# Patient Record
Sex: MALE | Race: WHITE | Employment: OTHER | URBAN - METROPOLITAN AREA
[De-identification: names, ages, dates, MRNs, and addresses within clinical notes are randomized per-mention and may not be internally consistent; named-entity substitution may affect disease eponyms.]

---

## 2018-03-02 PROBLEM — I25.10 CAD (CORONARY ARTERY DISEASE): Status: ACTIVE | Noted: 2018-03-02

## 2018-03-02 PROBLEM — E87.6 HYPOKALEMIA: Status: ACTIVE | Noted: 2018-03-02

## 2018-03-02 PROBLEM — R29.6 FALLS FREQUENTLY: Status: ACTIVE | Noted: 2018-03-02

## 2018-03-02 PROBLEM — R53.1 GENERALIZED WEAKNESS: Status: ACTIVE | Noted: 2018-03-02

## 2018-03-02 PROBLEM — E78.5 HLD (HYPERLIPIDEMIA): Status: ACTIVE | Noted: 2018-03-02

## 2018-03-02 PROBLEM — I10 HTN (HYPERTENSION): Status: ACTIVE | Noted: 2018-03-02

## 2018-03-03 PROBLEM — F03.90 DEMENTIA (HCC): Status: ACTIVE | Noted: 2018-03-03

## 2018-03-03 PROBLEM — E83.42 HYPOMAGNESEMIA: Status: ACTIVE | Noted: 2018-03-03

## 2018-03-03 PROBLEM — R53.1 WEAKNESS: Status: ACTIVE | Noted: 2018-03-03

## 2018-03-03 PROBLEM — E44.0 MODERATE MALNUTRITION (HCC): Chronic | Status: ACTIVE | Noted: 2018-03-03

## 2018-05-25 ENCOUNTER — HOSPITAL ENCOUNTER (OUTPATIENT)
Dept: OTHER | Age: 83
Discharge: HOME | End: 2018-05-25
Attending: FAMILY MEDICINE | Admitting: FAMILY MEDICINE

## 2018-05-25 DIAGNOSIS — R52 PAIN: ICD-10-CM

## 2018-06-22 ENCOUNTER — HOSPITAL ENCOUNTER (OUTPATIENT)
Dept: MRI IMAGING | Age: 83
Discharge: HOME | End: 2018-06-22
Attending: FAMILY MEDICINE | Admitting: FAMILY MEDICINE

## 2018-06-22 DIAGNOSIS — M54.2 NECK PAIN: ICD-10-CM

## 2018-06-22 DIAGNOSIS — M25.519 PAIN IN SHOULDER: ICD-10-CM

## 2018-10-17 ENCOUNTER — HOSPITAL ENCOUNTER (OUTPATIENT)
Dept: OPERATING ROOM | Age: 83
Setting detail: OUTPATIENT SURGERY
Discharge: HOME | End: 2018-10-17
Payer: MEDICARE

## 2018-10-17 VITALS
SYSTOLIC BLOOD PRESSURE: 155 MMHG | OXYGEN SATURATION: 96 % | DIASTOLIC BLOOD PRESSURE: 84 MMHG | RESPIRATION RATE: 20 BRPM | HEART RATE: 99 BPM

## 2018-10-17 PROCEDURE — 6370000000 HC RX 637 (ALT 250 FOR IP): Performed by: OPHTHALMOLOGY

## 2018-10-17 PROCEDURE — 66821 AFTER CATARACT LASER SURGERY: CPT

## 2018-10-17 RX ORDER — LANOLIN ALCOHOL/MO/W.PET/CERES
1000 CREAM (GRAM) TOPICAL DAILY
COMMUNITY
End: 2020-09-17

## 2018-10-17 RX ORDER — ACETAMINOPHEN 325 MG/1
650 TABLET ORAL EVERY 6 HOURS PRN
Status: ON HOLD | COMMUNITY

## 2018-10-17 RX ORDER — BISACODYL 10 MG
10 SUPPOSITORY, RECTAL RECTAL DAILY PRN
Status: ON HOLD | COMMUNITY

## 2018-10-17 RX ORDER — PREDNISOLONE ACETATE 10 MG/ML
1 SUSPENSION/ DROPS OPHTHALMIC
Status: COMPLETED | OUTPATIENT
Start: 2018-10-17 | End: 2018-10-17

## 2018-10-17 RX ORDER — TROPICAMIDE 10 MG/ML
1 SOLUTION/ DROPS OPHTHALMIC EVERY 5 MIN PRN
Status: DISCONTINUED | OUTPATIENT
Start: 2018-10-17 | End: 2018-10-18 | Stop reason: HOSPADM

## 2018-10-17 RX ORDER — PHENYLEPHRINE HCL 2.5 %
1 DROPS OPHTHALMIC (EYE) EVERY 5 MIN PRN
Status: DISCONTINUED | OUTPATIENT
Start: 2018-10-17 | End: 2018-10-18 | Stop reason: HOSPADM

## 2018-10-17 RX ORDER — PROPARACAINE HYDROCHLORIDE 5 MG/ML
1 SOLUTION/ DROPS OPHTHALMIC
Status: COMPLETED | OUTPATIENT
Start: 2018-10-17 | End: 2018-10-17

## 2018-10-17 RX ADMIN — PHENYLEPHRINE HYDROCHLORIDE 1 DROP: 25 SOLUTION/ DROPS OPHTHALMIC at 13:47

## 2018-10-17 RX ADMIN — PREDNISOLONE ACETATE 1 DROP: 10 SUSPENSION/ DROPS OPHTHALMIC at 14:24

## 2018-10-17 RX ADMIN — APRACLONIDINE HYDROCHLORIDE 1 DROP: 10 SOLUTION/ DROPS OPHTHALMIC at 13:47

## 2018-10-17 RX ADMIN — PROPARACAINE HYDROCHLORIDE 1 DROP: 5 SOLUTION/ DROPS OPHTHALMIC at 14:21

## 2018-10-17 RX ADMIN — TROPICAMIDE 1 DROP: 10 SOLUTION/ DROPS OPHTHALMIC at 13:47

## 2018-10-18 NOTE — OP NOTE
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 Sara Roldan M.D.

## 2018-10-24 ENCOUNTER — HOSPITAL ENCOUNTER (OUTPATIENT)
Dept: OPERATING ROOM | Age: 83
Setting detail: OUTPATIENT SURGERY
Discharge: HOME | End: 2018-10-24
Payer: MEDICARE

## 2018-10-24 VITALS — HEART RATE: 110 BPM | DIASTOLIC BLOOD PRESSURE: 86 MMHG | SYSTOLIC BLOOD PRESSURE: 140 MMHG

## 2018-10-24 PROCEDURE — 6370000000 HC RX 637 (ALT 250 FOR IP): Performed by: OPHTHALMOLOGY

## 2018-10-24 PROCEDURE — 66821 AFTER CATARACT LASER SURGERY: CPT

## 2018-10-24 RX ORDER — PROPARACAINE HYDROCHLORIDE 5 MG/ML
1 SOLUTION/ DROPS OPHTHALMIC ONCE
Status: COMPLETED | OUTPATIENT
Start: 2018-10-24 | End: 2018-10-24

## 2018-10-24 RX ORDER — PREDNISOLONE ACETATE 10 MG/ML
1 SUSPENSION/ DROPS OPHTHALMIC ONCE
Status: COMPLETED | OUTPATIENT
Start: 2018-10-24 | End: 2018-10-24

## 2018-10-24 RX ORDER — TROPICAMIDE 10 MG/ML
1 SOLUTION/ DROPS OPHTHALMIC
Status: COMPLETED | OUTPATIENT
Start: 2018-10-24 | End: 2018-10-24

## 2018-10-24 RX ORDER — PHENYLEPHRINE HCL 2.5 %
1 DROPS OPHTHALMIC (EYE)
Status: COMPLETED | OUTPATIENT
Start: 2018-10-24 | End: 2018-10-24

## 2018-10-24 RX ADMIN — PREDNISOLONE ACETATE 1 DROP: 10 SUSPENSION/ DROPS OPHTHALMIC at 15:15

## 2018-10-24 RX ADMIN — PHENYLEPHRINE HYDROCHLORIDE 1 DROP: 25 SOLUTION/ DROPS OPHTHALMIC at 14:27

## 2018-10-24 RX ADMIN — TROPICAMIDE 1 DROP: 10 SOLUTION/ DROPS OPHTHALMIC at 14:28

## 2018-10-24 RX ADMIN — APRACLONIDINE HYDROCHLORIDE 1 DROP: 10 SOLUTION/ DROPS OPHTHALMIC at 14:21

## 2018-10-24 RX ADMIN — PROPARACAINE HYDROCHLORIDE 1 DROP: 5 SOLUTION/ DROPS OPHTHALMIC at 15:11

## 2018-10-24 RX ADMIN — TROPICAMIDE 1 DROP: 10 SOLUTION/ DROPS OPHTHALMIC at 14:22

## 2018-10-24 RX ADMIN — PHENYLEPHRINE HYDROCHLORIDE 1 DROP: 25 SOLUTION/ DROPS OPHTHALMIC at 14:21

## 2018-10-24 NOTE — PROGRESS NOTES
Patient received discharge instruction and tolerated procedure well. All questions answered. ** Prescription drop instruction given.  Patient left via wheelchair and transport team.

## 2018-10-24 NOTE — PROGRESS NOTES
Discharge instructions reviewed with patient/responsible adult and understanding verbalized. Discharge instructions signed and copies given. Patient to be discharged home with belongings. [facility] is here with pt. And pt. Is in a w/c.

## 2018-10-25 NOTE — OP NOTE
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 Nikolas Ochoa M.D.

## 2018-10-28 ENCOUNTER — HOSPITAL ENCOUNTER (EMERGENCY)
Age: 83
Discharge: HOME | End: 2018-10-28
Attending: EMERGENCY MEDICINE
Payer: MEDICARE

## 2018-10-28 VITALS
TEMPERATURE: 98.1 F | SYSTOLIC BLOOD PRESSURE: 160 MMHG | HEART RATE: 86 BPM | RESPIRATION RATE: 16 BRPM | OXYGEN SATURATION: 97 % | HEIGHT: 70 IN | DIASTOLIC BLOOD PRESSURE: 80 MMHG | WEIGHT: 175 LBS | BODY MASS INDEX: 25.05 KG/M2

## 2018-10-28 DIAGNOSIS — S09.90XA ACUTE HEAD INJURY WITHOUT LOSS OF CONSCIOUSNESS, INITIAL ENCOUNTER: Primary | ICD-10-CM

## 2018-10-28 PROCEDURE — 99284 EMERGENCY DEPT VISIT MOD MDM: CPT

## 2018-10-28 PROCEDURE — 6360000002 HC RX W HCPCS: Performed by: EMERGENCY MEDICINE

## 2018-10-28 PROCEDURE — 90715 TDAP VACCINE 7 YRS/> IM: CPT | Performed by: EMERGENCY MEDICINE

## 2018-10-28 PROCEDURE — 90471 IMMUNIZATION ADMIN: CPT | Performed by: EMERGENCY MEDICINE

## 2018-10-28 RX ORDER — BACITRACIN ZINC AND POLYMYXIN B SULFATE 500; 1000 [USP'U]/G; [USP'U]/G
OINTMENT TOPICAL ONCE
Status: DISCONTINUED | OUTPATIENT
Start: 2018-10-28 | End: 2018-10-28 | Stop reason: HOSPADM

## 2018-10-28 RX ORDER — PREDNISOLONE ACETATE 10 MG/ML
1 SUSPENSION/ DROPS OPHTHALMIC 2 TIMES DAILY
COMMUNITY
End: 2019-05-28

## 2018-10-28 RX ADMIN — TETANUS TOXOID, REDUCED DIPHTHERIA TOXOID AND ACELLULAR PERTUSSIS VACCINE, ADSORBED 0.5 ML: 5; 2.5; 8; 8; 2.5 SUSPENSION INTRAMUSCULAR at 18:56

## 2018-10-28 ASSESSMENT — ENCOUNTER SYMPTOMS
BACK PAIN: 0
ABDOMINAL PAIN: 0
SHORTNESS OF BREATH: 0

## 2018-10-28 ASSESSMENT — PAIN DESCRIPTION - LOCATION: LOCATION: HEAD

## 2018-10-28 ASSESSMENT — PAIN SCALES - GENERAL: PAINLEVEL_OUTOF10: 2

## 2018-10-28 ASSESSMENT — PAIN DESCRIPTION - PAIN TYPE: TYPE: ACUTE PAIN

## 2018-10-28 NOTE — ED NOTES
Pt resting. Aware of transport back to [address] [place] [address].  Calling for ride at this time     Efren Cooley RN  10/28/18 4204

## 2018-10-28 NOTE — ED NOTES
Dressing applied. Wounds to left back side of head cleanded with wound cleanser and water. Polysporin applied. Telfa, 4x4 then secured with tape. Tolerated well.       Tien Stone RN  10/28/18 3120

## 2019-05-28 ENCOUNTER — HOSPITAL ENCOUNTER (EMERGENCY)
Age: 84
Discharge: HOME | End: 2019-05-28
Attending: EMERGENCY MEDICINE
Payer: MEDICARE

## 2019-05-28 ENCOUNTER — APPOINTMENT (OUTPATIENT)
Dept: GENERAL RADIOLOGY | Age: 84
End: 2019-05-28
Payer: MEDICARE

## 2019-05-28 VITALS
HEART RATE: 72 BPM | HEIGHT: 70 IN | OXYGEN SATURATION: 94 % | RESPIRATION RATE: 15 BRPM | TEMPERATURE: 98 F | SYSTOLIC BLOOD PRESSURE: 150 MMHG | BODY MASS INDEX: 25.05 KG/M2 | WEIGHT: 175 LBS | DIASTOLIC BLOOD PRESSURE: 89 MMHG

## 2019-05-28 LAB
A/G RATIO: 1 (ref 1.1–2.2)
ALBUMIN SERPL-MCNC: 3.8 G/DL (ref 3.4–5)
ALP BLD-CCNC: 130 U/L (ref 40–129)
ALT SERPL-CCNC: 9 U/L (ref 10–40)
ANION GAP SERPL CALCULATED.3IONS-SCNC: 13 MMOL/L (ref 3–16)
AST SERPL-CCNC: 13 U/L (ref 15–37)
BASOPHILS ABSOLUTE: 0.1 K/UL (ref 0–0.2)
BASOPHILS RELATIVE PERCENT: 0.6 %
BILIRUB SERPL-MCNC: 0.7 MG/DL (ref 0–1)
BUN BLDV-MCNC: 33 MG/DL (ref 7–20)
CALCIUM SERPL-MCNC: 9.6 MG/DL (ref 8.3–10.6)
CHLORIDE BLD-SCNC: 99 MMOL/L (ref 99–110)
CO2: 27 MMOL/L (ref 21–32)
CREAT SERPL-MCNC: 0.9 MG/DL (ref 0.8–1.3)
EKG ATRIAL RATE: 83 BPM
EKG DIAGNOSIS: NORMAL
EKG P AXIS: 66 DEGREES
EKG P-R INTERVAL: 136 MS
EKG Q-T INTERVAL: 372 MS
EKG QRS DURATION: 94 MS
EKG QTC CALCULATION (BAZETT): 437 MS
EKG R AXIS: -25 DEGREES
EKG T AXIS: 80 DEGREES
EKG VENTRICULAR RATE: 83 BPM
EOSINOPHILS ABSOLUTE: 0.9 K/UL (ref 0–0.6)
EOSINOPHILS RELATIVE PERCENT: 5.8 %
GFR AFRICAN AMERICAN: >60
GFR NON-AFRICAN AMERICAN: >60
GLOBULIN: 3.7 G/DL
GLUCOSE BLD-MCNC: 153 MG/DL (ref 70–99)
HCT VFR BLD CALC: 44.8 % (ref 40.5–52.5)
HEMOGLOBIN: 14.6 G/DL (ref 13.5–17.5)
LACTIC ACID: 1.3 MMOL/L (ref 0.4–2)
LACTIC ACID: 2.2 MMOL/L (ref 0.4–2)
LIPASE: 6 U/L (ref 13–60)
LYMPHOCYTES ABSOLUTE: 1.7 K/UL (ref 1–5.1)
LYMPHOCYTES RELATIVE PERCENT: 10.8 %
MCH RBC QN AUTO: 28.8 PG (ref 26–34)
MCHC RBC AUTO-ENTMCNC: 32.6 G/DL (ref 31–36)
MCV RBC AUTO: 88.5 FL (ref 80–100)
MONOCYTES ABSOLUTE: 1 K/UL (ref 0–1.3)
MONOCYTES RELATIVE PERCENT: 6 %
NEUTROPHILS ABSOLUTE: 12.4 K/UL (ref 1.7–7.7)
NEUTROPHILS RELATIVE PERCENT: 76.8 %
PDW BLD-RTO: 14.6 % (ref 12.4–15.4)
PLATELET # BLD: 323 K/UL (ref 135–450)
PMV BLD AUTO: 7.3 FL (ref 5–10.5)
POTASSIUM REFLEX MAGNESIUM: 3.9 MMOL/L (ref 3.5–5.1)
PRO-BNP: 95 PG/ML (ref 0–449)
RBC # BLD: 5.07 M/UL (ref 4.2–5.9)
SODIUM BLD-SCNC: 139 MMOL/L (ref 136–145)
TOTAL PROTEIN: 7.5 G/DL (ref 6.4–8.2)
TROPONIN: <0.01 NG/ML
TROPONIN: <0.01 NG/ML
WBC # BLD: 16.1 K/UL (ref 4–11)

## 2019-05-28 PROCEDURE — 85025 COMPLETE CBC W/AUTO DIFF WBC: CPT

## 2019-05-28 PROCEDURE — 84484 ASSAY OF TROPONIN QUANT: CPT

## 2019-05-28 PROCEDURE — 83690 ASSAY OF LIPASE: CPT

## 2019-05-28 PROCEDURE — 2580000003 HC RX 258: Performed by: PHYSICIAN ASSISTANT

## 2019-05-28 PROCEDURE — 36415 COLL VENOUS BLD VENIPUNCTURE: CPT

## 2019-05-28 PROCEDURE — 6370000000 HC RX 637 (ALT 250 FOR IP): Performed by: PHYSICIAN ASSISTANT

## 2019-05-28 PROCEDURE — 83605 ASSAY OF LACTIC ACID: CPT

## 2019-05-28 PROCEDURE — 80053 COMPREHEN METABOLIC PANEL: CPT

## 2019-05-28 PROCEDURE — 96360 HYDRATION IV INFUSION INIT: CPT

## 2019-05-28 PROCEDURE — 83880 ASSAY OF NATRIURETIC PEPTIDE: CPT

## 2019-05-28 PROCEDURE — 87040 BLOOD CULTURE FOR BACTERIA: CPT

## 2019-05-28 PROCEDURE — 93005 ELECTROCARDIOGRAM TRACING: CPT | Performed by: PHYSICIAN ASSISTANT

## 2019-05-28 PROCEDURE — 71045 X-RAY EXAM CHEST 1 VIEW: CPT

## 2019-05-28 PROCEDURE — 99285 EMERGENCY DEPT VISIT HI MDM: CPT

## 2019-05-28 PROCEDURE — 93010 ELECTROCARDIOGRAM REPORT: CPT | Performed by: INTERNAL MEDICINE

## 2019-05-28 RX ORDER — MELOXICAM 15 MG/1
15 TABLET ORAL DAILY
Status: ON HOLD | COMMUNITY
End: 2019-06-11 | Stop reason: HOSPADM

## 2019-05-28 RX ORDER — 0.9 % SODIUM CHLORIDE 0.9 %
1000 INTRAVENOUS SOLUTION INTRAVENOUS ONCE
Status: COMPLETED | OUTPATIENT
Start: 2019-05-28 | End: 2019-05-28

## 2019-05-28 RX ORDER — DOXYCYCLINE HYCLATE 100 MG
100 TABLET ORAL 2 TIMES DAILY
Qty: 14 TABLET | Refills: 0 | Status: SHIPPED | OUTPATIENT
Start: 2019-05-28 | End: 2019-06-04

## 2019-05-28 RX ORDER — GABAPENTIN 300 MG/1
400 CAPSULE ORAL 3 TIMES DAILY
COMMUNITY
End: 2020-09-17

## 2019-05-28 RX ORDER — BENZONATATE 100 MG/1
100 CAPSULE ORAL 3 TIMES DAILY PRN
Qty: 20 CAPSULE | Refills: 0 | Status: SHIPPED | OUTPATIENT
Start: 2019-05-28 | End: 2019-06-04

## 2019-05-28 RX ORDER — DOXYCYCLINE HYCLATE 100 MG
100 TABLET ORAL ONCE
Status: COMPLETED | OUTPATIENT
Start: 2019-05-28 | End: 2019-05-28

## 2019-05-28 RX ADMIN — SODIUM CHLORIDE 1000 ML: 9 INJECTION, SOLUTION INTRAVENOUS at 18:01

## 2019-05-28 RX ADMIN — DOXYCYCLINE HYCLATE 100 MG: 100 TABLET, COATED ORAL at 20:50

## 2019-05-28 ASSESSMENT — ENCOUNTER SYMPTOMS
COUGH: 1
VOMITING: 0
DIARRHEA: 1
SHORTNESS OF BREATH: 1
ABDOMINAL PAIN: 0

## 2019-05-28 ASSESSMENT — PAIN DESCRIPTION - FREQUENCY: FREQUENCY: INTERMITTENT

## 2019-05-28 ASSESSMENT — PAIN DESCRIPTION - LOCATION: LOCATION: RIB CAGE

## 2019-05-28 ASSESSMENT — PAIN SCALES - GENERAL: PAINLEVEL_OUTOF10: 6

## 2019-05-28 ASSESSMENT — PAIN DESCRIPTION - DESCRIPTORS: DESCRIPTORS: SHARP

## 2019-05-28 ASSESSMENT — PAIN DESCRIPTION - ORIENTATION: ORIENTATION: RIGHT;LEFT

## 2019-05-28 NOTE — ED NOTES
Warm blankets & H2O given via request, pt now resting with OU closed     Lisa Jain RN  05/28/19 9617

## 2019-05-28 NOTE — ED PROVIDER NOTES
Productive cough states atleast 2 weeks, chest congestion, chest soreness the past couple weeks off and on and worse today with coughing. Sob at times. COPD. Last neb treatment this morning. No known fever. Diarrhea. No vomiting. The history is provided by the patient. Cough   Cough characteristics:  Productive  Onset quality:  Gradual  Duration:  2 weeks  Progression:  Worsening  Chronicity:  New  Associated symptoms: chest pain and shortness of breath    Associated symptoms: no fever    Chest Pain   Pain location:  L chest and R chest  Pain quality: aching    Pain radiates to:  Does not radiate  Onset quality:  Gradual  Duration:  2 weeks  Progression:  Worsening  Chronicity:  New  Worsened by: Movement and coughing  Associated symptoms: cough and shortness of breath    Associated symptoms: no abdominal pain, no dizziness, no fever, no lower extremity edema, no palpitations, no syncope and no vomiting    Risk factors: coronary artery disease    Risk factors: no diabetes mellitus and not obese        Review of Systems   Constitutional: Negative for fever. HENT: Positive for congestion. Respiratory: Positive for cough and shortness of breath. Cardiovascular: Positive for chest pain. Negative for palpitations, leg swelling and syncope. Gastrointestinal: Positive for diarrhea. Negative for abdominal pain and vomiting. Neurological: Negative for dizziness and syncope. All other systems reviewed and are negative. PAST MEDICAL HISTORY   has a past medical history of Atherosclerotic heart disease, CAD (coronary artery disease), Cancer ([address] COPD (chronic obstructive pulmonary disease) ([address] Dementia, Depression, Falls frequently, Hernia, Hyperlipidemia, Hypertension, Lack of coordination, Melanoma ([address] MI (myocardial infarction) ([address] Muscle weakness (generalized), Protein calorie malnutrition ([address] Reflux, and Spinal stenosis.     PAST SURGICAL HISTORY   has a past surgical history that oropharyngeal edema or posterior oropharyngeal erythema. Eyes: Pupils are equal, round, and reactive to light. Conjunctivae and EOM are normal.   Neck: Normal range of motion. Neck supple. Cardiovascular: Normal rate, regular rhythm, normal heart sounds and intact distal pulses. Pulses:       Radial pulses are 2+ on the right side, and 2+ on the left side. Posterior tibial pulses are 2+ on the right side, and 2+ on the left side. Pulmonary/Chest: Effort normal and breath sounds normal. No stridor. No respiratory distress. He has no wheezes. He has no rales. Abdominal: Soft. Bowel sounds are normal. There is no tenderness. There is no rigidity, no rebound and no guarding. Musculoskeletal: Normal range of motion. He exhibits no edema. Neurological: He is alert. He has normal strength. No cranial nerve deficit or sensory deficit. He exhibits normal muscle tone. Coordination normal. GCS eye subscore is 4. GCS verbal subscore is 5. GCS motor subscore is 6. Skin: Skin is warm and dry. Psychiatric: He has a normal mood and affect. His speech is normal and behavior is normal. Thought content normal. Cognition and memory are normal.   Vitals reviewed.       Procedures    MDM  Number of Diagnoses or Management Options  Bronchitis:      Amount and/or Complexity of Data Reviewed  Clinical lab tests: ordered and reviewed  Tests in the radiology section of CPT®: ordered and reviewed  Review and summarize past medical records: yes  Discuss the patient with other providers: yes  Independent visualization of images, tracings, or specimens: yes    Patient Progress  Patient progress: stable    Results for orders placed or performed during the hospital encounter of 05/28/19   Lactic Acid, Plasma   Result Value Ref Range    Lactic Acid 2.2 (H) 0.4 - 2.0 mmol/L   CBC Auto Differential   Result Value Ref Range    WBC 16.1 (H) 4.0 - 11.0 K/uL    RBC 5.07 4.20 - 5.90 M/uL    Hemoglobin 14.6 13.5 - 17.5 g/dL discharge disposition reasonable. I estimate there is LOW risk for PNEUMONIA, MENINGITIS, PERITONSILLAR ABSCESS, SEPSIS, MALIGNANT OTITIS EXTERNA, OR EPIGLOTTITIS thus I consider the discharge disposition reasonable. Dr. Melvi Blake MD spent face to face time with patient and agrees with above dx and treatment. Please note that this chart was generated using Dragon dictation software.  Although every effort was made to ensure the accuracy of this automated transcription, some errors in transcription may have occurred       David Brock PA-C  05/28/19 2017

## 2019-05-28 NOTE — ED NOTES
Bed: 03  Expected date:   Expected time:   Means of arrival:   Comments:  -[facility] / [facility] pt     [facility]  05/28/19 9851

## 2019-05-28 NOTE — ED PROVIDER NOTES
discussed the diagnosis and risks, and we agree with discharging home to follow-up with their primary doctor. We also discussed returning to the Emergency Department immediately if new or worsening symptoms occur. We have discussed the symptoms which are most concerning (e.g., bloody sputum, fever, worsening pain or shortenss of breath, vomiting) that necessitate immediate return. FINAL IMPRESSION      1.  Bronchitis               Giovana Post MD  05/29/19 0677

## 2019-06-02 LAB
BLOOD CULTURE, ROUTINE: NORMAL
CULTURE, BLOOD 2: NORMAL

## 2019-06-09 ENCOUNTER — HOSPITAL ENCOUNTER (INPATIENT)
Age: 84
LOS: 2 days | Discharge: NURSING, REHAB OR LONG-TERM CARE | DRG: 384 | End: 2019-06-11
Attending: EMERGENCY MEDICINE | Admitting: INTERNAL MEDICINE
Payer: MEDICARE

## 2019-06-09 ENCOUNTER — APPOINTMENT (OUTPATIENT)
Dept: CT IMAGING | Age: 84
DRG: 384 | End: 2019-06-09
Payer: MEDICARE

## 2019-06-09 ENCOUNTER — APPOINTMENT (OUTPATIENT)
Dept: GENERAL RADIOLOGY | Age: 84
DRG: 384 | End: 2019-06-09
Payer: MEDICARE

## 2019-06-09 DIAGNOSIS — R10.13 ABDOMINAL PAIN, EPIGASTRIC: Primary | ICD-10-CM

## 2019-06-09 DIAGNOSIS — E86.0 DEHYDRATION: ICD-10-CM

## 2019-06-09 PROBLEM — R10.9 ABDOMINAL PAIN: Status: ACTIVE | Noted: 2019-06-09

## 2019-06-09 LAB
A/G RATIO: 1 (ref 1.1–2.2)
ALBUMIN SERPL-MCNC: 3.4 G/DL (ref 3.4–5)
ALP BLD-CCNC: 132 U/L (ref 40–129)
ALT SERPL-CCNC: 13 U/L (ref 10–40)
ANION GAP SERPL CALCULATED.3IONS-SCNC: 11 MMOL/L (ref 3–16)
AST SERPL-CCNC: 16 U/L (ref 15–37)
BASOPHILS ABSOLUTE: 0.1 K/UL (ref 0–0.2)
BASOPHILS RELATIVE PERCENT: 0.4 %
BILIRUB SERPL-MCNC: 0.9 MG/DL (ref 0–1)
BUN BLDV-MCNC: 52 MG/DL (ref 7–20)
CALCIUM SERPL-MCNC: 9.7 MG/DL (ref 8.3–10.6)
CHLORIDE BLD-SCNC: 101 MMOL/L (ref 99–110)
CO2: 28 MMOL/L (ref 21–32)
CREAT SERPL-MCNC: 1 MG/DL (ref 0.8–1.3)
EOSINOPHILS ABSOLUTE: 0.7 K/UL (ref 0–0.6)
EOSINOPHILS RELATIVE PERCENT: 4.2 %
GFR AFRICAN AMERICAN: >60
GFR NON-AFRICAN AMERICAN: >60
GLOBULIN: 3.3 G/DL
GLUCOSE BLD-MCNC: 168 MG/DL (ref 70–99)
HCT VFR BLD CALC: 32.6 % (ref 40.5–52.5)
HEMOGLOBIN: 10.9 G/DL (ref 13.5–17.5)
INR BLD: 1.43 (ref 0.86–1.14)
LIPASE: 7 U/L (ref 13–60)
LYMPHOCYTES ABSOLUTE: 2 K/UL (ref 1–5.1)
LYMPHOCYTES RELATIVE PERCENT: 11.3 %
MCH RBC QN AUTO: 29.9 PG (ref 26–34)
MCHC RBC AUTO-ENTMCNC: 33.3 G/DL (ref 31–36)
MCV RBC AUTO: 89.7 FL (ref 80–100)
MONOCYTES ABSOLUTE: 1.1 K/UL (ref 0–1.3)
MONOCYTES RELATIVE PERCENT: 6.5 %
NEUTROPHILS ABSOLUTE: 13.6 K/UL (ref 1.7–7.7)
NEUTROPHILS RELATIVE PERCENT: 77.6 %
PDW BLD-RTO: 14.2 % (ref 12.4–15.4)
PLATELET # BLD: 372 K/UL (ref 135–450)
PMV BLD AUTO: 8.2 FL (ref 5–10.5)
POTASSIUM SERPL-SCNC: 4.6 MMOL/L (ref 3.5–5.1)
PRO-BNP: 75 PG/ML (ref 0–449)
PROTHROMBIN TIME: 16.3 SEC (ref 9.8–13)
RBC # BLD: 3.63 M/UL (ref 4.2–5.9)
SODIUM BLD-SCNC: 140 MMOL/L (ref 136–145)
TOTAL PROTEIN: 6.7 G/DL (ref 6.4–8.2)
TROPONIN: <0.01 NG/ML
WBC # BLD: 17.5 K/UL (ref 4–11)

## 2019-06-09 PROCEDURE — 6370000000 HC RX 637 (ALT 250 FOR IP): Performed by: NURSE PRACTITIONER

## 2019-06-09 PROCEDURE — 6360000004 HC RX CONTRAST MEDICATION: Performed by: NURSE PRACTITIONER

## 2019-06-09 PROCEDURE — 6370000000 HC RX 637 (ALT 250 FOR IP): Performed by: INTERNAL MEDICINE

## 2019-06-09 PROCEDURE — 96376 TX/PRO/DX INJ SAME DRUG ADON: CPT

## 2019-06-09 PROCEDURE — 2580000003 HC RX 258: Performed by: NURSE PRACTITIONER

## 2019-06-09 PROCEDURE — 1200000000 HC SEMI PRIVATE

## 2019-06-09 PROCEDURE — 71045 X-RAY EXAM CHEST 1 VIEW: CPT

## 2019-06-09 PROCEDURE — 85610 PROTHROMBIN TIME: CPT

## 2019-06-09 PROCEDURE — G0378 HOSPITAL OBSERVATION PER HR: HCPCS

## 2019-06-09 PROCEDURE — 2580000003 HC RX 258: Performed by: INTERNAL MEDICINE

## 2019-06-09 PROCEDURE — 6360000002 HC RX W HCPCS: Performed by: INTERNAL MEDICINE

## 2019-06-09 PROCEDURE — 80053 COMPREHEN METABOLIC PANEL: CPT

## 2019-06-09 PROCEDURE — 84484 ASSAY OF TROPONIN QUANT: CPT

## 2019-06-09 PROCEDURE — 83690 ASSAY OF LIPASE: CPT

## 2019-06-09 PROCEDURE — 74177 CT ABD & PELVIS W/CONTRAST: CPT

## 2019-06-09 PROCEDURE — 93005 ELECTROCARDIOGRAM TRACING: CPT | Performed by: NURSE PRACTITIONER

## 2019-06-09 PROCEDURE — 96365 THER/PROPH/DIAG IV INF INIT: CPT

## 2019-06-09 PROCEDURE — 85025 COMPLETE CBC W/AUTO DIFF WBC: CPT

## 2019-06-09 PROCEDURE — 96372 THER/PROPH/DIAG INJ SC/IM: CPT

## 2019-06-09 PROCEDURE — 2500000003 HC RX 250 WO HCPCS: Performed by: NURSE PRACTITIONER

## 2019-06-09 PROCEDURE — 99285 EMERGENCY DEPT VISIT HI MDM: CPT

## 2019-06-09 PROCEDURE — 83880 ASSAY OF NATRIURETIC PEPTIDE: CPT

## 2019-06-09 RX ORDER — SODIUM CHLORIDE 0.9 % (FLUSH) 0.9 %
10 SYRINGE (ML) INJECTION EVERY 12 HOURS SCHEDULED
Status: DISCONTINUED | OUTPATIENT
Start: 2019-06-09 | End: 2019-06-11 | Stop reason: HOSPADM

## 2019-06-09 RX ORDER — POTASSIUM CHLORIDE 7.45 MG/ML
10 INJECTION INTRAVENOUS PRN
Status: DISCONTINUED | OUTPATIENT
Start: 2019-06-09 | End: 2019-06-11 | Stop reason: HOSPADM

## 2019-06-09 RX ORDER — NITROGLYCERIN 0.4 MG/1
0.4 TABLET SUBLINGUAL ONCE
Status: COMPLETED | OUTPATIENT
Start: 2019-06-09 | End: 2019-06-09

## 2019-06-09 RX ORDER — BISACODYL 10 MG
10 SUPPOSITORY, RECTAL RECTAL DAILY
Status: DISCONTINUED | OUTPATIENT
Start: 2019-06-09 | End: 2019-06-11 | Stop reason: HOSPADM

## 2019-06-09 RX ORDER — GABAPENTIN 400 MG/1
400 CAPSULE ORAL 3 TIMES DAILY
Status: DISCONTINUED | OUTPATIENT
Start: 2019-06-09 | End: 2019-06-11 | Stop reason: HOSPADM

## 2019-06-09 RX ORDER — PHYTONADIONE 10 MG/ML
10 INJECTION, EMULSION INTRAMUSCULAR; INTRAVENOUS; SUBCUTANEOUS ONCE
Status: COMPLETED | OUTPATIENT
Start: 2019-06-09 | End: 2019-06-10

## 2019-06-09 RX ORDER — SODIUM CHLORIDE 0.9 % (FLUSH) 0.9 %
10 SYRINGE (ML) INJECTION PRN
Status: DISCONTINUED | OUTPATIENT
Start: 2019-06-09 | End: 2019-06-11 | Stop reason: HOSPADM

## 2019-06-09 RX ORDER — CHOLECALCIFEROL (VITAMIN D3) 125 MCG
1000 CAPSULE ORAL DAILY
Status: DISCONTINUED | OUTPATIENT
Start: 2019-06-09 | End: 2019-06-11 | Stop reason: HOSPADM

## 2019-06-09 RX ORDER — ACETAMINOPHEN 325 MG/1
650 TABLET ORAL EVERY 6 HOURS PRN
Status: DISCONTINUED | OUTPATIENT
Start: 2019-06-09 | End: 2019-06-09 | Stop reason: SDUPTHER

## 2019-06-09 RX ORDER — ONDANSETRON 2 MG/ML
4 INJECTION INTRAMUSCULAR; INTRAVENOUS EVERY 6 HOURS PRN
Status: DISCONTINUED | OUTPATIENT
Start: 2019-06-09 | End: 2019-06-11 | Stop reason: HOSPADM

## 2019-06-09 RX ORDER — AMITRIPTYLINE HYDROCHLORIDE 50 MG/1
50 TABLET, FILM COATED ORAL NIGHTLY
Status: DISCONTINUED | OUTPATIENT
Start: 2019-06-09 | End: 2019-06-11 | Stop reason: HOSPADM

## 2019-06-09 RX ORDER — BENZONATATE 100 MG/1
100 CAPSULE ORAL 3 TIMES DAILY PRN
Status: ON HOLD | COMMUNITY
End: 2019-06-11 | Stop reason: HOSPADM

## 2019-06-09 RX ORDER — MAGNESIUM SULFATE 1 G/100ML
1 INJECTION INTRAVENOUS PRN
Status: DISCONTINUED | OUTPATIENT
Start: 2019-06-09 | End: 2019-06-11 | Stop reason: HOSPADM

## 2019-06-09 RX ORDER — ACETAMINOPHEN 325 MG/1
650 TABLET ORAL EVERY 4 HOURS PRN
Status: DISCONTINUED | OUTPATIENT
Start: 2019-06-09 | End: 2019-06-11 | Stop reason: HOSPADM

## 2019-06-09 RX ORDER — SODIUM CHLORIDE 9 MG/ML
INJECTION, SOLUTION INTRAVENOUS CONTINUOUS
Status: DISCONTINUED | OUTPATIENT
Start: 2019-06-09 | End: 2019-06-11

## 2019-06-09 RX ORDER — POTASSIUM CHLORIDE 750 MG/1
40 TABLET, EXTENDED RELEASE ORAL PRN
Status: DISCONTINUED | OUTPATIENT
Start: 2019-06-09 | End: 2019-06-11 | Stop reason: HOSPADM

## 2019-06-09 RX ADMIN — BISACODYL 10 MG: 10 SUPPOSITORY RECTAL at 18:05

## 2019-06-09 RX ADMIN — IOPAMIDOL 75 ML: 755 INJECTION, SOLUTION INTRAVENOUS at 13:14

## 2019-06-09 RX ADMIN — GABAPENTIN 400 MG: 400 CAPSULE ORAL at 20:56

## 2019-06-09 RX ADMIN — Medication 1000 MCG: at 18:05

## 2019-06-09 RX ADMIN — NITROGLYCERIN 0.4 MG: 0.4 TABLET SUBLINGUAL at 12:59

## 2019-06-09 RX ADMIN — SODIUM CHLORIDE 80 MG: 9 INJECTION, SOLUTION INTRAVENOUS at 15:06

## 2019-06-09 RX ADMIN — AMITRIPTYLINE HYDROCHLORIDE 50 MG: 50 TABLET, FILM COATED ORAL at 20:56

## 2019-06-09 RX ADMIN — SODIUM CHLORIDE: 9 INJECTION, SOLUTION INTRAVENOUS at 18:04

## 2019-06-09 RX ADMIN — ESOMEPRAZOLE SODIUM 8 MG/HR: 40 INJECTION, POWDER, LYOPHILIZED, FOR SOLUTION INTRAVENOUS at 15:39

## 2019-06-09 RX ADMIN — ENOXAPARIN SODIUM 40 MG: 40 INJECTION SUBCUTANEOUS at 18:04

## 2019-06-09 ASSESSMENT — PAIN DESCRIPTION - LOCATION
LOCATION: CHEST
LOCATION: CHEST

## 2019-06-09 ASSESSMENT — ENCOUNTER SYMPTOMS
DIARRHEA: 0
VOMITING: 0
CHEST TIGHTNESS: 0
NAUSEA: 0
SHORTNESS OF BREATH: 0
ABDOMINAL PAIN: 1

## 2019-06-09 ASSESSMENT — PAIN SCALES - GENERAL
PAINLEVEL_OUTOF10: 6
PAINLEVEL_OUTOF10: 0

## 2019-06-09 NOTE — ED PROVIDER NOTES
[address] ED  EMERGENCY DEPARTMENT ENCOUNTER        Pt Name: Christiano Homes: [id number]  Kayleighgfnolan 1935  Date of evaluation: 6/9/2019  Provider: PRAVEEN Medina - CNP  PCP: Stefani Longoria MD    This patient was seen and evaluated by the attending physician Natividad Lozano       Chief Complaint   Patient presents with    Abdominal Pain    Chest Pain       HISTORY OF PRESENT ILLNESS   (Location/Symptom, Timing/Onset, Context/Setting, Quality, Duration, Modifying Factors, Severity)  Note limiting factors. Khang Smiley is a 82 y.o. male presents to the emergency department from [facility] with chief complaint of abdominal pain. But after talking with patient, I did learn that he is having chest pain with radiation to both arms. States that the discomfort began this morning without mitigating exacerbating factors. Describes the pain as squeezing and tight. He does have history of hyperlipidemia, hypertension, and CAD. He denies nausea vomiting or diarrhea. States that the pain has been constant but worsening since time of onset. Denies any headache, fever, lightheadedness, dizziness, visual disturbances. No neck or back pain. No shortness of breath, cough, or congestion. No nausea, vomiting, diarrhea, constipation, or dysuria. No rash. Nursing Notes were all reviewed and agreed with or any disagreements were addressed  in the HPI. REVIEW OF SYSTEMS    (2-9 systems for level 4, 10 or more for level 5)     Review of Systems   Constitutional: Negative for activity change, chills and fever. Respiratory: Negative for chest tightness and shortness of breath. Cardiovascular: Positive for chest pain. Gastrointestinal: Positive for abdominal pain. Negative for diarrhea, nausea and vomiting. Genitourinary: Negative for dysuria. All other systems reviewed and are negative. Positives and Pertinent negatives as per HPI.   Except      Spouse name: None    Number of children: None    Years of education: None    Highest education level: None   Occupational History    None   Social Needs    Financial resource strain: None    Food insecurity:     Worry: None     Inability: None    Transportation needs:     Medical: None     Non-medical: None   Tobacco Use    Smoking status: Former Smoker    Smokeless tobacco: Never Used   Substance and Sexual Activity    Alcohol use: No    Drug use: No    Sexual activity: Not Currently   Lifestyle    Physical activity:     Days per week: None     Minutes per session: None    Stress: None   Relationships    Social connections:     Talks on phone: None     Gets together: None     Attends Quaker service: None     Active member of club or organization: None     Attends meetings of clubs or organizations: None     Relationship status: None    Intimate partner violence:     Fear of current or ex partner: None     Emotionally abused: None     Physically abused: None     Forced sexual activity: None   Other Topics Concern    None   Social History Narrative    None       SCREENINGS    [place] Coma Scale  Eye Opening: Spontaneous  Best Verbal Response: Oriented  Best Motor Response: Obeys commands  [place] Coma Scale Score: 15        PHYSICAL EXAM    (up to 7 for level 4, 8 or more for level 5)     ED Triage Vitals   BP Temp Temp src Pulse Resp SpO2 Height Weight   -- -- -- -- -- -- -- --       Physical Exam   Constitutional: He is oriented to person, place, and time. He appears well-developed and well-nourished. No distress. HENT:   Head: Normocephalic and atraumatic. Right Ear: External ear normal.   Left Ear: External ear normal.   Eyes: Right eye exhibits no discharge. Left eye exhibits no discharge. Neck: Normal range of motion. Neck supple. No JVD present. Cardiovascular: Normal rate and regular rhythm. Exam reveals no friction rub. No murmur heard.   Pulmonary/Chest: Effort

## 2019-06-09 NOTE — PLAN OF CARE
Nursing home patient with abdominal pain  CT abdomen suggestive of duodenal ulcer  MedSurg admit, GI consult for EGD

## 2019-06-09 NOTE — ED NOTES
1419-Perfect serve sent to Dr. Katina Lora for admission     [phone]- Call was returned by Dr. Katina Lora and spoke to [facility] ST TIFFANIE Bragg  06/09/19 [place]  06/09/19 9404

## 2019-06-09 NOTE — ED NOTES
Bed: 15  Expected date: 6/9/19  Expected time: 12:05 PM  Means of arrival:   Comments:  sintia Farrar  06/09/19 1201

## 2019-06-10 ENCOUNTER — ANESTHESIA EVENT (OUTPATIENT)
Dept: ENDOSCOPY | Age: 84
DRG: 384 | End: 2019-06-10
Payer: MEDICARE

## 2019-06-10 ENCOUNTER — ANESTHESIA (OUTPATIENT)
Dept: ENDOSCOPY | Age: 84
DRG: 384 | End: 2019-06-10
Payer: MEDICARE

## 2019-06-10 VITALS — DIASTOLIC BLOOD PRESSURE: 65 MMHG | OXYGEN SATURATION: 99 % | SYSTOLIC BLOOD PRESSURE: 122 MMHG

## 2019-06-10 LAB
ANION GAP SERPL CALCULATED.3IONS-SCNC: 9 MMOL/L (ref 3–16)
BASOPHILS ABSOLUTE: 0.1 K/UL (ref 0–0.2)
BASOPHILS RELATIVE PERCENT: 0.6 %
BUN BLDV-MCNC: 45 MG/DL (ref 7–20)
CALCIUM SERPL-MCNC: 8.9 MG/DL (ref 8.3–10.6)
CHLORIDE BLD-SCNC: 102 MMOL/L (ref 99–110)
CO2: 28 MMOL/L (ref 21–32)
CREAT SERPL-MCNC: 1.2 MG/DL (ref 0.8–1.3)
EKG ATRIAL RATE: 78 BPM
EKG ATRIAL RATE: 87 BPM
EKG DIAGNOSIS: NORMAL
EKG DIAGNOSIS: NORMAL
EKG P AXIS: 67 DEGREES
EKG P AXIS: 68 DEGREES
EKG P-R INTERVAL: 150 MS
EKG P-R INTERVAL: 160 MS
EKG Q-T INTERVAL: 378 MS
EKG Q-T INTERVAL: 386 MS
EKG QRS DURATION: 100 MS
EKG QRS DURATION: 92 MS
EKG QTC CALCULATION (BAZETT): 440 MS
EKG QTC CALCULATION (BAZETT): 454 MS
EKG R AXIS: 14 DEGREES
EKG R AXIS: 33 DEGREES
EKG T AXIS: 64 DEGREES
EKG T AXIS: 64 DEGREES
EKG VENTRICULAR RATE: 78 BPM
EKG VENTRICULAR RATE: 87 BPM
EOSINOPHILS ABSOLUTE: 0.7 K/UL (ref 0–0.6)
EOSINOPHILS RELATIVE PERCENT: 5 %
GFR AFRICAN AMERICAN: >60
GFR NON-AFRICAN AMERICAN: 58
GLUCOSE BLD-MCNC: 120 MG/DL (ref 70–99)
HCT VFR BLD CALC: 27.9 % (ref 40.5–52.5)
HEMOGLOBIN: 9.2 G/DL (ref 13.5–17.5)
LYMPHOCYTES ABSOLUTE: 1.9 K/UL (ref 1–5.1)
LYMPHOCYTES RELATIVE PERCENT: 12.9 %
MCH RBC QN AUTO: 29.7 PG (ref 26–34)
MCHC RBC AUTO-ENTMCNC: 33 G/DL (ref 31–36)
MCV RBC AUTO: 90.1 FL (ref 80–100)
MONOCYTES ABSOLUTE: 1.1 K/UL (ref 0–1.3)
MONOCYTES RELATIVE PERCENT: 7.3 %
NEUTROPHILS ABSOLUTE: 11.2 K/UL (ref 1.7–7.7)
NEUTROPHILS RELATIVE PERCENT: 74.2 %
PDW BLD-RTO: 14.8 % (ref 12.4–15.4)
PLATELET # BLD: 333 K/UL (ref 135–450)
PMV BLD AUTO: 8.1 FL (ref 5–10.5)
POTASSIUM REFLEX MAGNESIUM: 4.7 MMOL/L (ref 3.5–5.1)
RBC # BLD: 3.1 M/UL (ref 4.2–5.9)
SODIUM BLD-SCNC: 139 MMOL/L (ref 136–145)
TROPONIN: <0.01 NG/ML
TROPONIN: <0.01 NG/ML
WBC # BLD: 15.1 K/UL (ref 4–11)

## 2019-06-10 PROCEDURE — 2500000003 HC RX 250 WO HCPCS: Performed by: INTERNAL MEDICINE

## 2019-06-10 PROCEDURE — 93010 ELECTROCARDIOGRAM REPORT: CPT | Performed by: INTERNAL MEDICINE

## 2019-06-10 PROCEDURE — 6370000000 HC RX 637 (ALT 250 FOR IP): Performed by: INTERNAL MEDICINE

## 2019-06-10 PROCEDURE — G0378 HOSPITAL OBSERVATION PER HR: HCPCS

## 2019-06-10 PROCEDURE — 85025 COMPLETE CBC W/AUTO DIFF WBC: CPT

## 2019-06-10 PROCEDURE — 6360000002 HC RX W HCPCS: Performed by: HOSPITALIST

## 2019-06-10 PROCEDURE — 2580000003 HC RX 258: Performed by: INTERNAL MEDICINE

## 2019-06-10 PROCEDURE — 36415 COLL VENOUS BLD VENIPUNCTURE: CPT

## 2019-06-10 PROCEDURE — 1200000000 HC SEMI PRIVATE

## 2019-06-10 PROCEDURE — 93005 ELECTROCARDIOGRAM TRACING: CPT | Performed by: INTERNAL MEDICINE

## 2019-06-10 PROCEDURE — 84484 ASSAY OF TROPONIN QUANT: CPT

## 2019-06-10 PROCEDURE — 80048 BASIC METABOLIC PNL TOTAL CA: CPT

## 2019-06-10 PROCEDURE — 7100000010 HC PHASE II RECOVERY - FIRST 15 MIN: Performed by: INTERNAL MEDICINE

## 2019-06-10 PROCEDURE — 99232 SBSQ HOSP IP/OBS MODERATE 35: CPT | Performed by: INTERNAL MEDICINE

## 2019-06-10 RX ADMIN — ESOMEPRAZOLE SODIUM 8 MG/HR: 40 INJECTION, POWDER, LYOPHILIZED, FOR SOLUTION INTRAVENOUS at 03:26

## 2019-06-10 RX ADMIN — PHYTONADIONE 10 MG: 10 INJECTION, EMULSION INTRAMUSCULAR; INTRAVENOUS; SUBCUTANEOUS at 00:10

## 2019-06-10 RX ADMIN — AMITRIPTYLINE HYDROCHLORIDE 50 MG: 50 TABLET, FILM COATED ORAL at 20:45

## 2019-06-10 RX ADMIN — ESOMEPRAZOLE SODIUM 8 MG/HR: 40 INJECTION, POWDER, LYOPHILIZED, FOR SOLUTION INTRAVENOUS at 13:59

## 2019-06-10 RX ADMIN — SODIUM CHLORIDE: 9 INJECTION, SOLUTION INTRAVENOUS at 19:49

## 2019-06-10 RX ADMIN — SODIUM CHLORIDE: 9 INJECTION, SOLUTION INTRAVENOUS at 06:28

## 2019-06-10 RX ADMIN — Medication 10 ML: at 20:45

## 2019-06-10 RX ADMIN — GABAPENTIN 400 MG: 400 CAPSULE ORAL at 14:18

## 2019-06-10 RX ADMIN — GABAPENTIN 400 MG: 400 CAPSULE ORAL at 20:45

## 2019-06-10 ASSESSMENT — PAIN SCALES - GENERAL
PAINLEVEL_OUTOF10: 0
PAINLEVEL_OUTOF10: 0

## 2019-06-10 ASSESSMENT — PAIN - FUNCTIONAL ASSESSMENT: PAIN_FUNCTIONAL_ASSESSMENT: 0-10

## 2019-06-10 NOTE — ANESTHESIA PRE PROCEDURE
MG tablet Take 50 mg by mouth nightly    omeprazole (PRILOSEC) 20 MG capsule Take 1 capsule by mouth Daily     Current medications:     esomeprazole (NEXIUM)   8 mg/hr Intravenous Continuous    amitriptyline (ELAVIL) tablet 50 mg  50 mg Oral Nightly    bisacodyl (DULCOLAX) suppository 10 mg  10 mg Rectal Daily    gabapentin (NEURONTIN) capsule 400 mg  400 mg Oral TID    vitamin B-12 (CYANOCOBALAMIN)   1,000 mcg Oral Daily    potassium chloride (KLOR-CON M) extended release tablet 40 mEq  40 mEq Oral PRN       potassium bicarb-citric acid (EFFER-K)   40 mEq Oral PRN       potassium chloride 10 mEq/100 mL  10 mEq Intravenous PRN    magnesium sulfate 1 g  1 g Intravenous PRN    magnesium hydroxide (MILK OF MAGNESIA) 400 MG/5ML suspension  30 mL Oral Daily PRN    ondansetron (ZOFRAN) injection 4 mg  4 mg Intravenous Q6H PRN    acetaminophen (TYLENOL) tablet 650 mg  650 mg Oral Q4H PRN     Allergies:     Fluoxetine     Benazepril      Swollen tongue    Hydrocodone Other (See Comments)    Morphine     Penicillins Other (See Comments)    Simvastatin Other (See Comments)    Morphine And Related Nausea And Vomiting    Sucralfate Rash     Problem List:     Hypokalemia E87.6    Generalized weakness R53.1    Falls frequently R29.6    Hypokalemia E87.6    CAD (coronary artery disease) I25.10    HTN (hypertension) I10    HLD (hyperlipidemia) E78.5    Hypomagnesemia E83.42    Weakness R53.1    Dementia F03.90    Moderate malnutrition (HCC) E44.0    Abdominal pain R10.9     Past Medical History:     Atherosclerotic heart disease     CAD (coronary artery disease)     Cancer (HCC)     skin    COPD (chronic obstructive pulmonary disease) (HCC)     Dementia     Depression     Falls frequently     Hernia     Hyperlipidemia     Hypertension     Lack of coordination     falls    Melanoma ([address]     MI (myocardial infarction) ([address]     Muscle weakness (generalized)     Protein calorie malnutrition [facility]     Reflux     Spinal stenosis      Past Surgical History:    Procedure Laterality Date    CHOLECYSTECTOMY      COLONOSCOPY  8/10/12    CORONARY ANGIOPLASTY WITH STENT PLACEMENT      PROSTATE BIOPSY      SKIN BIOPSY      UPPER GASTROINTESTINAL ENDOSCOPY  12     Social History:   Smoking status: Former Smoker    Smokeless tobacco: Never Used   Substance Use Topics    Alcohol use: No      Vital Signs (Current):   BP: 105/59 Pulse: 88   Resp: 18 SpO2: 93   Temp: 97.1 °F (36.2 °C)   Height: 5' 11\" (1.803 m)  (19) Weight: 162 lb 9.6 oz (73.8 kg)  (06/10/19)   BMI: 22.68           BP Readings from Last 3 Encounters:   06/10/19 (!) 105/59   19 (!) 150/89   10/28/18 (!) 160/80     NPO Status: > 8 hrs    CBC:    WBC 15.1 06/10/2019    HGB 9.2 06/10/2019    HCT 27.9 06/10/2019     06/10/2019     CMP:     06/10/2019    K 4.7 06/10/2019     06/10/2019    CO2 28 06/10/2019    BUN 45 06/10/2019    CREATININE 1.2 06/10/2019    GLUCOSE 120 06/10/2019    PROT 6.7 2019    PROT 7.1 2013    CALCIUM 8.9 06/10/2019    BILITOT 0.9 2019    ALKPHOS 132 2019    AST 16 2019    ALT 13 2019     Coags:    PROTIME 16.3 2019    INR 1.43 2019     Anesthesia Evaluation  Patient summary reviewed and Nursing notes reviewed  Airway: Mallampati: II  TM distance: >3 FB   Neck ROM: full  Comment: Heavy beard  Mouth opening: > = 3 FB Dental:    (+) edentulous      Pulmonary:   (+) COPD:                             Cardiovascular:  Exercise tolerance: good (>4 METS),   (+) past MI: > 6 months, CAD: obstructive, CABG/stent: no interval change, hyperlipidemia    (-)  angina and  FULTON    ECG reviewed      Echocardiogram reviewed               ROS comment: EK2019 Sinus rhythm 87; frequent Premature ventricular complexes; Nonspecific ST and T wave abnormality. When compared with ECG of  .28.19 PVC are now present.     ECHO: 3/3/18  Normal left ventricle systolic function with an estimated ejection fraction of 55%. No regional wall motion abnormalities are seen. Grade I diastolic dysfunction with normal filing pressure. Aortic valve sclerosis without stenosis. Neuro/Psych:   (+) depression/anxiety    (-) seizures            ROS comment: +Dementia GI/Hepatic/Renal:            ROS comment: See HPI. Endo/Other:    (+) blood dyscrasia: anemia:., .    (-) diabetes mellitus, hypothyroidism               Abdominal:           Vascular:     - DVT and PE. Anesthesia Plan      general and TIVA     ASA 3       Induction: intravenous. Anesthetic plan and risks discussed with patient. Plan discussed with CRNA.             Lima Stephenson MD

## 2019-06-10 NOTE — DISCHARGE INSTR - COC
Continuity of Care Form    Patient Name: Lisseth Amaya   :  1935  MRN:  [id number]    Admit date:  2019  Discharge date:  19    Code Status Order: Full Code   Advance Directives:   Advance Care Flowsheet Documentation     Date/Time Healthcare Directive Type of Healthcare Directive Copy in [address] Agent's Name Healthcare Agent's Phone Number    19 1714  No, patient does not have an advance directive for healthcare treatment -- -- -- -- --          Admitting Physician:  John Naranjo MD  PCP: Eddie Chacon MD    Discharging Nurse: [facility] Unit/Room#: 9971/4379-40  Discharging Unit Phone Number: [phone]    Emergency Contact:   Extended Emergency Contact Information  Primary Emergency Contact: [address] Phone: [phone]  Relation: Brother/Sister  Secondary Emergency Contact: [address] [place] [address] [address] [place] [address] Phone: [phone]  Relation: Other  Guardian: Gabi Still  [employer] Phone: [phone]  Relation: Legal Guardian  Preferred language: English   needed?  No    Past Surgical History:  Past Surgical History:   Procedure Laterality Date    CHOLECYSTECTOMY      COLONOSCOPY  8/10/12    CORONARY ANGIOPLASTY WITH STENT PLACEMENT      PROSTATE BIOPSY      SKIN BIOPSY      UPPER GASTROINTESTINAL ENDOSCOPY  12       Immunization History:   Immunization History   Administered Date(s) Administered    Tdap (Boostrix, Adacel) 10/28/2018       Active Problems:  Patient Active Problem List   Diagnosis Code    Hypokalemia E87.6    Generalized weakness R53.1    Falls frequently R29.6    Hypokalemia E87.6    CAD (coronary artery disease) I25.10    HTN (hypertension) I10    HLD (hyperlipidemia) E78.5    Hypomagnesemia E83.42    Weakness R53.1    Dementia F03.90    Moderate malnutrition (HCC) E44.0    Abdominal pain R10.9    Abdominal pain, epigastric R10.13    Anemia D64.9

## 2019-06-10 NOTE — ANESTHESIA POSTPROCEDURE EVALUATION
Department of Anesthesiology  Postprocedure Note    Patient: Vasu Bowen  MRN: [id number]  YOB: 1935  Date of evaluation: 6/10/2019  Time:  12:23 PM     Procedure Summary     Date:  06/10/19 [facility]    Anesthesia Start:  4924 Anesthesia Stop:  6368    Procedure:  EGD W/ANES. (N/A ) Diagnosis:  (ABDOMINAL PAIN)    Surgeon:  Anant Chauhan DO Responsible Provider:  Jerome Severino MD    Anesthesia Type  ASA Status:  3        Anesthesia Type:none    Aly Phase I: Aly Score: 10    Last vitals: Reviewed and per EMR flowsheets.      Anesthesia Post Evaluation   CASE CANCELLED BY DR. FARMER-- NO ANESTHETIC WAS GIVEN

## 2019-06-10 NOTE — FLOWSHEET NOTE
06/10/19 0845   Vital Signs   Temp 97.1 °F (36.2 °C)   Temp Source Oral   Pulse 88   Heart Rate Source Monitor   Resp 18   BP (!) 105/59   BP Location Left upper arm   BP Upper/Lower Upper   Patient Position Semi fowlers   Level of Consciousness 0   MEWS Score 1   Oxygen Therapy   SpO2 93 %   O2 Device None (Room air)   AM assessment complete. Pt confused and repeats himself but easily reoriented. Respirations e/e. Pt denies any needs. Bed alarm is on and pt educated to call before getting OOB, will need reminded. Call light within reach, will continue to monitor.

## 2019-06-10 NOTE — PROGRESS NOTES
Consult has been called to Dr. Roderick Fitzgerald on 6. Spoke with dorcas.  1:36 PM    Aureliano Jovel  6/10/2019

## 2019-06-11 VITALS
RESPIRATION RATE: 16 BRPM | SYSTOLIC BLOOD PRESSURE: 115 MMHG | BODY MASS INDEX: 22.76 KG/M2 | HEART RATE: 73 BPM | DIASTOLIC BLOOD PRESSURE: 60 MMHG | HEIGHT: 71 IN | WEIGHT: 162.6 LBS | OXYGEN SATURATION: 98 % | TEMPERATURE: 96.8 F

## 2019-06-11 LAB
ANION GAP SERPL CALCULATED.3IONS-SCNC: 6 MMOL/L (ref 3–16)
BUN BLDV-MCNC: 33 MG/DL (ref 7–20)
CALCIUM SERPL-MCNC: 8.7 MG/DL (ref 8.3–10.6)
CHLORIDE BLD-SCNC: 108 MMOL/L (ref 99–110)
CO2: 28 MMOL/L (ref 21–32)
CREAT SERPL-MCNC: 1 MG/DL (ref 0.8–1.3)
GFR AFRICAN AMERICAN: >60
GFR NON-AFRICAN AMERICAN: >60
GLUCOSE BLD-MCNC: 98 MG/DL (ref 70–99)
HCT VFR BLD CALC: 24.6 % (ref 40.5–52.5)
HEMOGLOBIN: 8.2 G/DL (ref 13.5–17.5)
MCH RBC QN AUTO: 30.1 PG (ref 26–34)
MCHC RBC AUTO-ENTMCNC: 33.5 G/DL (ref 31–36)
MCV RBC AUTO: 89.9 FL (ref 80–100)
PDW BLD-RTO: 14.6 % (ref 12.4–15.4)
PLATELET # BLD: 301 K/UL (ref 135–450)
PMV BLD AUTO: 7.7 FL (ref 5–10.5)
POTASSIUM SERPL-SCNC: 4.5 MMOL/L (ref 3.5–5.1)
RBC # BLD: 2.73 M/UL (ref 4.2–5.9)
SODIUM BLD-SCNC: 142 MMOL/L (ref 136–145)
WBC # BLD: 9.6 K/UL (ref 4–11)

## 2019-06-11 PROCEDURE — 36415 COLL VENOUS BLD VENIPUNCTURE: CPT

## 2019-06-11 PROCEDURE — 99222 1ST HOSP IP/OBS MODERATE 55: CPT | Performed by: INTERNAL MEDICINE

## 2019-06-11 PROCEDURE — 85027 COMPLETE CBC AUTOMATED: CPT

## 2019-06-11 PROCEDURE — 2709999900 HC NON-CHARGEABLE SUPPLY: Performed by: INTERNAL MEDICINE

## 2019-06-11 PROCEDURE — G0378 HOSPITAL OBSERVATION PER HR: HCPCS

## 2019-06-11 PROCEDURE — 83540 ASSAY OF IRON: CPT

## 2019-06-11 PROCEDURE — 83550 IRON BINDING TEST: CPT

## 2019-06-11 PROCEDURE — 6370000000 HC RX 637 (ALT 250 FOR IP): Performed by: INTERNAL MEDICINE

## 2019-06-11 PROCEDURE — 2500000003 HC RX 250 WO HCPCS: Performed by: INTERNAL MEDICINE

## 2019-06-11 PROCEDURE — 88305 TISSUE EXAM BY PATHOLOGIST: CPT

## 2019-06-11 PROCEDURE — 2580000003 HC RX 258: Performed by: INTERNAL MEDICINE

## 2019-06-11 PROCEDURE — 80048 BASIC METABOLIC PNL TOTAL CA: CPT

## 2019-06-11 PROCEDURE — 0DB78ZX EXCISION OF STOMACH, PYLORUS, VIA NATURAL OR ARTIFICIAL OPENING ENDOSCOPIC, DIAGNOSTIC: ICD-10-PCS | Performed by: INTERNAL MEDICINE

## 2019-06-11 PROCEDURE — 99238 HOSP IP/OBS DSCHRG MGMT 30/<: CPT | Performed by: INTERNAL MEDICINE

## 2019-06-11 PROCEDURE — 3609012400 HC EGD TRANSORAL BIOPSY SINGLE/MULTIPLE: Performed by: INTERNAL MEDICINE

## 2019-06-11 PROCEDURE — 6360000002 HC RX W HCPCS: Performed by: INTERNAL MEDICINE

## 2019-06-11 PROCEDURE — 7100000010 HC PHASE II RECOVERY - FIRST 15 MIN: Performed by: INTERNAL MEDICINE

## 2019-06-11 PROCEDURE — 7100000011 HC PHASE II RECOVERY - ADDTL 15 MIN: Performed by: INTERNAL MEDICINE

## 2019-06-11 PROCEDURE — 88342 IMHCHEM/IMCYTCHM 1ST ANTB: CPT

## 2019-06-11 RX ORDER — PANTOPRAZOLE SODIUM 40 MG/1
40 TABLET, DELAYED RELEASE ORAL
Qty: 60 TABLET | Refills: 0
Start: 2019-06-11 | End: 2020-09-17

## 2019-06-11 RX ORDER — PANTOPRAZOLE SODIUM 40 MG/1
40 TABLET, DELAYED RELEASE ORAL
Status: DISCONTINUED | OUTPATIENT
Start: 2019-06-11 | End: 2019-06-11 | Stop reason: HOSPADM

## 2019-06-11 RX ORDER — MIDAZOLAM HYDROCHLORIDE 5 MG/ML
INJECTION INTRAMUSCULAR; INTRAVENOUS PRN
Status: DISCONTINUED | OUTPATIENT
Start: 2019-06-11 | End: 2019-06-11 | Stop reason: ALTCHOICE

## 2019-06-11 RX ORDER — FENTANYL CITRATE 50 UG/ML
INJECTION, SOLUTION INTRAMUSCULAR; INTRAVENOUS PRN
Status: DISCONTINUED | OUTPATIENT
Start: 2019-06-11 | End: 2019-06-11 | Stop reason: ALTCHOICE

## 2019-06-11 RX ADMIN — GABAPENTIN 400 MG: 400 CAPSULE ORAL at 14:25

## 2019-06-11 RX ADMIN — ESOMEPRAZOLE SODIUM 8 MG/HR: 40 INJECTION, POWDER, LYOPHILIZED, FOR SOLUTION INTRAVENOUS at 02:30

## 2019-06-11 ASSESSMENT — PAIN SCALES - GENERAL
PAINLEVEL_OUTOF10: 0
PAINLEVEL_OUTOF10: 0

## 2019-06-11 ASSESSMENT — PAIN - FUNCTIONAL ASSESSMENT: PAIN_FUNCTIONAL_ASSESSMENT: 0-10

## 2019-06-11 NOTE — CONSULTS
[address] [facility]  [phone]        Reason for Consultation/Chief Complaint: \"I have been having pain . \"    History of Present Illness:  Robert Wilson is a 82 y.o. patient who presented to the hospital with complaints of upper abdominal pain. He is not the most reliable historian but he repeatedly denies having chest pain. He has numbness but not pain inarms. He has upper abdominal pain for which he was going to have EGD yesterday and was cancelled with concern he is having chest pain. I have been asked to provide consultation regarding further management and testing. Past Medical History:  Previously followed by Dr Allison Martinez for his cardiology care no OV since 6/24/14 as he resides at [facility]. Chronic ischemic heart disease, CAD, previous MI and Stenting in 2007, 75% lesion of 1 artery, No other details available after review of electronic health records,  Chronic diastolic heart failure, HTN, HLD, DM, COPD, dementia,     has a past medical history of Atherosclerotic heart disease, CAD (coronary artery disease), Cancer ([address] COPD (chronic obstructive pulmonary disease) ([address] Dementia, Depression, Falls frequently, Hernia, Hyperlipidemia, Hypertension, Lack of coordination, Melanoma ([address] MI (myocardial infarction) ([address] Muscle weakness (generalized), Protein calorie malnutrition ([address] Reflux, and Spinal stenosis. Surgical History:   has a past surgical history that includes Coronary angioplasty with stent; Cholecystectomy; Prostate biopsy; skin biopsy; Upper gastrointestinal endoscopy (5/14/12); and Colonoscopy (8/10/12). Social History:   He resides at [facility], , 4 children, He is reports that he has quit smoking many years ago. He has never used smokeless tobacco. He reports that he does not drink alcohol or use drugs. Family History: non contributory    Home Medications:  Were reviewed and are listed in nursing record.  and/or listed below  Prior to ST and T wave abnormalityAbnormal ECGWhen compared with ECG of5.28.19 PVC are now present. Confirmed by Chang Granger MD, [address] ([id number]) on 6/10/2019 4:33:15 AM    CXR 6/9/19  No acute cardiopulmonary disease. FINDINGS: No lines or tubes. Normal cardiomediastinal silhouette. The lungs are clear without focal consolidation or pleural effusion. No suspicious pulmonary nodules. No pulmonary edema. No pneumothorax. No acute osseous abnormality. CT abdomen/pelvis 6/9/19  Mural thickening and marked inflammatory stranding is seen about the proximal duodenum, concerning for duodenitis or duodenal ulcer. Enhancement of the endothelium of the common bile duct, likely inflammatory. Adjacent lymph nodes, likely reactive. Diverticulosis. Prostatomegaly. Correlate with urologic history. Moderate stool at the rectum. Correlate for fecal impaction. FINDINGS: Lower Chest: Coronary artery calcification. Trace pleural effusions. Basilar bronchiectasis and atelectasis. Organs: Status post cholecystectomy. No intrahepatic ductal dilatation. Spleen is within normal limits. Pancreas is fatty. Mural thickening is seen of the proximal duodenum with wall enhancement. There is hazy induration of adjacent fat. Enhancement of the wall of the adjacent nondilated common bile duct. Subcentimeter short axis nodes at the shanthi hepatis. Adrenal glands are within normal limits. No hydronephrosis. Nonobstructing left nephrolithiasis. 1.5 cm left renal cyst.  Calcification of the abdominal aorta and iliac arteries. GI/Bowel: Large amount of stool at the rectum. Diverticulosis. Moderate stool within the colon. Appendix is within normal limits in caliber. Small bowel is nondilated. Pelvis: Prostatomegaly, measuring 5.2 x 4.3 cm. Calcification of prostate. No inguinal adenopathy. Peritoneum/Retroperitoneum: No free air. Bones/Soft Tissues: Degenerative change of the spine.      Lexscan 6/19/13  Interpetation Hypokalemia    CAD (coronary artery disease)    HTN (hypertension)    HLD (hyperlipidemia)    Hypomagnesemia    Weakness    Dementia    Moderate malnutrition (HCC)    Abdominal pain    Abdominal pain, epigastric    Anemia         Plan:  He should be on asa if no active ulcer and statin  OK to proceed with EGD  He may follow up in our office post discharge if he wishes to. No cardiac testing planned at this time. Thank you for allowing to us to participate in the allyn or Robert Offer. Further evaluation will be based upon the patient's clinical course and testing results.     Denton Bowman MD

## 2019-06-11 NOTE — PROGRESS NOTES
Assessment complete. Guest is alert with confusiion. Vital signs are per flowsheet. Respirations are easy and even at rest.  Guest is up in bed watching television with family at bedside, 2/4 side rails up, bed in lowest position, call light in easy reach, bed alarm on, and he denies any further needs. No other complications are noted, will continue to monitor throughout shift.

## 2019-06-11 NOTE — H&P
Gastroenterology Preop Assessment    Patient:   Allison Ocampo   :    1935   Facility:   [facility]  Referring/PCP: Rosaura Ta MD  Date:     2019    Subjective:   Procedure:egd    HPI/Reason for procedure:  Abnormal imaging    Past Medical History:   Diagnosis Date    Atherosclerotic heart disease     CAD (coronary artery disease)     [facility]     skin    COPD (chronic obstructive pulmonary disease) ([facility]     Dementia     Depression     Falls frequently     Hernia     Hyperlipidemia     Hypertension     Lack of coordination     falls    Melanoma ([facility]     MI (myocardial infarction) ([facility]     Muscle weakness (generalized)     Protein calorie malnutrition ([facility]     Reflux     Spinal stenosis      Past Surgical History:   Procedure Laterality Date    CHOLECYSTECTOMY      COLONOSCOPY  8/10/12    CORONARY ANGIOPLASTY WITH STENT PLACEMENT      PROSTATE BIOPSY      SKIN BIOPSY      UPPER GASTROINTESTINAL ENDOSCOPY  12       Social:   Social History     Tobacco Use    Smoking status: Former Smoker    Smokeless tobacco: Never Used   Substance Use Topics    Alcohol use: No     Family: History reviewed. No pertinent family history. Scheduled Medications:    amitriptyline  50 mg Oral Nightly    bisacodyl  10 mg Rectal Daily    gabapentin  400 mg Oral TID    vitamin B-12  1,000 mcg Oral Daily    sodium chloride flush  10 mL Intravenous 2 times per day       Current Medications:    Prior to Admission medications    Medication Sig Start Date End Date Taking? Authorizing Provider   benzonatate (TESSALON PERLES) 100 MG capsule Take 100 mg by mouth 3 times daily as needed for Cough   Yes Historical Provider, MD   gabapentin (NEURONTIN) 300 MG capsule Take 400 mg by mouth 3 times daily.    Yes Historical Provider, MD   meloxicam (MOBIC) 15 MG tablet Take 15 mg by mouth daily   Yes Historical Provider, MD   vitamin B-12 (CYANOCOBALAMIN) 1000 MCG tablet Take (MILK OF MAGNESIA) 400 MG/5ML suspension 30 mL, 30 mL, Oral, Daily PRN, Elton Longo MD    ondansetron [facility]) injection 4 mg, 4 mg, Intravenous, Q6H PRN, Elton Longo MD    acetaminophen (TYLENOL) tablet 650 mg, 650 mg, Oral, Q4H PRN, Elton Longo MD      Infusions:    pantoprozole (PROTONIX) infusion 8 mg/hr (06/11/19 0230)     PRN Medications: sodium chloride flush, potassium chloride **OR** potassium alternative oral replacement **OR** potassium chloride, magnesium sulfate, magnesium hydroxide, ondansetron, acetaminophen  Allergies: Allergies   Allergen Reactions    Fluoxetine     Benazepril      Swollen tongue    Hydrocodone Other (See Comments)    Morphine     Penicillins Other (See Comments)    Simvastatin Other (See Comments)    Morphine And Related Nausea And Vomiting    Sucralfate Rash         Objective:     Physical Exam:   BP (!) 112/50   Pulse 62   Temp 98.2 °F (36.8 °C) (Temporal)   Resp 18   Ht 5' 11\" (1.803 m)   Wt 162 lb 9.6 oz (73.8 kg)   SpO2 94%   BMI 22.68 kg/m²     HEENT: NCAT  Lungs: CTAB  CV: RRR  Abd: soft, ntd  Ext: dpi    Lab and Imaging Review   Labs:  CBC:   Recent Labs     06/09/19  1216 06/10/19  0612 06/11/19  0643   WBC 17.5* 15.1* 9.6   HGB 10.9* 9.2* 8.2*   HCT 32.6* 27.9* 24.6*   MCV 89.7 90.1 89.9    333 301     BMP:   Recent Labs     06/09/19  1216 06/10/19  0612 06/11/19  0643    139 142   K 4.6 4.7 4.5    102 108   CO2 28 28 28   BUN 52* 45* 33*   CREATININE 1.0 1.2 1.0     LIVER PROFILE:   Recent Labs     06/09/19  1216   AST 16   ALT 13   LIPASE 7.0*   PROT 6.7   BILITOT 0.9   ALKPHOS 132*     PT/INR:   Recent Labs     06/09/19  1216   INR 1.43*       Pre-Procedure Assessment / Plan:  ASA: Class 3 - A patient with severe systemic disease that limits activity but is not incapacitating  Airway: Mallampati: II (soft palate, uvula, fauces visible)  Level of Sedation Plan: Moderate sedation  Post Procedure plan: Return to same level

## 2019-06-11 NOTE — PROGRESS NOTES
AM assessment complete. VSS. Respirations e/e. Pt confused but reoriented. Pt denies any needs. Bed alarm is on and pt educated to call before getting OOB. Call light within reach, will continue to monitor.

## 2019-06-12 LAB
IRON SATURATION: 21 % (ref 20–50)
IRON: 41 UG/DL (ref 59–158)
TOTAL IRON BINDING CAPACITY: 200 UG/DL (ref 260–445)

## 2019-08-06 ENCOUNTER — HOSPITAL ENCOUNTER (INPATIENT)
Age: 84
LOS: 3 days | Discharge: NURSING, REHAB OR LONG-TERM CARE | DRG: 871 | End: 2019-08-09
Attending: EMERGENCY MEDICINE | Admitting: INTERNAL MEDICINE
Payer: MEDICARE

## 2019-08-06 ENCOUNTER — APPOINTMENT (OUTPATIENT)
Dept: CT IMAGING | Age: 84
DRG: 871 | End: 2019-08-06
Payer: MEDICARE

## 2019-08-06 ENCOUNTER — APPOINTMENT (OUTPATIENT)
Dept: GENERAL RADIOLOGY | Age: 84
DRG: 871 | End: 2019-08-06
Payer: MEDICARE

## 2019-08-06 DIAGNOSIS — A41.9 SEVERE SEPSIS (HCC): Primary | ICD-10-CM

## 2019-08-06 DIAGNOSIS — R65.20 SEVERE SEPSIS (HCC): Primary | ICD-10-CM

## 2019-08-06 DIAGNOSIS — E87.20 LACTIC ACID ACIDOSIS: ICD-10-CM

## 2019-08-06 DIAGNOSIS — N39.0 URINARY TRACT INFECTION IN MALE: ICD-10-CM

## 2019-08-06 LAB
A/G RATIO: 1.1 (ref 1.1–2.2)
ALBUMIN SERPL-MCNC: 4 G/DL (ref 3.4–5)
ALP BLD-CCNC: 158 U/L (ref 40–129)
ALT SERPL-CCNC: 10 U/L (ref 10–40)
ANION GAP SERPL CALCULATED.3IONS-SCNC: 21 MMOL/L (ref 3–16)
AST SERPL-CCNC: 30 U/L (ref 15–37)
BACTERIA: ABNORMAL /HPF
BASOPHILS ABSOLUTE: 0 K/UL (ref 0–0.2)
BASOPHILS RELATIVE PERCENT: 0.3 %
BILIRUB SERPL-MCNC: 1.1 MG/DL (ref 0–1)
BILIRUBIN URINE: NEGATIVE
BLOOD, URINE: ABNORMAL
BUN BLDV-MCNC: 21 MG/DL (ref 7–20)
CALCIUM SERPL-MCNC: 10.2 MG/DL (ref 8.3–10.6)
CHLORIDE BLD-SCNC: 100 MMOL/L (ref 99–110)
CLARITY: ABNORMAL
CO2: 21 MMOL/L (ref 21–32)
COLOR: YELLOW
CREAT SERPL-MCNC: 1.1 MG/DL (ref 0.8–1.3)
EKG ATRIAL RATE: 142 BPM
EKG DIAGNOSIS: NORMAL
EKG P AXIS: 64 DEGREES
EKG P-R INTERVAL: 136 MS
EKG Q-T INTERVAL: 276 MS
EKG QRS DURATION: 86 MS
EKG QTC CALCULATION (BAZETT): 424 MS
EKG R AXIS: -41 DEGREES
EKG T AXIS: 95 DEGREES
EKG VENTRICULAR RATE: 142 BPM
EOSINOPHILS ABSOLUTE: 0.1 K/UL (ref 0–0.6)
EOSINOPHILS RELATIVE PERCENT: 1.3 %
EPITHELIAL CELLS, UA: ABNORMAL /HPF
GFR AFRICAN AMERICAN: >60
GFR NON-AFRICAN AMERICAN: >60
GLOBULIN: 3.5 G/DL
GLUCOSE BLD-MCNC: 268 MG/DL (ref 70–99)
GLUCOSE URINE: NEGATIVE MG/DL
HCT VFR BLD CALC: 41.2 % (ref 40.5–52.5)
HEMOGLOBIN: 12.9 G/DL (ref 13.5–17.5)
INR BLD: 1.21 (ref 0.86–1.14)
KETONES, URINE: NEGATIVE MG/DL
LACTIC ACID, SEPSIS: 4.7 MMOL/L (ref 0.4–1.9)
LACTIC ACID, SEPSIS: 9.7 MMOL/L (ref 0.4–1.9)
LACTIC ACID: 2.3 MMOL/L (ref 0.4–2)
LEUKOCYTE ESTERASE, URINE: ABNORMAL
LIPASE: 6 U/L (ref 13–60)
LYMPHOCYTES ABSOLUTE: 0.9 K/UL (ref 1–5.1)
LYMPHOCYTES RELATIVE PERCENT: 9.6 %
MCH RBC QN AUTO: 26.7 PG (ref 26–34)
MCHC RBC AUTO-ENTMCNC: 31.3 G/DL (ref 31–36)
MCV RBC AUTO: 85.5 FL (ref 80–100)
MICROSCOPIC EXAMINATION: YES
MONOCYTES ABSOLUTE: 0.1 K/UL (ref 0–1.3)
MONOCYTES RELATIVE PERCENT: 0.9 %
NEUTROPHILS ABSOLUTE: 8 K/UL (ref 1.7–7.7)
NEUTROPHILS RELATIVE PERCENT: 87.9 %
NITRITE, URINE: NEGATIVE
OCCULT BLOOD DIAGNOSTIC: NORMAL
PDW BLD-RTO: 16.9 % (ref 12.4–15.4)
PH UA: 8.5 (ref 5–8)
PLATELET # BLD: 289 K/UL (ref 135–450)
PMV BLD AUTO: 7.9 FL (ref 5–10.5)
POTASSIUM REFLEX MAGNESIUM: 3.8 MMOL/L (ref 3.5–5.1)
PROTEIN UA: 100 MG/DL
PROTHROMBIN TIME: 13.7 SEC (ref 9.8–13)
RBC # BLD: 4.81 M/UL (ref 4.2–5.9)
RBC UA: ABNORMAL /HPF (ref 0–2)
SODIUM BLD-SCNC: 142 MMOL/L (ref 136–145)
SPECIFIC GRAVITY UA: 1.01 (ref 1–1.03)
TOTAL PROTEIN: 7.5 G/DL (ref 6.4–8.2)
TROPONIN: <0.01 NG/ML
URINE REFLEX TO CULTURE: YES
URINE TYPE: ABNORMAL
UROBILINOGEN, URINE: 1 E.U./DL
WBC # BLD: 9.1 K/UL (ref 4–11)
WBC UA: >100 /HPF (ref 0–5)

## 2019-08-06 PROCEDURE — 6370000000 HC RX 637 (ALT 250 FOR IP): Performed by: EMERGENCY MEDICINE

## 2019-08-06 PROCEDURE — 51702 INSERT TEMP BLADDER CATH: CPT

## 2019-08-06 PROCEDURE — 96366 THER/PROPH/DIAG IV INF ADDON: CPT

## 2019-08-06 PROCEDURE — 87086 URINE CULTURE/COLONY COUNT: CPT

## 2019-08-06 PROCEDURE — 83690 ASSAY OF LIPASE: CPT

## 2019-08-06 PROCEDURE — 4500000026 HC ED CRITICAL CARE PROCEDURE

## 2019-08-06 PROCEDURE — 87077 CULTURE AEROBIC IDENTIFY: CPT

## 2019-08-06 PROCEDURE — 2580000003 HC RX 258: Performed by: EMERGENCY MEDICINE

## 2019-08-06 PROCEDURE — 6360000002 HC RX W HCPCS: Performed by: INTERNAL MEDICINE

## 2019-08-06 PROCEDURE — 99223 1ST HOSP IP/OBS HIGH 75: CPT | Performed by: INTERNAL MEDICINE

## 2019-08-06 PROCEDURE — 96375 TX/PRO/DX INJ NEW DRUG ADDON: CPT

## 2019-08-06 PROCEDURE — 2580000003 HC RX 258: Performed by: INTERNAL MEDICINE

## 2019-08-06 PROCEDURE — 96365 THER/PROPH/DIAG IV INF INIT: CPT

## 2019-08-06 PROCEDURE — 83605 ASSAY OF LACTIC ACID: CPT

## 2019-08-06 PROCEDURE — 85025 COMPLETE CBC W/AUTO DIFF WBC: CPT

## 2019-08-06 PROCEDURE — 85610 PROTHROMBIN TIME: CPT

## 2019-08-06 PROCEDURE — 6370000000 HC RX 637 (ALT 250 FOR IP): Performed by: INTERNAL MEDICINE

## 2019-08-06 PROCEDURE — 2500000003 HC RX 250 WO HCPCS: Performed by: EMERGENCY MEDICINE

## 2019-08-06 PROCEDURE — 93010 ELECTROCARDIOGRAM REPORT: CPT | Performed by: INTERNAL MEDICINE

## 2019-08-06 PROCEDURE — 02HV33Z INSERTION OF INFUSION DEVICE INTO SUPERIOR VENA CAVA, PERCUTANEOUS APPROACH: ICD-10-PCS | Performed by: EMERGENCY MEDICINE

## 2019-08-06 PROCEDURE — 71045 X-RAY EXAM CHEST 1 VIEW: CPT

## 2019-08-06 PROCEDURE — G0328 FECAL BLOOD SCRN IMMUNOASSAY: HCPCS

## 2019-08-06 PROCEDURE — 84484 ASSAY OF TROPONIN QUANT: CPT

## 2019-08-06 PROCEDURE — 6360000002 HC RX W HCPCS: Performed by: EMERGENCY MEDICINE

## 2019-08-06 PROCEDURE — 2000000000 HC ICU R&B

## 2019-08-06 PROCEDURE — 70450 CT HEAD/BRAIN W/O DYE: CPT

## 2019-08-06 PROCEDURE — 87186 SC STD MICRODIL/AGAR DIL: CPT

## 2019-08-06 PROCEDURE — 80053 COMPREHEN METABOLIC PANEL: CPT

## 2019-08-06 PROCEDURE — 99291 CRITICAL CARE FIRST HOUR: CPT | Performed by: INTERNAL MEDICINE

## 2019-08-06 PROCEDURE — 93005 ELECTROCARDIOGRAM TRACING: CPT | Performed by: EMERGENCY MEDICINE

## 2019-08-06 PROCEDURE — 3E043XZ INTRODUCTION OF VASOPRESSOR INTO CENTRAL VEIN, PERCUTANEOUS APPROACH: ICD-10-PCS | Performed by: EMERGENCY MEDICINE

## 2019-08-06 PROCEDURE — 6360000004 HC RX CONTRAST MEDICATION: Performed by: EMERGENCY MEDICINE

## 2019-08-06 PROCEDURE — 81001 URINALYSIS AUTO W/SCOPE: CPT

## 2019-08-06 PROCEDURE — 99291 CRITICAL CARE FIRST HOUR: CPT

## 2019-08-06 PROCEDURE — 87040 BLOOD CULTURE FOR BACTERIA: CPT

## 2019-08-06 PROCEDURE — 96361 HYDRATE IV INFUSION ADD-ON: CPT

## 2019-08-06 PROCEDURE — 74177 CT ABD & PELVIS W/CONTRAST: CPT

## 2019-08-06 PROCEDURE — 36415 COLL VENOUS BLD VENIPUNCTURE: CPT

## 2019-08-06 PROCEDURE — 87801 DETECT AGNT MULT DNA AMPLI: CPT

## 2019-08-06 RX ORDER — 0.9 % SODIUM CHLORIDE 0.9 %
1000 INTRAVENOUS SOLUTION INTRAVENOUS ONCE
Status: COMPLETED | OUTPATIENT
Start: 2019-08-06 | End: 2019-08-06

## 2019-08-06 RX ORDER — DEXTROSE MONOHYDRATE 50 MG/ML
INJECTION, SOLUTION INTRAVENOUS
Status: DISPENSED
Start: 2019-08-06 | End: 2019-08-06

## 2019-08-06 RX ORDER — PANTOPRAZOLE SODIUM 40 MG/1
40 TABLET, DELAYED RELEASE ORAL
Status: DISCONTINUED | OUTPATIENT
Start: 2019-08-06 | End: 2019-08-07 | Stop reason: ALTCHOICE

## 2019-08-06 RX ORDER — ACETAMINOPHEN 650 MG/1
650 SUPPOSITORY RECTAL ONCE
Status: COMPLETED | OUTPATIENT
Start: 2019-08-06 | End: 2019-08-06

## 2019-08-06 RX ORDER — SODIUM CHLORIDE 9 MG/ML
INJECTION, SOLUTION INTRAVENOUS CONTINUOUS
Status: DISCONTINUED | OUTPATIENT
Start: 2019-08-06 | End: 2019-08-08

## 2019-08-06 RX ORDER — KETOROLAC TROMETHAMINE 30 MG/ML
15 INJECTION, SOLUTION INTRAMUSCULAR; INTRAVENOUS ONCE
Status: COMPLETED | OUTPATIENT
Start: 2019-08-06 | End: 2019-08-06

## 2019-08-06 RX ORDER — GABAPENTIN 100 MG/1
200 CAPSULE ORAL 3 TIMES DAILY
Status: DISCONTINUED | OUTPATIENT
Start: 2019-08-07 | End: 2019-08-09 | Stop reason: HOSPADM

## 2019-08-06 RX ORDER — SODIUM CHLORIDE 0.9 % (FLUSH) 0.9 %
10 SYRINGE (ML) INJECTION PRN
Status: DISCONTINUED | OUTPATIENT
Start: 2019-08-06 | End: 2019-08-09 | Stop reason: HOSPADM

## 2019-08-06 RX ORDER — FERROUS SULFATE 325(65) MG
325 TABLET ORAL
COMMUNITY
End: 2020-09-17

## 2019-08-06 RX ORDER — ONDANSETRON 2 MG/ML
4 INJECTION INTRAMUSCULAR; INTRAVENOUS EVERY 6 HOURS PRN
Status: DISCONTINUED | OUTPATIENT
Start: 2019-08-06 | End: 2019-08-09 | Stop reason: HOSPADM

## 2019-08-06 RX ORDER — SODIUM CHLORIDE 0.9 % (FLUSH) 0.9 %
10 SYRINGE (ML) INJECTION EVERY 12 HOURS SCHEDULED
Status: DISCONTINUED | OUTPATIENT
Start: 2019-08-06 | End: 2019-08-09 | Stop reason: HOSPADM

## 2019-08-06 RX ORDER — FERROUS SULFATE 325(65) MG
325 TABLET ORAL
Status: DISCONTINUED | OUTPATIENT
Start: 2019-08-07 | End: 2019-08-09 | Stop reason: HOSPADM

## 2019-08-06 RX ORDER — NOREPINEPHRINE BITARTRATE 1 MG/ML
INJECTION, SOLUTION INTRAVENOUS
Status: DISPENSED
Start: 2019-08-06 | End: 2019-08-06

## 2019-08-06 RX ORDER — ACETAMINOPHEN 325 MG/1
650 TABLET ORAL EVERY 6 HOURS PRN
Status: DISCONTINUED | OUTPATIENT
Start: 2019-08-06 | End: 2019-08-09 | Stop reason: HOSPADM

## 2019-08-06 RX ORDER — 0.9 % SODIUM CHLORIDE 0.9 %
500 INTRAVENOUS SOLUTION INTRAVENOUS ONCE
Status: COMPLETED | OUTPATIENT
Start: 2019-08-06 | End: 2019-08-06

## 2019-08-06 RX ADMIN — ACETAMINOPHEN 650 MG: 325 TABLET ORAL at 23:38

## 2019-08-06 RX ADMIN — MUPIROCIN: 20 OINTMENT TOPICAL at 21:42

## 2019-08-06 RX ADMIN — PANTOPRAZOLE SODIUM 40 MG: 40 TABLET, DELAYED RELEASE ORAL at 16:16

## 2019-08-06 RX ADMIN — IOPAMIDOL 75 ML: 755 INJECTION, SOLUTION INTRAVENOUS at 09:21

## 2019-08-06 RX ADMIN — SODIUM CHLORIDE 500 ML: 9 INJECTION, SOLUTION INTRAVENOUS at 10:27

## 2019-08-06 RX ADMIN — CEFEPIME 2 G: 2 INJECTION, POWDER, FOR SOLUTION INTRAVENOUS at 21:28

## 2019-08-06 RX ADMIN — SODIUM CHLORIDE 1000 ML: 9 INJECTION, SOLUTION INTRAVENOUS at 08:34

## 2019-08-06 RX ADMIN — KETOROLAC TROMETHAMINE 15 MG: 30 INJECTION, SOLUTION INTRAMUSCULAR; INTRAVENOUS at 11:17

## 2019-08-06 RX ADMIN — VANCOMYCIN HYDROCHLORIDE 1000 MG: 1 INJECTION, POWDER, LYOPHILIZED, FOR SOLUTION INTRAVENOUS at 08:37

## 2019-08-06 RX ADMIN — SODIUM CHLORIDE 1000 ML: 9 INJECTION, SOLUTION INTRAVENOUS at 14:15

## 2019-08-06 RX ADMIN — ENOXAPARIN SODIUM 40 MG: 40 INJECTION SUBCUTANEOUS at 16:18

## 2019-08-06 RX ADMIN — NOREPINEPHRINE BITARTRATE 5 MCG/MIN: 1 INJECTION INTRAVENOUS at 11:09

## 2019-08-06 RX ADMIN — SODIUM CHLORIDE: 9 INJECTION, SOLUTION INTRAVENOUS at 15:46

## 2019-08-06 RX ADMIN — ACETAMINOPHEN 650 MG: 650 SUPPOSITORY RECTAL at 08:30

## 2019-08-06 RX ADMIN — SODIUM CHLORIDE 1000 ML: 9 INJECTION, SOLUTION INTRAVENOUS at 08:33

## 2019-08-06 RX ADMIN — CEFEPIME 2 G: 2 INJECTION, POWDER, FOR SOLUTION INTRAVENOUS at 08:37

## 2019-08-06 RX ADMIN — Medication 10 ML: at 21:42

## 2019-08-06 RX ADMIN — NOREPINEPHRINE BITARTRATE 7 MCG/MIN: 1 INJECTION INTRAVENOUS at 18:32

## 2019-08-06 ASSESSMENT — PAIN SCALES - GENERAL
PAINLEVEL_OUTOF10: 0

## 2019-08-06 ASSESSMENT — PAIN SCALES - WONG BAKER: WONGBAKER_NUMERICALRESPONSE: 8

## 2019-08-06 ASSESSMENT — PAIN DESCRIPTION - PAIN TYPE: TYPE: ACUTE PAIN

## 2019-08-06 ASSESSMENT — PAIN DESCRIPTION - LOCATION: LOCATION: ABDOMEN

## 2019-08-06 ASSESSMENT — PAIN DESCRIPTION - DESCRIPTORS: DESCRIPTORS: PATIENT UNABLE TO DESCRIBE

## 2019-08-06 ASSESSMENT — PAIN DESCRIPTION - ORIENTATION: ORIENTATION: RIGHT;LOWER

## 2019-08-06 NOTE — PROGRESS NOTES
4 Eyes Skin Assessment     The patient is being assess for  Shift Handoff    I agree that 2 RN's have performed a thorough Head to Toe Skin Assessment on the patient. ALL assessment sites listed below have been assessed. Areas assessed by both nurses:   [x]   Head, Face, and Ears   [x]   Shoulders, Back, and Chest  [x]   Arms, Elbows, and Hands   [x]   Coccyx, Sacrum, and IschIum  [x]   Legs, Feet, and Heels        Does the Patient have Skin Breakdown? No , un blachable redness to sacrum , blanchable, boggy  redness de.  Heels         Bridger Prevention initiated:  Yes   Wound Care Orders initiated:  NA      [facility] nurse consulted for Pressure Injury (Stage 3,4, Unstageable, DTI, NWPT, and Complex wounds), New and Established Ostomies:  NA      Nurse 1 eSignature:Electronically signed by Rhoda Boswell RN on 8/6/2019 at 7:54 PM    **SHARE this note so that the co-signing nurse is able to place an eSignature**    Nurse 2 eSignature: Electronically signed by Shakeel Holland RN on 8/7/19 at 1:14 AM

## 2019-08-06 NOTE — H&P
Admission [address] ;  MRN: [id number] ; Admit Date: 8/6/2019  8:04 AM   Current Date and Time: 8/6/2019 2:15 PM    PCP  --  Marek Plaza MD         Chief Complaint   Patient presents with    Altered Mental Status     Per EMS: Pt was A&O x4 this morning, rode his exercise bike during therapy, finished his therapy and sometime after began \"shaking\" and AMS         HPI:  Patient is a 82 y.o.  male  With known h.o  Dementia living in a NH with hx of HTN, PUD, hyperlipidemia, depression presents from NH this am to [facility] ER for acute confusion, shaking rigors and chills. Hx obtained from talkng to Er Physician  As pt has dementia. Apparently pt was on his exercise bike this am when he developed fever with chills and rigors sent to Er where he was noted to have severe hypotension and high lactate of 9.7, ct head neg. cxr neg. Ct abd with no acute findings  Given 2 L fluids, with continued hypotension placed central line and admitted to ICU with pressor support    Recent adm to here for duodenal ulcer noted  Pt denies any chest pain , cough or sob . No dysuria. No headache or neck pain.  No skin issues    Allergies   Allergen Reactions    Fluoxetine     Benazepril      Swollen tongue    Hydrocodone Other (See Comments)    Morphine     Penicillins Other (See Comments)    Simvastatin Other (See Comments)    Morphine And Related Nausea And Vomiting    Sucralfate Rash       Past Medical History:   Diagnosis Date    Atherosclerotic heart disease     CAD (coronary artery disease)     Cancer (HCC)     skin    COPD (chronic obstructive pulmonary disease) (HCC)     Dementia     Depression     Falls frequently     Hernia     Hyperlipidemia     Hypertension     Lack of coordination     falls    Melanoma ([address]     MI (myocardial infarction) ([address]     Muscle weakness (generalized)     Protein calorie malnutrition ([address]     Reflux     Spinal stenosis       Past Surgical History: Procedure Laterality Date    CHOLECYSTECTOMY      COLONOSCOPY  8/10/12    CORONARY ANGIOPLASTY WITH STENT PLACEMENT      PROSTATE BIOPSY      SKIN BIOPSY      UPPER GASTROINTESTINAL ENDOSCOPY  5/14/12    UPPER GASTROINTESTINAL ENDOSCOPY N/A 6/11/2019    EGD BIOPSY performed by Liana Huffman DO at [facility] ENDOSCOPY      Medications Prior to Admission: ferrous sulfate 325 (65 Fe) MG tablet, Take 325 mg by mouth daily (with breakfast)  pantoprazole (PROTONIX) 40 MG tablet, Take 1 tablet by mouth 2 times daily (before meals)  gabapentin (NEURONTIN) 300 MG capsule, Take 400 mg by mouth 3 times daily. vitamin B-12 (CYANOCOBALAMIN) 1000 MCG tablet, Take 1,000 mcg by mouth daily  bisacodyl (DULCOLAX) 10 MG suppository, Place 10 mg rectally daily  acetaminophen (TYLENOL) 325 MG tablet, Take 650 mg by mouth every 6 hours as needed for Pain  amitriptyline (ELAVIL) 25 MG tablet, Take 1 tablet by mouth nightly (Patient taking differently: Take 50 mg by mouth nightly )  Allergies   Allergen Reactions    Fluoxetine     Benazepril      Swollen tongue    Hydrocodone Other (See Comments)    Morphine     Penicillins Other (See Comments)    Simvastatin Other (See Comments)    Morphine And Related Nausea And Vomiting    Sucralfate Rash      Social History     Tobacco Use    Smoking status: Former Smoker    Smokeless tobacco: Never Used   Substance Use Topics    Alcohol use: No      History reviewed. No pertinent family history.        Review of systems    Constitutional: Negative for fever or chills  HENT: Negative for sore throat   Eyes: Negative for redness   Respiratory: Negative  for dyspnea, cough   Cardiovascular: Negative for chest pain   Gastrointestinal:neg for abd pain, nausea or vomiting or diarrhea  No melena or BRPR  Genitourinary: Negative for hematuria   Musculoskeletal: Negative for arthralgias   Skin: Negative for rash   Neurological: Negative for syncope   Hematological: Negative for adenopathy   Psychiatric/Behavorial: Negative for anxiety      Objective:     VS: Temp: 103.2 °F (39.6 °C)  Pulse: 143  BP: (!) 99/46  SpO2: 100 %  O2 Flow Rate (L/min): 2 L/min        Physical Exam:  General:  Elderly confused male up in bed  Awake, alert and disoriented. Appears to be not in any distress  Mucous Membranes:  Pink , anicteric  Neck: No JVD, no carotid bruit, no thyromegaly  Chest:  Clear to auscultation bilaterally, no added sounds  Cardiovascular:  RRR S1S2 heard, no murmurs or gallops  Abdomen: thin sunken,  Soft, undistended, non tender, no organomegaly, BS present  Extremities: right femoral TLC No edema or cyanosis.  Distal pulses well felt  Neurological : no focal deficits except for baseline dementia        LABS:  CBC:  Lab Results   Component Value Date    WBC 9.1 08/06/2019    HGB 12.9 08/06/2019    HCT 41.2 08/06/2019    MCV 85.5 08/06/2019     08/06/2019    NEUTOPHILPCT 87.9 08/06/2019    LYMPHOPCT 9.6 08/06/2019    MONOPCT 0.9 08/06/2019    EOSPCT 4.4 04/23/2012    BASOPCT 0.3 08/06/2019    NEUTROABS 8.0 08/06/2019    LYMPHSABS 0.9 08/06/2019    MONOSABS 0.1 08/06/2019    EOSABS 0.1 08/06/2019    BASOSABS 0.0 08/06/2019     BMP:   Lab Results   Component Value Date     08/06/2019    K 3.8 08/06/2019    CO2 21 08/06/2019    BUN 21 08/06/2019    CREATININE 1.1 08/06/2019    CALCIUM 10.2 08/06/2019     Coagulation:   Lab Results   Component Value Date    INR 1.21 08/06/2019    APTT 31.1 02/24/2013     Cardiac markers:   Lab Results   Component Value Date    CKTOTAL 89 11/18/2010    TROPONINI <0.01 08/06/2019     ABGs: No results found for: POCPH, POCPCO2, POCPO2, POCHCO3, POCTCO2, POCFIO2    Lab Results   Component Value Date    ALT 10 08/06/2019    AST 30 08/06/2019    ALKPHOS 158 (H) 08/06/2019    BILITOT 1.1 (H) 08/06/2019       Lab Results   Component Value Date    INR 1.21 (H) 08/06/2019    INR 1.43 (H) 06/09/2019    INR 1.18 (H) 02/08/2016    PROTIME 13.7 (H) 08/06/2019

## 2019-08-06 NOTE — ED PROVIDER NOTES
eMERGENCY dEPARTMENT eNCOUnter      PtName: Carine Santillan  MRN: [id number]  [place] 1935  Date of evaluation: 8/6/2019  Provider: Sunday Garcia DO     CHIEF COMPLAINT       Chief Complaint   Patient presents with    Altered Mental Status     Per EMS: Pt was A&O x4 this morning, rode his exercise bike during therapy, finished his therapy and sometime after began \"shaking\" and AMS         HISTORY OF PRESENT ILLNESS   (Location/Symptom, Timing/Onset,Context/Setting, Quality, Duration, Modifying Factors, Severity) Note limiting factors. HPI    aCrine Santillan is a 82 y.o. male who presents to the emergency department with chief complaint of chills that started developing after he went on the exercise bike at the nursing home. He had cold extremities according to EMS and pulse ox was reading in the 70s so they put him on 100% oxygen. Patient does admit to a dry cough as well as nausea. History is limited. EMS reported that they were told by the nursing facility that patient is normally alert and oriented to person place and time. However they report that his mental status is altered today. Nursing Notes were reviewed. REVIEW OF SYSTEMS    (2+ forlevel 4; 10+ for level 5)     Review of Systems  See hpi for further details. Complete 10 point review of all systems performed and is otherwise negative unless noted above.     PAST MEDICAL HISTORY     Past Medical History:   Diagnosis Date    Atherosclerotic heart disease     CAD (coronary artery disease)     Cancer ([address]     skin    COPD (chronic obstructive pulmonary disease) ([address]     Dementia     Depression     Falls frequently     Hernia     Hyperlipidemia     Hypertension     Lack of coordination     falls    Melanoma ([address]     MI (myocardial infarction) ([address]     Muscle weakness (generalized)     Protein calorie malnutrition ([address]     Reflux     Spinal stenosis        SURGICAL HISTORY       Past Surgical History:   Procedure signed)  Emergency Medicine Provider              Marek Faith, DO  08/06/19 [address] [facility], DO  08/15/19 3424

## 2019-08-06 NOTE — ED NOTES
Called transfer center to let Gonzales Potts know update on pt status, lactic acid result, central line and pressers started per MD. Tulio Smallwood Day  08/06/19 [id number]

## 2019-08-06 NOTE — ED NOTES
josep attempted seconds iv left ac unsuccessful.  2nd blood cx obtained from left ac.     Justus Kendall RN  08/06/19 [id number]

## 2019-08-07 LAB
ABO/RH: NORMAL
ANION GAP SERPL CALCULATED.3IONS-SCNC: 12 MMOL/L (ref 3–16)
ANTIBODY SCREEN: NORMAL
BASOPHILS ABSOLUTE: 0 K/UL (ref 0–0.2)
BASOPHILS RELATIVE PERCENT: 0.2 %
BUN BLDV-MCNC: 17 MG/DL (ref 7–20)
BUN BLDV-MCNC: 18 MG/DL (ref 7–20)
BUN BLDV-MCNC: 20 MG/DL (ref 7–20)
CALCIUM SERPL-MCNC: 8.1 MG/DL (ref 8.3–10.6)
CALCIUM SERPL-MCNC: 8.2 MG/DL (ref 8.3–10.6)
CALCIUM SERPL-MCNC: 8.2 MG/DL (ref 8.3–10.6)
CHLORIDE BLD-SCNC: 103 MMOL/L (ref 99–110)
CHLORIDE BLD-SCNC: 105 MMOL/L (ref 99–110)
CHLORIDE BLD-SCNC: 106 MMOL/L (ref 99–110)
CO2: 20 MMOL/L (ref 21–32)
CO2: 21 MMOL/L (ref 21–32)
CO2: 21 MMOL/L (ref 21–32)
CREAT SERPL-MCNC: 0.8 MG/DL (ref 0.8–1.3)
CREAT SERPL-MCNC: 0.9 MG/DL (ref 0.8–1.3)
CREAT SERPL-MCNC: 0.9 MG/DL (ref 0.8–1.3)
EOSINOPHILS ABSOLUTE: 0.4 K/UL (ref 0–0.6)
EOSINOPHILS RELATIVE PERCENT: 2.4 %
GFR AFRICAN AMERICAN: >60
GFR NON-AFRICAN AMERICAN: >60
GLUCOSE BLD-MCNC: 114 MG/DL (ref 70–99)
GLUCOSE BLD-MCNC: 125 MG/DL (ref 70–99)
GLUCOSE BLD-MCNC: 160 MG/DL (ref 70–99)
GLUCOSE BLD-MCNC: 162 MG/DL (ref 70–99)
GLUCOSE BLD-MCNC: 173 MG/DL (ref 70–99)
GLUCOSE BLD-MCNC: 98 MG/DL (ref 70–99)
HCT VFR BLD CALC: 30.8 % (ref 40.5–52.5)
HCT VFR BLD CALC: 32 % (ref 40.5–52.5)
HEMOGLOBIN: 10.1 G/DL (ref 13.5–17.5)
HEMOGLOBIN: 9.7 G/DL (ref 13.5–17.5)
LACTIC ACID: 1 MMOL/L (ref 0.4–2)
LACTIC ACID: 2.5 MMOL/L (ref 0.4–2)
LACTIC ACID: 2.8 MMOL/L (ref 0.4–2)
LACTIC ACID: 4.3 MMOL/L (ref 0.4–2)
LYMPHOCYTES ABSOLUTE: 0.8 K/UL (ref 1–5.1)
LYMPHOCYTES RELATIVE PERCENT: 5.5 %
MAGNESIUM: 1.3 MG/DL (ref 1.8–2.4)
MAGNESIUM: 1.3 MG/DL (ref 1.8–2.4)
MAGNESIUM: 2.5 MG/DL (ref 1.8–2.4)
MCH RBC QN AUTO: 26.7 PG (ref 26–34)
MCHC RBC AUTO-ENTMCNC: 31.4 G/DL (ref 31–36)
MCV RBC AUTO: 85.1 FL (ref 80–100)
MONOCYTES ABSOLUTE: 0.8 K/UL (ref 0–1.3)
MONOCYTES RELATIVE PERCENT: 5.1 %
NEUTROPHILS ABSOLUTE: 13.5 K/UL (ref 1.7–7.7)
NEUTROPHILS RELATIVE PERCENT: 86.8 %
PDW BLD-RTO: 16.7 % (ref 12.4–15.4)
PERFORMED ON: ABNORMAL
PLATELET # BLD: 187 K/UL (ref 135–450)
PMV BLD AUTO: 7.9 FL (ref 5–10.5)
POTASSIUM REFLEX MAGNESIUM: 3.3 MMOL/L (ref 3.5–5.1)
POTASSIUM REFLEX MAGNESIUM: 3.4 MMOL/L (ref 3.5–5.1)
POTASSIUM REFLEX MAGNESIUM: 3.4 MMOL/L (ref 3.5–5.1)
RBC # BLD: 3.62 M/UL (ref 4.2–5.9)
REPORT: NORMAL
SODIUM BLD-SCNC: 136 MMOL/L (ref 136–145)
SODIUM BLD-SCNC: 138 MMOL/L (ref 136–145)
SODIUM BLD-SCNC: 138 MMOL/L (ref 136–145)
WBC # BLD: 15.5 K/UL (ref 4–11)

## 2019-08-07 PROCEDURE — 97530 THERAPEUTIC ACTIVITIES: CPT

## 2019-08-07 PROCEDURE — 86900 BLOOD TYPING SEROLOGIC ABO: CPT

## 2019-08-07 PROCEDURE — 2580000003 HC RX 258: Performed by: INTERNAL MEDICINE

## 2019-08-07 PROCEDURE — 36415 COLL VENOUS BLD VENIPUNCTURE: CPT

## 2019-08-07 PROCEDURE — 86901 BLOOD TYPING SEROLOGIC RH(D): CPT

## 2019-08-07 PROCEDURE — 99291 CRITICAL CARE FIRST HOUR: CPT | Performed by: INTERNAL MEDICINE

## 2019-08-07 PROCEDURE — 85025 COMPLETE CBC W/AUTO DIFF WBC: CPT

## 2019-08-07 PROCEDURE — 99232 SBSQ HOSP IP/OBS MODERATE 35: CPT | Performed by: INTERNAL MEDICINE

## 2019-08-07 PROCEDURE — 85018 HEMOGLOBIN: CPT

## 2019-08-07 PROCEDURE — 6370000000 HC RX 637 (ALT 250 FOR IP): Performed by: INTERNAL MEDICINE

## 2019-08-07 PROCEDURE — 86850 RBC ANTIBODY SCREEN: CPT

## 2019-08-07 PROCEDURE — 83605 ASSAY OF LACTIC ACID: CPT

## 2019-08-07 PROCEDURE — 94761 N-INVAS EAR/PLS OXIMETRY MLT: CPT

## 2019-08-07 PROCEDURE — 97166 OT EVAL MOD COMPLEX 45 MIN: CPT

## 2019-08-07 PROCEDURE — 97162 PT EVAL MOD COMPLEX 30 MIN: CPT

## 2019-08-07 PROCEDURE — 2000000000 HC ICU R&B

## 2019-08-07 PROCEDURE — 83735 ASSAY OF MAGNESIUM: CPT

## 2019-08-07 PROCEDURE — 80048 BASIC METABOLIC PNL TOTAL CA: CPT

## 2019-08-07 PROCEDURE — 6360000002 HC RX W HCPCS: Performed by: INTERNAL MEDICINE

## 2019-08-07 PROCEDURE — 85014 HEMATOCRIT: CPT

## 2019-08-07 PROCEDURE — C9113 INJ PANTOPRAZOLE SODIUM, VIA: HCPCS | Performed by: INTERNAL MEDICINE

## 2019-08-07 RX ORDER — 0.9 % SODIUM CHLORIDE 0.9 %
250 INTRAVENOUS SOLUTION INTRAVENOUS PRN
Status: DISCONTINUED | OUTPATIENT
Start: 2019-08-07 | End: 2019-08-09 | Stop reason: HOSPADM

## 2019-08-07 RX ORDER — POTASSIUM CHLORIDE 29.8 MG/ML
20 INJECTION INTRAVENOUS ONCE
Status: COMPLETED | OUTPATIENT
Start: 2019-08-07 | End: 2019-08-07

## 2019-08-07 RX ORDER — POTASSIUM CHLORIDE 29.8 MG/ML
20 INJECTION INTRAVENOUS PRN
Status: COMPLETED | OUTPATIENT
Start: 2019-08-07 | End: 2019-08-07

## 2019-08-07 RX ORDER — DEXTROSE MONOHYDRATE 25 G/50ML
12.5 INJECTION, SOLUTION INTRAVENOUS PRN
Status: DISCONTINUED | OUTPATIENT
Start: 2019-08-07 | End: 2019-08-09 | Stop reason: HOSPADM

## 2019-08-07 RX ORDER — 0.9 % SODIUM CHLORIDE 0.9 %
500 INTRAVENOUS SOLUTION INTRAVENOUS ONCE
Status: COMPLETED | OUTPATIENT
Start: 2019-08-07 | End: 2019-08-07

## 2019-08-07 RX ORDER — NICOTINE POLACRILEX 4 MG
15 LOZENGE BUCCAL PRN
Status: DISCONTINUED | OUTPATIENT
Start: 2019-08-07 | End: 2019-08-09 | Stop reason: HOSPADM

## 2019-08-07 RX ORDER — DEXTROSE MONOHYDRATE 50 MG/ML
100 INJECTION, SOLUTION INTRAVENOUS PRN
Status: DISCONTINUED | OUTPATIENT
Start: 2019-08-07 | End: 2019-08-09 | Stop reason: HOSPADM

## 2019-08-07 RX ORDER — MAGNESIUM SULFATE IN WATER 40 MG/ML
4 INJECTION, SOLUTION INTRAVENOUS ONCE
Status: COMPLETED | OUTPATIENT
Start: 2019-08-07 | End: 2019-08-07

## 2019-08-07 RX ORDER — AMITRIPTYLINE HYDROCHLORIDE 25 MG/1
25 TABLET, FILM COATED ORAL NIGHTLY
Status: DISCONTINUED | OUTPATIENT
Start: 2019-08-07 | End: 2019-08-09 | Stop reason: HOSPADM

## 2019-08-07 RX ADMIN — GABAPENTIN 200 MG: 100 CAPSULE ORAL at 20:46

## 2019-08-07 RX ADMIN — CEFEPIME 2 G: 2 INJECTION, POWDER, FOR SOLUTION INTRAVENOUS at 21:59

## 2019-08-07 RX ADMIN — SODIUM CHLORIDE: 9 INJECTION, SOLUTION INTRAVENOUS at 06:03

## 2019-08-07 RX ADMIN — POTASSIUM CHLORIDE 20 MEQ: 400 INJECTION, SOLUTION INTRAVENOUS at 17:33

## 2019-08-07 RX ADMIN — GABAPENTIN 200 MG: 100 CAPSULE ORAL at 15:08

## 2019-08-07 RX ADMIN — PANTOPRAZOLE SODIUM 40 MG: 40 TABLET, DELAYED RELEASE ORAL at 15:08

## 2019-08-07 RX ADMIN — POTASSIUM CHLORIDE 20 MEQ: 400 INJECTION, SOLUTION INTRAVENOUS at 20:46

## 2019-08-07 RX ADMIN — MUPIROCIN: 20 OINTMENT TOPICAL at 09:00

## 2019-08-07 RX ADMIN — CEFEPIME 2 G: 2 INJECTION, POWDER, FOR SOLUTION INTRAVENOUS at 08:58

## 2019-08-07 RX ADMIN — GABAPENTIN 200 MG: 100 CAPSULE ORAL at 08:58

## 2019-08-07 RX ADMIN — AMITRIPTYLINE HYDROCHLORIDE 25 MG: 25 TABLET, FILM COATED ORAL at 20:46

## 2019-08-07 RX ADMIN — POTASSIUM CHLORIDE 20 MEQ: 400 INJECTION, SOLUTION INTRAVENOUS at 11:55

## 2019-08-07 RX ADMIN — PANTOPRAZOLE SODIUM 40 MG: 40 TABLET, DELAYED RELEASE ORAL at 08:58

## 2019-08-07 RX ADMIN — SODIUM CHLORIDE 250 ML: 9 INJECTION, SOLUTION INTRAVENOUS at 18:41

## 2019-08-07 RX ADMIN — INSULIN LISPRO 1 UNITS: 100 INJECTION, SOLUTION INTRAVENOUS; SUBCUTANEOUS at 20:51

## 2019-08-07 RX ADMIN — ACETAMINOPHEN 650 MG: 325 TABLET ORAL at 15:08

## 2019-08-07 RX ADMIN — MUPIROCIN: 20 OINTMENT TOPICAL at 20:47

## 2019-08-07 RX ADMIN — MAGNESIUM SULFATE HEPTAHYDRATE 4 G: 40 INJECTION, SOLUTION INTRAVENOUS at 11:55

## 2019-08-07 RX ADMIN — Medication 10 ML: at 20:47

## 2019-08-07 RX ADMIN — SODIUM CHLORIDE 8 MG/HR: 9 INJECTION, SOLUTION INTRAVENOUS at 16:07

## 2019-08-07 RX ADMIN — Medication 10 ML: at 08:59

## 2019-08-07 RX ADMIN — ENOXAPARIN SODIUM 40 MG: 40 INJECTION SUBCUTANEOUS at 08:58

## 2019-08-07 RX ADMIN — SODIUM CHLORIDE 500 ML: 9 INJECTION, SOLUTION INTRAVENOUS at 01:11

## 2019-08-07 RX ADMIN — FERROUS SULFATE TAB 325 MG (65 MG ELEMENTAL FE) 325 MG: 325 (65 FE) TAB at 08:58

## 2019-08-07 ASSESSMENT — PAIN SCALES - GENERAL: PAINLEVEL_OUTOF10: 0

## 2019-08-07 NOTE — PROGRESS NOTES
Spoke to Dr Jevon Jaime. Order for EGD consent for tomorrow. And Make Pt NPO at midnight. Doctor updated on patients labs and Protonix gtt, as well as Black stools.

## 2019-08-07 NOTE — PROGRESS NOTES
Pt much more oriented this am. Able to help push self up in bed. Levophed turned off at 6 am. B/P remains with MAP>65.  Report to [facility]

## 2019-08-07 NOTE — PROGRESS NOTES
4 Eyes Skin Assessment     The patient is being assess for   Shift Handoff    I agree that 2 RN's have performed a thorough Head to Toe Skin Assessment on the patient. ALL assessment sites listed below have been assessed. Areas assessed by both nurses:   [x]   Head, Face, and Ears   [x]   Shoulders, Back, and Chest, Abdomen  [x]   Arms, Elbows, and Hands   [x]   Coccyx, Sacrum, and Ischium  [x]   Legs, Feet, and Heels        Shows area of unblanchable redness to coccyx. Bilateral heels boggy and pink. **SHARE this note so that the co-signing nurse is able to place an eSignature**    Co-signer eSignature: Electronically signed by Katie Coffman RN on 8/7/19 at 9:09 AM    Does the Patient have Skin Breakdown?   No          Bridger Prevention initiated:  Yes   Wound Care Orders initiated:  No      [facility] nurse consulted for Pressure Injury (Stage 3,4, Unstageable, DTI, NWPT, Complex wounds)and New or Established Ostomies:  No      Primary Nurse eSignature: Electronically signed by Robert Krueger RN on 8/7/19 at 7:53 AM

## 2019-08-07 NOTE — PROGRESS NOTES
Care rounds completed with Dr. Wendy Farah and multidisciplinary team. Reviewed labs, meds, VS, assessment, & plan of care for today. Informed of Intermittent Confusion. Order for Renal panel. Change NS to 50ml/ hr. And ACHS BG with low dose S/S. Informed of Sepsis flag.  See dictated note and new orders for details.

## 2019-08-07 NOTE — PROGRESS NOTES
Shift assessment was completed (see flow sheet). Pt is A/O to self at this time- intermittent confusion is baseline d/t dementia, denies any pain or other needs at this time. Respirations are even, unlabored, with diminished sounds. Scheduled medications to follow- whole/ with water. Call light within reach. Bed in lowest position. Bed alarm on. Will continue to monitor. High risk vesicant drug infusing:  ___YES_______    Multiple incompatible medications infusing:  _________    CVP Monitoring:  _________    Extremely difficult IV access challenge:  ________    Continued need for central line access:  Potential for need of Levo to restart (see MAR).     Addressed with physician:  ____YES____    RIGHT PATIENT, RIGHT TIME, RIGHT LINE

## 2019-08-07 NOTE — PROGRESS NOTES
4 Eyes Skin Assessment     The patient is being assess for   Shift Handoff    I agree that 2 RN's have performed a thorough Head to Toe Skin Assessment on the patient. ALL assessment sites listed below have been assessed. Areas assessed by both nurses: Helene Big  [x]   Head, Face, and Ears   [x]   Shoulders, Back, and Chest, Abdomen  [x]   Arms, Elbows, and Hands   [x]   Coccyx, Sacrum, and Ischium  [x]   Legs, Feet, and Heels        Blanchable redness to coccyx, boggy heels- off loading (no longer red). **SHARE this note so that the co-signing nurse is able to place an eSignature**    Co-signer eSignature: Electronically signed by Eugene Soni RN on 8/7/19 at 8:06 PM    Does the Patient have Skin Breakdown?   No          Bridger Prevention initiated:  Yes   Wound Care Orders initiated:  No      [facility] nurse consulted for Pressure Injury (Stage 3,4, Unstageable, DTI, NWPT, Complex wounds)and New or Established Ostomies:  No      Primary Nurse eSignature: Electronically signed by Yumiko Cueva RN on 8/7/19 at 7:21 PM

## 2019-08-07 NOTE — PROGRESS NOTES
Pt confused at times and gives CM permission to reach out to Garry Still,pt's legal guardian for initial interview. Writer left  for Danny Ovalle Case Management Assessment  Initial Evaluation    Date/Time of Evaluation: 8/7/2019 11:10 AM  Assessment Completed by: Filiberto Burnette    Patient Name: Fatuma Greene  YOB: 1935  Diagnosis: Sepsis ([facility] [A41.9]  Sepsis ([facility] [A41.9]  Sepsis ([facility] [A41.9]  Sepsis ([facility] [A41.9]  Date / Time: 8/6/2019  8:04 AM  Admission status/Date:inpatient   Chart Reviewed: Yes      Patient Interviewed: Yes   Family Interviewed:  Yes - Gabi CHEN      Hospitalization in the last 30 days:  No    Contacts  :     Relationship to Patient:   Phone Number:    Alternate Contact:     Relationship to Patient:     Phone Number:    Met with:    Current PCP  Tres Dobbs MD      Financial  Commercial  Precert required for SNF : Andre Garay        3 night stay required - Y, N    ADLS  Support Systems: Family Members  Transportation: EMS transportation       [facility] [address]: Reviewed Chart. Met with the pt. Pt in ICU, confused at times. CM spoke with pt's Gabi CHEN, for initial interview. Role of dcp explained. Pt from [facility] and plan return,+bed hold per Barrera Keep with VGT,+eCOC. follow. Explained Case Management role/services.

## 2019-08-07 NOTE — DISCHARGE INSTR - COC
Status/Restrictions: No weight bearing restirctions  Other Medical Equipment (for information only, NOT a DME order): Other Treatments:     Patient's personal belongings (please select all that are sent with patient):  None    RN SIGNATURE:  Electronically signed by Kahlil Sampson RN on 8/9/19 at 12:55 PM    CASE MANAGEMENT/SOCIAL WORK SECTION    Inpatient Status Date: 08/06/19  Readmission Risk Assessment Score:  Readmission Risk              Risk of Unplanned Readmission:        21           Discharging to Facility/ Agency   Name: Name: [facility]  Address: [address]  Phone: [phone]  Fax: [phone]  ·     / signature: Electronically signed by Patricia Breen RN on 8/9/19 at [address] [facility]. (if applicable)   · Name:  · Address:  · Dialysis Schedule:    Prognosis: {Prognosis:5904785300}    Condition at Discharge: Stable    Rehab Potential (if transferring to Rehab): Fair    Recommended Labs or Other Treatments After Discharge: none    Physician Certification: I certify the above information and transfer of Jillian Denise  is necessary for the continuing treatment of the diagnosis listed and that he requires [facility] for greater 30 days.      Update Admission H&P: No change in H&P    PHYSICIAN SIGNATURE:  Electronically signed by Kalin Conroy MD on 8/9/19 at 11:05 AM

## 2019-08-07 NOTE — FLOWSHEET NOTE
08/07/19 1433   Stool Assessment   Incontinence Yes   Stool Appearance Soft   Stool Color Black   Stool Amount Large   Stool Source Rectum   Unmeasured Output   Urine Occurrence 1   Stool Occurrence 1   Perfect serve to Dr Erna Shah to inform of Black stools. And monitor rhythm- Bigeminy/ Trigeminy. Continuing to monitor.

## 2019-08-07 NOTE — PROGRESS NOTES
Perfect serve to Dr Morgan Garcia to update on patients labs and current presentation.  Order for total 40meq IV potassium

## 2019-08-08 LAB
ANION GAP SERPL CALCULATED.3IONS-SCNC: 9 MMOL/L (ref 3–16)
BASOPHILS ABSOLUTE: 0 K/UL (ref 0–0.2)
BASOPHILS RELATIVE PERCENT: 0.4 %
BUN BLDV-MCNC: 14 MG/DL (ref 7–20)
CALCIUM SERPL-MCNC: 8 MG/DL (ref 8.3–10.6)
CHLORIDE BLD-SCNC: 107 MMOL/L (ref 99–110)
CO2: 22 MMOL/L (ref 21–32)
CREAT SERPL-MCNC: 0.6 MG/DL (ref 0.8–1.3)
EOSINOPHILS ABSOLUTE: 0.4 K/UL (ref 0–0.6)
EOSINOPHILS RELATIVE PERCENT: 4.9 %
GFR AFRICAN AMERICAN: >60
GFR NON-AFRICAN AMERICAN: >60
GLUCOSE BLD-MCNC: 114 MG/DL (ref 70–99)
GLUCOSE BLD-MCNC: 117 MG/DL (ref 70–99)
GLUCOSE BLD-MCNC: 121 MG/DL (ref 70–99)
GLUCOSE BLD-MCNC: 130 MG/DL (ref 70–99)
GLUCOSE BLD-MCNC: 138 MG/DL (ref 70–99)
HCT VFR BLD CALC: 29.2 % (ref 40.5–52.5)
HEMOGLOBIN: 9.2 G/DL (ref 13.5–17.5)
LACTIC ACID: 1.1 MMOL/L (ref 0.4–2)
LYMPHOCYTES ABSOLUTE: 0.8 K/UL (ref 1–5.1)
LYMPHOCYTES RELATIVE PERCENT: 9.5 %
MCH RBC QN AUTO: 26.5 PG (ref 26–34)
MCHC RBC AUTO-ENTMCNC: 31.5 G/DL (ref 31–36)
MCV RBC AUTO: 84.1 FL (ref 80–100)
MONOCYTES ABSOLUTE: 0.7 K/UL (ref 0–1.3)
MONOCYTES RELATIVE PERCENT: 8.4 %
NEUTROPHILS ABSOLUTE: 6.6 K/UL (ref 1.7–7.7)
NEUTROPHILS RELATIVE PERCENT: 76.8 %
ORGANISM: ABNORMAL
PDW BLD-RTO: 16.6 % (ref 12.4–15.4)
PERFORMED ON: ABNORMAL
PLATELET # BLD: 170 K/UL (ref 135–450)
PMV BLD AUTO: 8.4 FL (ref 5–10.5)
POTASSIUM SERPL-SCNC: 3.8 MMOL/L (ref 3.5–5.1)
RBC # BLD: 3.47 M/UL (ref 4.2–5.9)
SODIUM BLD-SCNC: 138 MMOL/L (ref 136–145)
URINE CULTURE, ROUTINE: ABNORMAL
URINE CULTURE, ROUTINE: ABNORMAL
WBC # BLD: 8.6 K/UL (ref 4–11)

## 2019-08-08 PROCEDURE — 6370000000 HC RX 637 (ALT 250 FOR IP): Performed by: INTERNAL MEDICINE

## 2019-08-08 PROCEDURE — 99152 MOD SED SAME PHYS/QHP 5/>YRS: CPT | Performed by: INTERNAL MEDICINE

## 2019-08-08 PROCEDURE — 6360000002 HC RX W HCPCS: Performed by: INTERNAL MEDICINE

## 2019-08-08 PROCEDURE — 1200000000 HC SEMI PRIVATE

## 2019-08-08 PROCEDURE — 2580000003 HC RX 258: Performed by: INTERNAL MEDICINE

## 2019-08-08 PROCEDURE — 99233 SBSQ HOSP IP/OBS HIGH 50: CPT | Performed by: INTERNAL MEDICINE

## 2019-08-08 PROCEDURE — 36592 COLLECT BLOOD FROM PICC: CPT

## 2019-08-08 PROCEDURE — 2709999900 HC NON-CHARGEABLE SUPPLY: Performed by: INTERNAL MEDICINE

## 2019-08-08 PROCEDURE — 85025 COMPLETE CBC W/AUTO DIFF WBC: CPT

## 2019-08-08 PROCEDURE — 80048 BASIC METABOLIC PNL TOTAL CA: CPT

## 2019-08-08 PROCEDURE — 97530 THERAPEUTIC ACTIVITIES: CPT

## 2019-08-08 PROCEDURE — 99232 SBSQ HOSP IP/OBS MODERATE 35: CPT | Performed by: INTERNAL MEDICINE

## 2019-08-08 PROCEDURE — 0DJ08ZZ INSPECTION OF UPPER INTESTINAL TRACT, VIA NATURAL OR ARTIFICIAL OPENING ENDOSCOPIC: ICD-10-PCS | Performed by: INTERNAL MEDICINE

## 2019-08-08 PROCEDURE — C9113 INJ PANTOPRAZOLE SODIUM, VIA: HCPCS | Performed by: INTERNAL MEDICINE

## 2019-08-08 PROCEDURE — 3609017100 HC EGD: Performed by: INTERNAL MEDICINE

## 2019-08-08 PROCEDURE — 83605 ASSAY OF LACTIC ACID: CPT

## 2019-08-08 RX ORDER — PANTOPRAZOLE SODIUM 40 MG/1
40 TABLET, DELAYED RELEASE ORAL
Status: DISCONTINUED | OUTPATIENT
Start: 2019-08-09 | End: 2019-08-09 | Stop reason: HOSPADM

## 2019-08-08 RX ORDER — FENTANYL CITRATE 50 UG/ML
INJECTION, SOLUTION INTRAMUSCULAR; INTRAVENOUS PRN
Status: DISCONTINUED | OUTPATIENT
Start: 2019-08-08 | End: 2019-08-08 | Stop reason: ALTCHOICE

## 2019-08-08 RX ORDER — MIDAZOLAM HYDROCHLORIDE 5 MG/ML
INJECTION INTRAMUSCULAR; INTRAVENOUS PRN
Status: DISCONTINUED | OUTPATIENT
Start: 2019-08-08 | End: 2019-08-08 | Stop reason: ALTCHOICE

## 2019-08-08 RX ADMIN — Medication 10 ML: at 21:27

## 2019-08-08 RX ADMIN — AMITRIPTYLINE HYDROCHLORIDE 25 MG: 25 TABLET, FILM COATED ORAL at 21:17

## 2019-08-08 RX ADMIN — CEFEPIME 2 G: 2 INJECTION, POWDER, FOR SOLUTION INTRAVENOUS at 21:17

## 2019-08-08 RX ADMIN — ACETAMINOPHEN 650 MG: 325 TABLET ORAL at 03:09

## 2019-08-08 RX ADMIN — GABAPENTIN 200 MG: 100 CAPSULE ORAL at 21:17

## 2019-08-08 RX ADMIN — MUPIROCIN: 20 OINTMENT TOPICAL at 09:17

## 2019-08-08 RX ADMIN — MUPIROCIN: 20 OINTMENT TOPICAL at 21:27

## 2019-08-08 RX ADMIN — SODIUM CHLORIDE 8 MG/HR: 9 INJECTION, SOLUTION INTRAVENOUS at 00:41

## 2019-08-08 RX ADMIN — Medication 10 ML: at 09:17

## 2019-08-08 RX ADMIN — GABAPENTIN 200 MG: 100 CAPSULE ORAL at 13:57

## 2019-08-08 RX ADMIN — SODIUM CHLORIDE: 9 INJECTION, SOLUTION INTRAVENOUS at 04:03

## 2019-08-08 RX ADMIN — CEFEPIME 2 G: 2 INJECTION, POWDER, FOR SOLUTION INTRAVENOUS at 09:17

## 2019-08-08 ASSESSMENT — PAIN DESCRIPTION - ORIENTATION: ORIENTATION: LOWER

## 2019-08-08 ASSESSMENT — PAIN SCALES - GENERAL
PAINLEVEL_OUTOF10: 5
PAINLEVEL_OUTOF10: 0
PAINLEVEL_OUTOF10: 0

## 2019-08-08 ASSESSMENT — PAIN DESCRIPTION - DESCRIPTORS: DESCRIPTORS: ACHING

## 2019-08-08 ASSESSMENT — PAIN DESCRIPTION - ONSET: ONSET: GRADUAL

## 2019-08-08 ASSESSMENT — PAIN - FUNCTIONAL ASSESSMENT: PAIN_FUNCTIONAL_ASSESSMENT: ACTIVITIES ARE NOT PREVENTED

## 2019-08-08 ASSESSMENT — PAIN DESCRIPTION - PAIN TYPE: TYPE: ACUTE PAIN

## 2019-08-08 ASSESSMENT — PAIN DESCRIPTION - FREQUENCY: FREQUENCY: INTERMITTENT

## 2019-08-08 ASSESSMENT — PAIN DESCRIPTION - LOCATION: LOCATION: BACK

## 2019-08-08 ASSESSMENT — PAIN DESCRIPTION - PROGRESSION: CLINICAL_PROGRESSION: GRADUALLY WORSENING

## 2019-08-08 NOTE — PROGRESS NOTES
[facility].      Electronically signed by Jayda Castellanos RN on 8/8/2019 at 2:28 PM

## 2019-08-08 NOTE — PROGRESS NOTES
Shift assessment complete. Patient is A&O to self and place. Somewhat questionable with time and situation. No more black BMs throughout night per night RN. H&H stable. VSS. Lungs are clear in upper lobes and diminished in lowers. RR are easy and even. NSR on monitor. Bowels active x4. Some tenderness across abdomen with palpation per patient. No edema noted throughout. Patient denies any further needs at this time. Call light and table within reach.      Electronically signed by Travon Giles RN on 8/8/2019 at 8:46 AM

## 2019-08-08 NOTE — PLAN OF CARE
Problem: Risk for Impaired Skin Integrity  Goal: Tissue integrity - skin and mucous membranes  Description  Structural intactness and normal physiological function of skin and  mucous membranes.   Outcome: Ongoing     Problem: Falls - Risk of:  Goal: Will remain free from falls  Description  Will remain free from falls  Outcome: Ongoing  Goal: Absence of physical injury  Description  Absence of physical injury  Outcome: Ongoing     Problem: Airway Clearance - Ineffective:  Goal: Ability to maintain a clear airway will improve  Description  Ability to maintain a clear airway will improve  Outcome: Ongoing     Problem: Anxiety/Stress:  Goal: Level of anxiety will decrease  Description  Level of anxiety will decrease  Outcome: Ongoing     Problem: Fluid Volume - Imbalance:  Goal: Absence of imbalanced fluid volume signs and symptoms  Description  Absence of imbalanced fluid volume signs and symptoms  Outcome: Ongoing     Problem: Pain:  Goal: Pain level will decrease  Description  Pain level will decrease  Outcome: Ongoing     Problem: Sleep Pattern Disturbance:  Goal: Appears well-rested  Description  Appears well-rested  Outcome: Ongoing

## 2019-08-08 NOTE — PROGRESS NOTES
08/07/19  0600 08/07/19  1555   WBC 9.1 15.5*  --    HGB 12.9* 9.7* 10.1*   HCT 41.2 30.8* 32.0*   MCV 85.5 85.1  --     187  --      BMP:   Recent Labs     08/07/19  1043 08/07/19  1555 08/08/19  0500    136 138   K 3.4* 3.3* 3.8    103 107   CO2 20* 21 22   BUN 18 17 14   CREATININE 0.8 0.9 0.6*     LIVER PROFILE:   Recent Labs     08/06/19  0808   AST 30   ALT 10   LIPASE 6.0*   BILITOT 1.1*   ALKPHOS 158*       Cultures:  8/6/2019 blood sent  8/6/2019 urine pending    Films:  CT CH/ABD/PEL 8/6/19    Impression:       No acute abnormality of the chest abdomen or pelvis, as discussed.  No  findings to explain right-sided abdomen pain or fever.            ASSESSMENT:  · Septic shock  · Urinary Tract Infection   · Suspected GI bleed with black tarry stools  · Mild lower lobe bronchiectasis  · Mediastinal lymphadenopathy   · Duodenal ulcer 6/11/19  · CAD     PLAN:  · Supplemental oxygen to maintain SaO2 >92%; wean as tolerated  · Gastroenterology to see  · Cefepime D#3  · PT/OT  · Prophylaxis: Protonix, SCD, bactroban   · Code status: Full code

## 2019-08-08 NOTE — PROGRESS NOTES
Patient reassessed. Post EGD. Appears slightly drowsy from conscious sedation. Still follows commands. Unchanged otherwise.      Electronically signed by Saman Lamb RN on 8/8/2019 at 11:53 AM    Vitals:    08/08/19 1130   BP: (!) 153/89   Pulse: 76   Resp: 15   Temp:    SpO2: 98%

## 2019-08-08 NOTE — PROGRESS NOTES
Occupational Therapy Daily Treatment Note    Unit: ICU  Date:  8/8/2019  Patient Name:    Reginaldo Aguirre  Admitting diagnosis:  Sepsis ([facility] [A41.9]  [facility] [A41.9]  Sepsis ([facility] [A41.9]  [facility] [A41.9]  Admit Date:  8/6/2019  Precautions/Restrictions:     fall risk, IV, bed/chair alarm, cordero catheter , confusion and ICU monitoring       Discharge Recommendations: LTC/ECF  DME needs for discharge: defer to facility       Therapy recommendations for staff:   Assist of 2 with use of STEDY for all transfers to/from BSC/chair    AM-PAC Score: 7  Home Health S4 Level: NA       Treatment Time:  7454-5794   Treatment number:  2 *  Total Treatment Time:   25 minutes      Subjective:  Pt in the bed and willing to work with OT, pt has improved direction following    Pain   No  Rating: NA  Location:na  Pain Medicine Status: No request made      Bed Mobility:   Supine to Sit:  Mod A of 2  Sit to Supine: Mod A of 2  Rolling:           Min A  Scooting:        Min A of 2 to head of bed    Transfer Training:   Sit to stand:   Max assist of two  Stand to sit: Max A  Bed to Chair:  Total A via Stedy  Bed to [facility]:   Not Tested  Standard toilet:   Not Tested    Activity Tolerance   Pt completed therapy session with SOB noted w/activity    ADL Training:   Upper body dressing:  Not Tested  Upper body bathing:  Not Tested  Lower body dressing:  Not Tested  Lower body bathing:  Not Tested  Toileting:   Not Tested  Grooming/Hygiene:  Not Tested    Therapeutic Exercise:   N/A    Patient Education:   Role of OT    Positioning Needs: In bed, call light and needs in reach. Family Present:  No    Assessment: Pt had improved direction following today and was cooperative however remains confused at times. PT was mod of 2 for supine to sit and max assist of two to stand into the stedy. The pt was also transferred to the chair witn max assist of two for sit pivot transfer. PT SOB with transfers and required rest. The pt requires

## 2019-08-08 NOTE — PROGRESS NOTES
4 Eyes Skin Assessment     The patient is being assess for   Transfer to New Unit    I agree that 2 RN's have performed a thorough Head to Toe Skin Assessment on the patient. ALL assessment sites listed below have been assessed. Areas assessed by both nurses:   [x]   Head, Face, and Ears   [x]   Shoulders, Back, and Chest, Abdomen  [x]   Arms, Elbows, and Hands   [x]   Coccyx, Sacrum, and Ischium  [x]   Legs, Feet, and Heels        No skin issues noted. Preventative to bilateral heels and sacrum. **SHARE this note so that the co-signing nurse is able to place an eSignature**    Co-signer eSignature: Electronically signed by Rock Mishel RN on 8/8/19 at 2:29 PM    Does the Patient have Skin Breakdown?   No          Bridger Prevention initiated:  No   Wound Care Orders initiated:  No      WOC nurse consulted for Pressure Injury (Stage 3,4, Unstageable, DTI, NWPT, Complex wounds)and New or Established Ostomies:  No      Primary Nurse eSignature: Electronically signed by Harsha Rojas RN on 8/8/19 at 2:56 PM

## 2019-08-08 NOTE — PROGRESS NOTES
High risk vesicant drug infusing:  ___no_______    Multiple incompatible medications infusing:  __no_______    CVP Monitoring:  __no_______    Extremely difficult IV access challenge:  _no_______    Continued need for central line access:  ___yes_______    Addressed with physician:  ___yes_____    RIGHT PATIENT, RIGHT TIME, RIGHT LINE

## 2019-08-08 NOTE — PROGRESS NOTES
IM Progress Note    Admit Date:  8/6/2019  2    Interval history:  BP improved , off levophed  Good UOP   Black stool x1     Subjective:  Mr. Lucian Garrett seen up in bed drowsy post EGD  No acute issues today  H.h stable    Objective:   BP (!) 97/54   Pulse 58   Temp 97.8 °F (36.6 °C) (Oral)   Resp 14   Ht 5' 11\" (1.803 m)   Wt 179 lb 4.8 oz (81.3 kg)   SpO2 98%   BMI 25.01 kg/m²       Intake/Output Summary (Last 24 hours) at 8/8/2019 0759  Last data filed at 8/8/2019 0550  Gross per 24 hour   Intake 2289.5 ml   Output 1675 ml   Net 614.5 ml       Physical Exam:    General:  Elderly male up in bed sedated. Appears to be not in any distress  Mucous Membranes:  Pink , anicteric  Neck: No JVD, no carotid bruit, no thyromegaly  Chest:  Clear to auscultation bilaterally, no added sounds  Cardiovascular:  RRR S1S2 heard, no murmurs or gallops  Abdomen: thin sunken,  Soft, undistended, non tender, no organomegaly, BS present  Extremities: right femoral TLC No edema or cyanosis.  Distal pulses well felt  Neurological : sedated    Medications:   Scheduled Medications:    insulin lispro  0-6 Units Subcutaneous TID WC    insulin lispro  0-3 Units Subcutaneous Nightly    amitriptyline  25 mg Oral Nightly    ferrous sulfate  325 mg Oral Daily with breakfast    gabapentin  200 mg Oral TID    sodium chloride flush  10 mL Intravenous 2 times per day    cefepime  2 g Intravenous Q12H    mupirocin   Nasal BID     I   dextrose      pantoprozole (PROTONIX) infusion 8 mg/hr (08/08/19 0041)    norepinephrine      sodium chloride 50 mL/hr at 08/08/19 0403     glucose, dextrose, glucagon (rDNA), dextrose, sodium chloride, acetaminophen, sodium chloride flush, magnesium hydroxide, ondansetron    Lab Data:  Recent Labs     08/06/19  0808 08/07/19  0600 08/07/19  1555 08/08/19  0500   WBC 9.1 15.5*  --  8.6   HGB 12.9* 9.7* 10.1* 9.2*   HCT 41.2 30.8* 32.0* 29.2*   MCV 85.5 85.1  --  84.1    187  --  170     Recent Labs 08/07/19  1043 08/07/19  1555 08/08/19  0500    136 138   K 3.4* 3.3* 3.8    103 107   CO2 20* 21 22   BUN 18 17 14   CREATININE 0.8 0.9 0.6*     Recent Labs     08/06/19  0808   TROPONINI <0.01       Coagulation:   Lab Results   Component Value Date    INR 1.21 08/06/2019    APTT 31.1 02/24/2013     Cardiac markers:   Lab Results   Component Value Date    CKTOTAL 89 11/18/2010    TROPONINI <0.01 08/06/2019         Lab Results   Component Value Date    ALT 10 08/06/2019    AST 30 08/06/2019    ALKPHOS 158 (H) 08/06/2019    BILITOT 1.1 (H) 08/06/2019       Lab Results   Component Value Date    INR 1.21 (H) 08/06/2019    INR 1.43 (H) 06/09/2019    INR 1.18 (H) 02/08/2016    PROTIME 13.7 (H) 08/06/2019    PROTIME 16.3 (H) 06/09/2019    PROTIME 13.5 (H) 02/08/2016            Sinus tachycardia with Premature supraventricular complexes with occasional Premature ventricular complexes     Radiology:     Chest xray   Left lower lobe atelectasis.  No acute process.     Ct chest and abd  No acute abnormality of the chest abdomen or pelvis, as discussed.  No  findings to explain right-sided abdomen pain or fever.     Ct brain  - no acute abn     ECHO 3/18       Technically difficult examination.   Normal left ventricle systolic function with an estimated ejection fraction   of 55%.   No regional wall motion abnormalities are seen.   Grade I diastolic dysfunction with normal filing pressure.   Aortic valve sclerosis without stenosis.       ASSESMENT & PLAN:      1. Sepsis with shock  - likely related to UTI. Severe hypotension, lactic acid of 9, fever, tachycardia   - BP did not improve with 2 L NS bolus  -  Needed  Levophed and now off  - critical care consulted  - continue cefepime for now . Blood cx with coag neg staph but urine neg so far      2. UTI - on cefepime, no obstructive stone noted on imaging  - await cx     3.  Recent duodenal ulcer  - was on ppi, had one black stool 8/7, drop in h.h can be accountable

## 2019-08-08 NOTE — PROGRESS NOTES
Pt in endoscopy for EGD. Alert, cooperative. Current IVF of 0.9 NS at 100/hr and Protonix gtt at 10/hr. IV site to right groin intact.

## 2019-08-09 ENCOUNTER — TELEPHONE (OUTPATIENT)
Dept: PULMONOLOGY | Age: 84
End: 2019-08-09

## 2019-08-09 VITALS
OXYGEN SATURATION: 98 % | HEIGHT: 71 IN | BODY MASS INDEX: 25.1 KG/M2 | HEART RATE: 89 BPM | DIASTOLIC BLOOD PRESSURE: 69 MMHG | TEMPERATURE: 97.4 F | WEIGHT: 179.3 LBS | SYSTOLIC BLOOD PRESSURE: 114 MMHG | RESPIRATION RATE: 16 BRPM

## 2019-08-09 DIAGNOSIS — R59.0 MEDIASTINAL LYMPHADENOPATHY: Primary | ICD-10-CM

## 2019-08-09 LAB
ANION GAP SERPL CALCULATED.3IONS-SCNC: 10 MMOL/L (ref 3–16)
BASOPHILS ABSOLUTE: 0 K/UL (ref 0–0.2)
BASOPHILS RELATIVE PERCENT: 0.3 %
BLOOD CULTURE, ROUTINE: ABNORMAL
BUN BLDV-MCNC: 9 MG/DL (ref 7–20)
CALCIUM SERPL-MCNC: 8.7 MG/DL (ref 8.3–10.6)
CHLORIDE BLD-SCNC: 101 MMOL/L (ref 99–110)
CO2: 25 MMOL/L (ref 21–32)
CREAT SERPL-MCNC: 0.6 MG/DL (ref 0.8–1.3)
EOSINOPHILS ABSOLUTE: 0.5 K/UL (ref 0–0.6)
EOSINOPHILS RELATIVE PERCENT: 5.9 %
GFR AFRICAN AMERICAN: >60
GFR NON-AFRICAN AMERICAN: >60
GLUCOSE BLD-MCNC: 114 MG/DL (ref 70–99)
GLUCOSE BLD-MCNC: 116 MG/DL (ref 70–99)
GLUCOSE BLD-MCNC: 123 MG/DL (ref 70–99)
HCT VFR BLD CALC: 33.6 % (ref 40.5–52.5)
HEMOGLOBIN: 10.9 G/DL (ref 13.5–17.5)
LYMPHOCYTES ABSOLUTE: 0.9 K/UL (ref 1–5.1)
LYMPHOCYTES RELATIVE PERCENT: 11 %
MCH RBC QN AUTO: 27.1 PG (ref 26–34)
MCHC RBC AUTO-ENTMCNC: 32.3 G/DL (ref 31–36)
MCV RBC AUTO: 83.7 FL (ref 80–100)
MONOCYTES ABSOLUTE: 0.7 K/UL (ref 0–1.3)
MONOCYTES RELATIVE PERCENT: 8.3 %
NEUTROPHILS ABSOLUTE: 5.9 K/UL (ref 1.7–7.7)
NEUTROPHILS RELATIVE PERCENT: 74.5 %
ORGANISM: ABNORMAL
PDW BLD-RTO: 16.4 % (ref 12.4–15.4)
PERFORMED ON: ABNORMAL
PERFORMED ON: ABNORMAL
PLATELET # BLD: 187 K/UL (ref 135–450)
PMV BLD AUTO: 8.2 FL (ref 5–10.5)
POTASSIUM SERPL-SCNC: 3.5 MMOL/L (ref 3.5–5.1)
RBC # BLD: 4.02 M/UL (ref 4.2–5.9)
SODIUM BLD-SCNC: 136 MMOL/L (ref 136–145)
WBC # BLD: 7.9 K/UL (ref 4–11)

## 2019-08-09 PROCEDURE — 6370000000 HC RX 637 (ALT 250 FOR IP): Performed by: INTERNAL MEDICINE

## 2019-08-09 PROCEDURE — 2580000003 HC RX 258: Performed by: INTERNAL MEDICINE

## 2019-08-09 PROCEDURE — 97530 THERAPEUTIC ACTIVITIES: CPT

## 2019-08-09 PROCEDURE — 97535 SELF CARE MNGMENT TRAINING: CPT

## 2019-08-09 PROCEDURE — 6360000002 HC RX W HCPCS: Performed by: INTERNAL MEDICINE

## 2019-08-09 PROCEDURE — 85025 COMPLETE CBC W/AUTO DIFF WBC: CPT

## 2019-08-09 PROCEDURE — 99232 SBSQ HOSP IP/OBS MODERATE 35: CPT | Performed by: INTERNAL MEDICINE

## 2019-08-09 PROCEDURE — 36415 COLL VENOUS BLD VENIPUNCTURE: CPT

## 2019-08-09 PROCEDURE — 99239 HOSP IP/OBS DSCHRG MGMT >30: CPT | Performed by: INTERNAL MEDICINE

## 2019-08-09 PROCEDURE — 80048 BASIC METABOLIC PNL TOTAL CA: CPT

## 2019-08-09 RX ORDER — CEFDINIR 300 MG/1
300 CAPSULE ORAL 2 TIMES DAILY
Qty: 20 CAPSULE | Refills: 0
Start: 2019-08-09 | End: 2019-08-19

## 2019-08-09 RX ADMIN — PANTOPRAZOLE SODIUM 40 MG: 40 TABLET, DELAYED RELEASE ORAL at 07:15

## 2019-08-09 RX ADMIN — GABAPENTIN 200 MG: 100 CAPSULE ORAL at 13:14

## 2019-08-09 RX ADMIN — GABAPENTIN 200 MG: 100 CAPSULE ORAL at 08:55

## 2019-08-09 RX ADMIN — CEFEPIME 2 G: 2 INJECTION, POWDER, FOR SOLUTION INTRAVENOUS at 10:35

## 2019-08-09 RX ADMIN — ACETAMINOPHEN 650 MG: 325 TABLET ORAL at 13:14

## 2019-08-09 RX ADMIN — MUPIROCIN: 20 OINTMENT TOPICAL at 09:00

## 2019-08-09 RX ADMIN — FERROUS SULFATE TAB 325 MG (65 MG ELEMENTAL FE) 325 MG: 325 (65 FE) TAB at 08:56

## 2019-08-09 RX ADMIN — Medication 10 ML: at 08:55

## 2019-08-09 ASSESSMENT — PAIN DESCRIPTION - DESCRIPTORS: DESCRIPTORS: ACHING

## 2019-08-09 ASSESSMENT — PAIN DESCRIPTION - LOCATION: LOCATION: SHOULDER

## 2019-08-09 ASSESSMENT — PAIN DESCRIPTION - PAIN TYPE: TYPE: CHRONIC PAIN

## 2019-08-09 ASSESSMENT — PAIN SCALES - GENERAL
PAINLEVEL_OUTOF10: 0
PAINLEVEL_OUTOF10: 6

## 2019-08-09 NOTE — PROGRESS NOTES
4 Eyes Skin Assessment     The patient is being assess for   Shift Handoff    I agree that 2 RN's have performed a thorough Head to Toe Skin Assessment on the patient. ALL assessment sites listed below have been assessed. Areas assessed by both nurses:   [x]   Head, Face, and Ears   [x]   Shoulders, Back, and Chest, Abdomen  [x]   Arms, Elbows, and Hands   [x]   Coccyx, Sacrum, and Ischium  [x]   Legs, Feet, and Heels        Patient coccyx and bilateral heels red but blanchable. No other skin issues noted. **SHARE this note so that the co-signing nurse is able to place an eSignature**    Co-signer eSignature: Electronically signed by Nery Archuleta RN on 8/9/19 at 3:58 PM    Does the Patient have Skin Breakdown?   No          Bridger Prevention initiated:  Yes   Wound Care Orders initiated:  No      [facility] nurse consulted for Pressure Injury (Stage 3,4, Unstageable, DTI, NWPT, Complex wounds)and New or Established Ostomies:  No      Primary Nurse eSignature: Electronically signed by Jocelyn Pacheco RN on 8/9/19 at 12:04 PM

## 2019-08-09 NOTE — OP NOTE
[address]                 [address] [address]                                OPERATIVE REPORT    PATIENT NAME: Paul Lord                     :        1935  MED REC NO:   [id number]                          ROOM:       93  ACCOUNT NO:   [de-identified]                           ADMIT DATE: 2019  PROVIDER:     Julita Chris MD    DATE OF PROCEDURE:  2019    PREPROCEDURE DIAGNOSES:  1.  GI bleed. 2.  Melena. POSTOPERATIVE DIAGNOSES:  1. Almost healed duodenal ulcer. 2.  No active bleeding. PROCEDURE:  EGD. SURGEON:  Julita Chris MD    PROCEDURE INDICATIONS:  An 42-year-old male with history of  hypertension, hyperlipidemia, COPD, coronary artery disease, GERD,  depression, dementia, and recent diagnosis of duodenal ulcer, admitted  with altered mental status secondary to UTI and sepsis. He was  hypotensive and received up to almost 4 liters of fluid. He was noted  to have a decline in hemoglobin and was with melenic stool. EGD is  being requested to rule out upper GI bleed from the duodenal ulcer. He  has had an EGD in 2019 for anemia and melena. He was diagnosed  with a duodenal ulcer. He is somewhat of a poor historian, but denies  any active bleeding. He denies any NSAID use. LABORATORY DATA:  White blood cell count 8.6, hemoglobin 9.2, hematocrit  29.2, platelets 372. Sodium 138, potassium 3.8, chloride 107, bicarb  22, BUN 14, creatinine 0.6, glucose 117. MEDICATIONS:  Versed 5 mg, fentanyl 100 mcg. PROCEDURE DETAILS:  Informed consent obtained after discussing risks,  benefits, and alternatives. A full history and physical was performed. The patient was classed as ASA class II. Medications were sequentially  given to achieve adequate sedation. Cardiopulmonary status was  continuously monitored throughout the procedure.   The patient was placed  in left lateral decubitus position. Once the patient was deemed to be  adequately sedated, a standard upper gastroscope was inserted in the  mouth and advanced under direct visualization to the second portion of  the duodenum. The entire mucosa of esophagus, stomach (retroflexed and  forward views), duodenum (bulb, sweep, and second portion) were examined  carefully during withdrawal.  The patient tolerated the procedure well  without any difficulties. FINDINGS:  ESOPHAGUS:  The entire esophagus appeared normal.  There was no evidence  of inflammation, ulcers, strictures, or Carvajal's. STOMACH:  The entire appeared normal, both in forward and retroflex  views. DUODENUM:  There was a single small linear erosion which is probably the  previously seen ulcer. This is nearly healed over. There is no  evidence of active bleeding or high-risk stigmata. The second portion  of duodenum appeared normal.    SUMMARY:  1.  Nearly healed duodenal ulcer. 2.  No active bleeding. RECOMMENDATIONS:  1. Return the patient to floor for continuous medical care. 2.  Continue PPI b.i.d. for another 4 weeks. 3.  Resume diet. 4.  Monitor hemoglobin. 5.  If signs of bleeding continues to be seen, we can consider  colonoscopy to rule out lower GI source. 6.  We will follow the patient with you.         Dafne Arredondo MD    D: 08/08/2019 18:05:48       T: 08/09/2019 5:39:51     [id number]  Job#: [id number]     Doc#: [id number]    CC:  MD Rachelle Amor MD

## 2019-08-09 NOTE — PROGRESS NOTES
STEDY  Eating: Max A beverage management    Therapeutic Exercise:   N/A    Patient Education:   Role of OT    Positioning Needs: In bed, call light and needs in reach. Alarm Set    Family Present:  No    Assessment: Pt making progress toward goals, continue as tolerated. GOALS  1). Bed to toilet/BSC: Mod A of 2 with AD as needed      To be met in 5 Visits:  1). Supine to Sit: Mod A of 2- goal met 8-8-19 new goal MIN A  2). Upper Body Bathing: Mod A  3). Lower Body Bathing:  Max A  4). Upper Body Dressing: N/A  5). Lower Body Dressing: N/A  6).  Pt to maya UE exs x 15 reps    Plan: cont with [address] [facility], OTR/L 0140        If patient discharges from this facility prior to next visit, this note will serve as the Discharge Summary

## 2019-08-09 NOTE — PROGRESS NOTES
Patient being transported back to [address] [place] [address] via squad. Patient stable for transport.

## 2019-08-09 NOTE — PROGRESS NOTES
IM Progress Note    Admit Date:  8/6/2019  3    Interval history:  BP improved , off levophed  Good UOP   Black stool x1     Subjective:  Mr. Radu Martinez had EGD -showed healing DUs  Objective:   BP (!) 164/82   Pulse 78   Temp 97.9 °F (36.6 °C) (Oral)   Resp 16   Ht 5' 11\" (1.803 m)   Wt 179 lb 4.8 oz (81.3 kg)   SpO2 96%   BMI 25.01 kg/m²       Intake/Output Summary (Last 24 hours) at 8/9/2019 1030  Last data filed at 8/8/2019 1344  Gross per 24 hour   Intake 425 ml   Output 950 ml   Net -525 ml       Physical Exam:    General:  Elderly male up in bed sedated. Appears to be not in any distress  Mucous Membranes:  Pink , anicteric  Neck: No JVD, no carotid bruit, no thyromegaly  Chest:  Clear to auscultation bilaterally, no added sounds  Cardiovascular:  RRR S1S2 heard, no murmurs or gallops  Abdomen: thin sunken,  Soft, undistended, non tender, no organomegaly, BS present  Extremities: right femoral TLC No edema or cyanosis.  Distal pulses well felt  Neurological : sedated    Medications:   Scheduled Medications:    pantoprazole  40 mg Oral BID AC    insulin lispro  0-6 Units Subcutaneous TID WC    insulin lispro  0-3 Units Subcutaneous Nightly    amitriptyline  25 mg Oral Nightly    ferrous sulfate  325 mg Oral Daily with breakfast    gabapentin  200 mg Oral TID    sodium chloride flush  10 mL Intravenous 2 times per day    cefepime  2 g Intravenous Q12H    mupirocin   Nasal BID     I   dextrose       glucose, dextrose, glucagon (rDNA), dextrose, sodium chloride, acetaminophen, sodium chloride flush, magnesium hydroxide, ondansetron    Lab Data:  Recent Labs     08/07/19  0600 08/07/19  1555 08/08/19  0500 08/09/19  0517   WBC 15.5*  --  8.6 7.9   HGB 9.7* 10.1* 9.2* 10.9*   HCT 30.8* 32.0* 29.2* 33.6*   MCV 85.1  --  84.1 83.7     --  170 187     Recent Labs     08/07/19  1555 08/08/19  0500 08/09/19  0517    138 136   K 3.3* 3.8 3.5    107 101   CO2 21 22 25   BUN 17 14 9 CREATININE 0.9 0.6* 0.6*     No results for input(s): CKTOTAL, CKMB, CKMBINDEX, TROPONINI in the last 72 hours. Coagulation:   Lab Results   Component Value Date    INR 1.21 08/06/2019    APTT 31.1 02/24/2013     Cardiac markers:   Lab Results   Component Value Date    CKTOTAL 89 11/18/2010    TROPONINI <0.01 08/06/2019         Lab Results   Component Value Date    ALT 10 08/06/2019    AST 30 08/06/2019    ALKPHOS 158 (H) 08/06/2019    BILITOT 1.1 (H) 08/06/2019       Lab Results   Component Value Date    INR 1.21 (H) 08/06/2019    INR 1.43 (H) 06/09/2019    INR 1.18 (H) 02/08/2016    PROTIME 13.7 (H) 08/06/2019    PROTIME 16.3 (H) 06/09/2019    PROTIME 13.5 (H) 02/08/2016            Sinus tachycardia with Premature supraventricular complexes with occasional Premature ventricular complexes     Radiology:     Chest xray   Left lower lobe atelectasis.  No acute process.     Ct chest and abd  No acute abnormality of the chest abdomen or pelvis, as discussed.  No  findings to explain right-sided abdomen pain or fever.     Ct brain  - no acute abn     ECHO 3/18       Technically difficult examination.   Normal left ventricle systolic function with an estimated ejection fraction   of 55%.   No regional wall motion abnormalities are seen.   Grade I diastolic dysfunction with normal filing pressure.   Aortic valve sclerosis without stenosis.       ASSESMENT & PLAN:      1. Sepsis with shock  - likely related to UTI. Severe hypotension, lactic acid of 9, fever, tachycardia   - BP did not improve with 2 L NS bolus  -  Needed  Levophed and now off  - critical care consulted  - continue cefepime for now . Blood culture -proteus and coag neg staph(likely contaminant)     2. UTI - on cefepime, no obstructive stone noted on imaging  - culture proteus     3. Recent duodenal ulcer  - was on ppi, had one black stool 8/7, drop in h.h can be accountable with IVF boluses.  Repeat EGD today neg for any active bleed,healed duodenal ulcer   ppi oral bid - 4 more weeks     4. Acute delirium with baseline dementia -  sec to fever and sepsis, resolved    5. Severe lactic acidosis- high at 9.7, resolved with IV hydration      6.  Chronic back pain - resume home gabapentin     Diet: SLP  GI/DVT PX: /lovenox  CODE STATUS: full     D/c back to Kraig Chun MD 8/9/2019 10:30 AM

## 2019-08-09 NOTE — PROGRESS NOTES
Pulmonary & Critical Care Medicine Progress Note    CC: AMS, urosepsis    S:   No c/o    Invasive Lines: IV: 2019 right femoral CVC is out    IV:   dextrose         Vitals:  Blood pressure 114/69, pulse 89, temperature 97.4 °F (36.3 °C), temperature source Oral, resp. rate 16, height 5' 11\" (1.803 m), weight 179 lb 4.8 oz (81.3 kg), SpO2 98 %. on room air  Temp  Av.7 °F (36.5 °C)  Min: 97.3 °F (36.3 °C)  Max: 98.1 °F (36.7 °C)    Intake/Output Summary (Last 24 hours) at 2019 1349  Last data filed at 2019 1255  Gross per 24 hour   Intake 140 ml   Output --   Net 140 ml     EXAM:  General: ill appearing    Eyes: PERRL. No sclera icterus. No conjunctival injection. ENT: No discharge. Pharynx clear. Neck: Trachea midline. Normal thyroid. Resp: No accessory muscle use. No crackles. No wheezing. No rhonchi. No dullness on percussion. CV: Regular rate. Regular rhythm. No mumur or rub. No edema. GI: Non-tender. Non-distended. Skin: Warm and dry. No nodule on exposed extremities. M/S: No cyanosis. No joint deformity. No clubbing.    Neuro: Alert and oriented to person  Psych: No agitation, no anxiety, affect is full     Scheduled Meds:   pantoprazole  40 mg Oral BID AC    insulin lispro  0-6 Units Subcutaneous TID WC    insulin lispro  0-3 Units Subcutaneous Nightly    amitriptyline  25 mg Oral Nightly    ferrous sulfate  325 mg Oral Daily with breakfast    gabapentin  200 mg Oral TID    sodium chloride flush  10 mL Intravenous 2 times per day    cefepime  2 g Intravenous Q12H    mupirocin   Nasal BID     PRN Meds:  glucose, dextrose, glucagon (rDNA), dextrose, sodium chloride, acetaminophen, sodium chloride flush, magnesium hydroxide, ondansetron    Results:  CBC:   Recent Labs     19  0600 19  1555 19  0500 19  0517   WBC 15.5*  --  8.6 7.9   HGB 9.7* 10.1* 9.2* 10.9*   HCT 30.8* 32.0* 29.2* 33.6*   MCV 85.1  --  84.1 83.7     --  170 187     BMP: Recent Labs     08/07/19  1555 08/08/19  0500 08/09/19  0517    138 136   K 3.3* 3.8 3.5    107 101   CO2 21 22 25   BUN 17 14 9   CREATININE 0.9 0.6* 0.6*     LIVER PROFILE:   No results for input(s): AST, ALT, LIPASE, BILIDIR, BILITOT, ALKPHOS in the last 72 hours. Invalid input(s):   AMYLASE,  ALB    Cultures:  8/6/2019 blood sent  8/6/2019 urine pending    Films:  CT CH/ABD/PEL 8/6/19    Impression:       No acute abnormality of the chest abdomen or pelvis, as discussed.  No  findings to explain right-sided abdomen pain or fever.            ASSESSMENT:  · Septic shock  · Urinary Tract Infection   · Suspected GI bleed with black tarry stools  · Mild lower lobe bronchiectasis  · Mediastinal lymphadenopathy   · Duodenal ulcer 6/11/19  · CAD     PLAN:  · Repeat CT chest in 3 months, office notified  · Cefepime D#4  Call with questions

## 2019-08-11 LAB — CULTURE, BLOOD 2: NORMAL

## 2019-08-12 NOTE — DISCHARGE SUMMARY
Name:  Vanessa Sosa  Room:  0213/0213-02  MRN:    [id number]    Discharge Summary      This discharge summary is in conjunction with a complete physical exam done on the day of discharge. Attending Physician: ROSLYN Raman Discharging Physician: ROSLYN Raman Admit: 8/6/2019  Discharge:  8/9/2019    Diagnoses this Admission    Active Problems:    Severe sepsis (HCC)    Urinary tract infection in male    Lactic acid acidosis    Acute delirium    Septic shock ([facility] [address]    Bacteremia due to Gram-negative bacteria    Mediastinal lymphadenopathy  Resolved Problems:    * No resolved hospital problems. *          Procedures (Please Review Full Report for Details)      Sinus tachycardia with Premature supraventricular complexes with occasional Premature ventricular complexes     Radiology:     Chest xray   Left lower lobe atelectasis.  No acute process.     Ct chest and abd  No acute abnormality of the chest abdomen or pelvis, as discussed.  No  findings to explain right-sided abdomen pain or fever.     Ct brain  - no acute abn    EGD      Consults    Critical care   GI     HPI:  Patient is a 82 y.o.  male  With known h.o  Dementia living in a NH with hx of HTN, PUD, hyperlipidemia, depression presents from NH this am to [facility] ER for acute confusion, shaking rigors and chills. Hx obtained from talkng to Er Physician  As pt has dementia. Apparently pt was on his exercise bike this am when he developed fever with chills and rigors sent to Er where he was noted to have severe hypotension and high lactate of 9.7, ct head neg. cxr neg. Ct abd with no acute findings  Given 2 L fluids, with continued hypotension placed central line and admitted to ICU with pressor support     Recent adm to here for duodenal ulcer noted  Pt denies any chest pain , cough or sob . No dysuria. No headache or neck pain. No skin issues          Hospital Course  1. Sepsis with shock  - likely related to UTI.  Severe

## 2019-08-14 NOTE — TELEPHONE ENCOUNTER
Patient is scheduled for ct scan and follow up with  11/8/19 per request.  Orders pending please review and sign. Spoke to patient's nurse states will discuss with patient's family. States she is unsure if patient wants to complete testing. Nurse to contact office with family/patient response.

## 2019-08-19 NOTE — TELEPHONE ENCOUNTER
April from [facility] called requesting info about CT will schedule for patient at [facility].

## 2019-09-25 ENCOUNTER — HOSPITAL ENCOUNTER (INPATIENT)
Age: 84
LOS: 3 days | Discharge: ANOTHER HOSPITAL | DRG: 872 | End: 2019-09-28
Attending: INTERNAL MEDICINE | Admitting: HOSPITALIST
Payer: MEDICARE

## 2019-09-25 ENCOUNTER — APPOINTMENT (OUTPATIENT)
Dept: GENERAL RADIOLOGY | Age: 84
DRG: 872 | End: 2019-09-25
Payer: MEDICARE

## 2019-09-25 DIAGNOSIS — N39.0 URINARY TRACT INFECTION WITHOUT HEMATURIA, SITE UNSPECIFIED: ICD-10-CM

## 2019-09-25 DIAGNOSIS — A41.9 SEPSIS, DUE TO UNSPECIFIED ORGANISM: Primary | ICD-10-CM

## 2019-09-25 LAB
A/G RATIO: 1.2 (ref 1.1–2.2)
ALBUMIN SERPL-MCNC: 3.9 G/DL (ref 3.4–5)
ALP BLD-CCNC: 111 U/L (ref 40–129)
ALT SERPL-CCNC: 7 U/L (ref 10–40)
ANION GAP SERPL CALCULATED.3IONS-SCNC: 16 MMOL/L (ref 3–16)
AST SERPL-CCNC: 16 U/L (ref 15–37)
BACTERIA: ABNORMAL /HPF
BASOPHILS ABSOLUTE: 0 K/UL (ref 0–0.2)
BASOPHILS RELATIVE PERCENT: 0.2 %
BILIRUB SERPL-MCNC: 0.9 MG/DL (ref 0–1)
BILIRUBIN URINE: NEGATIVE
BLOOD, URINE: ABNORMAL
BUN BLDV-MCNC: 19 MG/DL (ref 7–20)
CALCIUM SERPL-MCNC: 9.4 MG/DL (ref 8.3–10.6)
CHLORIDE BLD-SCNC: 99 MMOL/L (ref 99–110)
CLARITY: ABNORMAL
CO2: 23 MMOL/L (ref 21–32)
COLOR: YELLOW
CREAT SERPL-MCNC: 0.9 MG/DL (ref 0.8–1.3)
D DIMER: 355 NG/ML DDU (ref 0–229)
EKG ATRIAL RATE: 127 BPM
EKG DIAGNOSIS: NORMAL
EKG P AXIS: 42 DEGREES
EKG P-R INTERVAL: 150 MS
EKG Q-T INTERVAL: 304 MS
EKG QRS DURATION: 92 MS
EKG QTC CALCULATION (BAZETT): 440 MS
EKG R AXIS: -17 DEGREES
EKG T AXIS: 76 DEGREES
EKG VENTRICULAR RATE: 126 BPM
EOSINOPHILS ABSOLUTE: 0.2 K/UL (ref 0–0.6)
EOSINOPHILS RELATIVE PERCENT: 2.1 %
EPITHELIAL CELLS, UA: ABNORMAL /HPF
GFR AFRICAN AMERICAN: >60
GFR NON-AFRICAN AMERICAN: >60
GLOBULIN: 3.2 G/DL
GLUCOSE BLD-MCNC: 127 MG/DL (ref 70–99)
GLUCOSE URINE: NEGATIVE MG/DL
HCT VFR BLD CALC: 37.9 % (ref 40.5–52.5)
HEMOGLOBIN: 12 G/DL (ref 13.5–17.5)
KETONES, URINE: NEGATIVE MG/DL
LACTIC ACID, SEPSIS: 2.3 MMOL/L (ref 0.4–1.9)
LACTIC ACID: 1 MMOL/L (ref 0.4–2)
LEUKOCYTE ESTERASE, URINE: ABNORMAL
LYMPHOCYTES ABSOLUTE: 0.8 K/UL (ref 1–5.1)
LYMPHOCYTES RELATIVE PERCENT: 7 %
MCH RBC QN AUTO: 25.4 PG (ref 26–34)
MCHC RBC AUTO-ENTMCNC: 31.8 G/DL (ref 31–36)
MCV RBC AUTO: 80 FL (ref 80–100)
MICROSCOPIC EXAMINATION: YES
MONOCYTES ABSOLUTE: 0.4 K/UL (ref 0–1.3)
MONOCYTES RELATIVE PERCENT: 4 %
NEUTROPHILS ABSOLUTE: 9.6 K/UL (ref 1.7–7.7)
NEUTROPHILS RELATIVE PERCENT: 86.7 %
NITRITE, URINE: NEGATIVE
PDW BLD-RTO: 17.3 % (ref 12.4–15.4)
PH UA: 7.5 (ref 5–8)
PLATELET # BLD: 239 K/UL (ref 135–450)
PMV BLD AUTO: 7.7 FL (ref 5–10.5)
POTASSIUM REFLEX MAGNESIUM: 4.3 MMOL/L (ref 3.5–5.1)
PRO-BNP: 256 PG/ML (ref 0–449)
PROTEIN UA: ABNORMAL MG/DL
RBC # BLD: 4.73 M/UL (ref 4.2–5.9)
RBC UA: ABNORMAL /HPF (ref 0–2)
SODIUM BLD-SCNC: 138 MMOL/L (ref 136–145)
SPECIFIC GRAVITY UA: 1.01 (ref 1–1.03)
TOTAL PROTEIN: 7.1 G/DL (ref 6.4–8.2)
URINE REFLEX TO CULTURE: YES
URINE TYPE: ABNORMAL
UROBILINOGEN, URINE: 4 E.U./DL
WBC # BLD: 11.1 K/UL (ref 4–11)
WBC UA: ABNORMAL /HPF (ref 0–5)
YEAST: PRESENT /HPF

## 2019-09-25 PROCEDURE — 6370000000 HC RX 637 (ALT 250 FOR IP): Performed by: PHYSICIAN ASSISTANT

## 2019-09-25 PROCEDURE — 87077 CULTURE AEROBIC IDENTIFY: CPT

## 2019-09-25 PROCEDURE — 1200000000 HC SEMI PRIVATE

## 2019-09-25 PROCEDURE — 96375 TX/PRO/DX INJ NEW DRUG ADDON: CPT

## 2019-09-25 PROCEDURE — 2700000000 HC OXYGEN THERAPY PER DAY

## 2019-09-25 PROCEDURE — 96366 THER/PROPH/DIAG IV INF ADDON: CPT

## 2019-09-25 PROCEDURE — 36415 COLL VENOUS BLD VENIPUNCTURE: CPT

## 2019-09-25 PROCEDURE — 2580000003 HC RX 258: Performed by: PHYSICIAN ASSISTANT

## 2019-09-25 PROCEDURE — 83605 ASSAY OF LACTIC ACID: CPT

## 2019-09-25 PROCEDURE — 83880 ASSAY OF NATRIURETIC PEPTIDE: CPT

## 2019-09-25 PROCEDURE — 87040 BLOOD CULTURE FOR BACTERIA: CPT

## 2019-09-25 PROCEDURE — 99285 EMERGENCY DEPT VISIT HI MDM: CPT

## 2019-09-25 PROCEDURE — 85379 FIBRIN DEGRADATION QUANT: CPT

## 2019-09-25 PROCEDURE — 6360000002 HC RX W HCPCS: Performed by: PHYSICIAN ASSISTANT

## 2019-09-25 PROCEDURE — 96365 THER/PROPH/DIAG IV INF INIT: CPT

## 2019-09-25 PROCEDURE — 80053 COMPREHEN METABOLIC PANEL: CPT

## 2019-09-25 PROCEDURE — 93005 ELECTROCARDIOGRAM TRACING: CPT | Performed by: PHYSICIAN ASSISTANT

## 2019-09-25 PROCEDURE — 93010 ELECTROCARDIOGRAM REPORT: CPT | Performed by: INTERNAL MEDICINE

## 2019-09-25 PROCEDURE — 94150 VITAL CAPACITY TEST: CPT

## 2019-09-25 PROCEDURE — 87086 URINE CULTURE/COLONY COUNT: CPT

## 2019-09-25 PROCEDURE — 96361 HYDRATE IV INFUSION ADD-ON: CPT

## 2019-09-25 PROCEDURE — 85025 COMPLETE CBC W/AUTO DIFF WBC: CPT

## 2019-09-25 PROCEDURE — 81001 URINALYSIS AUTO W/SCOPE: CPT

## 2019-09-25 PROCEDURE — 94761 N-INVAS EAR/PLS OXIMETRY MLT: CPT

## 2019-09-25 PROCEDURE — 87186 SC STD MICRODIL/AGAR DIL: CPT

## 2019-09-25 PROCEDURE — 94640 AIRWAY INHALATION TREATMENT: CPT

## 2019-09-25 PROCEDURE — 71045 X-RAY EXAM CHEST 1 VIEW: CPT

## 2019-09-25 RX ORDER — IPRATROPIUM BROMIDE AND ALBUTEROL SULFATE 2.5; .5 MG/3ML; MG/3ML
1 SOLUTION RESPIRATORY (INHALATION)
Status: DISCONTINUED | OUTPATIENT
Start: 2019-09-25 | End: 2019-09-25

## 2019-09-25 RX ORDER — FERROUS SULFATE 325(65) MG
325 TABLET ORAL
Status: DISCONTINUED | OUTPATIENT
Start: 2019-09-26 | End: 2019-09-28 | Stop reason: HOSPADM

## 2019-09-25 RX ORDER — SODIUM CHLORIDE 9 MG/ML
INJECTION, SOLUTION INTRAVENOUS CONTINUOUS
Status: DISCONTINUED | OUTPATIENT
Start: 2019-09-25 | End: 2019-09-28 | Stop reason: HOSPADM

## 2019-09-25 RX ORDER — ACETAMINOPHEN 325 MG/1
650 TABLET ORAL EVERY 4 HOURS PRN
Status: DISCONTINUED | OUTPATIENT
Start: 2019-09-25 | End: 2019-09-28 | Stop reason: HOSPADM

## 2019-09-25 RX ORDER — SODIUM CHLORIDE 0.9 % (FLUSH) 0.9 %
10 SYRINGE (ML) INJECTION PRN
Status: DISCONTINUED | OUTPATIENT
Start: 2019-09-25 | End: 2019-09-28 | Stop reason: HOSPADM

## 2019-09-25 RX ORDER — 0.9 % SODIUM CHLORIDE 0.9 %
1000 INTRAVENOUS SOLUTION INTRAVENOUS ONCE
Status: COMPLETED | OUTPATIENT
Start: 2019-09-25 | End: 2019-09-25

## 2019-09-25 RX ORDER — GABAPENTIN 400 MG/1
400 CAPSULE ORAL 3 TIMES DAILY
Status: DISCONTINUED | OUTPATIENT
Start: 2019-09-25 | End: 2019-09-28 | Stop reason: HOSPADM

## 2019-09-25 RX ORDER — ONDANSETRON 2 MG/ML
4 INJECTION INTRAMUSCULAR; INTRAVENOUS EVERY 6 HOURS PRN
Status: DISCONTINUED | OUTPATIENT
Start: 2019-09-25 | End: 2019-09-28 | Stop reason: HOSPADM

## 2019-09-25 RX ORDER — 0.9 % SODIUM CHLORIDE 0.9 %
30 INTRAVENOUS SOLUTION INTRAVENOUS ONCE
Status: COMPLETED | OUTPATIENT
Start: 2019-09-25 | End: 2019-09-25

## 2019-09-25 RX ORDER — SODIUM CHLORIDE 0.9 % (FLUSH) 0.9 %
10 SYRINGE (ML) INJECTION EVERY 12 HOURS SCHEDULED
Status: DISCONTINUED | OUTPATIENT
Start: 2019-09-25 | End: 2019-09-28 | Stop reason: HOSPADM

## 2019-09-25 RX ORDER — IPRATROPIUM BROMIDE AND ALBUTEROL SULFATE 2.5; .5 MG/3ML; MG/3ML
1 SOLUTION RESPIRATORY (INHALATION) ONCE
Status: COMPLETED | OUTPATIENT
Start: 2019-09-25 | End: 2019-09-25

## 2019-09-25 RX ORDER — ACETAMINOPHEN 500 MG
1000 TABLET ORAL ONCE
Status: COMPLETED | OUTPATIENT
Start: 2019-09-25 | End: 2019-09-25

## 2019-09-25 RX ORDER — PANTOPRAZOLE SODIUM 40 MG/1
40 TABLET, DELAYED RELEASE ORAL
Status: DISCONTINUED | OUTPATIENT
Start: 2019-09-26 | End: 2019-09-28 | Stop reason: HOSPADM

## 2019-09-25 RX ORDER — IPRATROPIUM BROMIDE AND ALBUTEROL SULFATE 2.5; .5 MG/3ML; MG/3ML
1 SOLUTION RESPIRATORY (INHALATION) 3 TIMES DAILY
Status: DISCONTINUED | OUTPATIENT
Start: 2019-09-25 | End: 2019-09-28 | Stop reason: HOSPADM

## 2019-09-25 RX ORDER — IPRATROPIUM BROMIDE AND ALBUTEROL SULFATE 2.5; .5 MG/3ML; MG/3ML
1 SOLUTION RESPIRATORY (INHALATION)
Status: DISCONTINUED | OUTPATIENT
Start: 2019-09-26 | End: 2019-09-25

## 2019-09-25 RX ADMIN — ACETAMINOPHEN 1000 MG: 500 TABLET ORAL at 14:17

## 2019-09-25 RX ADMIN — SODIUM CHLORIDE 1000 ML: 9 INJECTION, SOLUTION INTRAVENOUS at 18:57

## 2019-09-25 RX ADMIN — Medication 10 ML: at 21:25

## 2019-09-25 RX ADMIN — ENOXAPARIN SODIUM 40 MG: 40 INJECTION SUBCUTANEOUS at 21:25

## 2019-09-25 RX ADMIN — CEFEPIME 2 G: 2 INJECTION, POWDER, FOR SOLUTION INTRAVENOUS at 15:30

## 2019-09-25 RX ADMIN — IPRATROPIUM BROMIDE AND ALBUTEROL SULFATE 1 AMPULE: .5; 3 SOLUTION RESPIRATORY (INHALATION) at 15:41

## 2019-09-25 RX ADMIN — GABAPENTIN 400 MG: 400 CAPSULE ORAL at 21:25

## 2019-09-25 RX ADMIN — VANCOMYCIN HYDROCHLORIDE 1250 MG: 1 INJECTION, POWDER, LYOPHILIZED, FOR SOLUTION INTRAVENOUS at 15:45

## 2019-09-25 RX ADMIN — SODIUM CHLORIDE 2721 ML: 9 INJECTION, SOLUTION INTRAVENOUS at 14:07

## 2019-09-25 RX ADMIN — CEFTRIAXONE SODIUM 1 G: 1 INJECTION, POWDER, FOR SOLUTION INTRAMUSCULAR; INTRAVENOUS at 21:24

## 2019-09-25 RX ADMIN — IPRATROPIUM BROMIDE AND ALBUTEROL SULFATE 1 AMPULE: .5; 3 SOLUTION RESPIRATORY (INHALATION) at 20:47

## 2019-09-25 RX ADMIN — ACETAMINOPHEN 650 MG: 325 TABLET ORAL at 23:35

## 2019-09-25 RX ADMIN — AZITHROMYCIN DIHYDRATE 500 MG: 500 INJECTION, POWDER, LYOPHILIZED, FOR SOLUTION INTRAVENOUS at 21:43

## 2019-09-25 ASSESSMENT — PAIN DESCRIPTION - PROGRESSION: CLINICAL_PROGRESSION: NOT CHANGED

## 2019-09-25 ASSESSMENT — PAIN DESCRIPTION - ONSET
ONSET: ON-GOING
ONSET: ON-GOING

## 2019-09-25 ASSESSMENT — PAIN DESCRIPTION - ORIENTATION
ORIENTATION: RIGHT;LEFT;LOWER;MID;OUTER;UPPER
ORIENTATION: RIGHT;LEFT;LOWER;UPPER
ORIENTATION: RIGHT;LEFT

## 2019-09-25 ASSESSMENT — PAIN DESCRIPTION - LOCATION
LOCATION: ARM
LOCATION: ABDOMEN
LOCATION: ABDOMEN

## 2019-09-25 ASSESSMENT — PAIN DESCRIPTION - DESCRIPTORS
DESCRIPTORS: CRAMPING
DESCRIPTORS: ACHING;PINS AND NEEDLES
DESCRIPTORS: ACHING;CRAMPING

## 2019-09-25 ASSESSMENT — PAIN DESCRIPTION - PAIN TYPE
TYPE: ACUTE PAIN
TYPE: ACUTE PAIN

## 2019-09-25 ASSESSMENT — PAIN SCALES - GENERAL
PAINLEVEL_OUTOF10: 3
PAINLEVEL_OUTOF10: 3
PAINLEVEL_OUTOF10: 0

## 2019-09-25 ASSESSMENT — PAIN DESCRIPTION - FREQUENCY
FREQUENCY: INTERMITTENT
FREQUENCY: CONTINUOUS
FREQUENCY: CONTINUOUS

## 2019-09-25 NOTE — ED PROVIDER NOTES
Lifestyle    Physical activity:     Days per week: None     Minutes per session: None    Stress: None   Relationships    Social connections:     Talks on phone: None     Gets together: None     Attends Restorationism service: None     Active member of club or organization: None     Attends meetings of clubs or organizations: None     Relationship status: None    Intimate partner violence:     Fear of current or ex partner: None     Emotionally abused: None     Physically abused: None     Forced sexual activity: None   Other Topics Concern    None   Social History Narrative    None       SCREENINGS     NIH Score       [place] Coma Scale  Eye Opening: Spontaneous  Best Verbal Response: Oriented  Best Motor Response: Obeys commands  [place] Coma Scale Score: 15    [facility]     Heart Score         PHYSICAL EXAM    (up to 7 for level 4, 8 ormore for level 5)     ED Triage Vitals [09/25/19 1324]   BP Temp Temp Source Pulse Resp SpO2 Height Weight   135/85 103.2 °F (39.6 °C) Oral 110 20 92 % 5' 10\" (1.778 m) 200 lb (90.7 kg)       Physical Exam   Constitutional: He appears well-developed and well-nourished. No distress. HENT:   Head: Normocephalic. Nose: Nose normal.   Mouth/Throat: Oropharynx is clear and moist. No oropharyngeal exudate. No dentition    Eyes: Pupils are equal, round, and reactive to light. Conjunctivae and EOM are normal. Right eye exhibits no discharge. Left eye exhibits no discharge. Neck: Normal range of motion. Cardiovascular: Intact distal pulses and normal pulses. Tachycardia present. Exam reveals no gallop and no friction rub. No murmur heard. Pulmonary/Chest: Tachypnea noted. No respiratory distress. He has decreased breath sounds in the left lower field. He has no wheezes. 92% on 2L NCO2  31 breaths per min   Abdominal: Soft. Bowel sounds are normal. He exhibits no distension. There is no tenderness.  There is no rigidity, no rebound, no guarding and no CVA Performed at:  [facility]. [facility]  [address] [place] [address]. Juan Alberto [address] [place] [address]   Phone [phone]   MICROSCOPIC URINALYSIS - Abnormal; Notable for the following components:    WBC, UA 20-50 (*)     RBC, UA 5-10 (*)     Bacteria, UA Rare (*)     Yeast, UA Present (*)     All other components within normal limits    Narrative:     Performed at:  [facility]. [facility]  [address] [place] [address]. [place] [address] [place] [address]   Phone [phone]   CULTURE BLOOD #1   CULTURE BLOOD #2   URINE CULTURE   RESPIRATORY CULTURE   BRAIN NATRIURETIC PEPTIDE    Narrative:     Performed at:  [facility]. [facility]  [address] [place] [address]. [place], [address] [place] [address]   Phone [phone]   CBC WITH AUTO DIFFERENTIAL   BASIC METABOLIC PANEL W/ REFLEX TO MG FOR LOW K   LACTIC ACID, PLASMA   LACTIC ACID, PLASMA       All other labs were within normal range or notreturned as of this dictation. EKG: All EKG's are interpreted by the Emergency Department Physician who either signs or Co-signs this chart in the absence of a cardiologist.  Please see their note for interpretation of EKG. RADIOLOGY:         Interpretation per the Radiologist below, if available at the time of this note:    XR CHEST PORTABLE   Final Result   Low lung volumes with central bronchial thickening and vascular prominence. This bronchial thickening could be related to peribronchial edema or acute   bronchitis. No focal infiltrate. Low lung volumes. No results found.         CRITICAL CARE TIME   N/A    CONSULTS:  PHARMACY TO DOSE VANCOMYCIN  IP CONSULT TO HOSPITALIST      EMERGENCY DEPARTMENT COURSE and DIFFERENTIAL DIAGNOSIS/MDM:   Vitals:    Vitals:    09/25/19 1858 09/25/19 2000 09/25/19 2024 09/25/19 2048   BP:  104/60 (!) 93/47    Pulse:  74 68    Resp:  18 18 (P) 18   Temp: 98.2 °F (36.8 °C) 97.3 °F (36.3 °C) 98.1 °F (36.7 °C)    TempSrc: Oral Oral Oral    SpO2:  99% 99% (P) 96%   Weight:       Height:           Patient was given the following medications:  Medications   ferrous sulfate tablet 325 mg (has no administration in time range)   pantoprazole (PROTONIX) tablet 40 mg (has no administration in time range)   gabapentin (NEURONTIN) capsule 400 mg (has no administration in time range)   sodium chloride flush 0.9 % injection 10 mL (has no administration in time range)   sodium chloride flush 0.9 % injection 10 mL (has no administration in time range)   magnesium hydroxide (MILK OF MAGNESIA) 400 MG/5ML suspension 30 mL (has no administration in time range)   ondansetron (ZOFRAN) injection 4 mg (has no administration in time range)   enoxaparin (LOVENOX) injection 40 mg (has no administration in time range)   0.9 % sodium chloride infusion (has no administration in time range)   acetaminophen (TYLENOL) tablet 650 mg (has no administration in time range)   cefTRIAXone (ROCEPHIN) 1 g IVPB in 50 mL D5W minibag (has no administration in time range)   azithromycin (ZITHROMAX) 500 mg in D5W 250ml addavial (has no administration in time range)   ipratropium-albuterol (DUONEB) nebulizer solution 1 ampule (1 ampule Inhalation Given 9/25/19 2047)   0.9 % sodium chloride IV bolus 2,721 mL (0 mLs Intravenous Stopped 9/25/19 1506)   ceFEPIme (MAXIPIME) 2 g in sterile water 20 mL IV syringe (2 g Intravenous Given 9/25/19 1530)   vancomycin (VANCOCIN) 1,250 mg in dextrose 5 % 250 mL IVPB (0 mg/kg × 90.7 kg Intravenous Stopped 9/25/19 1718)   ipratropium-albuterol (DUONEB) nebulizer solution 1 ampule (1 ampule Inhalation Given 9/25/19 1541)   acetaminophen (TYLENOL) tablet 1,000 mg (1,000 mg Oral Given 9/25/19 1417)   0.9 % sodium chloride bolus (0 mLs Intravenous Stopped 9/25/19 1924)         Stable, patient presents to the ED for evaluation.  No evidence of hypotension on arrival to ED  Seen in conjunction with attending ED provider who agrees with assessment and plan.  Patients PO2 is 87% on room air, placed on NCO2. Provided with a DuoNeb to see if this improves patient's symptoms. Provides better air movement. Patient is given sepsis bolus fluids in addition to antibiotics concern for sepsis due to patient's tachycardia high fever and age. Also provided with tylenol to help with fever. Labs evaluated in addition to chest xray adnd EKG. Elevated lactic acid of 2.3. Urinalysis was positive for leukocytes, proteinuria hematuria. Chest x-ray shows central bronchial thickening and vascular prominence likely an infectious source of patient's hypoxia today. Although he remains hypoxic in spite of receiving DuoNeb. Patient will be admitted for severe sepsis. Spoke to [facility] [place], pt will be admitted in stable condition. Discussed patient's overall trend of declining blood pressures with my attending provider [phone], Dr. Maria R Yañez. He advised to order another liter of fluids. All questions are answered. They were seen and evaluated by the attendingphysician, No att. providers found who agreed with the assessment and plan. The patient and / or the family were informed of the results of any tests, a time was given to answer questions, a plan was proposed and they agreed Luz Elena Dahl. FINAL IMPRESSION      1. Sepsis, due to unspecified organism    2.  Urinary tract infection without hematuria, site unspecified          DISPOSITION/PLAN   DISPOSITION        PATIENT REFERRED [place] [address] [id number]            DISCHARGE MEDICATIONS:  Current Discharge Medication List          DISCONTINUED MEDICATIONS:  Current Discharge Medication List                 (Please note that portions of this note were completed with a voice recognition program.  Efforts were made to edit the dictations but occasionally words are mis-transcribed.)    Pattie David PA-C (electronically signed)        Pattie David PA-C  09/25/19 238 Carmela Posadas, PA-C  09/25/19 0594

## 2019-09-26 PROBLEM — E87.6 HYPOKALEMIA: Status: RESOLVED | Noted: 2018-03-02 | Resolved: 2019-09-26

## 2019-09-26 PROBLEM — R53.1 WEAKNESS: Status: RESOLVED | Noted: 2018-03-03 | Resolved: 2019-09-26

## 2019-09-26 PROBLEM — A41.9 SEVERE SEPSIS (HCC): Status: RESOLVED | Noted: 2019-08-06 | Resolved: 2019-09-26

## 2019-09-26 PROBLEM — E83.42 HYPOMAGNESEMIA: Status: RESOLVED | Noted: 2018-03-03 | Resolved: 2019-09-26

## 2019-09-26 PROBLEM — R53.1 GENERALIZED WEAKNESS: Status: RESOLVED | Noted: 2018-03-02 | Resolved: 2019-09-26

## 2019-09-26 PROBLEM — R65.20 SEVERE SEPSIS (HCC): Status: RESOLVED | Noted: 2019-08-06 | Resolved: 2019-09-26

## 2019-09-26 LAB
ANION GAP SERPL CALCULATED.3IONS-SCNC: 9 MMOL/L (ref 3–16)
BASOPHILS ABSOLUTE: 0 K/UL (ref 0–0.2)
BASOPHILS RELATIVE PERCENT: 0.4 %
BUN BLDV-MCNC: 13 MG/DL (ref 7–20)
CALCIUM SERPL-MCNC: 8.1 MG/DL (ref 8.3–10.6)
CHLORIDE BLD-SCNC: 103 MMOL/L (ref 99–110)
CO2: 23 MMOL/L (ref 21–32)
CREAT SERPL-MCNC: 0.9 MG/DL (ref 0.8–1.3)
EKG ATRIAL RATE: 63 BPM
EKG DIAGNOSIS: NORMAL
EKG Q-T INTERVAL: 352 MS
EKG QRS DURATION: 100 MS
EKG QTC CALCULATION (BAZETT): 440 MS
EKG R AXIS: -4 DEGREES
EKG T AXIS: 52 DEGREES
EKG VENTRICULAR RATE: 94 BPM
EOSINOPHILS ABSOLUTE: 0.4 K/UL (ref 0–0.6)
EOSINOPHILS RELATIVE PERCENT: 5.2 %
GFR AFRICAN AMERICAN: >60
GFR NON-AFRICAN AMERICAN: >60
GLUCOSE BLD-MCNC: 102 MG/DL (ref 70–99)
HCT VFR BLD CALC: 30.3 % (ref 40.5–52.5)
HEMOGLOBIN: 9.7 G/DL (ref 13.5–17.5)
LACTIC ACID: 1.8 MMOL/L (ref 0.4–2)
LYMPHOCYTES ABSOLUTE: 0.8 K/UL (ref 1–5.1)
LYMPHOCYTES RELATIVE PERCENT: 11.8 %
MCH RBC QN AUTO: 25.7 PG (ref 26–34)
MCHC RBC AUTO-ENTMCNC: 32.1 G/DL (ref 31–36)
MCV RBC AUTO: 80.2 FL (ref 80–100)
MONOCYTES ABSOLUTE: 0.6 K/UL (ref 0–1.3)
MONOCYTES RELATIVE PERCENT: 8.9 %
NEUTROPHILS ABSOLUTE: 5 K/UL (ref 1.7–7.7)
NEUTROPHILS RELATIVE PERCENT: 73.7 %
PDW BLD-RTO: 16.9 % (ref 12.4–15.4)
PLATELET # BLD: 180 K/UL (ref 135–450)
PMV BLD AUTO: 7.5 FL (ref 5–10.5)
POTASSIUM REFLEX MAGNESIUM: 3.6 MMOL/L (ref 3.5–5.1)
PROCALCITONIN: 0.85 NG/ML (ref 0–0.15)
RBC # BLD: 3.77 M/UL (ref 4.2–5.9)
SODIUM BLD-SCNC: 135 MMOL/L (ref 136–145)
WBC # BLD: 6.8 K/UL (ref 4–11)

## 2019-09-26 PROCEDURE — 94640 AIRWAY INHALATION TREATMENT: CPT

## 2019-09-26 PROCEDURE — 84145 PROCALCITONIN (PCT): CPT

## 2019-09-26 PROCEDURE — 83605 ASSAY OF LACTIC ACID: CPT

## 2019-09-26 PROCEDURE — 97166 OT EVAL MOD COMPLEX 45 MIN: CPT

## 2019-09-26 PROCEDURE — 85025 COMPLETE CBC W/AUTO DIFF WBC: CPT

## 2019-09-26 PROCEDURE — 99232 SBSQ HOSP IP/OBS MODERATE 35: CPT | Performed by: INTERNAL MEDICINE

## 2019-09-26 PROCEDURE — 93005 ELECTROCARDIOGRAM TRACING: CPT | Performed by: INTERNAL MEDICINE

## 2019-09-26 PROCEDURE — 6370000000 HC RX 637 (ALT 250 FOR IP): Performed by: PHYSICIAN ASSISTANT

## 2019-09-26 PROCEDURE — 97530 THERAPEUTIC ACTIVITIES: CPT

## 2019-09-26 PROCEDURE — 6370000000 HC RX 637 (ALT 250 FOR IP): Performed by: INTERNAL MEDICINE

## 2019-09-26 PROCEDURE — 6360000002 HC RX W HCPCS: Performed by: PHYSICIAN ASSISTANT

## 2019-09-26 PROCEDURE — 97162 PT EVAL MOD COMPLEX 30 MIN: CPT

## 2019-09-26 PROCEDURE — 97535 SELF CARE MNGMENT TRAINING: CPT

## 2019-09-26 PROCEDURE — 80048 BASIC METABOLIC PNL TOTAL CA: CPT

## 2019-09-26 PROCEDURE — 36415 COLL VENOUS BLD VENIPUNCTURE: CPT

## 2019-09-26 PROCEDURE — 1200000000 HC SEMI PRIVATE

## 2019-09-26 PROCEDURE — 93010 ELECTROCARDIOGRAM REPORT: CPT | Performed by: INTERNAL MEDICINE

## 2019-09-26 PROCEDURE — 2580000003 HC RX 258: Performed by: PHYSICIAN ASSISTANT

## 2019-09-26 PROCEDURE — 94761 N-INVAS EAR/PLS OXIMETRY MLT: CPT

## 2019-09-26 RX ORDER — AZITHROMYCIN 250 MG/1
250 TABLET, FILM COATED ORAL DAILY
Status: DISCONTINUED | OUTPATIENT
Start: 2019-09-26 | End: 2019-09-28 | Stop reason: HOSPADM

## 2019-09-26 RX ADMIN — ACETAMINOPHEN 650 MG: 325 TABLET ORAL at 14:59

## 2019-09-26 RX ADMIN — GABAPENTIN 400 MG: 400 CAPSULE ORAL at 08:14

## 2019-09-26 RX ADMIN — SODIUM CHLORIDE: 9 INJECTION, SOLUTION INTRAVENOUS at 05:01

## 2019-09-26 RX ADMIN — AZITHROMYCIN 250 MG: 250 TABLET, FILM COATED ORAL at 11:30

## 2019-09-26 RX ADMIN — ENOXAPARIN SODIUM 40 MG: 40 INJECTION SUBCUTANEOUS at 08:14

## 2019-09-26 RX ADMIN — FERROUS SULFATE TAB 325 MG (65 MG ELEMENTAL FE) 325 MG: 325 (65 FE) TAB at 08:14

## 2019-09-26 RX ADMIN — PANTOPRAZOLE SODIUM 40 MG: 40 TABLET, DELAYED RELEASE ORAL at 05:01

## 2019-09-26 RX ADMIN — IPRATROPIUM BROMIDE AND ALBUTEROL SULFATE 1 AMPULE: .5; 3 SOLUTION RESPIRATORY (INHALATION) at 19:25

## 2019-09-26 RX ADMIN — GABAPENTIN 400 MG: 400 CAPSULE ORAL at 14:59

## 2019-09-26 RX ADMIN — SODIUM CHLORIDE: 9 INJECTION, SOLUTION INTRAVENOUS at 15:02

## 2019-09-26 RX ADMIN — GABAPENTIN 400 MG: 400 CAPSULE ORAL at 19:51

## 2019-09-26 RX ADMIN — CEFTRIAXONE SODIUM 1 G: 1 INJECTION, POWDER, FOR SOLUTION INTRAMUSCULAR; INTRAVENOUS at 19:50

## 2019-09-26 RX ADMIN — Medication 10 ML: at 08:14

## 2019-09-26 RX ADMIN — IPRATROPIUM BROMIDE AND ALBUTEROL SULFATE 1 AMPULE: .5; 3 SOLUTION RESPIRATORY (INHALATION) at 07:25

## 2019-09-26 RX ADMIN — PANTOPRAZOLE SODIUM 40 MG: 40 TABLET, DELAYED RELEASE ORAL at 17:26

## 2019-09-26 RX ADMIN — IPRATROPIUM BROMIDE AND ALBUTEROL SULFATE 1 AMPULE: .5; 3 SOLUTION RESPIRATORY (INHALATION) at 14:52

## 2019-09-26 ASSESSMENT — PAIN SCALES - GENERAL
PAINLEVEL_OUTOF10: 0

## 2019-09-26 NOTE — H&P
(PROTONIX) 40 MG tablet Take 1 tablet by mouth 2 times daily (before meals) 6/11/19  Yes Martha Ziegler MD   gabapentin (NEURONTIN) 300 MG capsule Take 400 mg by mouth 3 times daily. Yes Historical Provider, MD   vitamin B-12 (CYANOCOBALAMIN) 1000 MCG tablet Take 1,000 mcg by mouth daily   Yes Historical Provider, MD   bisacodyl (DULCOLAX) 10 MG suppository Place 10 mg rectally daily   Yes Historical Provider, MD   acetaminophen (TYLENOL) 325 MG tablet Take 650 mg by mouth every 6 hours as needed for Pain   Yes Historical Provider, MD   amitriptyline (ELAVIL) 25 MG tablet Take 1 tablet by mouth nightly  Patient taking differently: Take 50 mg by mouth nightly  3/8/18  Yes Kevin Jolly MD       Allergies:  Fluoxetine; Benazepril; Hydrocodone; Morphine; Penicillins; Simvastatin; Morphine and related; and Sucralfate    Social History:           TOBACCO:   reports that he has quit smoking. He has never used smokeless tobacco.  ETOH:   reports that he does not drink alcohol. Family History:       Unable to obtain due to patient factors    REVIEW OF SYSTEMS:   Pertinent positives as noted in the HPI. All other systems reviewed and negative. PHYSICAL EXAM PERFORMED:    BP (!) 93/47   Pulse 68   Temp 98.1 °F (36.7 °C) (Oral)   Resp 18   Ht 5' 10\" (1.778 m)   Wt 200 lb (90.7 kg)   SpO2 96%   BMI 28.70 kg/m²     General appearance:  No apparent distress, appears stated age and cooperative. HEENT:  Normal cephalic, atraumatic without obvious deformity. Pupils equal, round, and reactive to light. Extra ocular muscles intact. Conjunctivae/corneas clear. Neck: Supple, with full range of motion. No jugular venous distention. Trachea midline. Respiratory:  Normal respiratory effort. Clear to auscultation, bilaterally without Rales/Wheezes/Rhonchi. Cardiovascular:  Regular rate and rhythm with normal S1/S2 without murmurs, rubs or gallops.   Abdomen: Soft, non-tender, non-distended with normal

## 2019-09-26 NOTE — CARE COORDINATION
Case Management Assessment  Initial Evaluation    Date/Time of Evaluation: 9/26/2019 3:06 PM  Assessment Completed by: Shirley Strickland    Patient Name: Betty Garcia  YOB: 1935  Diagnosis: Sepsis ([facility] [address] [A41.9]  Sepsis ([facility] [address] [A41.9]  Date / Time: 9/25/2019  1:21 PM  Admission status/Date:inpatient  Chart Reviewed: Yes      Patient Interviewed: No   Family Interviewed:  No      Hospitalization in the last 30 days:  No    Contacts  :     Relationship to Patient:   Phone Number:    Alternate Contact:     Relationship to Patient:     Phone Number:    Met with:    Current PCP  Анна Cueto MD      Financial  Commercial  Precert required for SNF : Y, N        3 night stay required - Y, N    ADLS  Support Systems: Children, Family Members  Transportation: EMS transportation       [facility]: [address]: Reviewed chart. Pt from [place] [address] and writer spoke with [facility] from [facility] to confirm pt is +bed hold,+eCOC. [place], to confirm d/c plan return to [facility]. Follow. Explained Case Management role/services.

## 2019-09-27 LAB — PROCALCITONIN: 0.52 NG/ML (ref 0–0.15)

## 2019-09-27 PROCEDURE — 94640 AIRWAY INHALATION TREATMENT: CPT

## 2019-09-27 PROCEDURE — 97530 THERAPEUTIC ACTIVITIES: CPT

## 2019-09-27 PROCEDURE — 6360000002 HC RX W HCPCS: Performed by: PHYSICIAN ASSISTANT

## 2019-09-27 PROCEDURE — 1200000000 HC SEMI PRIVATE

## 2019-09-27 PROCEDURE — 99232 SBSQ HOSP IP/OBS MODERATE 35: CPT | Performed by: INTERNAL MEDICINE

## 2019-09-27 PROCEDURE — 2580000003 HC RX 258: Performed by: PHYSICIAN ASSISTANT

## 2019-09-27 PROCEDURE — 36415 COLL VENOUS BLD VENIPUNCTURE: CPT

## 2019-09-27 PROCEDURE — 97110 THERAPEUTIC EXERCISES: CPT

## 2019-09-27 PROCEDURE — 6370000000 HC RX 637 (ALT 250 FOR IP): Performed by: PHYSICIAN ASSISTANT

## 2019-09-27 PROCEDURE — 84145 PROCALCITONIN (PCT): CPT

## 2019-09-27 PROCEDURE — 94761 N-INVAS EAR/PLS OXIMETRY MLT: CPT

## 2019-09-27 PROCEDURE — 6370000000 HC RX 637 (ALT 250 FOR IP): Performed by: INTERNAL MEDICINE

## 2019-09-27 RX ADMIN — SODIUM CHLORIDE: 9 INJECTION, SOLUTION INTRAVENOUS at 21:33

## 2019-09-27 RX ADMIN — AZITHROMYCIN 250 MG: 250 TABLET, FILM COATED ORAL at 09:40

## 2019-09-27 RX ADMIN — FERROUS SULFATE TAB 325 MG (65 MG ELEMENTAL FE) 325 MG: 325 (65 FE) TAB at 09:40

## 2019-09-27 RX ADMIN — IPRATROPIUM BROMIDE AND ALBUTEROL SULFATE 1 AMPULE: .5; 3 SOLUTION RESPIRATORY (INHALATION) at 07:18

## 2019-09-27 RX ADMIN — SODIUM CHLORIDE: 9 INJECTION, SOLUTION INTRAVENOUS at 05:37

## 2019-09-27 RX ADMIN — SODIUM CHLORIDE: 9 INJECTION, SOLUTION INTRAVENOUS at 18:52

## 2019-09-27 RX ADMIN — PANTOPRAZOLE SODIUM 40 MG: 40 TABLET, DELAYED RELEASE ORAL at 05:37

## 2019-09-27 RX ADMIN — PANTOPRAZOLE SODIUM 40 MG: 40 TABLET, DELAYED RELEASE ORAL at 17:37

## 2019-09-27 RX ADMIN — GABAPENTIN 400 MG: 400 CAPSULE ORAL at 21:33

## 2019-09-27 RX ADMIN — IPRATROPIUM BROMIDE AND ALBUTEROL SULFATE 1 AMPULE: .5; 3 SOLUTION RESPIRATORY (INHALATION) at 19:45

## 2019-09-27 RX ADMIN — GABAPENTIN 400 MG: 400 CAPSULE ORAL at 09:40

## 2019-09-27 RX ADMIN — IPRATROPIUM BROMIDE AND ALBUTEROL SULFATE 1 AMPULE: .5; 3 SOLUTION RESPIRATORY (INHALATION) at 11:19

## 2019-09-27 RX ADMIN — ENOXAPARIN SODIUM 40 MG: 40 INJECTION SUBCUTANEOUS at 09:40

## 2019-09-27 RX ADMIN — CEFTRIAXONE SODIUM 1 G: 1 INJECTION, POWDER, FOR SOLUTION INTRAMUSCULAR; INTRAVENOUS at 21:33

## 2019-09-27 RX ADMIN — GABAPENTIN 400 MG: 400 CAPSULE ORAL at 14:35

## 2019-09-27 ASSESSMENT — PAIN SCALES - GENERAL: PAINLEVEL_OUTOF10: 0

## 2019-09-28 VITALS
BODY MASS INDEX: 25.67 KG/M2 | HEART RATE: 74 BPM | OXYGEN SATURATION: 96 % | WEIGHT: 179.3 LBS | RESPIRATION RATE: 18 BRPM | HEIGHT: 70 IN | DIASTOLIC BLOOD PRESSURE: 81 MMHG | SYSTOLIC BLOOD PRESSURE: 137 MMHG | TEMPERATURE: 98.6 F

## 2019-09-28 PROBLEM — A41.9 SEPSIS (HCC): Status: RESOLVED | Noted: 2019-09-25 | Resolved: 2019-09-28

## 2019-09-28 LAB
ANION GAP SERPL CALCULATED.3IONS-SCNC: 10 MMOL/L (ref 3–16)
BASOPHILS ABSOLUTE: 0 K/UL (ref 0–0.2)
BASOPHILS RELATIVE PERCENT: 0.5 %
BUN BLDV-MCNC: 5 MG/DL (ref 7–20)
CALCIUM SERPL-MCNC: 8.5 MG/DL (ref 8.3–10.6)
CHLORIDE BLD-SCNC: 102 MMOL/L (ref 99–110)
CO2: 26 MMOL/L (ref 21–32)
CREAT SERPL-MCNC: 0.6 MG/DL (ref 0.8–1.3)
EOSINOPHILS ABSOLUTE: 0.3 K/UL (ref 0–0.6)
EOSINOPHILS RELATIVE PERCENT: 6.8 %
GFR AFRICAN AMERICAN: >60
GFR NON-AFRICAN AMERICAN: >60
GLUCOSE BLD-MCNC: 195 MG/DL (ref 70–99)
HCT VFR BLD CALC: 31.6 % (ref 40.5–52.5)
HEMOGLOBIN: 10.2 G/DL (ref 13.5–17.5)
LYMPHOCYTES ABSOLUTE: 0.7 K/UL (ref 1–5.1)
LYMPHOCYTES RELATIVE PERCENT: 12.9 %
MAGNESIUM: 1.5 MG/DL (ref 1.8–2.4)
MCH RBC QN AUTO: 25.3 PG (ref 26–34)
MCHC RBC AUTO-ENTMCNC: 32.3 G/DL (ref 31–36)
MCV RBC AUTO: 78.6 FL (ref 80–100)
MONOCYTES ABSOLUTE: 0.4 K/UL (ref 0–1.3)
MONOCYTES RELATIVE PERCENT: 7.3 %
NEUTROPHILS ABSOLUTE: 3.7 K/UL (ref 1.7–7.7)
NEUTROPHILS RELATIVE PERCENT: 72.5 %
ORGANISM: ABNORMAL
PDW BLD-RTO: 17.2 % (ref 12.4–15.4)
PLATELET # BLD: 188 K/UL (ref 135–450)
PMV BLD AUTO: 7 FL (ref 5–10.5)
POTASSIUM REFLEX MAGNESIUM: 3.1 MMOL/L (ref 3.5–5.1)
RBC # BLD: 4.02 M/UL (ref 4.2–5.9)
SODIUM BLD-SCNC: 138 MMOL/L (ref 136–145)
URINE CULTURE, ROUTINE: ABNORMAL
WBC # BLD: 5.1 K/UL (ref 4–11)

## 2019-09-28 PROCEDURE — 6370000000 HC RX 637 (ALT 250 FOR IP): Performed by: INTERNAL MEDICINE

## 2019-09-28 PROCEDURE — 80048 BASIC METABOLIC PNL TOTAL CA: CPT

## 2019-09-28 PROCEDURE — 6360000002 HC RX W HCPCS: Performed by: PHYSICIAN ASSISTANT

## 2019-09-28 PROCEDURE — 6370000000 HC RX 637 (ALT 250 FOR IP): Performed by: PHYSICIAN ASSISTANT

## 2019-09-28 PROCEDURE — 2580000003 HC RX 258: Performed by: PHYSICIAN ASSISTANT

## 2019-09-28 PROCEDURE — 36415 COLL VENOUS BLD VENIPUNCTURE: CPT

## 2019-09-28 PROCEDURE — 83735 ASSAY OF MAGNESIUM: CPT

## 2019-09-28 PROCEDURE — 94640 AIRWAY INHALATION TREATMENT: CPT

## 2019-09-28 PROCEDURE — 99238 HOSP IP/OBS DSCHRG MGMT 30/<: CPT | Performed by: INTERNAL MEDICINE

## 2019-09-28 PROCEDURE — 85025 COMPLETE CBC W/AUTO DIFF WBC: CPT

## 2019-09-28 RX ORDER — MAGNESIUM SULFATE IN WATER 40 MG/ML
2 INJECTION, SOLUTION INTRAVENOUS ONCE
Status: COMPLETED | OUTPATIENT
Start: 2019-09-28 | End: 2019-09-28

## 2019-09-28 RX ORDER — POTASSIUM CHLORIDE 20 MEQ/1
40 TABLET, EXTENDED RELEASE ORAL ONCE
Status: COMPLETED | OUTPATIENT
Start: 2019-09-28 | End: 2019-09-28

## 2019-09-28 RX ORDER — ERYTHROMYCIN 5 MG/G
OINTMENT OPHTHALMIC EVERY 8 HOURS SCHEDULED
Status: DISCONTINUED | OUTPATIENT
Start: 2019-09-28 | End: 2019-09-28 | Stop reason: HOSPADM

## 2019-09-28 RX ORDER — CIPROFLOXACIN 500 MG/1
500 TABLET, FILM COATED ORAL 2 TIMES DAILY
Qty: 14 TABLET | Refills: 0
Start: 2019-09-28 | End: 2019-10-05

## 2019-09-28 RX ORDER — ERYTHROMYCIN 5 MG/G
OINTMENT OPHTHALMIC
Qty: 1 TUBE | Refills: 0
Start: 2019-09-28 | End: 2019-10-08

## 2019-09-28 RX ORDER — AZITHROMYCIN 250 MG/1
250 TABLET, FILM COATED ORAL DAILY
Qty: 1 TABLET | Refills: 0
Start: 2019-09-30 | End: 2019-10-01

## 2019-09-28 RX ADMIN — GABAPENTIN 400 MG: 400 CAPSULE ORAL at 08:34

## 2019-09-28 RX ADMIN — MAGNESIUM SULFATE HEPTAHYDRATE 2 G: 40 INJECTION, SOLUTION INTRAVENOUS at 12:55

## 2019-09-28 RX ADMIN — PANTOPRAZOLE SODIUM 40 MG: 40 TABLET, DELAYED RELEASE ORAL at 06:23

## 2019-09-28 RX ADMIN — IPRATROPIUM BROMIDE AND ALBUTEROL SULFATE 1 AMPULE: .5; 3 SOLUTION RESPIRATORY (INHALATION) at 07:55

## 2019-09-28 RX ADMIN — GABAPENTIN 400 MG: 400 CAPSULE ORAL at 14:55

## 2019-09-28 RX ADMIN — Medication 10 ML: at 08:33

## 2019-09-28 RX ADMIN — AZITHROMYCIN 250 MG: 250 TABLET, FILM COATED ORAL at 08:33

## 2019-09-28 RX ADMIN — ENOXAPARIN SODIUM 40 MG: 40 INJECTION SUBCUTANEOUS at 08:33

## 2019-09-28 RX ADMIN — FERROUS SULFATE TAB 325 MG (65 MG ELEMENTAL FE) 325 MG: 325 (65 FE) TAB at 08:33

## 2019-09-28 RX ADMIN — IPRATROPIUM BROMIDE AND ALBUTEROL SULFATE 1 AMPULE: .5; 3 SOLUTION RESPIRATORY (INHALATION) at 14:15

## 2019-09-28 RX ADMIN — POTASSIUM CHLORIDE 40 MEQ: 20 TABLET, EXTENDED RELEASE ORAL at 12:55

## 2019-09-28 RX ADMIN — ERYTHROMYCIN: 5 OINTMENT OPHTHALMIC at 12:51

## 2019-09-28 NOTE — PROGRESS NOTES
Lying in bed awake watching tv. No complaints or needs at present time. Call light in reach. Bed alarm in place and turned on.
Tele monitor #[id number] returned to desk. Spoke with Kinjal Wilks in [facility] r/t discharge order.
Temp of 100.6. Tylenol 650 mg PO given. Up in chair awake. Call light in reach.
Oral TID    sodium chloride flush  10 mL Intravenous 2 times per day    enoxaparin  40 mg Subcutaneous Daily    cefTRIAXone (ROCEPHIN) IV  1 g Intravenous Q24H    ipratropium-albuterol  1 ampule Inhalation TID       Continuous Infusions:   sodium chloride 75 mL/hr at 09/26/19 0501       PRN Meds:  sodium chloride flush, magnesium hydroxide, ondansetron, acetaminophen      Data:  CBC:   Recent Labs     09/25/19  1330 09/26/19  0601   WBC 11.1* 6.8   HGB 12.0* 9.7*   HCT 37.9* 30.3*   MCV 80.0 80.2    180     BMP:   Recent Labs     09/25/19  1330 09/26/19  0601    135*   K 4.3 3.6   CL 99 103   CO2 23 23   BUN 19 13   CREATININE 0.9 0.9     LIVER PROFILE:   Recent Labs     09/25/19  1330   AST 16   ALT 7*   BILITOT 0.9   ALKPHOS 111     Results for Margie Klein (MRN [id number]) as of 9/26/2019 09:29   Ref. Range 9/25/2019 13:30   Lactic Acid, Sepsis Latest Ref Range: 0.4 - 1.9 mmol/L 2.3 (H)   Results for Margie Klein (MRN [id number]) as of 9/26/2019 09:29   Ref. Range 9/25/2019 21:04 9/26/2019 02:12   Lactic Acid Latest Ref Range: 0.4 - 2.0 mmol/L 1.0 1.8     PT/INR: No results for input(s): PROTIME, INR in the last 72 hours. Cultures: pending      Assessment:  Principal Problem:    Sepsis ([address]  Active Problems:    CAD (coronary artery disease)    HTN (hypertension)    HLD (hyperlipidemia)    Dementia    Moderate malnutrition (HCC)    Urinary tract infection in male  Resolved Problems:    * No resolved hospital problems. *      Plan:    #Sepsis likely secondary to urinary tract infection. Patient had a fever of 103.2 and was tachycardic at the time of admission. His blood pressure is low normal but stable. His fever is improved. He did spike a temp of 101.6 at 1130 last night. He was admitted to the hospitalist.  Started on IV Rocephin. Await cultures. #He has a dry cough. He may have acute bronchitis. No obvious pneumonia. Will add p.o. azithromycin to the IV Rocephin.     #Late
direction from physician. 3. Bronchodilators will be administered via Metered Dose Inhaler (MDI) with spacer when the following criteria are met:  a. Alert and cooperative     b. HR < 140 bpm  c. RR < 30 bpm                d. Can demonstrate a 2-3 second inspiratory hold  4. Bronchodilators will be administered via Hand Held Nebulizer [facility]) to patients when ANY of the following criteria are met  a. Incognizant or uncooperative          b. Patients treated with HHN at Home        c. Unable to demonstrate proper use of MDI with spacer     d. RR > 30 bpm   5. Bronchodilators will be delivered via Metered Dose Inhaler (MDI), HHN, Aerogen to intubated patients on mechanical ventilation. 6. Inhalation medication orders will be delivered and/or substituted as outlined below. Aerosolized Medications Ordering and Administration Guidelines:    1. All Medications will be ordered by a physician, and their frequency and/or modality will be adjusted as defined by the patients Respiratory Severity Index (RSI) score. 2. If the patient does not have documented COPD, consider discontinuing anticholinergics when RSI is less than 9.  3. If the bronchospasm worsens (increased RSI), then the bronchodilator frequency can be increased to a maximum of every 4 hours. If greater than every 4 hours is required, the physician will be contacted. 4. If the bronchospasm improves, the frequency of the bronchodilator can be decreased, based on the patient's RSI, but not less than home treatment regimen frequency. 5. Bronchodilator(s) will be discontinued if patient has a RSI less than 9 and has received no scheduled or as needed treatment for 72  Hrs. Patients Ordered on a Mucolytic Agent:    1. Must always be administered with a bronchodilator. 2. Discontinue if patient experiences worsened bronchospasm, or secretions have lessened to the point that the patient is able to clear them with a cough.     Anti-inflammatory and Combination
improved. Fever curve better. Started on IV Rocephin. Await cultures. #He has a dry cough. He may have acute bronchitis. No obvious pneumonia. He is on p.o. Azithromycin in addition to IV Rocephin. On HHN. 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 Appears to be stable. #Coronary artery disease. Stable. #Hypertension. Presently not a problem. I do not see any blood pressure medicines listed on his home medication list.      #Lovenox for DVT prophylaxis    #Moderate malnutrition. May use dietary supplements. Discharge planning.     Christa Martínez MD 9/27/2019 10:19 AM
notified of pt transfer status and use of commode. Patient/Family Education   Pt educated on role of inpatient PT, POC, importance of continued activity, DC recommendations, safety awareness, transfer techniques and calling for assist with mobility. Practiced using call button to notify nurse of needs. Assessment  Pt seen for Physical Therapy evaluation in acute care setting. Pt demonstrated decreased Activity tolerance, Balance, ROM, Safety and Strength and decreased independence with Bed Mobility  and Transfers. Unsure of pt's baseline function at [address], but pt appears to be improved from last hospital stay in August 2019. Recommend return to LTC with PT. Goals : To be met in 3 visits:  1). Independent with LE Ex x 10 reps    To be met in 6 visits:  1). Supine to/from sit: CGA  2). Sit to/from stand: Min A to/from STEDY  3). Bed to chair: Max A with squat pivot transfer  4). Tolerate B LE exercises 3 sets of 10-15 reps      Rehabilitation Potential: Fair  Strengths for achieving goals include:   Pt motivated and Pt cooperative  Barriers to achieving goals include: Other: dementia    Plan    To be seen 3-5 x / week  while in acute care setting for therapeutic exercises, bed mobility, transfers, progressive gait training, balance training, and family/patient education. Signature: Leilani Gutierrez, PT, DPT #[id number]      If patient discharges from this facility prior to next visit, this note will serve as the Discharge Summary.

## 2019-10-01 LAB
BLOOD CULTURE, ROUTINE: NORMAL
CULTURE, BLOOD 2: NORMAL

## 2019-11-18 ENCOUNTER — OFFICE VISIT (OUTPATIENT)
Dept: PULMONOLOGY | Age: 84
End: 2019-11-18
Payer: MEDICARE

## 2019-11-18 ENCOUNTER — HOSPITAL ENCOUNTER (OUTPATIENT)
Age: 84
Discharge: HOME | End: 2019-11-18
Payer: MEDICARE

## 2019-11-18 ENCOUNTER — HOSPITAL ENCOUNTER (OUTPATIENT)
Dept: CT IMAGING | Age: 84
Discharge: HOME | End: 2019-11-18
Payer: MEDICARE

## 2019-11-18 VITALS
HEART RATE: 78 BPM | HEIGHT: 70 IN | WEIGHT: 179 LBS | SYSTOLIC BLOOD PRESSURE: 136 MMHG | DIASTOLIC BLOOD PRESSURE: 70 MMHG | OXYGEN SATURATION: 96 % | BODY MASS INDEX: 25.62 KG/M2 | RESPIRATION RATE: 16 BRPM

## 2019-11-18 DIAGNOSIS — R59.0 MEDIASTINAL LYMPHADENOPATHY: Primary | ICD-10-CM

## 2019-11-18 DIAGNOSIS — R59.0 MEDIASTINAL LYMPHADENOPATHY: ICD-10-CM

## 2019-11-18 LAB
CREAT SERPL-MCNC: 0.7 MG/DL (ref 0.8–1.3)
GFR AFRICAN AMERICAN: >60
GFR NON-AFRICAN AMERICAN: >60

## 2019-11-18 PROCEDURE — G8427 DOCREV CUR MEDS BY ELIG CLIN: HCPCS | Performed by: INTERNAL MEDICINE

## 2019-11-18 PROCEDURE — G8417 CALC BMI ABV UP PARAM F/U: HCPCS | Performed by: INTERNAL MEDICINE

## 2019-11-18 PROCEDURE — 36415 COLL VENOUS BLD VENIPUNCTURE: CPT

## 2019-11-18 PROCEDURE — 1123F ACP DISCUSS/DSCN MKR DOCD: CPT | Performed by: INTERNAL MEDICINE

## 2019-11-18 PROCEDURE — G8484 FLU IMMUNIZE NO ADMIN: HCPCS | Performed by: INTERNAL MEDICINE

## 2019-11-18 PROCEDURE — 82565 ASSAY OF CREATININE: CPT

## 2019-11-18 PROCEDURE — 99213 OFFICE O/P EST LOW 20 MIN: CPT | Performed by: INTERNAL MEDICINE

## 2019-11-18 PROCEDURE — G8598 ASA/ANTIPLAT THER USED: HCPCS | Performed by: INTERNAL MEDICINE

## 2019-11-18 PROCEDURE — 4040F PNEUMOC VAC/ADMIN/RCVD: CPT | Performed by: INTERNAL MEDICINE

## 2019-11-18 PROCEDURE — 6360000004 HC RX CONTRAST MEDICATION: Performed by: INTERNAL MEDICINE

## 2019-11-18 PROCEDURE — 1036F TOBACCO NON-USER: CPT | Performed by: INTERNAL MEDICINE

## 2019-11-18 PROCEDURE — 71260 CT THORAX DX C+: CPT

## 2019-11-18 RX ADMIN — IOPAMIDOL 75 ML: 755 INJECTION, SOLUTION INTRAVENOUS at 11:26

## 2020-09-16 ENCOUNTER — APPOINTMENT (OUTPATIENT)
Dept: CT IMAGING | Age: 85
DRG: 871 | End: 2020-09-16
Payer: MEDICARE

## 2020-09-16 ENCOUNTER — HOSPITAL ENCOUNTER (INPATIENT)
Age: 85
LOS: 3 days | Discharge: NURSING, REHAB OR LONG-TERM CARE | DRG: 871 | End: 2020-09-20
Attending: EMERGENCY MEDICINE | Admitting: INTERNAL MEDICINE
Payer: MEDICARE

## 2020-09-16 ENCOUNTER — APPOINTMENT (OUTPATIENT)
Dept: GENERAL RADIOLOGY | Age: 85
DRG: 871 | End: 2020-09-16
Payer: MEDICARE

## 2020-09-16 LAB
A/G RATIO: 1.3 (ref 1.1–2.2)
ALBUMIN SERPL-MCNC: 4.3 G/DL (ref 3.4–5)
ALP BLD-CCNC: 145 U/L (ref 40–129)
ALT SERPL-CCNC: 6 U/L (ref 10–40)
ANION GAP SERPL CALCULATED.3IONS-SCNC: 13 MMOL/L (ref 3–16)
AST SERPL-CCNC: 10 U/L (ref 15–37)
BACTERIA: ABNORMAL /HPF
BASOPHILS ABSOLUTE: 0.1 K/UL (ref 0–0.2)
BASOPHILS RELATIVE PERCENT: 0.3 %
BILIRUB SERPL-MCNC: 1 MG/DL (ref 0–1)
BILIRUBIN URINE: NEGATIVE
BLOOD, URINE: ABNORMAL
BUN BLDV-MCNC: 15 MG/DL (ref 7–20)
CALCIUM SERPL-MCNC: 10.1 MG/DL (ref 8.3–10.6)
CHLORIDE BLD-SCNC: 100 MMOL/L (ref 99–110)
CLARITY: ABNORMAL
CO2: 25 MMOL/L (ref 21–32)
COLOR: YELLOW
CREAT SERPL-MCNC: 0.9 MG/DL (ref 0.8–1.3)
EOSINOPHILS ABSOLUTE: 0.2 K/UL (ref 0–0.6)
EOSINOPHILS RELATIVE PERCENT: 1.1 %
GFR AFRICAN AMERICAN: >60
GFR NON-AFRICAN AMERICAN: >60
GLOBULIN: 3.4 G/DL
GLUCOSE BLD-MCNC: 184 MG/DL (ref 70–99)
GLUCOSE URINE: NEGATIVE MG/DL
HCT VFR BLD CALC: 42 % (ref 40.5–52.5)
HEMOGLOBIN: 13.9 G/DL (ref 13.5–17.5)
KETONES, URINE: NEGATIVE MG/DL
LACTIC ACID: 2.2 MMOL/L (ref 0.4–2)
LEUKOCYTE ESTERASE, URINE: ABNORMAL
LYMPHOCYTES ABSOLUTE: 1.2 K/UL (ref 1–5.1)
LYMPHOCYTES RELATIVE PERCENT: 7.6 %
MCH RBC QN AUTO: 29.2 PG (ref 26–34)
MCHC RBC AUTO-ENTMCNC: 33.2 G/DL (ref 31–36)
MCV RBC AUTO: 87.9 FL (ref 80–100)
MICROSCOPIC EXAMINATION: YES
MONOCYTES ABSOLUTE: 1.3 K/UL (ref 0–1.3)
MONOCYTES RELATIVE PERCENT: 8.3 %
NEUTROPHILS ABSOLUTE: 13 K/UL (ref 1.7–7.7)
NEUTROPHILS RELATIVE PERCENT: 82.7 %
NITRITE, URINE: NEGATIVE
PDW BLD-RTO: 14.1 % (ref 12.4–15.4)
PH UA: 6.5 (ref 5–8)
PLATELET # BLD: 282 K/UL (ref 135–450)
PMV BLD AUTO: 8 FL (ref 5–10.5)
POTASSIUM REFLEX MAGNESIUM: 3.7 MMOL/L (ref 3.5–5.1)
PRO-BNP: 180 PG/ML (ref 0–449)
PROCALCITONIN: 0.07 NG/ML (ref 0–0.15)
PROTEIN UA: 30 MG/DL
RBC # BLD: 4.78 M/UL (ref 4.2–5.9)
RBC UA: ABNORMAL /HPF (ref 0–4)
SODIUM BLD-SCNC: 138 MMOL/L (ref 136–145)
SPECIFIC GRAVITY UA: 1.01 (ref 1–1.03)
TOTAL PROTEIN: 7.7 G/DL (ref 6.4–8.2)
TROPONIN: <0.01 NG/ML
URINE TYPE: ABNORMAL
UROBILINOGEN, URINE: 2 E.U./DL
WBC # BLD: 15.8 K/UL (ref 4–11)
WBC UA: >100 /HPF (ref 0–5)

## 2020-09-16 PROCEDURE — 81001 URINALYSIS AUTO W/SCOPE: CPT

## 2020-09-16 PROCEDURE — 87086 URINE CULTURE/COLONY COUNT: CPT

## 2020-09-16 PROCEDURE — 96366 THER/PROPH/DIAG IV INF ADDON: CPT

## 2020-09-16 PROCEDURE — U0003 INFECTIOUS AGENT DETECTION BY NUCLEIC ACID (DNA OR RNA); SEVERE ACUTE RESPIRATORY SYNDROME CORONAVIRUS 2 (SARS-COV-2) (CORONAVIRUS DISEASE [COVID-19]), AMPLIFIED PROBE TECHNIQUE, MAKING USE OF HIGH THROUGHPUT TECHNOLOGIES AS DESCRIBED BY CMS-2020-01-R: HCPCS

## 2020-09-16 PROCEDURE — 84145 PROCALCITONIN (PCT): CPT

## 2020-09-16 PROCEDURE — 84484 ASSAY OF TROPONIN QUANT: CPT

## 2020-09-16 PROCEDURE — 6360000004 HC RX CONTRAST MEDICATION: Performed by: PHYSICIAN ASSISTANT

## 2020-09-16 PROCEDURE — 87077 CULTURE AEROBIC IDENTIFY: CPT

## 2020-09-16 PROCEDURE — 80053 COMPREHEN METABOLIC PANEL: CPT

## 2020-09-16 PROCEDURE — P9612 CATHETERIZE FOR URINE SPEC: HCPCS

## 2020-09-16 PROCEDURE — 71046 X-RAY EXAM CHEST 2 VIEWS: CPT

## 2020-09-16 PROCEDURE — 99285 EMERGENCY DEPT VISIT HI MDM: CPT

## 2020-09-16 PROCEDURE — 87040 BLOOD CULTURE FOR BACTERIA: CPT

## 2020-09-16 PROCEDURE — 96365 THER/PROPH/DIAG IV INF INIT: CPT

## 2020-09-16 PROCEDURE — 74177 CT ABD & PELVIS W/CONTRAST: CPT

## 2020-09-16 PROCEDURE — 87186 SC STD MICRODIL/AGAR DIL: CPT

## 2020-09-16 PROCEDURE — 96367 TX/PROPH/DG ADDL SEQ IV INF: CPT

## 2020-09-16 PROCEDURE — 85025 COMPLETE CBC W/AUTO DIFF WBC: CPT

## 2020-09-16 PROCEDURE — 2580000003 HC RX 258: Performed by: PHYSICIAN ASSISTANT

## 2020-09-16 PROCEDURE — 6360000002 HC RX W HCPCS: Performed by: PHYSICIAN ASSISTANT

## 2020-09-16 PROCEDURE — 93005 ELECTROCARDIOGRAM TRACING: CPT | Performed by: PHYSICIAN ASSISTANT

## 2020-09-16 PROCEDURE — 83880 ASSAY OF NATRIURETIC PEPTIDE: CPT

## 2020-09-16 PROCEDURE — 83605 ASSAY OF LACTIC ACID: CPT

## 2020-09-16 RX ORDER — 0.9 % SODIUM CHLORIDE 0.9 %
30 INTRAVENOUS SOLUTION INTRAVENOUS ONCE
Status: COMPLETED | OUTPATIENT
Start: 2020-09-16 | End: 2020-09-16

## 2020-09-16 RX ADMIN — SODIUM CHLORIDE 2226 ML: 9 INJECTION, SOLUTION INTRAVENOUS at 21:25

## 2020-09-16 RX ADMIN — VANCOMYCIN HYDROCHLORIDE 2000 MG: 10 INJECTION, POWDER, LYOPHILIZED, FOR SOLUTION INTRAVENOUS at 22:44

## 2020-09-16 RX ADMIN — IOPAMIDOL 75 ML: 755 INJECTION, SOLUTION INTRAVENOUS at 21:11

## 2020-09-16 RX ADMIN — CEFEPIME HYDROCHLORIDE 2 G: 2 INJECTION, POWDER, FOR SOLUTION INTRAVENOUS at 21:26

## 2020-09-16 ASSESSMENT — PAIN SCALES - GENERAL: PAINLEVEL_OUTOF10: 8

## 2020-09-16 ASSESSMENT — PAIN DESCRIPTION - LOCATION: LOCATION: ABDOMEN

## 2020-09-16 ASSESSMENT — PAIN DESCRIPTION - DESCRIPTORS: DESCRIPTORS: ACHING;DISCOMFORT

## 2020-09-16 NOTE — ED NOTES
Bed: 03  Expected date:   Expected time:   Means of arrival:   Comments:  Hazel Hensley RN  09/16/20 1616

## 2020-09-17 PROBLEM — A41.9 SEPSIS (HCC): Status: ACTIVE | Noted: 2020-09-17

## 2020-09-17 LAB
A/G RATIO: 1.2 (ref 1.1–2.2)
ALBUMIN SERPL-MCNC: 3.5 G/DL (ref 3.4–5)
ALP BLD-CCNC: 116 U/L (ref 40–129)
ALT SERPL-CCNC: <5 U/L (ref 10–40)
ANION GAP SERPL CALCULATED.3IONS-SCNC: 10 MMOL/L (ref 3–16)
AST SERPL-CCNC: 10 U/L (ref 15–37)
BASOPHILS ABSOLUTE: 0.1 K/UL (ref 0–0.2)
BASOPHILS RELATIVE PERCENT: 0.6 %
BILIRUB SERPL-MCNC: 0.9 MG/DL (ref 0–1)
BUN BLDV-MCNC: 12 MG/DL (ref 7–20)
CALCIUM SERPL-MCNC: 8.8 MG/DL (ref 8.3–10.6)
CHLORIDE BLD-SCNC: 103 MMOL/L (ref 99–110)
CO2: 24 MMOL/L (ref 21–32)
CREAT SERPL-MCNC: 0.9 MG/DL (ref 0.8–1.3)
EKG ATRIAL RATE: 105 BPM
EKG DIAGNOSIS: NORMAL
EKG P AXIS: 41 DEGREES
EKG P-R INTERVAL: 146 MS
EKG Q-T INTERVAL: 344 MS
EKG QRS DURATION: 100 MS
EKG QTC CALCULATION (BAZETT): 454 MS
EKG R AXIS: -47 DEGREES
EKG T AXIS: 58 DEGREES
EKG VENTRICULAR RATE: 105 BPM
EOSINOPHILS ABSOLUTE: 0.2 K/UL (ref 0–0.6)
EOSINOPHILS RELATIVE PERCENT: 2 %
GFR AFRICAN AMERICAN: >60
GFR NON-AFRICAN AMERICAN: >60
GLOBULIN: 2.9 G/DL
GLUCOSE BLD-MCNC: 126 MG/DL (ref 70–99)
HCT VFR BLD CALC: 36.2 % (ref 40.5–52.5)
HEMOGLOBIN: 12.1 G/DL (ref 13.5–17.5)
LACTIC ACID, SEPSIS: 1.3 MMOL/L (ref 0.4–1.9)
LACTIC ACID: 1.1 MMOL/L (ref 0.4–2)
LYMPHOCYTES ABSOLUTE: 1.3 K/UL (ref 1–5.1)
LYMPHOCYTES RELATIVE PERCENT: 12.1 %
MCH RBC QN AUTO: 29.7 PG (ref 26–34)
MCHC RBC AUTO-ENTMCNC: 33.5 G/DL (ref 31–36)
MCV RBC AUTO: 88.6 FL (ref 80–100)
MONOCYTES ABSOLUTE: 1.2 K/UL (ref 0–1.3)
MONOCYTES RELATIVE PERCENT: 11 %
NEUTROPHILS ABSOLUTE: 8.1 K/UL (ref 1.7–7.7)
NEUTROPHILS RELATIVE PERCENT: 74.3 %
PDW BLD-RTO: 14.2 % (ref 12.4–15.4)
PLATELET # BLD: 213 K/UL (ref 135–450)
PMV BLD AUTO: 7.4 FL (ref 5–10.5)
POTASSIUM REFLEX MAGNESIUM: 3.6 MMOL/L (ref 3.5–5.1)
RBC # BLD: 4.09 M/UL (ref 4.2–5.9)
SARS-COV-2, PCR: NOT DETECTED
SODIUM BLD-SCNC: 137 MMOL/L (ref 136–145)
TOTAL PROTEIN: 6.4 G/DL (ref 6.4–8.2)
WBC # BLD: 10.9 K/UL (ref 4–11)

## 2020-09-17 PROCEDURE — 6360000002 HC RX W HCPCS: Performed by: INTERNAL MEDICINE

## 2020-09-17 PROCEDURE — 6370000000 HC RX 637 (ALT 250 FOR IP): Performed by: PHYSICIAN ASSISTANT

## 2020-09-17 PROCEDURE — 2580000003 HC RX 258: Performed by: INTERNAL MEDICINE

## 2020-09-17 PROCEDURE — 99223 1ST HOSP IP/OBS HIGH 75: CPT | Performed by: PHYSICIAN ASSISTANT

## 2020-09-17 PROCEDURE — 83605 ASSAY OF LACTIC ACID: CPT

## 2020-09-17 PROCEDURE — 36415 COLL VENOUS BLD VENIPUNCTURE: CPT

## 2020-09-17 PROCEDURE — 2500000003 HC RX 250 WO HCPCS: Performed by: PHYSICIAN ASSISTANT

## 2020-09-17 PROCEDURE — 80053 COMPREHEN METABOLIC PANEL: CPT

## 2020-09-17 PROCEDURE — 6370000000 HC RX 637 (ALT 250 FOR IP): Performed by: INTERNAL MEDICINE

## 2020-09-17 PROCEDURE — 1200000000 HC SEMI PRIVATE

## 2020-09-17 PROCEDURE — 85025 COMPLETE CBC W/AUTO DIFF WBC: CPT

## 2020-09-17 PROCEDURE — 93010 ELECTROCARDIOGRAM REPORT: CPT | Performed by: INTERNAL MEDICINE

## 2020-09-17 RX ORDER — SODIUM CHLORIDE 9 MG/ML
INJECTION, SOLUTION INTRAVENOUS CONTINUOUS
Status: DISCONTINUED | OUTPATIENT
Start: 2020-09-17 | End: 2020-09-18

## 2020-09-17 RX ORDER — FERROUS SULFATE 325(65) MG
325 TABLET ORAL
Status: DISCONTINUED | OUTPATIENT
Start: 2020-09-17 | End: 2020-09-17

## 2020-09-17 RX ORDER — ACETAMINOPHEN 325 MG/1
650 TABLET ORAL EVERY 6 HOURS PRN
Status: DISCONTINUED | OUTPATIENT
Start: 2020-09-17 | End: 2020-09-20 | Stop reason: HOSPADM

## 2020-09-17 RX ORDER — SODIUM CHLORIDE 0.9 % (FLUSH) 0.9 %
10 SYRINGE (ML) INJECTION PRN
Status: DISCONTINUED | OUTPATIENT
Start: 2020-09-17 | End: 2020-09-20 | Stop reason: HOSPADM

## 2020-09-17 RX ORDER — AMITRIPTYLINE HYDROCHLORIDE 50 MG/1
50 TABLET, FILM COATED ORAL NIGHTLY
Status: DISCONTINUED | OUTPATIENT
Start: 2020-09-17 | End: 2020-09-17

## 2020-09-17 RX ORDER — BISACODYL 10 MG
10 SUPPOSITORY, RECTAL RECTAL DAILY PRN
Status: DISCONTINUED | OUTPATIENT
Start: 2020-09-17 | End: 2020-09-20 | Stop reason: HOSPADM

## 2020-09-17 RX ORDER — AMLODIPINE BESYLATE 5 MG/1
10 TABLET ORAL DAILY
Status: DISCONTINUED | OUTPATIENT
Start: 2020-09-18 | End: 2020-09-20 | Stop reason: HOSPADM

## 2020-09-17 RX ORDER — ACETAMINOPHEN 650 MG/1
650 SUPPOSITORY RECTAL EVERY 6 HOURS PRN
Status: DISCONTINUED | OUTPATIENT
Start: 2020-09-17 | End: 2020-09-20 | Stop reason: HOSPADM

## 2020-09-17 RX ORDER — AMLODIPINE BESYLATE 10 MG/1
10 TABLET ORAL DAILY
COMMUNITY
End: 2022-07-26

## 2020-09-17 RX ORDER — POTASSIUM CHLORIDE 750 MG/1
10 CAPSULE, EXTENDED RELEASE ORAL DAILY
Status: ON HOLD | COMMUNITY
End: 2022-07-26 | Stop reason: HOSPADM

## 2020-09-17 RX ORDER — POTASSIUM CHLORIDE 750 MG/1
10 TABLET, EXTENDED RELEASE ORAL DAILY
Status: DISCONTINUED | OUTPATIENT
Start: 2020-09-18 | End: 2020-09-18

## 2020-09-17 RX ORDER — AMITRIPTYLINE HYDROCHLORIDE 50 MG/1
50 TABLET, FILM COATED ORAL ONCE
Status: COMPLETED | OUTPATIENT
Start: 2020-09-17 | End: 2020-09-17

## 2020-09-17 RX ORDER — BUPROPION HYDROCHLORIDE 75 MG/1
37.5 TABLET ORAL 2 TIMES DAILY
Status: DISCONTINUED | OUTPATIENT
Start: 2020-09-17 | End: 2020-09-20 | Stop reason: HOSPADM

## 2020-09-17 RX ORDER — OMEPRAZOLE 20 MG/1
20 CAPSULE, DELAYED RELEASE ORAL DAILY
Status: ON HOLD | COMMUNITY

## 2020-09-17 RX ORDER — PREGABALIN 25 MG/1
25 CAPSULE ORAL 3 TIMES DAILY
Status: DISCONTINUED | OUTPATIENT
Start: 2020-09-17 | End: 2020-09-20 | Stop reason: HOSPADM

## 2020-09-17 RX ORDER — LIDOCAINE 4 G/G
1 PATCH TOPICAL DAILY
COMMUNITY
End: 2022-07-21

## 2020-09-17 RX ORDER — PANTOPRAZOLE SODIUM 40 MG/1
40 TABLET, DELAYED RELEASE ORAL
Status: DISCONTINUED | OUTPATIENT
Start: 2020-09-18 | End: 2020-09-20 | Stop reason: HOSPADM

## 2020-09-17 RX ORDER — SODIUM CHLORIDE 0.9 % (FLUSH) 0.9 %
10 SYRINGE (ML) INJECTION EVERY 12 HOURS SCHEDULED
Status: DISCONTINUED | OUTPATIENT
Start: 2020-09-17 | End: 2020-09-20 | Stop reason: HOSPADM

## 2020-09-17 RX ORDER — CHOLECALCIFEROL (VITAMIN D3) 125 MCG
1000 CAPSULE ORAL DAILY
Status: DISCONTINUED | OUTPATIENT
Start: 2020-09-17 | End: 2020-09-17

## 2020-09-17 RX ORDER — PANTOPRAZOLE SODIUM 40 MG/1
40 TABLET, DELAYED RELEASE ORAL
Status: DISCONTINUED | OUTPATIENT
Start: 2020-09-17 | End: 2020-09-17

## 2020-09-17 RX ORDER — ALBUTEROL SULFATE 90 UG/1
2 AEROSOL, METERED RESPIRATORY (INHALATION) EVERY 6 HOURS PRN
Status: DISCONTINUED | OUTPATIENT
Start: 2020-09-17 | End: 2020-09-18

## 2020-09-17 RX ORDER — PREGABALIN 25 MG/1
75 CAPSULE ORAL 3 TIMES DAILY
Status: ON HOLD | COMMUNITY

## 2020-09-17 RX ORDER — GABAPENTIN 400 MG/1
400 CAPSULE ORAL 3 TIMES DAILY
Status: DISCONTINUED | OUTPATIENT
Start: 2020-09-17 | End: 2020-09-17

## 2020-09-17 RX ORDER — BUPROPION HYDROCHLORIDE 75 MG/1
37.5 TABLET ORAL 2 TIMES DAILY
COMMUNITY
End: 2022-07-21

## 2020-09-17 RX ADMIN — FERROUS SULFATE TAB 325 MG (65 MG ELEMENTAL FE) 325 MG: 325 (65 FE) TAB at 09:14

## 2020-09-17 RX ADMIN — SODIUM CHLORIDE: 9 INJECTION, SOLUTION INTRAVENOUS at 04:35

## 2020-09-17 RX ADMIN — ENOXAPARIN SODIUM 40 MG: 40 INJECTION SUBCUTANEOUS at 09:13

## 2020-09-17 RX ADMIN — METRONIDAZOLE 500 MG: 500 INJECTION, SOLUTION INTRAVENOUS at 23:59

## 2020-09-17 RX ADMIN — Medication 10 ML: at 09:15

## 2020-09-17 RX ADMIN — CEFTRIAXONE SODIUM 1 G: 1 INJECTION, POWDER, FOR SOLUTION INTRAMUSCULAR; INTRAVENOUS at 09:13

## 2020-09-17 RX ADMIN — BUPROPION HYDROCHLORIDE 37.5 MG: 75 TABLET, FILM COATED ORAL at 21:13

## 2020-09-17 RX ADMIN — AMITRIPTYLINE HYDROCHLORIDE 50 MG: 50 TABLET, FILM COATED ORAL at 04:15

## 2020-09-17 RX ADMIN — CYANOCOBALAMIN TAB 500 MCG 1000 MCG: 500 TAB at 09:14

## 2020-09-17 RX ADMIN — GABAPENTIN 400 MG: 400 CAPSULE ORAL at 09:13

## 2020-09-17 RX ADMIN — PREGABALIN 25 MG: 25 CAPSULE ORAL at 21:15

## 2020-09-17 RX ADMIN — Medication 10 ML: at 21:12

## 2020-09-17 RX ADMIN — PANTOPRAZOLE SODIUM 40 MG: 40 TABLET, DELAYED RELEASE ORAL at 06:26

## 2020-09-17 RX ADMIN — SODIUM CHLORIDE: 9 INJECTION, SOLUTION INTRAVENOUS at 21:12

## 2020-09-17 ASSESSMENT — PAIN SCALES - GENERAL: PAINLEVEL_OUTOF10: 0

## 2020-09-17 NOTE — ED NOTES
2212- perfect serve to Dr. Bran Dominguez for admission consult per [facility] Vitale  09/16/20 [id number]

## 2020-09-17 NOTE — PLAN OF CARE
82 M sent from SNF for episode of resp distress and lower abd pain. Pt has dementia and is unable to give hx. He is not requiring O2 here and he does not appear to be in pain. Sepsis  UTI  COVID r/o  Abd pain?  resp distress?

## 2020-09-17 NOTE — ED NOTES
8518 Emergency contact 1 Donna Hay) called and asked for update on patient.  Told her someone would call back with information regarding his reason for admission     Fabiana Pollock  09/16/20 [id number]

## 2020-09-17 NOTE — H&P
Hospital Medicine History & Physical      PCP: Azael Gutierrez MD    Date of Admission: 9/16/2020    Date of Service: Pt seen/examined on 9/17/2020      Chief Complaint:    Chief Complaint   Patient presents with    Abdominal Pain     patient state he has pain in abdomen and on the right side. [address] thought he might have been choking when she assessed the patient. patient denies states he is nauseous and has a productive cough. patient also c/o numbness de upper arms says this comes and goes. History Of Present Illness: The patient is a 82 y.o. male with dementia, PUD, CAD, depression, COPD, PCM who presented to the ED with complaint of \"abdominal pain. \"  The patient has dementia and is a poor historian. Per ER notes, a nurse at his extended care facility thought he was choking and having difficulty breathing. Apparently the respiratory issues resolved, but patient was questioned on how he was feeling and he reported having abdominal pain therefore he was sent to the emergency room for evaluation. On my examination today he denies any acute complaints. His dementia precludes the ability to complete a reliable review of systems. His ER work-up showed a low-grade fever and urinalysis consistent with infection. A COVID-19 test is pending. CT of his abdomen is unremarkable. Chest x-ray shows airspace disease in the right lung base. He is on room air with stable oxygen saturations and exhibits no evidence of respiratory distress.       Past Medical History:        Diagnosis Date    Atherosclerotic heart disease     CAD (coronary artery disease)     Cancer ([address]     skin    COPD (chronic obstructive pulmonary disease) (HCC)     Dementia ([address]     Depression     Falls frequently     Hernia     Hyperlipidemia     Hypertension     Lack of coordination     falls    Melanoma ([address]     MI (myocardial infarction) ([address]     Muscle weakness (generalized)     Protein calorie malnutrition ([facility]     Reflux     Sepsis ([facility]     Spinal stenosis     UTI (urinary tract infection)        Past Surgical History:        Procedure Laterality Date    CHOLECYSTECTOMY      COLONOSCOPY  8/10/12    CORONARY ANGIOPLASTY WITH STENT PLACEMENT      PROSTATE BIOPSY      SKIN BIOPSY      UPPER GASTROINTESTINAL ENDOSCOPY  5/14/12    UPPER GASTROINTESTINAL ENDOSCOPY N/A 6/11/2019    [facility]    EGD performed by Kyara Mijares MD at [address] [facility]       Medications Prior to Admission:    Prior to Admission medications    Medication Sig Start Date End Date Taking? Authorizing Provider   magnesium hydroxide (MILK OF MAGNESIA) 400 MG/5ML suspension Take 30 mLs by mouth daily as needed for Constipation    Historical Provider, MD   ferrous sulfate 325 (65 Fe) MG tablet Take 325 mg by mouth daily (with breakfast)    Historical Provider, MD   pantoprazole (PROTONIX) 40 MG tablet Take 1 tablet by mouth 2 times daily (before meals) 6/11/19   Jacek Loza MD   gabapentin (NEURONTIN) 300 MG capsule Take 400 mg by mouth 3 times daily. Historical Provider, MD   vitamin B-12 (CYANOCOBALAMIN) 1000 MCG tablet Take 1,000 mcg by mouth daily    Historical Provider, MD   bisacodyl (DULCOLAX) 10 MG suppository Place 10 mg rectally daily as needed     Historical Provider, MD   acetaminophen (TYLENOL) 325 MG tablet Take 650 mg by mouth every 6 hours as needed for Pain    Historical Provider, MD   amitriptyline (ELAVIL) 25 MG tablet Take 1 tablet by mouth nightly  Patient taking differently: Take 50 mg by mouth nightly  3/8/18   Kelvin Emmanuel MD       Allergies:  Fluoxetine; Benazepril; Hydrocodone; Morphine; Penicillins; Simvastatin; Morphine and related; and Sucralfate    Social History:  The patient currently lives at [address] [place] [address]:   reports that he has quit smoking.  His smoking use included cigarettes. He has a 2.50 pack-year smoking history. He has never used smokeless tobacco.  ETOH:   reports no history of alcohol use. Family History:   Positive as follows:    No family history on file. REVIEW OF SYSTEMS:     Could not complete 2/2 dementia     PHYSICAL EXAM:    BP (!) 122/57   Pulse 82   Temp 100 °F (37.8 °C) (Oral)   Resp 18   Ht 5' 10.5\" (1.791 m)   Wt 220 lb (99.8 kg)   SpO2 95%   BMI 31.12 kg/m²     Gen: Pleasantly confused elderly male, he is frail. No distress. Alert. Eyes: PERRL. No sclera icterus. No conjunctival injection. ENT: No discharge. Pharynx clear. Neck: No JVD. No Carotid Bruit. Trachea midline. Resp: No accessory muscle use. No crackles. No wheezes. No rhonchi. CV: Regular rate. Regular rhythm. No murmur. No rub. No edema. Capillary Refill: Brisk,< 3 seconds   Peripheral Pulses: +2 palpable, equal bilaterally   GI: Non-tender. Non-distended. No masses. No organomegaly. Normal bowel sounds. No hernia. Skin: Warm and dry. No nodule on exposed extremities. No rash on exposed extremities. M/S: No cyanosis. No joint deformity. No clubbing. Muscle atrophy noted  Neuro: Awake. Grossly nonfocal    Psych: Oriented to self only. . No anxiety or agitation. CBC:   Recent Labs     09/16/20 1910 09/17/20  0629   WBC 15.8* 10.9   HGB 13.9 12.1*   HCT 42.0 36.2*   MCV 87.9 88.6    213     BMP:   Recent Labs     09/16/20 1910 09/17/20  0629    137   K 3.7 3.6    103   CO2 25 24   BUN 15 12   CREATININE 0.9 0.9     LIVER PROFILE:   Recent Labs     09/16/20 1910 09/17/20  0629   AST 10* 10*   ALT 6* <5*   BILITOT 1.0 0.9   ALKPHOS 145* 116     PT/INR: No results for input(s): PROTIME, INR in the last 72 hours. APTT: No results for input(s): APTT in the last 72 hours.   UA:  Recent Labs     09/16/20  2140   COLORU Yellow   PHUR 6.5   WBCUA >100*   RBCUA 3-4   BACTERIA 2+*   CLARITYU CLOUDY*   SPECGRAV 1.010 LEUKOCYTESUR LARGE*   UROBILINOGEN 2.0*   BILIRUBINUR Negative   BLOODU MODERATE*   GLUCOSEU Negative          CARDIAC ENZYMES  Recent Labs     09/16/20  1910   TROPONINI <0.01       U/A:    Lab Results   Component Value Date    NITRITE neg 01/26/2013    COLORU Yellow 09/16/2020    WBCUA >100 09/16/2020    RBCUA 3-4 09/16/2020    MUCUS 3+ 11/17/2017    BACTERIA 2+ 09/16/2020    CLARITYU CLOUDY 09/16/2020    SPECGRAV 1.010 09/16/2020    LEUKOCYTESUR LARGE 09/16/2020    BLOODU MODERATE 09/16/2020    GLUCOSEU Negative 09/16/2020       ABG  No results found for: ELE1LNT, BEART, S4ACCMTA, PHART, THGBART, MON9KLP, PO2ART, FTY2TSU    CULTURES  Blood: Pending    EKG:  I have reviewed the EKG with the following interpretation:   Sinus tachycardia with a ventricular rate of 105 bpm.  Nonspecific ST abnormality      RADIOLOGY  CT ABDOMEN PELVIS W IV CONTRAST Additional Contrast? None   Final Result   Negative CT examination. No evidence of acute process in the abdomen or   pelvis. XR CHEST (2 VW)   Final Result   Increased lung markings in the right lung base possibility of acute   infiltrate superimposed upon chronic interstitial changes cannot be excluded           ASSESSMENT/PLAN:    #Sepsis  - tachycardia and leucocytosis  - 2/2 UTI and suspected aspiration PNA  - Lactic acidosis resolved quickly with IVF  - f/u cultures  - abx as below  - cont IV NS    #UTI  - Rocephin D#1  - f/u culture    #Suspected aspiration PNA  -Patient is a poor historian but per ER records he was found after a choking incident at his nursing home. His chest x-ray shows right lower lobe airspace disease.  -Continue Rocephin and Flagyl day #1  -SLP to evaluate    #COPD  -Without exacerbation, PRN albuterol inhaler     #PUD  -PPI    #Dementia  -He is pleasantly confused  - his nurse has spoken to his dtr who states his current mentation is baseline  - supportive care     #PCM  - dietitian consult    COVID 19 is PENDING.   Droplet plus until

## 2020-09-17 NOTE — ED PROVIDER NOTES
[address] ED  EMERGENCY DEPARTMENT ENCOUNTER        Pt Name: Elizabeth Maynard  MRN: [id number]  [place] 1935  Date of evaluation: 9/16/2020  Provider: Bari Collet, PA  PCP: Latrice Cornejo MD    This patient was seen and evaluated by the attending physician Chelo Garcia have evaluated this patient. My supervising physician was available for consultation. CHIEF COMPLAINT       Chief Complaint   Patient presents with    Abdominal Pain     patient state he has pain in abdomen and on the right side. Jeannette Quiroga thought he might have been choking when she assessed the patient. patient denies states he is nauseous and has a productive cough. patient also c/o numbness de upper arms says this comes and goes. HISTORY OF PRESENT ILLNESS   (Location/Symptom, Timing/Onset, Context/Setting, Quality, Duration, Modifying Factors, Severity)  Note limiting factors. Elizabeth Maynard is a 82 y.o. male with significant past medical history of coronary artery disease hypertension hyperlipidemia dementia anemia COPD, frequent falls who presents via EMS from his nursing home for reported respiratory distress event. Patient was found to be in his room choking questionably on his spit. At that time he was questioned and he noted that he had groin pain. He was brought to the ER for evaluation. Patient is a poor historian, currently he denies any chest pain or shortness of breath. He denies any body aches or chills. He denies any nausea vomiting or abdominal pain or groin pain. He denies any dysuria. He denies any recent falls or injuries. Nursing Notes were all reviewed and agreed with or any disagreements were addressed  in the HPI. REVIEW OF SYSTEMS    (2-9 systems for level 4, 10 or more for level 5)     Review of Systems    Positives and Pertinent negatives as per HPI. Except as noted abovein the ROS, all other systems were reviewed and negative.        PAST MEDICAL HISTORY     Past Medical History:   Diagnosis Date    Atherosclerotic heart disease     CAD (coronary artery disease)     [facility]     skin    COPD (chronic obstructive pulmonary disease) (HCC)     Dementia ([facility]     Depression     Falls frequently     Hernia     Hyperlipidemia     Hypertension     Lack of coordination     falls    Melanoma ([facility]     MI (myocardial infarction) ([facility]     Muscle weakness (generalized)     Protein calorie malnutrition ([facility]     Reflux     Sepsis ([facility]     Spinal stenosis     UTI (urinary tract infection)          SURGICAL HISTORY     Past Surgical History:   Procedure Laterality Date    CHOLECYSTECTOMY      COLONOSCOPY  8/10/12    CORONARY ANGIOPLASTY WITH STENT PLACEMENT      PROSTATE BIOPSY      SKIN BIOPSY      UPPER GASTROINTESTINAL ENDOSCOPY  5/14/12    UPPER GASTROINTESTINAL ENDOSCOPY N/A 6/11/2019    EGD BIOPSY performed by Ethel Crocker DO at [facility] N/A 8/8/2019    EGD performed by Gilbert Marino MD at [address]       Previous Medications    ACETAMINOPHEN (TYLENOL) 325 MG TABLET    Take 650 mg by mouth every 6 hours as needed for Pain    AMITRIPTYLINE (ELAVIL) 25 MG TABLET    Take 1 tablet by mouth nightly    BISACODYL (DULCOLAX) 10 MG SUPPOSITORY    Place 10 mg rectally daily as needed     FERROUS SULFATE 325 (65 FE) MG TABLET    Take 325 mg by mouth daily (with breakfast)    GABAPENTIN (NEURONTIN) 300 MG CAPSULE    Take 400 mg by mouth 3 times daily. MAGNESIUM HYDROXIDE (MILK OF MAGNESIA) 400 MG/5ML SUSPENSION    Take 30 mLs by mouth daily as needed for Constipation    PANTOPRAZOLE (PROTONIX) 40 MG TABLET    Take 1 tablet by mouth 2 times daily (before meals)    VITAMIN B-12 (CYANOCOBALAMIN) 1000 MCG TABLET    Take 1,000 mcg by mouth daily         ALLERGIES     Fluoxetine;  Benazepril; Hydrocodone; Morphine; Penicillins; Simvastatin; Morphine and related; and Sucralfate    FAMILYHISTORY     No family history on file. SOCIAL HISTORY       Social History     Socioeconomic History    Marital status:      Spouse name: Not on file    Number of children: Not on file    Years of education: Not on file    Highest education level: Not on file   Occupational History    Not on file   Social Needs    Financial resource strain: Not on file    Food insecurity     Worry: Not on file     Inability: Not on file    Transportation needs     Medical: Not on file     Non-medical: Not on file   Tobacco Use    Smoking status: Former Smoker     Packs/day: 0.25     Years: 10.00     Pack years: 2.50     Types: Cigarettes    Smokeless tobacco: Never Used   Substance and Sexual Activity    Alcohol use: No    Drug use: No    Sexual activity: Not Currently   Lifestyle    Physical activity     Days per week: Not on file     Minutes per session: Not on file    Stress: Not on file   Relationships    Social connections     Talks on phone: Not on file     Gets together: Not on file     Attends Yazidism service: Not on file     Active member of club or organization: Not on file     Attends meetings of clubs or organizations: Not on file     Relationship status: Not on file    Intimate partner violence     Fear of current or ex partner: Not on file     Emotionally abused: Not on file     Physically abused: Not on file     Forced sexual activity: Not on file   Other Topics Concern    Not on file   Social History Narrative    Not on file       SCREENINGS             PHYSICAL EXAM    (up to 7 for level 4, 8 or more for level 5)     ED Triage Vitals [09/16/20 1901]   BP Temp Temp Source Pulse Resp SpO2 Height Weight   (!) 120/55 100 °F (37.8 °C) Oral 113 18 95 % 5' 10.5\" (1.791 m) 220 lb (99.8 kg)       Physical Exam  Vitals signs and nursing note reviewed. Constitutional:       General: He is awake. He is not in acute distress.      Appearance: He is well-developed and overweight. He is not ill-appearing, toxic-appearing or diaphoretic. Comments: Sitting upright in exam bed. Comfortable. HENT:      Head: Normocephalic and atraumatic. No raccoon eyes, Forde's sign or contusion. Jaw: There is normal jaw occlusion. Right Ear: Hearing, tympanic membrane, ear canal and external ear normal. No hemotympanum. Left Ear: Hearing, tympanic membrane, ear canal and external ear normal. No hemotympanum. Nose: Nose normal.      Mouth/Throat:      Lips: Pink. Mouth: Mucous membranes are dry. Pharynx: Oropharynx is clear. Eyes:      General:         Right eye: No discharge. Left eye: No discharge. Conjunctiva/sclera: Conjunctivae normal.      Pupils: Pupils are equal, round, and reactive to light. Neck:      Musculoskeletal: Normal range of motion and neck supple. No neck rigidity or muscular tenderness. Cardiovascular:      Rate and Rhythm: Normal rate and regular rhythm. Pulses:           Radial pulses are 2+ on the right side and 2+ on the left side. Posterior tibial pulses are 2+ on the right side and 2+ on the left side. Heart sounds: Normal heart sounds. No murmur. No friction rub. No gallop. Pulmonary:      Effort: Pulmonary effort is normal. No tachypnea, accessory muscle usage, prolonged expiration or respiratory distress. Breath sounds: Normal breath sounds. No decreased breath sounds, wheezing, rhonchi or rales. Comments: Coughing up thick but otherwise clear and colorless sputum  Abdominal:      General: Bowel sounds are normal. There is no distension. Palpations: Abdomen is soft. Tenderness: There is no abdominal tenderness. There is no right CVA tenderness or left CVA tenderness. Genitourinary:     Penis: Normal. No tenderness or swelling. Scrotum/Testes: Normal.   Musculoskeletal:      Right lower leg: No edema. Left lower leg: No edema. Lymphadenopathy:      Cervical: No cervical adenopathy. Skin:     General: Skin is warm and dry. Capillary Refill: Capillary refill takes less than 2 seconds. Coloration: Skin is not jaundiced. Findings: No bruising or rash. Neurological:      Mental Status: He is alert and easily aroused. Mental status is at baseline. He is disoriented and confused. GCS: GCS eye subscore is 4. GCS verbal subscore is 5. GCS motor subscore is 6. Cranial Nerves: No dysarthria or facial asymmetry. Sensory: Sensation is intact. No sensory deficit. Motor: No weakness, tremor or seizure activity. Psychiatric:         Behavior: Behavior normal. Behavior is cooperative.            DIAGNOSTIC RESULTS   LABS:    Labs Reviewed   CBC WITH AUTO DIFFERENTIAL - Abnormal; Notable for the following components:       Result Value    WBC 15.8 (*)     Neutrophils Absolute 13.0 (*)     All other components within normal limits    Narrative:     Performed at:  [facility],  [employer], [facility]   Phone [phone]   COMPREHENSIVE METABOLIC PANEL W/ REFLEX TO MG FOR LOW K - Abnormal; Notable for the following components:    Glucose 184 (*)     Alkaline Phosphatase 145 (*)     ALT 6 (*)     AST 10 (*)     All other components within normal limits    Narrative:     Performed at:  [facility],  [place], [facility]   Phone [phone]   URINALYSIS - Abnormal; Notable for the following components:    Clarity, UA CLOUDY (*)     Blood, Urine MODERATE (*)     Protein, UA 30 (*)     Urobilinogen, Urine 2.0 (*)     Leukocyte Esterase, Urine LARGE (*)     All other components within normal limits    Narrative:     Performed at:  [facility]) - [facility] acute [facility],  [place], [facility]   Phone [phone]   LACTIC ACID, PLASMA - Abnormal; Notable for the following components:    Lactic Acid 2.2 (*)     All other components within normal limits    Narrative:     Performed at:  [facility]) - [facility],  [place], [place]   Phone [phone]   MICROSCOPIC URINALYSIS - Abnormal; Notable for the following components:    WBC, UA >100 (*)     Bacteria, UA 2+ (*)     All other components within normal limits    Narrative:     Performed at:  [facility],  [place], [facility]   Phone [phone]   CULTURE, BLOOD 1   CULTURE, BLOOD 2   TROPONIN    Narrative:     Performed at:  [place] [address],  [place], [place] [employer] [place]   Phone [phone]   BRAIN NATRIURETIC PEPTIDE    Narrative:     Performed at:  [place] [address],  [place], [facility]   Phone [phone]   LACTATE, SEPSIS    Narrative:     Performed at:  [place] [address],  [place], [place] [employer]   Phone [phone]   PROCALCITONIN    Narrative:     Performed at:  [facility]) - [facility],  [place], [place]   Phone [phone]   LACTATE, SEPSIS   COVID-19   [phone]       All other labs were within normal range or not returned as of this dictation. EKG: All EKG's are interpreted by the Emergency Department Physician who either signs orCo-signs this chart in the absence of a cardiologist.  Please see their note for interpretation of EKG. RADIOLOGY:   Non-plain film images such as CT, Ultrasound and MRI are read by the radiologist. Plain radiographic images are visualized andpreliminarily interpreted by the  ED Provider with the below findings:        Interpretation perthe Radiologist below, if available at the time of this note:    CT ABDOMEN PELVIS W IV CONTRAST Additional Contrast? None   Final Result   Negative CT examination.  No evidence of acute process in the abdomen or   pelvis. XR CHEST (2 VW)   Final Result   Increased lung markings in the right lung base possibility of acute   infiltrate superimposed upon chronic interstitial changes cannot be excluded           Xr Chest (2 Vw)    Result Date: 9/16/2020  EXAMINATION: TWO XRAY VIEWS OF THE CHEST 9/16/2020 7:46 pm COMPARISON: September 25, 2019 HISTORY: ORDERING SYSTEM PROVIDED HISTORY: cough TECHNOLOGIST PROVIDED HISTORY: Reason for exam:->cough Reason for Exam: Abdominal Pain (patient state he has pain in abdomen and on the right side. Bobetta Bamberger thought he might have been choking when she assessed the patient. patient denies states he is nauseous and has a productive cough.  patient also c/o numbness de upper arms says this comes and goes Acuity: Acute Type of Exam: Initial FINDINGS: Chronic interstitial changes with some increased markings in the right lung base when compared to September 25, 2019     Increased lung markings in the right lung base possibility of acute infiltrate superimposed upon chronic interstitial changes cannot be excluded          PROCEDURES   Unless otherwise noted below, none     Procedures    CRITICAL CARE TIME   N/A    CONSULTS:  IP CONSULT TO HOSPITALIST  IP CONSULT TO HOSPITALIST      EMERGENCY DEPARTMENT COURSE and DIFFERENTIALDIAGNOSIS/MDM:   Vitals:    Vitals:    09/16/20 2304 09/16/20 2335 09/17/20 0005 09/17/20 0104   BP: (!) 119/53 (!) 113/57 (!) 97/54 114/64   Pulse: 71 87 82 81   Resp:  14 13 20   Temp:       TempSrc:       SpO2: 92% 94% 96% 94%   Weight:       Height:           Patient was given thefollowing medications:  Medications   cefepime (MAXIPIME) 2 g IVPB minibag (0 g Intravenous Stopped 9/16/20 2246)   vancomycin (VANCOCIN) 2,000 mg in dextrose 5 % 500 mL IVPB (2,000 mg Intravenous New Bag 9/16/20 2244)   0.9 % sodium chloride IV bolus 2,226 mL (0 mL/kg × 74.2 kg (Ideal) Intravenous Stopped 9/16/20 2308)   iopamidol (ISOVUE-370) 76 % injection 75 mL (75 mLs Intravenous Given 9/16/20 2111)       PDMP Monitoring:    Last PDMP Jay Brewster as Reviewed [facility]):  Review User Review Instant Review Result            Urine Drug Screenings (1 yr)     No resulted procedures found. Medication Contract and Consent for Opioid Use Documents Filed      No documents found                MDM:   Patient seen and evaluated. Old records reviewed. Diagnostic testing reviewed and results discussed. Patient is an 82-year-old male who presents from his nursing home for evaluation of a respiratory event and reported abdominal pain at the nursing home. On exam he is alert, he is confused, however reportedly at his baseline. He is in no acute respiratory distress with stable vital signs throughout his ER stay. His cardiopulmonary exam is reassuring aside from the fact that he has a thick cough. His abdomen is soft nontender without peritoneal signs. No focal neuro deficits appreciated. Work-up in the emergency department was extensive, I was initially concerned for a pneumonia however there is no evidence of such on his CT. CT abdomen is negative for sign of acute abnormality however patient is meeting sirs criteria and he was found to have source with his urine, UTI. Patient started on broad-spectrum antibiotics old cultures reviewed and he will be admitted for continued management of his acute condition.      SEPSIS EVALUATION TOOL Time of presentation: 9/16/2020  6:44 PM    SIRS CRITERIA: (2 required)  Temperature <36 or >38  No  Heart rate >90    Yes  RR >20 or PCO2 <32   No  WBC >12 or <4 or >10% bands Yes    SEPSIS CRITERIA: (Both required)  Two or more of the above  Yes  Suspected source(s) of infection UTI    ANTIBIOTIC SELECTION RATIONAL  [id number] Antibiogram and Recent culture results    ANTIBIOTIC SELECTION LIMITATIONS  None    SEP-1 CORE MEASURE DATA    Classification: sepsis    Amount of fluids ordered: at least 30mL/kg based on ideal body weight due to obesity defined as BMI >30 (patient's BMI is Body mass index is 31.12 kg/m². and IBW is Ideal body weight: 74.1 kg (163 lb 7.5 oz)Adjusted ideal body weight: 84.4 kg (186 lb 1.3 oz))    Time at which sepsis was identified: 1330    Broad-spectrum antibiotics chosen:  based on suspected source of: UTI    Repeat lactate level: improving    On reassessment after fluid resuscitation:   pending, to be completed by inpatient team      FINAL IMPRESSION      1. Septicemia ([facility]    2.  Acute cystitis without hematuria          DISPOSITION/PLAN   DISPOSITION Admitted 09/17/2020 12:29:12 AM      PATIENT REFERREDTO:  Phylicia Ying, [facility] [place] [address] [id number]            DISCHARGE MEDICATIONS:  New Prescriptions    No medications on file       DISCONTINUED MEDICATIONS:  Discontinued Medications    No medications on file              (Please note that portions ofthis note were completed with a voice recognition program.  Efforts were made to edit the dictations but occasionally words are mis-transcribed.)    ERIC Espinal (electronically signed)        Ishaan Espinal  09/17/20 0555

## 2020-09-17 NOTE — ED PROVIDER NOTES
ED Attending Note    Scott Darden was initially seen and evaluated by the advanced practice provider. Briefly, this is a 82 y.o. male who presented RLQ abdominal pain and cough. Focused exam:   General: appears stated age, nontoxic, NAD  HEENT: NCAT. PERRLA. CV: RRR, no M/R/G  Pulm: CTAB. No increased WOB. Abd: Soft, nontender, nondistended. No rebound or guarding. CT ABDOMEN PELVIS W IV CONTRAST Additional Contrast? None   Final Result   Negative CT examination. No evidence of acute process in the abdomen or   pelvis. XR CHEST (2 VW)   Final Result   Increased lung markings in the right lung base possibility of acute   infiltrate superimposed upon chronic interstitial changes cannot be excluded              Abdominal exam is benign. CT of the abdomen and pelvis shows no e acute cancer today it is 6 tiology for the patient's complaint of abdominal pain however her chest x-ray    EKG interpreted by myself. Rate: 105  Rhythm: sinus tachy  Axis: -47  Intervals: within normal limits  ST Segments: no acute abnormality  T waves: no acute abnormality  Comparison: Compared to 9/26/19, the rhythm is sinus tachycardia from atrial fibrillation  Impression: Sinus tachycardia with left axis deviation and incomplete right bundle branch block       For all further details of the patient's emergency department visit, please see the advanced practice provider's documentation. Fer Melara MD     This report has been produced using speech recognition software and may contain errors related to that system including errors in grammar, punctuation, and spelling, as well as words and phrases that may be inappropriate. If there are any questions or concerns please feel free to contact the dictating provider for clarification.        Fer Melara MD  09/16/20 5982

## 2020-09-18 PROBLEM — E44.0 MODERATE PROTEIN-CALORIE MALNUTRITION (HCC): Status: ACTIVE | Noted: 2020-09-18

## 2020-09-18 LAB
ANION GAP SERPL CALCULATED.3IONS-SCNC: 12 MMOL/L (ref 3–16)
BASOPHILS ABSOLUTE: 0 K/UL (ref 0–0.2)
BASOPHILS RELATIVE PERCENT: 0.2 %
BUN BLDV-MCNC: 8 MG/DL (ref 7–20)
CALCIUM SERPL-MCNC: 8.8 MG/DL (ref 8.3–10.6)
CHLORIDE BLD-SCNC: 100 MMOL/L (ref 99–110)
CO2: 22 MMOL/L (ref 21–32)
CREAT SERPL-MCNC: 0.7 MG/DL (ref 0.8–1.3)
EOSINOPHILS ABSOLUTE: 0.4 K/UL (ref 0–0.6)
EOSINOPHILS RELATIVE PERCENT: 4.3 %
GFR AFRICAN AMERICAN: >60
GFR NON-AFRICAN AMERICAN: >60
GLUCOSE BLD-MCNC: 102 MG/DL (ref 70–99)
HCT VFR BLD CALC: 37.6 % (ref 40.5–52.5)
HEMOGLOBIN: 12.5 G/DL (ref 13.5–17.5)
LYMPHOCYTES ABSOLUTE: 1.1 K/UL (ref 1–5.1)
LYMPHOCYTES RELATIVE PERCENT: 11.5 %
MAGNESIUM: 1.5 MG/DL (ref 1.8–2.4)
MAGNESIUM: 1.5 MG/DL (ref 1.8–2.4)
MCH RBC QN AUTO: 29.5 PG (ref 26–34)
MCHC RBC AUTO-ENTMCNC: 33.2 G/DL (ref 31–36)
MCV RBC AUTO: 88.8 FL (ref 80–100)
MONOCYTES ABSOLUTE: 1.1 K/UL (ref 0–1.3)
MONOCYTES RELATIVE PERCENT: 12 %
NEUTROPHILS ABSOLUTE: 6.6 K/UL (ref 1.7–7.7)
NEUTROPHILS RELATIVE PERCENT: 72 %
PDW BLD-RTO: 14.1 % (ref 12.4–15.4)
PLATELET # BLD: 202 K/UL (ref 135–450)
PMV BLD AUTO: 7.7 FL (ref 5–10.5)
POTASSIUM REFLEX MAGNESIUM: 3.2 MMOL/L (ref 3.5–5.1)
RBC # BLD: 4.23 M/UL (ref 4.2–5.9)
SODIUM BLD-SCNC: 134 MMOL/L (ref 136–145)
WBC # BLD: 9.1 K/UL (ref 4–11)

## 2020-09-18 PROCEDURE — 36415 COLL VENOUS BLD VENIPUNCTURE: CPT

## 2020-09-18 PROCEDURE — 6370000000 HC RX 637 (ALT 250 FOR IP): Performed by: INTERNAL MEDICINE

## 2020-09-18 PROCEDURE — 2580000003 HC RX 258: Performed by: INTERNAL MEDICINE

## 2020-09-18 PROCEDURE — 97162 PT EVAL MOD COMPLEX 30 MIN: CPT

## 2020-09-18 PROCEDURE — 92610 EVALUATE SWALLOWING FUNCTION: CPT

## 2020-09-18 PROCEDURE — 97530 THERAPEUTIC ACTIVITIES: CPT

## 2020-09-18 PROCEDURE — 2580000003 HC RX 258

## 2020-09-18 PROCEDURE — 97166 OT EVAL MOD COMPLEX 45 MIN: CPT

## 2020-09-18 PROCEDURE — 99232 SBSQ HOSP IP/OBS MODERATE 35: CPT | Performed by: INTERNAL MEDICINE

## 2020-09-18 PROCEDURE — 1200000000 HC SEMI PRIVATE

## 2020-09-18 PROCEDURE — 83735 ASSAY OF MAGNESIUM: CPT

## 2020-09-18 PROCEDURE — 2500000003 HC RX 250 WO HCPCS: Performed by: PHYSICIAN ASSISTANT

## 2020-09-18 PROCEDURE — 92526 ORAL FUNCTION THERAPY: CPT

## 2020-09-18 PROCEDURE — 80048 BASIC METABOLIC PNL TOTAL CA: CPT

## 2020-09-18 PROCEDURE — 6360000002 HC RX W HCPCS: Performed by: INTERNAL MEDICINE

## 2020-09-18 PROCEDURE — 6370000000 HC RX 637 (ALT 250 FOR IP): Performed by: PHYSICIAN ASSISTANT

## 2020-09-18 PROCEDURE — 85025 COMPLETE CBC W/AUTO DIFF WBC: CPT

## 2020-09-18 PROCEDURE — 97535 SELF CARE MNGMENT TRAINING: CPT

## 2020-09-18 RX ORDER — POTASSIUM CHLORIDE 750 MG/1
10 TABLET, EXTENDED RELEASE ORAL 2 TIMES DAILY
Status: DISCONTINUED | OUTPATIENT
Start: 2020-09-18 | End: 2020-09-20 | Stop reason: HOSPADM

## 2020-09-18 RX ORDER — MAGNESIUM SULFATE 1 G/100ML
1 INJECTION INTRAVENOUS PRN
Status: DISCONTINUED | OUTPATIENT
Start: 2020-09-18 | End: 2020-09-20 | Stop reason: HOSPADM

## 2020-09-18 RX ORDER — ALBUTEROL SULFATE 90 UG/1
2 AEROSOL, METERED RESPIRATORY (INHALATION) EVERY 4 HOURS PRN
Status: DISCONTINUED | OUTPATIENT
Start: 2020-09-18 | End: 2020-09-20 | Stop reason: HOSPADM

## 2020-09-18 RX ORDER — SODIUM CHLORIDE 9 MG/ML
INJECTION, SOLUTION INTRAVENOUS
Status: COMPLETED
Start: 2020-09-18 | End: 2020-09-18

## 2020-09-18 RX ORDER — POTASSIUM CHLORIDE 7.45 MG/ML
10 INJECTION INTRAVENOUS PRN
Status: DISCONTINUED | OUTPATIENT
Start: 2020-09-18 | End: 2020-09-20 | Stop reason: HOSPADM

## 2020-09-18 RX ORDER — POTASSIUM CHLORIDE 20 MEQ/1
40 TABLET, EXTENDED RELEASE ORAL PRN
Status: DISCONTINUED | OUTPATIENT
Start: 2020-09-18 | End: 2020-09-20 | Stop reason: HOSPADM

## 2020-09-18 RX ADMIN — PREGABALIN 25 MG: 25 CAPSULE ORAL at 14:42

## 2020-09-18 RX ADMIN — ACETAMINOPHEN 650 MG: 325 TABLET ORAL at 17:06

## 2020-09-18 RX ADMIN — BUPROPION HYDROCHLORIDE 37.5 MG: 75 TABLET, FILM COATED ORAL at 21:07

## 2020-09-18 RX ADMIN — MAGNESIUM SULFATE HEPTAHYDRATE 1 G: 1 INJECTION, SOLUTION INTRAVENOUS at 06:50

## 2020-09-18 RX ADMIN — METRONIDAZOLE 500 MG: 500 INJECTION, SOLUTION INTRAVENOUS at 06:50

## 2020-09-18 RX ADMIN — POTASSIUM CHLORIDE 10 MEQ: 750 TABLET, EXTENDED RELEASE ORAL at 21:07

## 2020-09-18 RX ADMIN — CEFTRIAXONE SODIUM 1 G: 1 INJECTION, POWDER, FOR SOLUTION INTRAMUSCULAR; INTRAVENOUS at 09:15

## 2020-09-18 RX ADMIN — SODIUM CHLORIDE: 9 INJECTION, SOLUTION INTRAVENOUS at 07:24

## 2020-09-18 RX ADMIN — Medication 10 ML: at 21:07

## 2020-09-18 RX ADMIN — PREGABALIN 25 MG: 25 CAPSULE ORAL at 21:07

## 2020-09-18 RX ADMIN — POTASSIUM CHLORIDE 10 MEQ: 750 TABLET, EXTENDED RELEASE ORAL at 09:16

## 2020-09-18 RX ADMIN — PANTOPRAZOLE SODIUM 40 MG: 40 TABLET, DELAYED RELEASE ORAL at 06:50

## 2020-09-18 RX ADMIN — MAGNESIUM SULFATE HEPTAHYDRATE 1 G: 1 INJECTION, SOLUTION INTRAVENOUS at 09:18

## 2020-09-18 RX ADMIN — METRONIDAZOLE 500 MG: 500 INJECTION, SOLUTION INTRAVENOUS at 14:42

## 2020-09-18 RX ADMIN — PREGABALIN 25 MG: 25 CAPSULE ORAL at 09:16

## 2020-09-18 RX ADMIN — SODIUM CHLORIDE 250 ML: 9 INJECTION, SOLUTION INTRAVENOUS at 07:24

## 2020-09-18 RX ADMIN — AMLODIPINE BESYLATE 10 MG: 5 TABLET ORAL at 09:16

## 2020-09-18 RX ADMIN — ENOXAPARIN SODIUM 40 MG: 40 INJECTION SUBCUTANEOUS at 09:16

## 2020-09-18 RX ADMIN — METRONIDAZOLE 500 MG: 500 INJECTION, SOLUTION INTRAVENOUS at 23:52

## 2020-09-18 RX ADMIN — POTASSIUM BICARBONATE 40 MEQ: 782 TABLET, EFFERVESCENT ORAL at 07:26

## 2020-09-18 RX ADMIN — BUPROPION HYDROCHLORIDE 37.5 MG: 75 TABLET, FILM COATED ORAL at 09:16

## 2020-09-18 ASSESSMENT — PAIN SCALES - GENERAL
PAINLEVEL_OUTOF10: 0
PAINLEVEL_OUTOF10: 8
PAINLEVEL_OUTOF10: 0

## 2020-09-18 NOTE — DISCHARGE INSTR - COC
Continuity of Care Form    Patient Name: González Faust   :  1935  MRN:  [id number]    Admit date:  2020  Discharge date:  2020    Code Status Order: Full Code   Advance Directives:   [facility] Documentation       Date/Time Healthcare Directive Type of Healthcare Directive Copy in [facility] [address] Agent's Name Healthcare Agent's Phone Number    20 [phone]  Unknown, patient unable to respond due to medical condition -- -- -- -- --            Admitting Physician:  Shilpa Campa MD  PCP: Marlo Bell MD    Discharging Nurse: Rosa Ellsworth RN  [address] Unit/Room#: 6274/3036-35  Discharging Unit Phone Number: [phone]    Emergency Contact:   Extended Emergency Contact Information  Primary Emergency Contact: Gabi Still  [place] Phone: [phone]  Mobile Phone: [phone]  Relation: Legal Guardian  Preferred language: English   needed?  No  Secondary Emergency Contact: James Carlin [place] [address] Phone: [phone]  Relation: Brother/Sister    Past Surgical History:  Past Surgical History:   Procedure Laterality Date    CHOLECYSTECTOMY      COLONOSCOPY  8/10/12    CORONARY ANGIOPLASTY WITH STENT PLACEMENT      PROSTATE BIOPSY      SKIN BIOPSY      UPPER GASTROINTESTINAL ENDOSCOPY  12    UPPER GASTROINTESTINAL ENDOSCOPY N/A 2019    EGD BIOPSY performed by Cinthya Longoria DO at [facility] N/A 2019    EGD performed by Shantel Chinchilla MD at [address] [place] [address]       Immunization History:   Immunization History   Administered Date(s) Administered    Influenza A (H4I0-06) Vaccine PF IM 2009    Influenza Virus Vaccine 2009, 2010    Influenza, High Dose (Fluzone 65 yrs and older) 2011, 2013    Pneumococcal Conjugate 13-valent (Iyemvxj00) 2016    Pneumococcal Polysaccharide (Iazkpixxn50) 2009    Td vaccine (adult) 06/03/2004    Tdap (Boostrix, Adacel) 10/18/2011, 10/28/2018       Active Problems:  Patient Active Problem List   Diagnosis Code    Falls frequently R29.6    CAD (coronary artery disease) I25.10    HTN (hypertension) I10    HLD (hyperlipidemia) E78.5    Dementia (HCC) F03.90    Moderate malnutrition ([address] E44.0    Abdominal pain R10.9    Abdominal pain, epigastric R10.13    Anemia D64.9    Urinary tract infection without hematuria N39.0    Lactic acid acidosis E87.2    Mediastinal lymphadenopathy R59.0    Septicemia (HCC) A41.9    Pneumonia of right lower lobe due to infectious organism ([address] J18.1    PUD (peptic ulcer disease) K27.9    Moderate protein-calorie malnutrition (HCC) E44.0       Isolation/Infection:   Isolation            No Isolation          Patient Infection Status       Infection Onset Added Last Indicated Last Indicated By Review Planned Expiration Resolved Resolved By    None active    Resolved    COVID-19 Rule Out 09/16/20 09/16/20 09/16/20 COVID-19 (Ordered)   09/17/20 Rule-Out Test Resulted            Nurse Assessment:  Last Vital Signs: BP (!) 100/58   Pulse 77   Temp 97 °F (36.1 °C) (Oral)   Resp 16   Ht 5' 10\" (1.778 m)   Wt 178 lb (80.7 kg)   SpO2 93%   BMI 25.54 kg/m²     Last documented pain score (0-10 scale): Pain Level: 0  Last Weight:   Wt Readings from Last 1 Encounters:   09/17/20 178 lb (80.7 kg)     Mental Status:  disoriented and alert    IV Access:  - None    Nursing Mobility/ADLs:  Walking   Dependent  Transfer  Dependent  Bathing  Dependent  Dressing  Dependent  Toileting  Dependent  Feeding  Dependent  Med Admin  Dependent  Med Delivery   crushed    Wound Care Documentation and Therapy:        Elimination:  Continence:   · Bowel: No  · Bladder: No  Urinary Catheter: None   Colostomy/Ileostomy/Ileal Conduit: No       Date of Last BM: 9/20/20    Intake/Output Summary (Last 24 hours) at 9/18/2020 1626  Last data filed at 9/18/2020 1329  Gross per 24 hour Intake 963.33 ml   Output 30 ml   Net 933.33 ml     I/O last 3 completed shifts: In: 963.3 [P.O.:600; I.V.:263.3; IV Piggyback:100]  Out: 30 [Urine:30]    Safety Concerns: At Risk for Falls and Aspiration Risk    Impairments/Disabilities:      None    Nutrition Therapy:  Current Nutrition Therapy:   - Oral Diet:  Dysphagia 1 pureed    Routes of Feeding: Oral  Liquids: Thin Liquids  Daily Fluid Restriction: no  Last Modified Barium Swallow with Video (Video Swallowing Test): not done    Treatments at the Time of Hospital Discharge:   Respiratory Treatments: n/a  Oxygen Therapy:  is not on home oxygen therapy. Ventilator:    - No ventilator support    Rehab Therapies: PT/OT/SN  Weight Bearing Status/Restrictions: No weight bearing restirctions  Other Medical Equipment (for information only, NOT a DME order):  hospital bed  Other Treatments: n/a      Patient's personal belongings (please select all that are sent with patient):  None    RN SIGNATURE:  Electronically signed by Elissa Hoffman RN on 9/20/20 at 11:00 AM EDT    CASE MANAGEMENT/SOCIAL WORK SECTION    Inpatient Status Date: 9/17/2020    Readmission Risk Assessment Score:  Readmission Risk              Risk of Unplanned Readmission:        15           Discharging to Facility/ Agency    Name: [facility]   Address: [address] Phone: [phone]   Fax: [phone]      ·     / signature: Electronically signed by Cassy Fernandez RN on 9/20/20 at 9:50 AM EDT    PHYSICIAN SECTION    Prognosis: Fair    Condition at Discharge: Stable    Rehab Potential (if transferring to Rehab): Fair    Recommended Labs or Other Treatments After Discharge:     Resume long term care. Physician Certification: I certify the above information and transfer of Yeesnia Tubbs  is necessary for the continuing treatment of the diagnosis listed and that he requires [facility] for greater 30 days. Update Admission H&P: No change in H&P    PHYSICIAN SIGNATURE:  Electronically signed by Jalyn Ratliff MD on 9/20/20 at 7:54 AM EDT

## 2020-09-18 NOTE — PLAN OF CARE
Problem: Mood - Altered:  Goal: Mood stable  Description: Mood stable  9/18/2020 1405 by Nhung Healy RN  Outcome: Met This Shift  9/18/2020 0150 by Natanael Calvin RN  Outcome: Ongoing   Patient has been very polite and nice today  Problem: Sleep Pattern Disturbance:  Goal: Appears well-rested  Description: Appears well-rested  9/18/2020 1405 by Nhung Healy RN  Outcome: Met This Shift  9/18/2020 0150 by Natanael Calvin RN  Outcome: Ongoing   Pt has been able to nap on and off throughout the day  Problem: Falls - Risk of:  Goal: Will remain free from falls  Description: Will remain free from falls  9/18/2020 1405 by Nhung Healy RN  Outcome: Ongoing  9/18/2020 0150 by Natanael Calvin RN  Outcome: Ongoing  Goal: Absence of physical injury  Description: Absence of physical injury  9/18/2020 0150 by Natanael Calvin RN  Outcome: Ongoing   Patient remains on bedrest, patient is typically wheelchair bound, bed alarm on and call light in reach at all times  Problem: Skin Integrity:  Goal: Will show no infection signs and symptoms  Description: Will show no infection signs and symptoms  9/18/2020 1405 by Nhung Healy RN  Outcome: Ongoing  9/18/2020 0150 by Natanael Calvin RN  Outcome: Ongoing  Goal: Absence of new skin breakdown  Description: Absence of new skin breakdown  9/18/2020 0150 by Natanael Calvin RN  Outcome: Ongoing   Patients skin was assessed today see 4 eyes assessment  Problem: Discharge Planning:  Goal: Ability to perform activities of daily living will improve  Description: Ability to perform activities of daily living will improve  9/18/2020 0150 by Natanael Calvin RN  Outcome: Ongoing  Goal: Participates in care planning  Description: Participates in care planning  9/18/2020 0150 by Natanael Calvin RN  Outcome: Ongoing  Goal: Discharged to appropriate level of care  Description: Discharged to appropriate level of care  9/18/2020 0150 by Natanael Calvin RN  Outcome:

## 2020-09-18 NOTE — PROGRESS NOTES
Progress Note    Admit Date:  9/16/2020    Nursing home patient with dementia, admitted with sepsis from likely aspiration pneumonia/UTI. COVID-19 has been ruled out. Subjective:  Mr. Tata Tubbs is doing better today. He is very pleasant. He is awake, alert and appears to be oriented to person. Seen by speech therapy, dysphagia 1 puréed diet has been recommended. Lactic acidosis is resolved. Potassium and magnesium low, this is being repleted. On telemetry he is in normal sinus rhythm with frequent PVCs and bigeminy. Objective:   /62   Pulse 77   Temp 97.4 °F (36.3 °C) (Oral)   Resp 16   Ht 5' 10\" (1.778 m)   Wt 178 lb (80.7 kg)   SpO2 93%   BMI 25.54 kg/m²       Intake/Output Summary (Last 24 hours) at 9/18/2020 1119  Last data filed at 9/18/2020 0830  Gross per 24 hour   Intake 723.33 ml   Output --   Net 723.33 ml         Physical Exam:  General:  Elderly male. Frail , pleasant , oriented to person only . awake, alert, NAD  Skin:  Warm and dry  Neck:  JVD absent. Neck supple  Chest:  Clear to auscultation, respiration easy. No wheezes, rales or rhonchi. Cardiovascular:  RRR ,S1S2 normal  Abdomen:  Soft, non tender, non distended, BS +  Extremities:  No edema. Intact peripheral pulses.  Brisk cap refill, < 2 secs  Neuro: non focal  Psych: No anxiety or agitation      Medications:   Scheduled Meds:   potassium chloride  10 mEq Oral BID    cefTRIAXone (ROCEPHIN) IV  1 g Intravenous Q24H    sodium chloride flush  10 mL Intravenous 2 times per day    enoxaparin  40 mg Subcutaneous Daily    pantoprazole  40 mg Oral QAM AC    amLODIPine  10 mg Oral Daily    buPROPion  37.5 mg Oral BID    pregabalin  25 mg Oral TID    metroNIDAZOLE  500 mg Intravenous Q8H       Continuous Infusions:      Data:  CBC:   Recent Labs     09/16/20  1910 09/17/20  0629 09/18/20  0538   WBC 15.8* 10.9 9.1   RBC 4.78 4.09* 4.23   HGB 13.9 12.1* 12.5*   HCT 42.0 36.2* 37.6*   MCV 87.9 88.6 88.8   RDW 14.1 14.2 14.1    213 202     BMP:   Recent Labs     09/16/20 1910 09/17/20  0629 09/18/20  0538    137 134*   K 3.7 3.6 3.2*    103 100   CO2 25 24 22   BUN 15 12 8   CREATININE 0.9 0.9 0.7*     BNP: No results for input(s): BNP in the last 72 hours. PT/INR: No results for input(s): PROTIME, INR in the last 72 hours. APTT: No results for input(s): APTT in the last 72 hours. CARDIAC ENZYMES:   Recent Labs     09/16/20 1910   TROPONINI <0.01     FASTING LIPID PANEL:  Lab Results   Component Value Date    CHOL 126 02/20/2011    HDL 34 02/20/2011    TRIG 75 02/20/2011     LIVER PROFILE:   Recent Labs     09/16/20 1910 09/17/20  0629   AST 10* 10*   ALT 6* <5*   BILITOT 1.0 0.9   ALKPHOS 145* 116        Radiology  CT ABDOMEN PELVIS W IV CONTRAST Additional Contrast? None   Final Result   Negative CT examination. No evidence of acute process in the abdomen or   pelvis. XR CHEST (2 VW)   Final Result   Increased lung markings in the right lung base possibility of acute   infiltrate superimposed upon chronic interstitial changes cannot be excluded               Assessment:  Active Problems:    Septicemia ([facility]  Resolved Problems:    * No resolved hospital problems. *      Plan:     #Sepsis  -Present on admission with tachycardia and leucocytosis  - 2/2 UTI and suspected aspiration PNA  - Lactic acidosis resolved quickly with IVF  - f/u cultures  - abx as below  - COVID-19 has been ruled out  - Discontinue IV fluids  - Procalcitonin was negative     #UTI  -Continue IV Rocephin D# 2  - f/u culture     #Suspected aspiration PNA   Dysphagia  -Patient is a poor historian but per ER records he was found after a choking incident at his nursing home.   His chest x-ray shows right lower lobe airspace disease.  -Continue Rocephin and Flagyl day #2  -Seen by speech therapy.   -Start dysphagia 1 puréed diet    # Hypokalemia and hypomagnesemia   -replete today     #COPD  -Without exacerbation, PRN albuterol inhaler      #PUD  -PPI     #Dementia  -He is pleasantly confused  - his nurse has spoken to his dtr who states his current mentation is baseline  - supportive care      # moderate PCM  - dietitian consult        DVT Prophylaxis: Lovenox   DIET DYSPHAGIA PUREED; Dysphagia Pureed  Dietary Nutrition Supplements: Standard High Calorie Oral Supplement  Code Status: Full Code     Patient has clinically improved,  likely discharge back to nursing home in a..     Jocelyn Rossi MD 9/18/2020 11:19 AM

## 2020-09-18 NOTE — PLAN OF CARE
Problem: Falls - Risk of:  Goal: Will remain free from falls  Description: Will remain free from falls  Outcome: Ongoing  Goal: Absence of physical injury  Description: Absence of physical injury  Outcome: Ongoing     Problem: Skin Integrity:  Goal: Will show no infection signs and symptoms  Description: Will show no infection signs and symptoms  Outcome: Ongoing  Goal: Absence of new skin breakdown  Description: Absence of new skin breakdown  Outcome: Ongoing     Problem: Discharge Planning:  Goal: Ability to perform activities of daily living will improve  Description: Ability to perform activities of daily living will improve  Outcome: Ongoing  Goal: Participates in care planning  Description: Participates in care planning  Outcome: Ongoing  Goal: Discharged to appropriate level of care  Description: Discharged to appropriate level of care  Outcome: Ongoing     Problem: Injury - Risk of, Physical Injury:  Goal: Will remain free from falls  Description: Will remain free from falls  Outcome: Ongoing  Goal: Absence of physical injury  Description: Absence of physical injury  Outcome: Ongoing     Problem: Mood - Altered:  Goal: Mood stable  Description: Mood stable  Outcome: Ongoing  Goal: Absence of abusive behavior  Description: Absence of abusive behavior  Outcome: Ongoing  Goal: Verbalizations of feeling emotionally comfortable while being cared for will increase  Description: Verbalizations of feeling emotionally comfortable while being cared for will increase  Outcome: Ongoing     Problem: Psychomotor Activity - Altered:  Goal: Absence of psychomotor disturbance signs and symptoms  Description: Absence of psychomotor disturbance signs and symptoms  Outcome: Ongoing     Problem: Sensory Perception - Impaired:  Goal: Demonstrations of improved sensory functioning will increase  Description: Demonstrations of improved sensory functioning will increase  Outcome: Ongoing  Goal: Decrease in sensory misperception Will show no infection signs and symptoms  Outcome: Ongoing     Problem: Tissue Perfusion, Altered:  Goal: Circulatory function within specified parameters  Description: Circulatory function within specified parameters  Outcome: Ongoing     Problem: Venous Thromboembolism:  Goal: Will show no signs or symptoms of venous thromboembolism  Description: Will show no signs or symptoms of venous thromboembolism  Outcome: Ongoing  Goal: Absence of signs or symptoms of impaired coagulation  Description: Absence of signs or symptoms of impaired coagulation  Outcome: Ongoing

## 2020-09-18 NOTE — PROGRESS NOTES
4 Eyes Skin Assessment     The patient is being assess for   Admission    I agree that 2 RN's have performed a thorough Head to Toe Skin Assessment on the patient. ALL assessment sites listed below have been assessed. Areas assessed by both nurses:   [x]   Head, Face, and Ears   [x]   Shoulders, Back, and Chest, Abdomen  [x]   Arms, Elbows, and Hands   [x]   Coccyx, Sacrum, and Ischium  [x]   Legs, Feet, and Heels        Excoriation to coccyx, pink/blanchable, applied zinc. Dry, flaky skin scattered bruises    **SHARE this note so that the co-signing nurse is able to place an eSignature**    Co-signer eSignature: Electronically signed by Burgess Guero RN on 9/18/20 at 2:09 AM EDT    Does the Patient have Skin Breakdown? No          Bridger Prevention initiated:  Yes   Wound Care Orders initiated:  No      [facility] nurse consulted for Pressure Injury (Stage 3,4, Unstageable, DTI, NWPT, Complex wounds)and New or Established Ostomies:  No      Primary Nurse eSignature: Electronically signed by Pranav Clemens RN on 9/18/20 at 2:07 AM EDT      .

## 2020-09-18 NOTE — PROGRESS NOTES
Inpatient Physical Therapy Evaluation and Treatment    Unit: [address]  Date:  9/18/2020  Patient Name:    Samia Jara  Admitting diagnosis:  Sepsis [facility]) [A41.9]  Admit Date:  9/16/2020  Precautions/Restrictions/WB Status/ Lines/ Wounds/ Oxygen: Fall risk, Bed/chair alarm, Lines -IV, Confusion and Telemetry    Treatment Time:  16:15-17:05  Treatment Number:  1   Timed Code Treatment Minutes: 40 minutes  Total Treatment Minutes:  50  minutes    Patient Goals for Therapy: \" to have less pain\"          Discharge Recommendations: SNF (retrun to [facility] with PT)  DME needs for discharge: defer to facility       Therapy recommendation for EMS Transport: requires transport by cot due to requiring lift equiptment to complete transfer    Therapy recommendations for staff:   Assist of 2 with use of MAXI-Move for all transfers to/from chair     History Of Present Illness:  copied from the H&P:     The patient is a 82 y.o. male with dementia, PUD, CAD, depression, COPD, PCM who presented to the ED with complaint of \"abdominal pain. \"  The patient has dementia and is a poor historian. Per ER notes, a nurse at his extended care facility thought he was choking and having difficulty breathing. Apparently the respiratory issues resolved, but patient was questioned on how he was feeling and he reported having abdominal pain therefore he was sent to the emergency room for evaluation. On my examination today he denies any acute complaints. His dementia precludes the ability to complete a reliable review of systems. His ER work-up showed a low-grade fever and urinalysis consistent with infection. A COVID-19 test is pending. CT of his abdomen is unremarkable. Chest x-ray shows airspace disease in the right lung base.   He is on room air with stable oxygen saturations and exhibits no evidence of respiratory distress.       Home Health S4 Level Recommendation:  NA  AM-PAC Mobility Score    AM-PAC Inpatient Mobility Raw Score : 10 Preadmission Environment  Per patient, questionable historian; attempted to call [facility] to clarify prior status but unable to reach nurse  Pt. Lives [facility] environment:    in [address] [facility] facility  Steps to enter first floor: No steps  Steps to second floor: N/A  Bathroom: walk in shower and comfort height toilet  Equipment owned: wc (manual), shower chair/bench and hospital bed     Preadmission Status:  Pt. Able to drive: No  Pt Fully independent with ADLs: No  Pt. Required assistance from family for: Bathing, Cleaning, Cooking, Dressing and Laundry   Pt. required assist: mod A? for transfers and gets from place to place with manual w/c-propels w/c with LEs  History of falls No       Pain   Yes  Location: B shoulders and hands, increased with transfer using stedy  Ratin /10  Pain Medicine Status: RN notified    Cognition    A&O Person   Able to follow 1 step commands    Subjective  Patient lying supine in bed with no family present. Pt agreeable to this PT eval & tx. Upper Extremity ROM/Strength  Please see OT evaluation. Lower Extremity ROM / Strength   AROM WFL: Yes  ROM limitations:     Able to  Lift buttocks off bed minimally to bridge with mod A to taqueria brief  Able to move legs to EOB,then floor    Lower Extremity Sensation    NT    Lower Extremity Proprioception:   NT    Coordination and Tone  WFL    Balance  Sitting:  Good ; SBA  Comments:     Standing: Poor; Mod A   Comments: in stedy    Bed Mobility   Supine to Sit:    Min A  and 2 persons  Sit to Supine:   Not Tested  Rolling:   Not Tested  Scooting in sitting: CGA  Scooting in supine:  Not Tested    Transfer Training     Sit to stand: Mod A  and 2 persons, 2 attempts from EOB to stedy  Stand to sit:   Mod A  and 2 persons, for controlled descent to chair  Bed to Chair:   Total A  Of 2with use of gait belt and ARNOLD STEDY    Gait pt is non-ambulatory at baseline; pt ambulated 0 ft.      Activity Tolerance   Pt completed therapy session with Pain noted with use of UE's in stedy  Supine  /66  SpO2 94%  HR 77     Seated at EOB  /75     Positioning Needs   Pt up in chair, lift pad present, positioned in proper neutral alignment and pressure relief provided. Call light provided and all needs within reach    Exercises Initiated  Sarai deferred secondary to treatment focus on functional mobility    Other  None. Patient/Family Education   Pt educated on role of inpatient PT, POC, importance of continued activity, DC recommendations, safety awareness, transfer techniques and calling for assist with mobility. Assessment  Pt seen for Physical Therapy evaluation in acute care setting. Pt demonstrated decreased Activity tolerance, Balance, Safety and Strength as well as decreased independence with Bed Mobility  and Transfers. Patient will benefit from skilled PT while in acute care . Recommending SNF at discharge to restore transfer ability. Goals : To be met in 3 visits:  1). Independent with LE Ex x 10 reps    To be met in 6 visits:  1). Supine to/from sit: CGA  2). Sit to/from stand: Min A   3). Bed to chair: Mod A with LRAD or stand pivot  5). Tolerate B LE exercises 3 sets of 10-15 reps    Rehabilitation Potential: Fair  Strengths for achieving goals include:   Pt motivated, PLOF and Pt cooperative   Barriers to achieving goals include:    Pain and Weakness    Plan    To be seen 3-5 x / week  while in acute care setting for therapeutic exercises, bed mobility, transfers, progressive gait training, balance training, and family/patient education. Signature: Clementine Browne, PT #[id number]    If patient discharges from this facility prior to next visit, this note will serve as the Discharge Summary.

## 2020-09-18 NOTE — PROGRESS NOTES
Potassium 3.2 & magnesium 1.50. EFFER-K given at 0726 for potassium replacement. 1 g Magnesium sulfate given at 0650. Addition 1 g dose needed per protocol.

## 2020-09-18 NOTE — PROGRESS NOTES
CMU called x3 this shift, azucena noted. Called lab to withdraw blood sooner. Notified MD.    New orders for mag level, potassium/mag protocol.

## 2020-09-18 NOTE — FLOWSHEET NOTE
09/18/20 0830   Vital Signs   Temp 97.4 °F (36.3 °C)   Temp Source Oral   Pulse 77   Heart Rate Source Monitor   Resp 16   /62   BP Location Right upper arm   Patient Position Sitting   Level of Consciousness 0   MEWS Score 1   Oxygen Therapy   SpO2 93 %   O2 Device None (Room air)     Patient remains on room air with a spontaneous cough. Vitals are stable this morning. Patient ate some of his breakfast. Took his pills crushed in pudding and took his k+ whole. Drank some of his water. Repositioned patient in bed. Receiving 2nd dose of magnesium now for mag of 1.5 this AM. Removed from droplet isolation as well due to covid being negative. Ensured bed alarm was on and call light in reach.

## 2020-09-18 NOTE — PROGRESS NOTES
Speech Language Pathology  Facility/Department: [address] [place] [address] 2 [place] MEDICAL-SURGICAL   CLINICAL BEDSIDE SWALLOW EVALUATION    NAME: Jeff Cruz  : 1935  MRN: [id number]    Recommended Diet and Intervention  Diet Solids Recommendation: Dysphagia Pureed (Dysphagia I)  Liquid Consistency Recommendation: Thin  Recommended Form of Meds: Crushed in puree as able  Check posterior oral cavity for pocketing    ADMISSION DATE: 2020  ADMITTING DIAGNOSIS: has Falls frequently; CAD (coronary artery disease); HTN (hypertension); HLD (hyperlipidemia); Dementia ([address] Moderate malnutrition ([address] Abdominal pain; Abdominal pain, epigastric; Anemia; Urinary tract infection without hematuria; Lactic acid acidosis; Mediastinal lymphadenopathy; Septicemia ([facility] [address] Pneumonia of right lower lobe due to infectious organism [facility]); and PUD (peptic ulcer disease) on their problem list.  ONSET DATE:     Recent Chest Xray/CT of Chest:   FINDINGS:    Chronic interstitial changes with some increased markings in the right lung    base when compared to 2019              Impression    Increased lung markings in the right lung base possibility of acute    infiltrate superimposed upon chronic interstitial changes cannot be excluded               Date of Eval: 2020  Evaluating Therapist: Jessica Herrmann    Current Diet level:  Current Diet : Dysphagia Minced and Moist (Dysphagia II)  Current Liquid Diet : Thin      Primary Complaint  Per MD H&P \"patient state he has pain in abdomen and on the right side. Ester Blake thought he might have been choking when she assessed the patient. patient denies states he is nauseous and has a productive cough. patient also c/o numbness de upper arms says this comes and goes. \"    Pain:  Pain Assessment  Pain Assessment: 0-10  Pain Level: 0  Pain Location: Abdomen  Pain Descriptors: Aching, Discomfort    Reason for Referral  Jeff Cruz was referred for a bedside swallow evaluation to assess the efficiency of his swallow function, identify signs and symptoms of aspiration and make recommendations regarding safe dietary consistencies, effective compensatory strategies, and safe eating environment. Impression  Dysphagia Diagnosis: Moderate oral stage dysphagia  Dysphagia Outcome Severity Scale: Level 4: Mild moderate dysphagia- Intermittent supervision/cueing. One - two diet consistencies restricted     RN approved entry to room this date. Patient presents with moderate oral dysphagia d/t posterior pocketing with soft solids requiring multiple puree trials to clear. Patient edentulous limiting mastication. Patient presents with no s/s of pharyngeal dysphagia this date. Continue to assess d/t reports of choking and lung markings on right lower lobe from chest x-ray (see above). At this time recommend puree and thin liquids. RN made aware of current diet recommendations. If patient presents with overt s/s of aspiration or change in respiratory status, please downgrade to strict NPO and re-contact SLP      Treatment Plan  Requires SLP Intervention: Yes  Duration/Frequency of Treatment: 2-4x/wk for LOS          Recommended Diet and Intervention  Diet Solids Recommendation: Dysphagia Pureed (Dysphagia I)  Liquid Consistency Recommendation: Thin  Recommended Form of Meds: Crushed in puree as able  Recommendations: Dysphagia treatment  Therapeutic Interventions: Diet tolerance monitoring; Therapeutic PO trials with SLP;Bolus control exercises;Oral motor exercises; Patient/Family education    Compensatory Swallowing Strategies  Compensatory Swallowing Strategies:  Alternate solids and liquids;Eat/Feed slowly;Upright as possible for all oral intake;Remain upright for 30-45 minutes after meals;Small bites/sips(check posterior oral cavity after meals and medications.)    Treatment/Goals  Short-term Goals  Timeframe for Short-term Goals: 5 days (9/23)  Long-term Goals  Timeframe for Long-term Goals: 7 days (9/25)  Goal 1: Pt will tolerate LRD w/o s/s of penetration/aspiration  Dysphagia Goals: The patient will tolerate recommended diet without observed clinical signs of aspiration; The patient/caregiver will demonstrate understanding of compensatory strategies for improved swallowing safety. ;The patient will tolerate mechanical soft foods 10/10. General  Chart Reviewed: Yes  Comments: Patient admitted with abdominal pain. Per H&P reports conerns over \"choking on spit\" prior to hospitalization. Previously on dysphagia diet prior to admission. Subjective  Subjective: Patient sitting upright in bed and agreeable to PO intake. Behavior/Cognition: Cooperative; Alert  Respiratory Status: Room air(RR16 throughout)  O2 Device: None (Room air)  Communication Observation: Functional  Follows Directions: Simple  Dentition: Edentulous  Patient Positioning: Upright in bed  Baseline Vocal Quality: Weak;Hoarse  Volitional Cough: Strong  Prior Dysphagia History: No hx of dysphagia per chart review, but admitted on dysphagia diet per facility  Consistencies Administered: Dysphagia Pureed (Dysphagia I); Dysphagia Soft and Bite-Sized (Dysphagia III); Dysphagia Minced and Moist (Dysphagia II); Thin - cup; Thin - straw      Oral Motor Deficits  Oral/Motor  Oral Motor: Within functional limits    Oral Phase Dysfunction  Oral Phase  Oral Phase: Exceptions  Oral Phase Dysfunction  Impaired Mastication: Soft Solid(edentulous)  Pocketing Left: Soft solid(posterior)  Pocketing Right: Soft solid(posterior)  Decreased Anterior to Posterior Transit: All  Lingual/Palatal Residue: Soft solid  Oral Phase  Oral Phase - Comment: Patient presents with posterior oral pocketing (near anterior faucil pillars) on both sides. Mild-mod oral residue requiring liquid washes to clear. Required 3x applesauce trials to clear posterior pocketing.      Indicators of Pharyngeal Phase Dysfunction   Pharyngeal Phase  Pharyngeal Phase: WNL  Pharyngeal Phase   Pharyngeal: Adequate swallow timing adn hyolaryngeal elevation/excursion. Prognosis  Prognosis  Prognosis for safe diet advancement: fair  Barriers to reach goals: age;cognitive deficits  Individuals consulted  Consulted and agree with results and recommendations: Patient;RN    Education  Patient Education: Role of SLP, POC, diet recommendations and compensatory strategies  Patient Education Response: Verbalizes understanding;Needs reinforcement  Safety Devices in place: Yes  Type of devices: Bed alarm in place; Left in bed;Call light within reach;Nurse notified       Therapy Time  SLP Individual Minutes  Time In: 2115  Time Out: 1957  Minutes: [address]  CCC-SLP  Speech-Language Pathologist [id number]    DENYS Cortes  9/18/2020 10:24 AM

## 2020-09-18 NOTE — PROGRESS NOTES
Comprehensive Nutrition Assessment    Type and Reason for Visit:  Initial, Consult, Positive Nutrition Screen(ONS/Dys)    Nutrition Recommendations/Plan:   1. Continue the Pureed diet  2. Add Ensure Enlive with each meal    Nutrition Assessment:    Pt. moderately malnourished AEB pt was admitted with sepsis and has noted mild fat loss and moderate muscle loss; . At risk for further nutrition compromise r/t he is consuming < 50% r/t cognitive deficit and decreased swallowing. Will add ONS . Malnutrition Assessment:  Malnutrition Status: Moderate malnutrition    Context:  Acute Illness     Findings of the 6 clinical characteristics of malnutrition:  Energy Intake:  Unable to assess  Weight Loss: Body Fat Loss:  1 - Mild body fat loss Orbital   Muscle Mass Loss:  7 - Moderate muscle mass loss Clavicles (pectoralis & deltoids), Temples (temporalis)  Fluid Accumulation:  No significant fluid accumulation Extremities   Strength:  Not Performed    Estimated Daily Nutrient Needs:  Energy (kcal):  6772-4946 based ~ 20-25 kcal/kf cbw; Weight Used for Energy Requirements:  Current     Protein (g):  75-90 based ~ 1-1.2 gr/kg ibw; Weight Used for Protein Requirements:  Ideal        Fluid (ml/day):  6410-5819; Weight Used for Fluid Requirements:  Current      Nutrition Related Findings:  eldlerly male sitting up in bed being assisted with meal; no teeth reports he has not had any in a longtime; SLP complted bedside eval and downgraded diet to Pureed; no BM history      Wounds:  None       Current Nutrition Therapies:    DIET DYSPHAGIA PUREED;  Dysphagia Pureed    Anthropometric Measures:  · Height: 5' 10\" (177.8 cm)  · Current Body Weight: 178 lb (80.7 kg)   · Admission Body Weight: 178 lb (80.7 kg)    · Usual Body Weight: 179 lb (81.2 kg)(Sep 2019)     · Ideal Body Weight: 166 lbs; % Ideal Body Weight 107.2 %   · BMI: 25.5   · BMI Categories: Normal Weight (BMI 22.0 to 24.9) age over 72       Nutrition Diagnosis:   · Inadequate oral intake related to acute injury/trauma, biting/chewing (masticatory) difficulty, cognitive or neurological impairment as evidenced by intake 26-50%, swallow study results      Nutrition Interventions:   Food and/or Nutrient Delivery:  Continue Current Diet, Start Oral Nutrition Supplement  Nutrition Education/Counseling:  No recommendation at this time   Coordination of Nutrition Care:  Continued Inpatient Monitoring, Coordination of Community Care    Goals:  pt will increase his intake of pureed diet and supps to > 50% and will maintian weight at 178#       Nutrition Monitoring and Evaluation:     Food/Nutrient Intake Outcomes:  Diet Advancement/Tolerance, Supplement Intake  Physical Signs/Symptoms Outcomes:  Chewing or Swallowing, Weight, Nutrition Focused Physical Findings     Discharge Planning:     Too soon to determine     Electronically signed by Tk Acevedo RD, LD on 9/18/20 at 4:02 PM EDT    Contact: [phone]

## 2020-09-18 NOTE — PROGRESS NOTES
Inpatient Occupational Therapy  Evaluation and Treatment    Unit: 2 [place]  Date:  9/18/2020  Patient Name:    Rekha Gomez  Admitting diagnosis:  Sepsis [facility]) [A41.9]  Admit Date:  9/16/2020  Precautions/Restrictions/WB Status/ Lines/ Wounds/ Oxygen: Fall risk, Bed/chair alarm, Lines -IV, Confusion and Telemetry    Treatment Time:  0440-1407  Treatment Number: 1   Timed code treatment minutes 40 minutes   Total Treatment minutes:   50   minutes    Patient Goals for Therapy:  \" have less pain \"      Discharge Recommendations: SNF (return to [facility] with PT/OT)  DME needs for discharge: defer to facility       Therapy recommendations for staff:   Assist of 2 with use of MAXI-Move for all transfers to/from [facility]  to/from [facility]    History of Present Illness: from the [facility]&PBARRETT 9/17/20  \"The patient is U 33 y. o. male with dementia, PUD, CAD, depression, COPD, PCM who presented to the ED with complaint of \"abdominal pain. \"  The patient has dementia and is a poor historian.  Per ER notes, a nurse at his extended care facility thought he was choking and having difficulty breathing.  Apparently the respiratory issues resolved, but patient was questioned on how he was feeling and he reported having abdominal pain therefore he was sent to the emergency room for evaluation. [place] my examination today he denies any acute complaints.  His dementia precludes the ability to complete a reliable review of systems.  His ER work-up showed a low-grade fever and urinalysis consistent with infection.  A COVID-19 test is pending.  CT of his abdomen is unremarkable.  Chest x-ray shows airspace disease in the right lung base.  He is on room air with stable oxygen saturations and exhibits no evidence of respiratory distress. \"    Home Health S4 Level Recommendation:  NA  AM-PAC Score: AM-PAC Inpatient Daily Activity Raw Score: 13    Preadmission Environment  Per patient, questionable historian; attempted to call [facility] to clarify prior status but unable to reach nurse  Pt. Lives [facility] environment:  [place] [facility] [place]  Steps to enter first floor: No steps  Steps to second floor: N/A  Bathroom: walk in shower and comfort height toilet  Equipment owned: wc (manual), shower chair/bench and hospital bed    Preadmission Status:  Pt. Able to drive: No  Pt Fully independent with ADLs: No  Pt. Required assistance from family for: Bathing, Cleaning, Cooking, Dressing and Laundry   Pt. required assist: mod A? for transfers and gets from place to place with manual w/c-propels w/c with LEs  History of falls No    Pain  Yes  Ratin  Location:B UEs, shoulders, back; \"I'm in bad shape\"  Pain Medicine Status: RN notified      Cognition    A&O Person   Able to follow 1 step commands    Subjective  Patient lying supine in bed with no family present. Pt agreeable to this OT eval & tx. Upper Extremity ROM:    Limited UEs bilaterally, especially with shoulder flexion, pain with UE use    Upper Extremity Strength:    BUE strength impaired but not formally assessed w/ MMT       Upper Extremity Sensation    NT    Upper Extremity Proprioception:  WFL    Coordination and Tone  WFL    Balance  Functional Sitting Balance:  WFL  Functional Standing Balance:Impaired - Poor in STEDY    Bed mobility:    Supine to sit:   Min A  and 2 persons  Sit to supine:   Not Tested  Rolling:    Not Tested  Scooting in sitting:  SBA  Scooting to head of bed:   Not Tested    Bridging: Mod A     Transfers:    Sit to stand: Mod A  and 2 persons on 2nd attempt to STEDY  Stand to sit:  Mod A for safe controlled descent  Bed to chair:   Total A, 2 persons and with STEDY  Standard toilet: Not Tested  Bed to [facility]:  Not Tested     Chronic UE pain appeared to be exacerbated by use of STEDY, recommend staff use MAXI MOVE. Patient reports doing stand pivot (or sit pivot?) transfer at baseline.     Dressing:      UE:   Not Tested  LE:    Max A don disposable undergarment    Bathing:    UE:  Not Tested  LE:  Not Tested    Eating:   Independent beverage management    Toileting:  Not Tested    Activity Tolerance   Pt completed therapy session with Pain noted with UE use   Supine  /66  SpO2 94%  HR 77    Seated at EOB  /75      Positioning Needs:   Pt up in chair, alarm set, positioned in proper neutral alignment and pressure relief provided. Call light provided and all needs within reach    Exercise / Activities Initiated: NA  N/A    Patient/Family Education:   Role of OT  Recommendations for DC    Assessment of Deficits: Pt seen for Occupational therapy evaluation in acute care setting. Pt demonstrated decreased Activity tolerance, ADLs, Bed mobility, Safety Awareness, Strength and Transfers. Pt functioning below baseline and will likely benefit from skilled occupational therapy services to maximize safety and independence. Goal(s) : To be met in 3 Visits:  1). Bed to toilet/BSC: Max A     To be met in 5 Visits:  1). Supine to/from Sit:  Mod A   2). Upper Body Bathing:   Min A   3). Lower Body Bathing: Mod A   4). Upper Body Dressing:  Min A   5). Lower Body Dressing: Mod A   6). Pt to demonstrate UE exs x 15 reps with minimal cues    Rehabilitation Potential:  Good for goals listed above. Strengths for achieving goals include: PLOF and Pt cooperative  Barriers to achieving goals include:  Pain     Plan: To be seen 3-5 x/wk while in acute care setting for therapeutic exercises, bed mobility, transfers, dressing, bathing, family/patient education, ADL/IADL retraining, energy conservation training.      Laurette Dance, OTR/L 2056            If patient discharges from this facility prior to next visit, this note will serve as the Discharge Summary

## 2020-09-18 NOTE — PROGRESS NOTES
RESPIRATORY THERAPY ASSESSMENT    Name:  Stanton Haro Record Number:  [id number]  Age: 82 y.o. Gender: male  : 1935  Today's Date:  2020  Room:  0235/0235-01    Assessment     Is the patient being admitted for a COPD or Asthma exacerbation? No   (If yes the patient will be seen every 4 hours for the first 24 hours and then reassessed)    Patient Admission Diagnosis      Allergies  Allergies   Allergen Reactions    Fluoxetine     Benazepril      Swollen tongue    Hydrocodone Other (See Comments)    Morphine     Penicillins Other (See Comments)    Simvastatin Other (See Comments)    Morphine And Related Nausea And Vomiting    Sucralfate Rash       Minimum Predicted Vital Capacity:               Actual Vital Capacity:                    Pulmonary History:COPD  Home Oxygen Therapy:  room air  Home Respiratory Therapy:None   Current Respiratory Therapy:  Albuterol 2 puff Q6 PRN          Respiratory Severity Index(RSI)   Patients with orders for inhalation medications, oxygen, or any therapeutic treatment modality will be placed on Respiratory Protocol. They will be assessed with the first treatment and at least every 72 hours thereafter. The following severity scale will be used to determine frequency of treatment intervention.     Smoking History: Smoking History Less than 1ppd or less than 15 pack year = 1    Social History  Social History     Tobacco Use    Smoking status: Former Smoker     Packs/day: 0.25     Years: 10.00     Pack years: 2.50     Types: Cigarettes    Smokeless tobacco: Never Used   Substance Use Topics    Alcohol use: No    Drug use: No       Recent Surgical History: None = 0  Past Surgical History  Past Surgical History:   Procedure Laterality Date    CHOLECYSTECTOMY      COLONOSCOPY  8/10/12    CORONARY ANGIOPLASTY WITH STENT PLACEMENT      PROSTATE BIOPSY      SKIN BIOPSY      UPPER GASTROINTESTINAL ENDOSCOPY  12    UPPER GASTROINTESTINAL ENDOSCOPY N/A 6/11/2019    EGD BIOPSY performed by Kiera Krueger DO at [address] [facility] N/A 8/8/2019    EGD performed by Leslie Orellana MD at [address] [facility]       Level of Consciousness: Alert, Follows Commands but Disoriented = 1    Level of Activity: Non weight bearing- transfers bed to chair only = 3    Respiratory Pattern: Regular Pattern; RR 8-20 = 0    Breath Sounds: Clear = 0    Sputum   ,  ,    Cough: Strong, spontaneous, non-productive = 0    Vital Signs   /62   Pulse 88   Temp 98.6 °F (37 °C) (Oral)   Resp 16   Ht 5' 10\" (1.778 m)   Wt 178 lb (80.7 kg)   SpO2 93%   BMI 25.54 kg/m²   SPO2 (COPD values may differ): Greater than or equal to 92% on room air = 0    Peak Flow (asthma only): not applicable = 0    RSI: 5-6 = Q4hr PRN (every four hours as needed) for dyspnea        Plan       Goals: medication delivery, mobilize retained secretions, volume expansion and improve oxygenation    Patient/caregiver was educated on the proper method of use for Respiratory Care Devices:  Yes      Level of patient/caregiver understanding able to:   ? Verbalize understanding   ? Demonstrate understanding       ? Teach back        ? Needs reinforcement       ? No available caregiver               ? Other:     Response to education:  Good     Is patient being placed on Home Treatment Regimen? No     Does the patient have everything they need prior to discharge? NA     Comments: pt assessed & chart reviewed    Plan of Care: Albuterol 2 puff Q4 PRN    Electronically signed by Gina Bernard RCP on 9/18/2020 at 2:32 AM    Respiratory Protocol Guidelines     1. Assessment and treatment by Respiratory Therapy will be initiated for medication and therapeutic interventions upon initiation of aerosolized medication. 2. Physician will be contacted for respiratory rate (RR) greater than 35 breaths per minute.  Therapy will be held for heart rate (HR) greater than 140 beats per minute, pending direction from physician. 3. Bronchodilators will be administered via Metered Dose Inhaler (MDI) with spacer when the following criteria are met:  a. Alert and cooperative     b. HR < 140 bpm  c. RR < 30 bpm                d. Can demonstrate a 2-3 second inspiratory hold  4. Bronchodilators will be administered via Hand Held Nebulizer [facility]) to patients when ANY of the following criteria are met  a. Incognizant or uncooperative          b. Patients treated with HHN at Home        c. Unable to demonstrate proper use of MDI with spacer     d. RR > 30 bpm   5. Bronchodilators will be delivered via Metered Dose Inhaler (MDI), HHN, Aerogen to intubated patients on mechanical ventilation. 6. Inhalation medication orders will be delivered and/or substituted as outlined below. Aerosolized Medications Ordering and Administration Guidelines:    1. All Medications will be ordered by a physician, and their frequency and/or modality will be adjusted as defined by the patients Respiratory Severity Index (RSI) score. 2. If the patient does not have documented COPD, consider discontinuing anticholinergics when RSI is less than 9.  3. If the bronchospasm worsens (increased RSI), then the bronchodilator frequency can be increased to a maximum of every 4 hours. If greater than every 4 hours is required, the physician will be contacted. 4. If the bronchospasm improves, the frequency of the bronchodilator can be decreased, based on the patient's RSI, but not less than home treatment regimen frequency. 5. Bronchodilator(s) will be discontinued if patient has a RSI less than 9 and has received no scheduled or as needed treatment for 72  Hrs. Patients Ordered on a Mucolytic Agent:    1. Must always be administered with a bronchodilator.     2. Discontinue if patient experiences worsened bronchospasm, or secretions have lessened to the point that the patient is able to clear them with a cough.    Anti-inflammatory and Combination Medications:    1. If the patient lacks prior history of lung disease, is not using inhaled anti-inflammatory medication at home, and lacks wheezing by examination or by history for at least 24 hours, contact physician for possible discontinuation.

## 2020-09-18 NOTE — PROGRESS NOTES
Report given to Pastor Zeke RN. Pt in stable condition. Pastor Zeke RN aware of additional 1g dose of magnesium needed to complete protocol. [id number].

## 2020-09-18 NOTE — PROGRESS NOTES
Pt arrived from ED via stretcher in stable condition to room 235-1. Pt A/O to self only, pt forgetful. Pt stated they were in [address] [facility]. Re-orientated pt there are in the hospital. Pt arrived incontinent. Changed brief, applied zinc to perineal area, & reposition in bed. Orientated pt call light. Bed alarm on.

## 2020-09-19 LAB
ANION GAP SERPL CALCULATED.3IONS-SCNC: 9 MMOL/L (ref 3–16)
BASOPHILS ABSOLUTE: 0 K/UL (ref 0–0.2)
BASOPHILS RELATIVE PERCENT: 0.4 %
BUN BLDV-MCNC: 10 MG/DL (ref 7–20)
CALCIUM SERPL-MCNC: 8.7 MG/DL (ref 8.3–10.6)
CHLORIDE BLD-SCNC: 99 MMOL/L (ref 99–110)
CO2: 24 MMOL/L (ref 21–32)
CREAT SERPL-MCNC: 0.7 MG/DL (ref 0.8–1.3)
EOSINOPHILS ABSOLUTE: 0.4 K/UL (ref 0–0.6)
EOSINOPHILS RELATIVE PERCENT: 6 %
GFR AFRICAN AMERICAN: >60
GFR NON-AFRICAN AMERICAN: >60
GLUCOSE BLD-MCNC: 126 MG/DL (ref 70–99)
HCT VFR BLD CALC: 35.3 % (ref 40.5–52.5)
HEMOGLOBIN: 11.9 G/DL (ref 13.5–17.5)
LYMPHOCYTES ABSOLUTE: 1 K/UL (ref 1–5.1)
LYMPHOCYTES RELATIVE PERCENT: 13.2 %
MAGNESIUM: 1.9 MG/DL (ref 1.8–2.4)
MCH RBC QN AUTO: 29.4 PG (ref 26–34)
MCHC RBC AUTO-ENTMCNC: 33.6 G/DL (ref 31–36)
MCV RBC AUTO: 87.4 FL (ref 80–100)
MONOCYTES ABSOLUTE: 0.8 K/UL (ref 0–1.3)
MONOCYTES RELATIVE PERCENT: 10.3 %
NEUTROPHILS ABSOLUTE: 5.2 K/UL (ref 1.7–7.7)
NEUTROPHILS RELATIVE PERCENT: 70.1 %
ORGANISM: ABNORMAL
PDW BLD-RTO: 14.3 % (ref 12.4–15.4)
PHOSPHORUS: 2.7 MG/DL (ref 2.5–4.9)
PLATELET # BLD: 204 K/UL (ref 135–450)
PMV BLD AUTO: 7.7 FL (ref 5–10.5)
POTASSIUM REFLEX MAGNESIUM: 3.2 MMOL/L (ref 3.5–5.1)
RBC # BLD: 4.04 M/UL (ref 4.2–5.9)
SODIUM BLD-SCNC: 132 MMOL/L (ref 136–145)
URINE CULTURE, ROUTINE: ABNORMAL
WBC # BLD: 7.4 K/UL (ref 4–11)

## 2020-09-19 PROCEDURE — 80048 BASIC METABOLIC PNL TOTAL CA: CPT

## 2020-09-19 PROCEDURE — 6370000000 HC RX 637 (ALT 250 FOR IP): Performed by: INTERNAL MEDICINE

## 2020-09-19 PROCEDURE — 84100 ASSAY OF PHOSPHORUS: CPT

## 2020-09-19 PROCEDURE — 2580000003 HC RX 258: Performed by: INTERNAL MEDICINE

## 2020-09-19 PROCEDURE — 6370000000 HC RX 637 (ALT 250 FOR IP): Performed by: PHYSICIAN ASSISTANT

## 2020-09-19 PROCEDURE — 36415 COLL VENOUS BLD VENIPUNCTURE: CPT

## 2020-09-19 PROCEDURE — 83735 ASSAY OF MAGNESIUM: CPT

## 2020-09-19 PROCEDURE — 85025 COMPLETE CBC W/AUTO DIFF WBC: CPT

## 2020-09-19 PROCEDURE — 2500000003 HC RX 250 WO HCPCS: Performed by: PHYSICIAN ASSISTANT

## 2020-09-19 PROCEDURE — 1200000000 HC SEMI PRIVATE

## 2020-09-19 PROCEDURE — 92526 ORAL FUNCTION THERAPY: CPT

## 2020-09-19 PROCEDURE — 6360000002 HC RX W HCPCS: Performed by: INTERNAL MEDICINE

## 2020-09-19 RX ADMIN — PANTOPRAZOLE SODIUM 40 MG: 40 TABLET, DELAYED RELEASE ORAL at 06:59

## 2020-09-19 RX ADMIN — PREGABALIN 25 MG: 25 CAPSULE ORAL at 20:45

## 2020-09-19 RX ADMIN — AMLODIPINE BESYLATE 10 MG: 5 TABLET ORAL at 14:02

## 2020-09-19 RX ADMIN — BUPROPION HYDROCHLORIDE 37.5 MG: 75 TABLET, FILM COATED ORAL at 20:45

## 2020-09-19 RX ADMIN — ENOXAPARIN SODIUM 40 MG: 40 INJECTION SUBCUTANEOUS at 09:27

## 2020-09-19 RX ADMIN — Medication 10 ML: at 20:46

## 2020-09-19 RX ADMIN — POTASSIUM CHLORIDE 40 MEQ: 1500 TABLET, EXTENDED RELEASE ORAL at 16:14

## 2020-09-19 RX ADMIN — POTASSIUM CHLORIDE 10 MEQ: 750 TABLET, EXTENDED RELEASE ORAL at 09:28

## 2020-09-19 RX ADMIN — PREGABALIN 25 MG: 25 CAPSULE ORAL at 09:27

## 2020-09-19 RX ADMIN — METRONIDAZOLE 500 MG: 500 INJECTION, SOLUTION INTRAVENOUS at 15:37

## 2020-09-19 RX ADMIN — POTASSIUM CHLORIDE 10 MEQ: 750 TABLET, EXTENDED RELEASE ORAL at 20:45

## 2020-09-19 RX ADMIN — CEFTRIAXONE SODIUM 1 G: 1 INJECTION, POWDER, FOR SOLUTION INTRAMUSCULAR; INTRAVENOUS at 09:27

## 2020-09-19 RX ADMIN — BUPROPION HYDROCHLORIDE 37.5 MG: 75 TABLET, FILM COATED ORAL at 09:27

## 2020-09-19 RX ADMIN — METRONIDAZOLE 500 MG: 500 INJECTION, SOLUTION INTRAVENOUS at 06:53

## 2020-09-19 RX ADMIN — PREGABALIN 25 MG: 25 CAPSULE ORAL at 14:01

## 2020-09-19 NOTE — PLAN OF CARE
Problem: Falls - Risk of:  Goal: Will remain free from falls  Description: Will remain free from falls  9/19/2020 1225 by Bozena Butts RN  Outcome: Ongoing    Goal: Absence of physical injury  Description: Absence of physical injury  9/19/2020 1225 by Bozena Butts RN  Outcome: Ongoing       Problem: Skin Integrity:  Goal: Will show no infection signs and symptoms  Description: Will show no infection signs and symptoms  9/19/2020 1225 by Bozena Butts RN  Outcome: Ongoing    Goal: Absence of new skin breakdown  Description: Absence of new skin breakdown  9/19/2020 1225 by Bozena Butts RN  Outcome: Ongoing       Problem: Discharge Planning:  Goal: Ability to perform activities of daily living will improve  Description: Ability to perform activities of daily living will improve  9/19/2020 1225 by Bozena Butts RN  Outcome: Ongoing    Goal: Participates in care planning  Description: Participates in care planning  9/19/2020 1225 by Bozena Butts RN  Outcome: Ongoing     Problem: Injury - Risk of, Physical Injury:  Goal: Will remain free from falls  Description: Will remain free from falls  9/19/2020 1225 by Bozena Butts RN  Outcome: Ongoing    Goal: Absence of physical injury  Description: Absence of physical injury  9/19/2020 1225 by Bozena Butts RN  Outcome: Ongoing         Problem: Sleep Pattern Disturbance:  Goal: Appears well-rested  Description: Appears well-rested  9/19/2020 1225 by Bozena Butts RN  Outcome: Ongoing       Problem: Infection:  Goal: Will remain free from infection  Description: Will remain free from infection  9/19/2020 1225 by Bozena Butts RN  Outcome: Ongoing       Problem: Safety:  Goal: Free from accidental physical injury  Description: Free from accidental physical injury  9/19/2020 1225 by Bozena Butts RN  Outcome: Ongoing      Problem: Pain:  Goal: Patient's pain/discomfort is manageable  Description: Patient's pain/discomfort is manageable  9/19/2020 1225 by Manohar Og Alejandro Lindsay RN  Outcome: Ongoing      Problem: Confusion - Acute:  Goal: Absence of continued neurological deterioration signs and symptoms  Description: Absence of continued neurological deterioration signs and symptoms  9/19/2020 1225 by Elsa Garcia RN  Outcome: Ongoing

## 2020-09-19 NOTE — PROGRESS NOTES
Report given to [facility], RN. Pt in bed, awake, light off. Denies further needs at this time.

## 2020-09-19 NOTE — PROGRESS NOTES
PM assessment complete. Pt in bed, eyes closed. A/O to person, place, and situation. Meds given. See MAR. Bed alarm on. Bed in lowest position. Call light within reach. Denies further needs at this time.

## 2020-09-19 NOTE — PROGRESS NOTES
Speech Language Pathology  Facility/Department: [address] [place] [address] [address] [place] [facility]  Dysphagia Daily Treatment Note    NAME: Bill Martinez  : 1935  MRN: [id number]     Recommend continue pureed solids/thin liquids, meds crushed in puree to preserve endurance and increase ability to demonstrate safe po intake for time required for meal. Recommend upright position during and 30 minutes after meals, small bites/sips, slow rate of po intake, oral care before and after meals, and encourage self-feeding. Pt needs assistance with all po intake but recommend hand over hand feeding. ST to continue to follow per POC. Patient Diagnosis(es):   Patient Active Problem List    Diagnosis Date Noted    Aspiration pneumonia ([address]     Moderate protein-calorie malnutrition ([address] 2020    Septicemia ([address] 2020    Pneumonia of right lower lobe due to infectious organism ([address]     PUD (peptic ulcer disease)     Mediastinal lymphadenopathy     Urinary tract infection without hematuria     Lactic acid acidosis     Abdominal pain, epigastric     Anemia     Abdominal pain 2019    Dementia ([address] 2018    Moderate malnutrition ([address] 2018    Falls frequently 2018    Hypokalemia 2018    CAD (coronary artery disease) 2018    HTN (hypertension) 2018    HLD (hyperlipidemia) 2018     Allergies: Allergies   Allergen Reactions    Fluoxetine     Benazepril      Swollen tongue    Hydrocodone Other (See Comments)    Morphine     Penicillins Other (See Comments)    Simvastatin Other (See Comments)    Morphine And Related Nausea And Vomiting    Sucralfate Rash     Onset Date: 2020  Subjective: Pt pleasant and cooperative. Pt orients to person, not place or time. Pain: no c/o pain    Current Diet: DIET DYSPHAGIA PUREED;  Dysphagia Pureed  Dietary Nutrition Supplements: Standard High Calorie Oral Supplement    Diet Tolerance:  Patient tolerating current diet level without signs/symptoms of aspiration. P.O. Trials: Thin   x x5 thin by cup   Nectar / Mildly Thick       Honey / Moderately Thick       Pudding / Extremely Thick       Puree   x x5 1/2 tsp   Solid   x x5 minced diomedes cracker in pudding     Dysphagia Treatment and Impressions:  Viji/FÉLIX LINDSEY for entry for dysphagia tx. Pt states, \"I just have to be careful with things I have to chew. \" Pt edentulous. Pt demonstrates constant rotary chewing pattern prior to po trials with nothing in oral cavity. Pt assessed with thin liquids, puree, and minced and moist solids, all presented with hand over hand assist to encourage pt participation and to decrease adverse outcomes associated with aspiration. Oral swallow appears timely with thin liquids by cup and puree. No overt clinical signs of aspiration are observed. Pt requires 8-10 sec oral prep with minced and moist solids prior to initial pharyngeal swallow of piecemealed bolus and continued oral prep of residuals with second swallow to clear initiates after another 10 seconds. O2 sats remain similar to those prior to po intake (95) after swallow, however pt has brief period of increased RR which calms after three inhalations over 5 seconds. Same increase in RR noted when pt attempts to move leg. No overt clinical signs of aspiration observed with minced and moist solids though double the effort of pureed solids is required for Minced and moist solids. Education completed re: recommendations, benefits of oral care, and self-feeding (hand over hand) with good understanding voiced but pt could benefit from reinforcement. Recommend continue pureed solids/thin liquids, meds crushed in puree.  Pt exhibits potential to upgrade to minced and moist solids in near future, as pt demonstrates more awareness of solids in oral cavity and fewer swallows to clear residuals compared to that at time of eval. However, recommend continue pureed solids at this time to preserve endurance and increase ability to demonstrate safe po intake for time required for meal. Recommend upright position during and 30 minutes after meals, small bites/sips, slow rate of po intake, oral care before and after meals, and encourage self-feeding. Pt needs assistance with all po intake. ST to continue to follow per POC. Dysphagia Goals:  Timeframe for Long-term Goals: 7 days (9/25)  Goal 1: Pt will tolerate LRD w/o s/s of penetration/aspiration 09/19 Ongoing. See above     Short-term Goals  Timeframe for Short-term Goals: 5 days (9/23)  Dysphagia Goals:   1. The patient will tolerate recommended diet without observed clinical signs of aspiration,  09/19 Ongoing. See above. 2. The patient/caregiver will demonstrate understanding of compensatory strategies for improved swallowing safety. , 09/19 Ongoing. See above   3. The patient will tolerate mechanical soft foods 10/10. 09/19 Ongoing. See above        Recommendations:  Solid Consistency: Pureed Solids  Liquid Consistency: Thin Liquids  Medication: crushed in puree as able    Patient/Family/Caregiver Education: Education completed re: results and recommendations, role of ST for dysphagia, rationale for diet modification    Compensatory Strategies: Recommend upright position during and 30 minutes after meals, small bites/sips, slow rate of po intake, oral care before and after meals, and encourage self-feeding. Plan:    Continued Dysphagia treatment with goals per plan of care. Discharge Recommendations: TBD    If pt discharges from hospital prior to Speech/Swallowing discharge, this note serves as tx and discharge summary. Total Treatment Time / Charges     Time in Time out Total Time / units   Cognitive Tx         Speech Tx      Dysphagia Tx 0957 1025 28 min/1 unit     Signature:  Stormy Morris. Libby Valladares M.A.  [id number] [facility]  Speech-Language Pathologist

## 2020-09-19 NOTE — FLOWSHEET NOTE
09/19/20 0703   Vital Signs   Temp 97.7 °F (36.5 °C)   Temp Source Oral   Pulse 84   Heart Rate Source Monitor   Resp 18   /66   BP Location Right upper arm   BP Upper/Lower Upper   Patient Position High fowlers   Level of Consciousness 0   MEWS Score 1   Oxygen Therapy   SpO2 95 %   O2 Device None (Room air)   Patient resting in bed at this time. Alert and oriented to self, pleasantly confused per normal. Received AM medications per order and tolerated well. Assessment complete, see flow sheet. Resp e/e, no s/s of distress noted. Accepted breakfast with assist for feeding. Fall precaution in place. Call light within reach.

## 2020-09-19 NOTE — PLAN OF CARE
Problem: Falls - Risk of:  Goal: Will remain free from falls  Description: Will remain free from falls  9/19/2020 0052 by Gogo Nam RN  Outcome: Ongoing  9/18/2020 1405 by Reynaldo Kc RN  Outcome: Ongoing  Goal: Absence of physical injury  Description: Absence of physical injury  Outcome: Ongoing

## 2020-09-20 VITALS
TEMPERATURE: 97.3 F | RESPIRATION RATE: 17 BRPM | WEIGHT: 180.5 LBS | HEART RATE: 89 BPM | BODY MASS INDEX: 25.84 KG/M2 | HEIGHT: 70 IN | OXYGEN SATURATION: 90 % | DIASTOLIC BLOOD PRESSURE: 70 MMHG | SYSTOLIC BLOOD PRESSURE: 111 MMHG

## 2020-09-20 LAB
ANION GAP SERPL CALCULATED.3IONS-SCNC: 9 MMOL/L (ref 3–16)
BASOPHILS ABSOLUTE: 0 K/UL (ref 0–0.2)
BASOPHILS RELATIVE PERCENT: 0.4 %
BLOOD CULTURE, ROUTINE: NORMAL
BUN BLDV-MCNC: 12 MG/DL (ref 7–20)
CALCIUM SERPL-MCNC: 9.1 MG/DL (ref 8.3–10.6)
CHLORIDE BLD-SCNC: 106 MMOL/L (ref 99–110)
CO2: 25 MMOL/L (ref 21–32)
CREAT SERPL-MCNC: 0.8 MG/DL (ref 0.8–1.3)
CULTURE, BLOOD 2: NORMAL
EOSINOPHILS ABSOLUTE: 0.5 K/UL (ref 0–0.6)
EOSINOPHILS RELATIVE PERCENT: 6.8 %
GFR AFRICAN AMERICAN: >60
GFR NON-AFRICAN AMERICAN: >60
GLUCOSE BLD-MCNC: 125 MG/DL (ref 70–99)
HCT VFR BLD CALC: 35.1 % (ref 40.5–52.5)
HEMOGLOBIN: 11.8 G/DL (ref 13.5–17.5)
LYMPHOCYTES ABSOLUTE: 1.1 K/UL (ref 1–5.1)
LYMPHOCYTES RELATIVE PERCENT: 15.9 %
MCH RBC QN AUTO: 29.8 PG (ref 26–34)
MCHC RBC AUTO-ENTMCNC: 33.7 G/DL (ref 31–36)
MCV RBC AUTO: 88.5 FL (ref 80–100)
MONOCYTES ABSOLUTE: 0.7 K/UL (ref 0–1.3)
MONOCYTES RELATIVE PERCENT: 10.9 %
NEUTROPHILS ABSOLUTE: 4.5 K/UL (ref 1.7–7.7)
NEUTROPHILS RELATIVE PERCENT: 66 %
PDW BLD-RTO: 14.3 % (ref 12.4–15.4)
PLATELET # BLD: 219 K/UL (ref 135–450)
PMV BLD AUTO: 7.5 FL (ref 5–10.5)
POTASSIUM REFLEX MAGNESIUM: 4 MMOL/L (ref 3.5–5.1)
RBC # BLD: 3.97 M/UL (ref 4.2–5.9)
SODIUM BLD-SCNC: 140 MMOL/L (ref 136–145)
WBC # BLD: 6.8 K/UL (ref 4–11)

## 2020-09-20 PROCEDURE — 85025 COMPLETE CBC W/AUTO DIFF WBC: CPT

## 2020-09-20 PROCEDURE — 2500000003 HC RX 250 WO HCPCS: Performed by: PHYSICIAN ASSISTANT

## 2020-09-20 PROCEDURE — 6370000000 HC RX 637 (ALT 250 FOR IP): Performed by: PHYSICIAN ASSISTANT

## 2020-09-20 PROCEDURE — 6360000002 HC RX W HCPCS: Performed by: INTERNAL MEDICINE

## 2020-09-20 PROCEDURE — 6370000000 HC RX 637 (ALT 250 FOR IP): Performed by: INTERNAL MEDICINE

## 2020-09-20 PROCEDURE — 36415 COLL VENOUS BLD VENIPUNCTURE: CPT

## 2020-09-20 PROCEDURE — 2580000003 HC RX 258: Performed by: INTERNAL MEDICINE

## 2020-09-20 PROCEDURE — 80048 BASIC METABOLIC PNL TOTAL CA: CPT

## 2020-09-20 RX ORDER — METRONIDAZOLE 500 MG/1
500 TABLET ORAL 3 TIMES DAILY
Qty: 21 TABLET | Refills: 0 | Status: SHIPPED | OUTPATIENT
Start: 2020-09-20 | End: 2020-09-27

## 2020-09-20 RX ORDER — LEVOFLOXACIN 500 MG/1
500 TABLET, FILM COATED ORAL DAILY
Qty: 7 TABLET | Refills: 0 | Status: SHIPPED | OUTPATIENT
Start: 2020-09-20 | End: 2020-09-27

## 2020-09-20 RX ADMIN — Medication 10 ML: at 08:32

## 2020-09-20 RX ADMIN — PANTOPRAZOLE SODIUM 40 MG: 40 TABLET, DELAYED RELEASE ORAL at 06:22

## 2020-09-20 RX ADMIN — CEFTRIAXONE SODIUM 1 G: 1 INJECTION, POWDER, FOR SOLUTION INTRAMUSCULAR; INTRAVENOUS at 08:31

## 2020-09-20 RX ADMIN — POTASSIUM CHLORIDE 10 MEQ: 750 TABLET, EXTENDED RELEASE ORAL at 08:32

## 2020-09-20 RX ADMIN — BUPROPION HYDROCHLORIDE 37.5 MG: 75 TABLET, FILM COATED ORAL at 08:31

## 2020-09-20 RX ADMIN — PREGABALIN 25 MG: 25 CAPSULE ORAL at 08:32

## 2020-09-20 RX ADMIN — ENOXAPARIN SODIUM 40 MG: 40 INJECTION SUBCUTANEOUS at 08:32

## 2020-09-20 RX ADMIN — METRONIDAZOLE 500 MG: 500 INJECTION, SOLUTION INTRAVENOUS at 06:21

## 2020-09-20 RX ADMIN — METRONIDAZOLE 500 MG: 500 INJECTION, SOLUTION INTRAVENOUS at 00:21

## 2020-09-20 RX ADMIN — AMLODIPINE BESYLATE 10 MG: 5 TABLET ORAL at 08:31

## 2020-09-20 NOTE — PROGRESS NOTES
4 Eyes Skin Assessment     The patient is being assess for  Transfer to LTC. I agree that 2 RN's have performed a thorough Head to Toe Skin Assessment on the patient. ALL assessment sites listed below have been assessed. Areas assessed by both nurses: yes  [x]   Head, Face, and Ears   [x]   Shoulders, Back, and Chest  [x]   Arms, Elbows, and Hands   [x]   Coccyx, Sacrum, and Ischum  [x]   Legs, Feet, and Heels        Does the Patient have Skin Breakdown? No   Redness to coccyx; but blanchable. Scattered bruising to extremities.          Bridger Prevention initiated:  No   Wound Care Orders initiated:  No      WOC nurse consulted for Pressure Injury (Stage 3,4, Unstageable, DTI, NWPT, and Complex wounds):  No      Nurse 1 eSignature: Electronically signed by Caitlyn Resendiz RN on 9/20/20 at 1:12 PM EDT    **SHARE this note so that the co-signing nurse is able to place an eSignature**    Nurse 2 eSignature: Electronically signed by Faye Story RN on 9/20/20 at 1:25 PM EDT

## 2020-09-20 NOTE — FLOWSHEET NOTE
09/19/20 2038   Vital Signs   Temp 98.8 °F (37.1 °C)   Temp Source Oral   Pulse 97   Resp 18   /67   BP Location Right upper arm   Level of Consciousness 0   MEWS Score 1   Oxygen Therapy   SpO2 95 %   O2 Device None (Room air)   Evening meds given to pt. Pt was incontinent of large soft BM.  Baldomero Glance

## 2020-09-20 NOTE — DISCHARGE SUMMARY
Hospital Medicine Discharge Summary    Patient ID: Marlen Samayoa      Patient's PCP: Priya Reagan MD    Admit Date: 9/16/2020     Discharge Date:   9/20/2020    Admitting Physician: Elizabeth Gayle MD     Discharge Physician: Eh Bynre MD     Discharge Diagnoses: Active Hospital Problems    Diagnosis    Aspiration pneumonia ([address] [J69.0]    Moderate protein-calorie malnutrition ([address] [E44.0]    Septicemia ([address] [A41.9]    Hypokalemia [E87.6]       The patient was seen and examined on day of discharge and this discharge summary is in conjunction with any daily progress note from day of discharge. Hospital Course: 83yo Nursing home patient with dementia, admitted with sepsis from likely aspiration pneumonia/UTI. COVID-19 has been ruled out.       #Sepsis - resolved  -Present on admission with tachycardia and leucocytosis  - 2/2 UTI and suspected aspiration PNA  - Lactic acidosis resolved quickly with IVF  - abx as below  - COVID-19 has been ruled out  - Discontinue IV fluids  - Procalcitonin was negative     #UTI  - culture with enterococcus.         #Suspected aspiration PNA   Dysphagia  -Patient is a poor historian but per ER records he was found after a choking incident at his nursing home. [facility] chest x-ray shows right lower lobe airspace disease.  -rocephin and flagyl to levaquin and flagyl on discharge.   -Seen by speech therapy.   -Start dysphagia 1 puréed diet        # Hypokalemia and hypomagnesemia   -replete today        #COPD  -Without exacerbation, PRN albuterol inhaler         #PUD  -PPI        #Dementia  -He is pleasantly confused  - his nurse has spoken to his dtr who states his current mentation is baseline  - supportive care         # moderate PCM  - dietitian consult        Physical Exam Performed:     /70   Pulse 89   Temp 97.3 °F (36.3 °C) (Axillary)   Resp 17   Ht 5' 10\" (1.778 m)   Wt 180 lb 8 oz (81.9 kg)   SpO2 90%   BMI 25.90 kg/m² Physical Exam:  General:  Elderly male. Frail , pleasant , oriented to person only . awake, alert, NAD  Skin:  Warm and dry  Neck:  JVD absent. Neck supple  Chest:  Clear to auscultation, respiration easy. No wheezes, rales or rhonchi. Cardiovascular:  RRR ,S1S2 normal  Abdomen:  Soft, non tender, non distended, BS +  Extremities:  No edema. Intact peripheral pulses. Brisk cap refill, < 2 secs  Neuro: non focal  Psych: No anxiety or agitation         Labs: For convenience and continuity at follow-up the following most recent labs are provided:      CBC:    Lab Results   Component Value Date    WBC 6.8 09/20/2020    HGB 11.8 09/20/2020    HCT 35.1 09/20/2020     09/20/2020       Renal:    Lab Results   Component Value Date     09/20/2020    K 4.0 09/20/2020     09/20/2020    CO2 25 09/20/2020    BUN 12 09/20/2020    CREATININE 0.8 09/20/2020    CALCIUM 9.1 09/20/2020    PHOS 2.7 09/19/2020         Significant Diagnostic Studies    Radiology:   CT ABDOMEN PELVIS W IV CONTRAST Additional Contrast? None   Final Result   Negative CT examination. No evidence of acute process in the abdomen or   pelvis. XR CHEST (2 VW)   Final Result   Increased lung markings in the right lung base possibility of acute   infiltrate superimposed upon chronic interstitial changes cannot be excluded                Consults:     IP CONSULT TO DIETITIAN    Disposition:  return to ECF    Condition at Discharge: Stable    Discharge Instructions/Follow-up:  PCp 1 week. Code Status:  Full Code     Activity: activity as tolerated    Diet: regular diet and  with limitations as per speech recs.        Discharge Medications:     Current Discharge Medication List           Details   levoFLOXacin (LEVAQUIN) 500 MG tablet Take 1 tablet by mouth daily for 7 days  Qty: 7 tablet, Refills: 0      metroNIDAZOLE (FLAGYL) 500 MG tablet Take 1 tablet by mouth 3 times daily for 7 days  Qty: 21 tablet, Refills: 0 Details   amLODIPine (NORVASC) 10 MG tablet Take 10 mg by mouth daily      !! lidocaine (ASPERCREME LIDOCAINE) 4 % external patch Place 1 patch onto the skin daily To left midscapular region      !! lidocaine (ASPERCREME LIDOCAINE) 4 % external patch Place 1 patch onto the skin daily One time per day to right mid scapular region      buPROPion (WELLBUTRIN) 75 MG tablet Take 37.5 mg by mouth 2 times daily      pregabalin (LYRICA) 25 MG capsule Take 25 mg by mouth 3 times daily. omeprazole (PRILOSEC) 20 MG delayed release capsule Take 20 mg by mouth daily      potassium chloride (MICRO-K) 10 MEQ extended release capsule Take 10 mEq by mouth daily      magnesium hydroxide (MILK OF MAGNESIA) 400 MG/5ML suspension Take 30 mLs by mouth daily as needed for Constipation      bisacodyl (DULCOLAX) 10 MG suppository Place 10 mg rectally daily as needed for Constipation If MOM ineffective. acetaminophen (TYLENOL) 325 MG tablet Take 650 mg by mouth every 6 hours as needed for Pain       !! - Potential duplicate medications found. Please discuss with provider. Time Spent on discharge is more than 30 minutes in the examination, evaluation, counseling and review of medications and discharge plan. Signed:    Michelle Jackson MD   9/20/2020      Thank you Osito Trevizo MD for the opportunity to be involved in this patient's care. If you have any questions or concerns please feel free to contact me at [phone].

## 2020-09-20 NOTE — CARE COORDINATION
Case Management Assessment  Initial Evaluation      Patient Name: Kathi Tavares  YOB: 1935  Diagnosis: Sepsis ([facility] [A41.9]  Date / Time: 9/16/2020  6:44 PM    Admission status/Date:  09/17/2020  Chart Reviewed: Yes      Patient Interviewed: No- Dementia  Family Interviewed:  Yes - daughter (LG) Anna Zavala    Hospitalization in the last 30 days: Yes    Addressed Health Care Decision Maker: Yes- Karol LEA)    Met with: dtr  Interview conducted  (bedside/phone): phone    Current PCP: [address] required for SNF : NO         3 night stay required -  WAIVED    ADLS  Support Systems/Care Needs:    Transportation: EMS transportation    Meal Preparation: per facility    Housing  Living Arrangements: LTC/VGT +bed hold  Steps:   Intent for return to present living arrangements: Yes    Home O2 Use :  NO  RA in ED      Community Service Affiliation  Dialysis:  No    · Agency:  · Location:  · Dialysis Schedule:  · Phone:   · Fax: Other Community Services: (ex:PT/OT,Mental Health,Wound Clinic, Cardio/Pul [address])    DISCHARGE PLAN: Explained Case Management role/services. Chart reviewed. Met with pt's Karol CHEN (dtr) via phone D/T Q19 restrictions and explained the role of the CM. +LTC at [facility] and will return. [facility] to fax LG documents to CM office to be scanned to chart. +CM following.
INTERDISCIPLINARY PLAN OF CARE CONFERENCE    Date/Time: 9/18/2020 4:23 PM  Completed by: Damian Clay, Case Management      Patient Name:  Geeta Gomez  YOB: 1935  Admitting Diagnosis: Sepsis ([address] [A41.9]     Admit Date/Time:  9/16/2020  6:44 PM    Chart reviewed. Interdisciplinary team contacted or reviewed plan related to patient progress and discharge plans. Disciplines included Case Management, Nursing, and Dietitian. Current Status:ongoing  PT/OT recommendation for discharge plan of care: n/a    Expected D/C Disposition:  Nursing Home  Confirmed plan with patient and/or family Yes confirmed with: (name) Rachel Perry (LG)    Discharge Plan Comments: Reviewed chart. Role of discharge planner explained via pt's LG's VM, as it stated Gabi's name in the intro. CM LM for Josefina Torre to call CM. Per CM notes from 9/17/2020, per Rachel Perry,  pt is from [address] [place] [address] and, per Josefina Torre, is to return back to [facility]. +bed hold from [facility] with [facility].      Covid neg as of 9/17/2020    Home O2 in place on admit: No  Pt informed of need to bring portable home O2 tank on day of discharge for nursing to connect prior to leaving:  Not Indicated  Verbalized agreement/Understanding:  Not Indicated
protocol.

## 2020-09-20 NOTE — PROGRESS NOTES
Placed call to [facility] and gave report to facility staff Antonieta. Transport here for pickup. Provided paperwork to staff.

## 2020-09-20 NOTE — FLOWSHEET NOTE
09/20/20 0716   Vital Signs   Temp 97.3 °F (36.3 °C)   Temp Source Axillary   Pulse 89   Heart Rate Source Monitor   Resp 17   /70   BP Location Right upper arm   BP Upper/Lower Upper   Patient Position Semi fowlers   Level of Consciousness 0   MEWS Score 1   Oxygen Therapy   SpO2 90 %   O2 Device None (Room air)   Patient resting in bed at this time, alert and oriented to person. Patient pleasantly confused and cooperative with care. Received AM medications per order and tolerated well. Assessment complete, see flow sheet. Fall precautions in place. Call light within reach.

## 2020-09-20 NOTE — PROGRESS NOTES
Accessed chart to remove IV from chart. I removed IV from patient, no complications. Dressing applied.

## 2021-09-28 NOTE — H&P
NO DIPLOPIA ON EXAM TODAY. ORTHO @ DISTANCE, MODERATE EXOPHORIA @ NEAR. MILD CONVERGENCE INSUFF, POSSIBLE DUE TO RECENT FALL. PATIENT TO RTC IF DOUBLE VISION WORSENS. by mouth every 6 hours as needed for Pain   Yes Historical Provider, MD   amitriptyline (ELAVIL) 25 MG tablet Take 1 tablet by mouth nightly  Patient taking differently: Take 50 mg by mouth nightly  3/8/18  Yes Kimber Hernadez MD        Allergies: Allergies   Allergen Reactions    Fluoxetine     Benazepril      Swollen tongue    Hydrocodone Other (See Comments)    Morphine     Penicillins Other (See Comments)    Simvastatin Other (See Comments)    Morphine And Related Nausea And Vomiting    Sucralfate Rash       Nurses notes reviewed and agreed. Physical Exam:  Vital Signs: BP (!) 164/89   Pulse 84   Temp 98.3 °F (36.8 °C) (Oral)   Resp 20   Ht 5' 11\" (1.803 m)   Wt 179 lb 4.8 oz (81.3 kg)   SpO2 92%   BMI 25.01 kg/m²    Airway: Mallampati: II (soft palate, uvula, fauces visible)  Pulmonary:Normal  Cardiac:Normal  Abdomen:Normal    Pre-Procedure Assessment / Plan:  ASA: Class 3 - A patient with severe systemic disease that limits activity but is not incapacitating  Level of Sedation Plan: Moderate sedation  Post Procedure plan: Return to same level of care    I assessed the patient and find that the patient is in satisfactory condition to proceed with the planned procedure and sedation plan. I have explained the risk, benefits, and alternatives to the procedure; the patient understands and agrees to proceed.        Shefali Meza  8/8/2019

## 2022-07-21 ENCOUNTER — APPOINTMENT (OUTPATIENT)
Dept: CT IMAGING | Age: 87
DRG: 871 | End: 2022-07-21
Payer: MEDICARE

## 2022-07-21 ENCOUNTER — APPOINTMENT (OUTPATIENT)
Dept: GENERAL RADIOLOGY | Age: 87
DRG: 871 | End: 2022-07-21
Payer: MEDICARE

## 2022-07-21 ENCOUNTER — HOSPITAL ENCOUNTER (INPATIENT)
Age: 87
LOS: 4 days | Discharge: NURSING, REHAB OR LONG-TERM CARE | DRG: 871 | End: 2022-07-26
Attending: EMERGENCY MEDICINE | Admitting: INTERNAL MEDICINE
Payer: MEDICARE

## 2022-07-21 DIAGNOSIS — R65.21 SEPTIC SHOCK (HCC): Primary | ICD-10-CM

## 2022-07-21 DIAGNOSIS — N39.0 URINARY TRACT INFECTION WITH HEMATURIA, SITE UNSPECIFIED: ICD-10-CM

## 2022-07-21 DIAGNOSIS — A41.9 SEPTIC SHOCK (HCC): Primary | ICD-10-CM

## 2022-07-21 DIAGNOSIS — J47.9: ICD-10-CM

## 2022-07-21 DIAGNOSIS — R09.02 HYPOXIA: ICD-10-CM

## 2022-07-21 DIAGNOSIS — R31.9 URINARY TRACT INFECTION WITH HEMATURIA, SITE UNSPECIFIED: ICD-10-CM

## 2022-07-21 LAB
A/G RATIO: 1.4 (ref 1.1–2.2)
ALBUMIN SERPL-MCNC: 4.5 G/DL (ref 3.4–5)
ALP BLD-CCNC: 137 U/L (ref 40–129)
ALT SERPL-CCNC: 15 U/L (ref 10–40)
ANION GAP SERPL CALCULATED.3IONS-SCNC: 18 MMOL/L (ref 3–16)
AST SERPL-CCNC: 18 U/L (ref 15–37)
BASOPHILS ABSOLUTE: 0.1 K/UL (ref 0–0.2)
BASOPHILS RELATIVE PERCENT: 0.7 %
BILIRUB SERPL-MCNC: 0.6 MG/DL (ref 0–1)
BUN BLDV-MCNC: 21 MG/DL (ref 7–20)
CALCIUM SERPL-MCNC: 9.8 MG/DL (ref 8.3–10.6)
CHLORIDE BLD-SCNC: 98 MMOL/L (ref 99–110)
CO2: 21 MMOL/L (ref 21–32)
CREAT SERPL-MCNC: 1.1 MG/DL (ref 0.8–1.3)
EOSINOPHILS ABSOLUTE: 0.2 K/UL (ref 0–0.6)
EOSINOPHILS RELATIVE PERCENT: 2 %
GFR AFRICAN AMERICAN: >60
GFR NON-AFRICAN AMERICAN: >60
GLUCOSE BLD-MCNC: 190 MG/DL (ref 70–99)
HCT VFR BLD CALC: 43 % (ref 40.5–52.5)
HEMOGLOBIN: 14.5 G/DL (ref 13.5–17.5)
LACTIC ACID, SEPSIS: 3.5 MMOL/L (ref 0.4–1.9)
LACTIC ACID, SEPSIS: 5.5 MMOL/L (ref 0.4–1.9)
LIPASE: 11 U/L (ref 13–60)
LYMPHOCYTES ABSOLUTE: 0.6 K/UL (ref 1–5.1)
LYMPHOCYTES RELATIVE PERCENT: 5.9 %
MCH RBC QN AUTO: 29.5 PG (ref 26–34)
MCHC RBC AUTO-ENTMCNC: 33.6 G/DL (ref 31–36)
MCV RBC AUTO: 87.7 FL (ref 80–100)
MONOCYTES ABSOLUTE: 0.1 K/UL (ref 0–1.3)
MONOCYTES RELATIVE PERCENT: 0.6 %
NEUTROPHILS ABSOLUTE: 9.3 K/UL (ref 1.7–7.7)
NEUTROPHILS RELATIVE PERCENT: 90.8 %
PDW BLD-RTO: 15 % (ref 12.4–15.4)
PLATELET # BLD: 190 K/UL (ref 135–450)
PMV BLD AUTO: 7.2 FL (ref 5–10.5)
POTASSIUM REFLEX MAGNESIUM: 4.2 MMOL/L (ref 3.5–5.1)
PRO-BNP: 153 PG/ML (ref 0–449)
PROCALCITONIN: 0.17 NG/ML (ref 0–0.15)
RBC # BLD: 4.9 M/UL (ref 4.2–5.9)
SARS-COV-2, NAAT: NOT DETECTED
SODIUM BLD-SCNC: 137 MMOL/L (ref 136–145)
TOTAL PROTEIN: 7.8 G/DL (ref 6.4–8.2)
TROPONIN: <0.01 NG/ML
WBC # BLD: 10.2 K/UL (ref 4–11)

## 2022-07-21 PROCEDURE — 96361 HYDRATE IV INFUSION ADD-ON: CPT

## 2022-07-21 PROCEDURE — 80053 COMPREHEN METABOLIC PANEL: CPT

## 2022-07-21 PROCEDURE — 6360000004 HC RX CONTRAST MEDICATION: Performed by: EMERGENCY MEDICINE

## 2022-07-21 PROCEDURE — 83690 ASSAY OF LIPASE: CPT

## 2022-07-21 PROCEDURE — 6370000000 HC RX 637 (ALT 250 FOR IP): Performed by: EMERGENCY MEDICINE

## 2022-07-21 PROCEDURE — 71260 CT THORAX DX C+: CPT

## 2022-07-21 PROCEDURE — 36556 INSERT NON-TUNNEL CV CATH: CPT

## 2022-07-21 PROCEDURE — 96375 TX/PRO/DX INJ NEW DRUG ADDON: CPT

## 2022-07-21 PROCEDURE — 84145 PROCALCITONIN (PCT): CPT

## 2022-07-21 PROCEDURE — 85025 COMPLETE CBC W/AUTO DIFF WBC: CPT

## 2022-07-21 PROCEDURE — 84484 ASSAY OF TROPONIN QUANT: CPT

## 2022-07-21 PROCEDURE — 83880 ASSAY OF NATRIURETIC PEPTIDE: CPT

## 2022-07-21 PROCEDURE — 6360000002 HC RX W HCPCS: Performed by: EMERGENCY MEDICINE

## 2022-07-21 PROCEDURE — 99285 EMERGENCY DEPT VISIT HI MDM: CPT

## 2022-07-21 PROCEDURE — 71045 X-RAY EXAM CHEST 1 VIEW: CPT

## 2022-07-21 PROCEDURE — 93005 ELECTROCARDIOGRAM TRACING: CPT | Performed by: EMERGENCY MEDICINE

## 2022-07-21 PROCEDURE — 87150 DNA/RNA AMPLIFIED PROBE: CPT

## 2022-07-21 PROCEDURE — 83735 ASSAY OF MAGNESIUM: CPT

## 2022-07-21 PROCEDURE — 96365 THER/PROPH/DIAG IV INF INIT: CPT

## 2022-07-21 PROCEDURE — 36415 COLL VENOUS BLD VENIPUNCTURE: CPT

## 2022-07-21 PROCEDURE — 87635 SARS-COV-2 COVID-19 AMP PRB: CPT

## 2022-07-21 PROCEDURE — 87040 BLOOD CULTURE FOR BACTERIA: CPT

## 2022-07-21 PROCEDURE — 2580000003 HC RX 258: Performed by: EMERGENCY MEDICINE

## 2022-07-21 PROCEDURE — 83605 ASSAY OF LACTIC ACID: CPT

## 2022-07-21 PROCEDURE — 02HV33Z INSERTION OF INFUSION DEVICE INTO SUPERIOR VENA CAVA, PERCUTANEOUS APPROACH: ICD-10-PCS | Performed by: EMERGENCY MEDICINE

## 2022-07-21 RX ORDER — SODIUM CHLORIDE, SODIUM LACTATE, POTASSIUM CHLORIDE, CALCIUM CHLORIDE 600; 310; 30; 20 MG/100ML; MG/100ML; MG/100ML; MG/100ML
30 INJECTION, SOLUTION INTRAVENOUS ONCE
Status: COMPLETED | OUTPATIENT
Start: 2022-07-21 | End: 2022-07-21

## 2022-07-21 RX ORDER — ACETAMINOPHEN 650 MG/1
650 SUPPOSITORY RECTAL ONCE
Status: COMPLETED | OUTPATIENT
Start: 2022-07-21 | End: 2022-07-21

## 2022-07-21 RX ORDER — ASPIRIN 325 MG
325 TABLET ORAL ONCE
Status: DISCONTINUED | OUTPATIENT
Start: 2022-07-21 | End: 2022-07-21

## 2022-07-21 RX ORDER — ASPIRIN 81 MG/1
324 TABLET, CHEWABLE ORAL ONCE
Status: COMPLETED | OUTPATIENT
Start: 2022-07-21 | End: 2022-07-21

## 2022-07-21 RX ORDER — METOCLOPRAMIDE HYDROCHLORIDE 5 MG/ML
10 INJECTION INTRAMUSCULAR; INTRAVENOUS ONCE
Status: COMPLETED | OUTPATIENT
Start: 2022-07-21 | End: 2022-07-21

## 2022-07-21 RX ORDER — LANOLIN ALCOHOL/MO/W.PET/CERES
3 CREAM (GRAM) TOPICAL DAILY
Status: ON HOLD | COMMUNITY

## 2022-07-21 RX ORDER — ACETAMINOPHEN 500 MG
1000 TABLET ORAL 2 TIMES DAILY
Status: ON HOLD | COMMUNITY
End: 2022-07-26 | Stop reason: HOSPADM

## 2022-07-21 RX ADMIN — IOPAMIDOL 75 ML: 755 INJECTION, SOLUTION INTRAVENOUS at 22:11

## 2022-07-21 RX ADMIN — ASPIRIN 324 MG: 81 TABLET, CHEWABLE ORAL at 20:56

## 2022-07-21 RX ADMIN — CEFTRIAXONE SODIUM 1000 MG: 1 INJECTION, POWDER, FOR SOLUTION INTRAMUSCULAR; INTRAVENOUS at 23:45

## 2022-07-21 RX ADMIN — SODIUM CHLORIDE, POTASSIUM CHLORIDE, SODIUM LACTATE AND CALCIUM CHLORIDE 2721 ML: 600; 310; 30; 20 INJECTION, SOLUTION INTRAVENOUS at 21:03

## 2022-07-21 RX ADMIN — ACETAMINOPHEN 650 MG: 650 SUPPOSITORY RECTAL at 20:58

## 2022-07-21 RX ADMIN — METOCLOPRAMIDE 10 MG: 5 INJECTION, SOLUTION INTRAMUSCULAR; INTRAVENOUS at 20:53

## 2022-07-21 ASSESSMENT — PAIN - FUNCTIONAL ASSESSMENT: PAIN_FUNCTIONAL_ASSESSMENT: 0-10

## 2022-07-21 ASSESSMENT — PAIN SCALES - GENERAL: PAINLEVEL_OUTOF10: 7

## 2022-07-22 ENCOUNTER — APPOINTMENT (OUTPATIENT)
Dept: GENERAL RADIOLOGY | Age: 87
DRG: 871 | End: 2022-07-22
Payer: MEDICARE

## 2022-07-22 PROBLEM — A41.9 SEPTIC SHOCK (HCC): Status: ACTIVE | Noted: 2022-07-22

## 2022-07-22 PROBLEM — R65.21 SEPTIC SHOCK (HCC): Status: ACTIVE | Noted: 2022-07-22

## 2022-07-22 PROBLEM — K86.89 MASS OF PANCREAS: Status: ACTIVE | Noted: 2022-07-22

## 2022-07-22 PROBLEM — N30.00 ACUTE CYSTITIS WITHOUT HEMATURIA: Status: ACTIVE | Noted: 2022-07-22

## 2022-07-22 PROBLEM — N30.01 ACUTE CYSTITIS WITH HEMATURIA: Status: ACTIVE | Noted: 2022-07-22

## 2022-07-22 LAB
A/G RATIO: 1.7 (ref 1.1–2.2)
ALBUMIN SERPL-MCNC: 3.6 G/DL (ref 3.4–5)
ALP BLD-CCNC: 107 U/L (ref 40–129)
ALT SERPL-CCNC: 82 U/L (ref 10–40)
AMORPHOUS: ABNORMAL /HPF
ANION GAP SERPL CALCULATED.3IONS-SCNC: 12 MMOL/L (ref 3–16)
AST SERPL-CCNC: 101 U/L (ref 15–37)
BACTERIA: ABNORMAL /HPF
BASOPHILS ABSOLUTE: 0 K/UL (ref 0–0.2)
BASOPHILS RELATIVE PERCENT: 0.2 %
BILIRUB SERPL-MCNC: 0.5 MG/DL (ref 0–1)
BILIRUBIN URINE: NEGATIVE
BLOOD, URINE: ABNORMAL
BUN BLDV-MCNC: 17 MG/DL (ref 7–20)
CALCIUM SERPL-MCNC: 8.3 MG/DL (ref 8.3–10.6)
CHLORIDE BLD-SCNC: 101 MMOL/L (ref 99–110)
CLARITY: CLEAR
CO2: 21 MMOL/L (ref 21–32)
COLOR: YELLOW
CREAT SERPL-MCNC: 1 MG/DL (ref 0.8–1.3)
EKG ATRIAL RATE: 120 BPM
EKG ATRIAL RATE: 84 BPM
EKG DIAGNOSIS: NORMAL
EKG DIAGNOSIS: NORMAL
EKG P AXIS: 50 DEGREES
EKG P AXIS: 56 DEGREES
EKG P-R INTERVAL: 146 MS
EKG P-R INTERVAL: 174 MS
EKG Q-T INTERVAL: 304 MS
EKG Q-T INTERVAL: 372 MS
EKG QRS DURATION: 100 MS
EKG QRS DURATION: 86 MS
EKG QTC CALCULATION (BAZETT): 429 MS
EKG QTC CALCULATION (BAZETT): 439 MS
EKG R AXIS: -44 DEGREES
EKG R AXIS: 1 DEGREES
EKG T AXIS: 63 DEGREES
EKG T AXIS: 93 DEGREES
EKG VENTRICULAR RATE: 120 BPM
EKG VENTRICULAR RATE: 84 BPM
EOSINOPHILS ABSOLUTE: 0 K/UL (ref 0–0.6)
EOSINOPHILS RELATIVE PERCENT: 0.1 %
EPITHELIAL CELLS, UA: ABNORMAL /HPF (ref 0–5)
GFR AFRICAN AMERICAN: >60
GFR NON-AFRICAN AMERICAN: >60
GLUCOSE BLD-MCNC: 108 MG/DL (ref 70–99)
GLUCOSE BLD-MCNC: 112 MG/DL (ref 70–99)
GLUCOSE BLD-MCNC: 147 MG/DL (ref 70–99)
GLUCOSE BLD-MCNC: 169 MG/DL (ref 70–99)
GLUCOSE BLD-MCNC: 215 MG/DL (ref 70–99)
GLUCOSE URINE: NEGATIVE MG/DL
HCT VFR BLD CALC: 34.9 % (ref 40.5–52.5)
HEMOGLOBIN: 11.7 G/DL (ref 13.5–17.5)
KETONES, URINE: NEGATIVE MG/DL
LACTIC ACID: 4.3 MMOL/L (ref 0.4–2)
LEUKOCYTE ESTERASE, URINE: ABNORMAL
LYMPHOCYTES ABSOLUTE: 0.2 K/UL (ref 1–5.1)
LYMPHOCYTES RELATIVE PERCENT: 1.2 %
MAGNESIUM: 1.5 MG/DL (ref 1.8–2.4)
MAGNESIUM: 1.5 MG/DL (ref 1.8–2.4)
MCH RBC QN AUTO: 29.6 PG (ref 26–34)
MCHC RBC AUTO-ENTMCNC: 33.5 G/DL (ref 31–36)
MCV RBC AUTO: 88.4 FL (ref 80–100)
MICROSCOPIC EXAMINATION: YES
MONOCYTES ABSOLUTE: 0.7 K/UL (ref 0–1.3)
MONOCYTES RELATIVE PERCENT: 3.4 %
NEUTROPHILS ABSOLUTE: 19 K/UL (ref 1.7–7.7)
NEUTROPHILS RELATIVE PERCENT: 95.1 %
NITRITE, URINE: NEGATIVE
PDW BLD-RTO: 14.9 % (ref 12.4–15.4)
PERFORMED ON: ABNORMAL
PH UA: 8 (ref 5–8)
PLATELET # BLD: 133 K/UL (ref 135–450)
PMV BLD AUTO: 7.9 FL (ref 5–10.5)
POTASSIUM REFLEX MAGNESIUM: 4.3 MMOL/L (ref 3.5–5.1)
PROTEIN UA: ABNORMAL MG/DL
RBC # BLD: 3.95 M/UL (ref 4.2–5.9)
RBC UA: ABNORMAL /HPF (ref 0–4)
SODIUM BLD-SCNC: 134 MMOL/L (ref 136–145)
SPECIFIC GRAVITY UA: 1.01 (ref 1–1.03)
TOTAL PROTEIN: 5.7 G/DL (ref 6.4–8.2)
TROPONIN: <0.01 NG/ML
URINE REFLEX TO CULTURE: YES
URINE TYPE: ABNORMAL
UROBILINOGEN, URINE: 0.2 E.U./DL
WBC # BLD: 19.9 K/UL (ref 4–11)
WBC UA: ABNORMAL /HPF (ref 0–5)

## 2022-07-22 PROCEDURE — 83735 ASSAY OF MAGNESIUM: CPT

## 2022-07-22 PROCEDURE — 85025 COMPLETE CBC W/AUTO DIFF WBC: CPT

## 2022-07-22 PROCEDURE — 2500000003 HC RX 250 WO HCPCS: Performed by: EMERGENCY MEDICINE

## 2022-07-22 PROCEDURE — 99223 1ST HOSP IP/OBS HIGH 75: CPT | Performed by: INTERNAL MEDICINE

## 2022-07-22 PROCEDURE — 6360000002 HC RX W HCPCS: Performed by: INTERNAL MEDICINE

## 2022-07-22 PROCEDURE — 80053 COMPREHEN METABOLIC PANEL: CPT

## 2022-07-22 PROCEDURE — 2580000003 HC RX 258: Performed by: INTERNAL MEDICINE

## 2022-07-22 PROCEDURE — 2580000003 HC RX 258: Performed by: EMERGENCY MEDICINE

## 2022-07-22 PROCEDURE — 93010 ELECTROCARDIOGRAM REPORT: CPT | Performed by: INTERNAL MEDICINE

## 2022-07-22 PROCEDURE — 96367 TX/PROPH/DG ADDL SEQ IV INF: CPT

## 2022-07-22 PROCEDURE — 92610 EVALUATE SWALLOWING FUNCTION: CPT

## 2022-07-22 PROCEDURE — 83605 ASSAY OF LACTIC ACID: CPT

## 2022-07-22 PROCEDURE — 99291 CRITICAL CARE FIRST HOUR: CPT | Performed by: INTERNAL MEDICINE

## 2022-07-22 PROCEDURE — 6370000000 HC RX 637 (ALT 250 FOR IP): Performed by: INTERNAL MEDICINE

## 2022-07-22 PROCEDURE — C9113 INJ PANTOPRAZOLE SODIUM, VIA: HCPCS | Performed by: INTERNAL MEDICINE

## 2022-07-22 PROCEDURE — 6360000002 HC RX W HCPCS: Performed by: EMERGENCY MEDICINE

## 2022-07-22 PROCEDURE — 36556 INSERT NON-TUNNEL CV CATH: CPT

## 2022-07-22 PROCEDURE — 92526 ORAL FUNCTION THERAPY: CPT

## 2022-07-22 PROCEDURE — 71045 X-RAY EXAM CHEST 1 VIEW: CPT

## 2022-07-22 PROCEDURE — 87086 URINE CULTURE/COLONY COUNT: CPT

## 2022-07-22 PROCEDURE — 2000000000 HC ICU R&B

## 2022-07-22 PROCEDURE — 84484 ASSAY OF TROPONIN QUANT: CPT

## 2022-07-22 PROCEDURE — 93005 ELECTROCARDIOGRAM TRACING: CPT | Performed by: INTERNAL MEDICINE

## 2022-07-22 PROCEDURE — 36415 COLL VENOUS BLD VENIPUNCTURE: CPT

## 2022-07-22 PROCEDURE — 2500000003 HC RX 250 WO HCPCS: Performed by: INTERNAL MEDICINE

## 2022-07-22 PROCEDURE — 81001 URINALYSIS AUTO W/SCOPE: CPT

## 2022-07-22 RX ORDER — LEVOFLOXACIN 5 MG/ML
500 INJECTION, SOLUTION INTRAVENOUS EVERY 24 HOURS
Status: DISCONTINUED | OUTPATIENT
Start: 2022-07-22 | End: 2022-07-22

## 2022-07-22 RX ORDER — PANTOPRAZOLE SODIUM 40 MG/10ML
40 INJECTION, POWDER, LYOPHILIZED, FOR SOLUTION INTRAVENOUS DAILY
Status: DISCONTINUED | OUTPATIENT
Start: 2022-07-22 | End: 2022-07-25

## 2022-07-22 RX ORDER — SODIUM CHLORIDE 9 MG/ML
INJECTION, SOLUTION INTRAVENOUS PRN
Status: DISCONTINUED | OUTPATIENT
Start: 2022-07-22 | End: 2022-07-26 | Stop reason: HOSPADM

## 2022-07-22 RX ORDER — DEXTROSE MONOHYDRATE 100 MG/ML
INJECTION, SOLUTION INTRAVENOUS CONTINUOUS PRN
Status: DISCONTINUED | OUTPATIENT
Start: 2022-07-22 | End: 2022-07-26 | Stop reason: HOSPADM

## 2022-07-22 RX ORDER — ACETAMINOPHEN 650 MG/1
650 SUPPOSITORY RECTAL EVERY 6 HOURS PRN
Status: DISCONTINUED | OUTPATIENT
Start: 2022-07-22 | End: 2022-07-26 | Stop reason: HOSPADM

## 2022-07-22 RX ORDER — SODIUM CHLORIDE 9 MG/ML
INJECTION, SOLUTION INTRAVENOUS CONTINUOUS
Status: DISCONTINUED | OUTPATIENT
Start: 2022-07-22 | End: 2022-07-24

## 2022-07-22 RX ORDER — SODIUM CHLORIDE 0.9 % (FLUSH) 0.9 %
5-40 SYRINGE (ML) INJECTION PRN
Status: DISCONTINUED | OUTPATIENT
Start: 2022-07-22 | End: 2022-07-26 | Stop reason: HOSPADM

## 2022-07-22 RX ORDER — ACETAMINOPHEN 325 MG/1
650 TABLET ORAL EVERY 6 HOURS PRN
Status: DISCONTINUED | OUTPATIENT
Start: 2022-07-22 | End: 2022-07-26 | Stop reason: HOSPADM

## 2022-07-22 RX ORDER — INSULIN LISPRO 100 [IU]/ML
0-8 INJECTION, SOLUTION INTRAVENOUS; SUBCUTANEOUS
Status: DISCONTINUED | OUTPATIENT
Start: 2022-07-22 | End: 2022-07-26 | Stop reason: HOSPADM

## 2022-07-22 RX ORDER — ENOXAPARIN SODIUM 100 MG/ML
40 INJECTION SUBCUTANEOUS DAILY
Status: DISCONTINUED | OUTPATIENT
Start: 2022-07-22 | End: 2022-07-26 | Stop reason: HOSPADM

## 2022-07-22 RX ORDER — SERTRALINE HYDROCHLORIDE 100 MG/1
100 TABLET, FILM COATED ORAL DAILY
Status: DISCONTINUED | OUTPATIENT
Start: 2022-07-22 | End: 2022-07-26 | Stop reason: HOSPADM

## 2022-07-22 RX ORDER — 0.9 % SODIUM CHLORIDE 0.9 %
250 INTRAVENOUS SOLUTION INTRAVENOUS ONCE
Status: COMPLETED | OUTPATIENT
Start: 2022-07-22 | End: 2022-07-22

## 2022-07-22 RX ORDER — SODIUM CHLORIDE 0.9 % (FLUSH) 0.9 %
5-40 SYRINGE (ML) INJECTION EVERY 12 HOURS SCHEDULED
Status: DISCONTINUED | OUTPATIENT
Start: 2022-07-22 | End: 2022-07-26 | Stop reason: HOSPADM

## 2022-07-22 RX ORDER — SODIUM CHLORIDE 9 MG/ML
INJECTION, SOLUTION INTRAVENOUS CONTINUOUS
Status: DISCONTINUED | OUTPATIENT
Start: 2022-07-22 | End: 2022-07-22

## 2022-07-22 RX ORDER — INSULIN LISPRO 100 [IU]/ML
0-4 INJECTION, SOLUTION INTRAVENOUS; SUBCUTANEOUS NIGHTLY
Status: DISCONTINUED | OUTPATIENT
Start: 2022-07-22 | End: 2022-07-26 | Stop reason: HOSPADM

## 2022-07-22 RX ORDER — PREGABALIN 25 MG/1
75 CAPSULE ORAL 3 TIMES DAILY
Status: DISCONTINUED | OUTPATIENT
Start: 2022-07-22 | End: 2022-07-26 | Stop reason: HOSPADM

## 2022-07-22 RX ADMIN — MAGNESIUM SULFATE HEPTAHYDRATE 3000 MG: 500 INJECTION, SOLUTION INTRAMUSCULAR; INTRAVENOUS at 11:44

## 2022-07-22 RX ADMIN — PREGABALIN 75 MG: 25 CAPSULE ORAL at 20:57

## 2022-07-22 RX ADMIN — Medication 10 ML: at 20:50

## 2022-07-22 RX ADMIN — NOREPINEPHRINE BITARTRATE 7 MCG/MIN: 1 INJECTION INTRAVENOUS at 06:25

## 2022-07-22 RX ADMIN — SERTRALINE 100 MG: 100 TABLET, FILM COATED ORAL at 14:12

## 2022-07-22 RX ADMIN — NOREPINEPHRINE BITARTRATE 3 MCG/MIN: 1 INJECTION INTRAVENOUS at 18:11

## 2022-07-22 RX ADMIN — NOREPINEPHRINE BITARTRATE 3 MCG/MIN: 1 INJECTION INTRAVENOUS at 07:42

## 2022-07-22 RX ADMIN — Medication 10 ML: at 08:22

## 2022-07-22 RX ADMIN — NOREPINEPHRINE BITARTRATE 5 MCG/MIN: 1 INJECTION INTRAVENOUS at 03:56

## 2022-07-22 RX ADMIN — PANTOPRAZOLE SODIUM 40 MG: 40 INJECTION, POWDER, FOR SOLUTION INTRAVENOUS at 08:21

## 2022-07-22 RX ADMIN — CEFEPIME 2000 MG: 2 INJECTION, POWDER, FOR SOLUTION INTRAVENOUS at 02:44

## 2022-07-22 RX ADMIN — VANCOMYCIN HYDROCHLORIDE 1750 MG: 10 INJECTION, POWDER, LYOPHILIZED, FOR SOLUTION INTRAVENOUS at 07:19

## 2022-07-22 RX ADMIN — CEFEPIME 2000 MG: 2 INJECTION, POWDER, FOR SOLUTION INTRAVENOUS at 15:04

## 2022-07-22 RX ADMIN — SODIUM CHLORIDE: 9 INJECTION, SOLUTION INTRAVENOUS at 00:54

## 2022-07-22 RX ADMIN — MUPIROCIN: 20 OINTMENT TOPICAL at 08:21

## 2022-07-22 RX ADMIN — MUPIROCIN: 20 OINTMENT TOPICAL at 20:50

## 2022-07-22 RX ADMIN — PREGABALIN 75 MG: 25 CAPSULE ORAL at 14:12

## 2022-07-22 RX ADMIN — SODIUM CHLORIDE: 9 INJECTION, SOLUTION INTRAVENOUS at 17:05

## 2022-07-22 RX ADMIN — SODIUM CHLORIDE: 9 INJECTION, SOLUTION INTRAVENOUS at 06:16

## 2022-07-22 RX ADMIN — ACETAMINOPHEN 650 MG: 325 TABLET ORAL at 16:58

## 2022-07-22 RX ADMIN — DEXTROSE MONOHYDRATE 500 MG: 50 INJECTION, SOLUTION INTRAVENOUS at 00:17

## 2022-07-22 RX ADMIN — SODIUM CHLORIDE 250 ML: 9 INJECTION, SOLUTION INTRAVENOUS at 00:53

## 2022-07-22 RX ADMIN — ENOXAPARIN SODIUM 40 MG: 100 INJECTION SUBCUTANEOUS at 09:33

## 2022-07-22 ASSESSMENT — PAIN SCALES - GENERAL
PAINLEVEL_OUTOF10: 0
PAINLEVEL_OUTOF10: 0

## 2022-07-22 NOTE — CONSULTS
Patient is being seen at the request of Nabila Lambert for a consultation for septic shock, UTI    HISTORY OF PRESENT ILLNESS: This is an 77-year-old male who presented from [facility] with a 1 day history of left-sided chest pain, severe, radiated to the back, pressure-like in character, similar to prior chest pain associated with MI. He had associated shortness of breath, nausea and vomiting. In the emergency department the patient was found to be febrile and hypotensive. A right internal jugular CVC was placed and the patient was started on IV vasopressors with fluid resuscitation. PAST MEDICAL HISTORY:  Past Medical History:   Diagnosis Date    Atherosclerotic heart disease     CAD (coronary artery disease)     Cancer (HCC)     skin    COPD (chronic obstructive pulmonary disease) (HCC)     Dementia (HCC)     Depression     Duodenal ulcer without hemorrhage or perforation     Falls frequently     GERD without esophagitis     Hernia     Hyperlipidemia     Hypertension     Hypokalemia     Lack of coordination     falls    Melanoma ([address]     MI (myocardial infarction) ([address]     Muscle weakness (generalized)     Protein calorie malnutrition ([address]     Reflux     Sepsis ([address]     Spinal stenosis     UTI (urinary tract infection)      PAST SURGICAL HISTORY:  Past Surgical History:   Procedure Laterality Date    CHOLECYSTECTOMY      COLONOSCOPY  8/10/12    CORONARY ANGIOPLASTY WITH STENT PLACEMENT      PROSTATE BIOPSY      SKIN BIOPSY      UPPER GASTROINTESTINAL ENDOSCOPY  5/14/12    UPPER GASTROINTESTINAL ENDOSCOPY N/A 6/11/2019    EGD BIOPSY performed by Juan C Rodriguez DO at [facility] N/A 8/8/2019    EGD performed by Jennifer Carreon MD at [address] [facility] [address]:  No known early lung disease    SOCIAL HISTORY:   reports that he has quit smoking. His smoking use included cigarettes. He has a 2.50 pack-year smoking history.  He has never used smokeless tobacco.    Scheduled Meds:   cefepime  2,000 mg IntraVENous Q12H    levofloxacin  500 mg IntraVENous Q24H    pantoprazole  40 mg IntraVENous Daily    sodium chloride flush  5-40 mL IntraVENous 2 times per day    enoxaparin  40 mg SubCUTAneous Daily     Continuous Infusions:   norepinephrine 5 mcg/min (07/22/22 0356)    sodium chloride      sodium chloride       PRN Meds:  sodium chloride flush, sodium chloride, acetaminophen **OR** acetaminophen    ALLERGIES:  Patient is allergic to fluoxetine, benazepril, hydrocodone, morphine, penicillins, simvastatin, morphine and related, and sucralfate. REVIEW OF SYSTEMS:  Unable to obtain because the patient is poorly communicative     PHYSICAL EXAM:  Blood pressure (!) 97/39, pulse 85, temperature 98.1 °F (36.7 °C), temperature source Oral, resp. rate 19, height 5' 11\" (1.803 m), weight 192 lb 11.2 oz (87.4 kg), SpO2 92 %.' on 4 L  General:  ill appearing    Resp: No crackles. No wheezing. CV: S1, S2. Trace edema  GI: NT, ND, +BS  Skin: Warm and dry. Neuro: PERRL. Alert and oriented to his name but not to where he is or where he lives, speech is difficult to understand    LABS:  CBC:   Recent Labs     07/21/22 2039   WBC 10.2   HGB 14.5   HCT 43.0   MCV 87.7        BMP:   Recent Labs     07/21/22 2039      K 4.2   CL 98*   CO2 21   BUN 21*   CREATININE 1.1     LIVER PROFILE:   Recent Labs     07/21/22 2039   AST 18   ALT 15   LIPASE 11.0*   BILITOT 0.6   ALKPHOS 137*     PT/INR: No results for input(s): PROTIME, INR in the last 72 hours. APTT: No results for input(s): APTT in the last 72 hours.   UA:  Recent Labs     07/22/22  0153   COLORU Yellow   PHUR 8.0   WBCUA 21-50*   RBCUA 5-10*   BACTERIA 1+*   CLARITYU Clear   SPECGRAV 1.010   LEUKOCYTESUR MODERATE*   UROBILINOGEN 0.2   BILIRUBINUR Negative   BLOODU LARGE*   GLUCOSEU Negative   AMORPHOUS Rare     No results for input(s): PHART, BPP1FTA, PO2ART in the last 72 hours.    Cultures:      7/22/2022 urine lab confirmed its reported is plated  5/29/6804 blood sent  7/21/2022 SARS-CoV-2 denies change in visual acuity    Chest imaging was reviewed by me and showed:   CT chest abdomen and pelvis  Impression   1. Mild to moderate bronchial wall thickening with patchy bronchial   secretions potentially due to reactive airways disease or bronchitis. Pulmonary vascular congestion may contribute to the wall thickening. 2. Possible cardiovascular findings of pulmonary hypertension. 3. Suspected cystitis. 4. Unchanged 1.0 cm simple cystic lesion in the pancreatic tail, potentially   a mucinous cystic neoplasm such as branch duct intraductal papillary mucinous   neoplasm or a sequela of prior pancreatitis given changes of chronic   pancreatitis. Recommend follow-up with pancreas protocol abdomen MRI   (preferred) or CT in 2 years per the ACR recommendations for incidental   pancreatic cysts.      ASSESSMENT:  Septic shock  Acute hypoxemic respiratory failure  UTI - acute cystitis with hematuria  Acute metabolic encephalopathy, typically the patient is oriented x2, moves about his skilled nursing facility in wheelchair, and speech is fluent  Pancreatic mass  Mild lower lobe bronchiectasis  Nursing home resident    PLAN:  Supplemental oxygen to maintain SaO2 >92%; wean as tolerated   IV fluid resuscitation with crystalloid has been completed  Broad spectrum antibiotics to include cefepime day #2, DC Levaquin    IV levophed to maintain MAP of 65  Follow up on cultures, I confirmed with lab that urine was received  Follow electrolytes  Blood sugar control, with goal 140-180   Prophylaxis: Lovenox, Protonix, Bactroban     Total critical care time caring for this patient with life threatening, unstable organ failure, including direct patient contact, management of life support systems, review of data including imaging and labs, discussions with other team members and physicians is 28 minutes so far today, excluding procedures.

## 2022-07-22 NOTE — PROGRESS NOTES
Speech Language Pathology  Clinical Bedside Swallow Assessment  Facility/Department: [facility] ICU    Instrumentation: Not clinically indicated at this time. Will continue to monitor  Diet recommendation: IDDSI 4 Puree Solids; IDDSI 0 Thin Liquids; Meds crushed in puree as able  Risk management: upright for all intake, stay upright for at least 30 mins after intake, small bites/sips, total feed, distant supervision with intake, oral care 2-3x/day to reduce adverse affects in the event of aspiration, slow rate of intake, general GERD precautions, general aspiration precautions, and hold PO and contact SLP if s/s of aspiration or worsening respiratory status develop. Zahra Quiet  : 1935 (82 y.o.)   MRN: [id number]  ROOM: [address] [place] [address]  ADMISSION DATE: 2022  PATIENT DIAGNOSIS(ES): Hypoxia [R09.02]  Disease of bronchial airway ([address] [J47.9]  Septic shock ([address] [A41.9, R65.21]  Urinary tract infection with hematuria, site unspecified [N39.0, R31.9]  Chief Complaint   Patient presents with    Chest Pain     Pt presents to ED via [facility] from [place]. States pain started about 45 minutes. States pain to left side of chest that radiates to back. EMS gave 4mg of oral zofran. Pt is alert to self.      Patient Active Problem List    Diagnosis Date Noted    Septic shock ([address] 2022    Aspiration pneumonia (HCC)     Moderate protein-calorie malnutrition ([address] 2020    Septicemia ([address] 2020    Pneumonia of right lower lobe due to infectious organism     PUD (peptic ulcer disease)     Mediastinal lymphadenopathy     Urinary tract infection without hematuria     Lactic acid acidosis     Abdominal pain, epigastric     Anemia     Abdominal pain 2019    Dementia ([address] 2018    Moderate malnutrition ([address] 2018    Falls frequently 2018    Hypokalemia 2018    CAD (coronary artery disease) 2018    HTN (hypertension) 2018    HLD (hyperlipidemia) 03/02/2018     Past Medical History:   Diagnosis Date    Atherosclerotic heart disease     CAD (coronary artery disease)     Cancer (HCC)     skin    COPD (chronic obstructive pulmonary disease) (HCC)     Dementia (HCC)     Depression     Duodenal ulcer without hemorrhage or perforation     Falls frequently     GERD without esophagitis     Hernia     Hyperlipidemia     Hypertension     Hypokalemia     Lack of coordination     falls    [address]     MI (myocardial infarction) ([address]     Muscle weakness (generalized)     Protein calorie malnutrition ([address]     Reflux     Sepsis ([address]     Spinal stenosis     UTI (urinary tract infection)      Past Surgical History:   Procedure Laterality Date    CHOLECYSTECTOMY      COLONOSCOPY  8/10/12    CORONARY ANGIOPLASTY WITH STENT PLACEMENT      PROSTATE BIOPSY      SKIN BIOPSY      UPPER GASTROINTESTINAL ENDOSCOPY  5/14/12    UPPER GASTROINTESTINAL ENDOSCOPY N/A 6/11/2019    EGD BIOPSY performed by Yue Fitch DO at [facility] N/A 8/8/2019    EGD performed by Jose Friend MD at [facility]     Allergies   Allergen Reactions    Fluoxetine     Benazepril      Swollen tongue    Hydrocodone Other (See Comments)    Morphine     Penicillins Other (See Comments)    Simvastatin Other (See Comments)    Morphine And Related Nausea And Vomiting    Sucralfate Rash       DATE ONSET: 07/22/2022    Date of Evaluation: 7/22/2022   Evaluating Therapist: Guanaco Arango, DENYS    Chart Reviewed: : [x] Yes [] No    Current Diet: ADULT DIET; Dysphagia - Pureed    Recent Chest Radiography: [x] Chest XR   [] CT of Chest  Date: 07/22/2022  Impressions      Impression   1. A new right internal jugular central line terminates in the mid superior   vena cava. No associated pneumothorax. 2. New airspace opacity in the medial right lung base potentially due to   atelectasis, aspiration, or pneumonia.    3. New left basilar subsegmental atelectasis. 4. At least mild peribronchial cuffing potentially due pulmonary vascular   congestion, reactive airways disease, or bronchitis. 5. Possible new trace left pleural effusion. 6. Pulmonary vascular congestion. Pain: The patient does not complain of pain    Reason for Referral  Danita Kerr was referred for a bedside swallow evaluation to assess the efficiency of their swallow function, identify signs and symptoms of aspiration and make recommendations regarding safe dietary consistencies, effective compensatory strategies, and safe eating environment. Assessment    Medical record review/interview: Per Pulmonology H&P: \"HISTORY OF PRESENT ILLNESS: This is an 51-year-old male who presented from [facility] with a 1 day history of left-sided chest pain, severe, radiated to the back, pressure-like in character, similar to prior chest pain associated with MI. He had associated shortness of breath, nausea and vomiting. In the emergency department the patient was found to be febrile and hypotensive. A right internal jugular CVC was placed and the patient was started on IV vasopressors with fluid resuscitation. \"        Patient Complaints:  Odynophagia: [x] Yes [] No  Globus Sensation: [x] Yes [] No  SOB with PO intake: [x] Yes [] No  Increased WOB with PO intake: [x] Yes [] No  Reflux Sx's: [x] Yes [] No  Weight loss: [] Yes [x] No  Coughing/Choking with PO intake: [x] Yes [] No  Reduced Appetite: [] Yes [x] No    Additional Reported Symptoms/Complaints: Known to SLP team with most recent recommendations in 09/2020 for puree solids and thin liquids. There was concern for endurance preservation during meals. Pt stated coughing/choking with PO intake, odynophagia, SOP with PO intake, and increased WOB with PO intake happen \"sometimes\". He noted he eats mostly softer foods including frequently eating potatoes. Pt was unsure if he has lost weight recently.  He reported taking medications whole with thin liquids. Pt was disoriented to time and place. Repeatedly asking where he was and what time it was during the assessment. Pt is currently on 4L NC with O2 of 98% and RR of 17. Predisposing dysphagia risk factors: COPD, Cognitive Deficit (dementia), and GERD  Clinical signs of possible chronic dysphagia: Hx of esophageal dysphagia, hx of oropharyngeal dysphagia  Precipitating dysphagia risk factors: reduced physical mobility, increased O2 demands, AMS, and sepsis    Vitals/labs:     Vitals:    07/22/22 0747 07/22/22 0800 07/22/22 0828 07/22/22 0900   BP:  (!) 113/43  (!) 109/43   Pulse:  97  87   Resp:  25  21   Temp: (!) 103.5 °F (39.7 °C)  (!) 101 °F (38.3 °C)    TempSrc: Axillary  Oral    SpO2:  92%  97%   Weight:       Height:            CBC:   Recent Labs     07/21/22 2039   WBC 10.2   HGB 14.5         BMP:  Recent Labs     07/21/22 2039      K 4.2   CL 98*   CO2 21   BUN 21*   CREATININE 1.1   GLUCOSE 190*          Cranial nerve exam:   CN V (trigeminal): ophthalmic, maxillary, and mandibular facial sensation- WFL b/l  CN VII (facial): WFL b/l  CN IX/X (glossopharyngeal/vagus): MPT: KEMAR d/t pt inability to follow commands; pitch range: WFL; vocal quality: hoarse and weak; cough: Strong-perceptually, Non-Productive, and Dry  CN XII (hypoglossal): WFL b/l      Laryngeal function exam:   Secretions: Oral mucosa was pink and moist with wet secretions noted throughout oral cavity. Oral care performed prior to PO trials  Vocal quality: See CN exam above  MPT: See CN exam above  S/Z ratio: KEMAR d/t pt inability to follow commands.   Pitch range: See CN exam above  Cough: See CN exam above    Oral Care Status:    [] Oral Care [facility]  [x] Poor oral care status  [x] Edentulous  [] Upper Dentures  [] Lower Dentures  [] Missing/Broken Teeth  [] Evidence of dental cavities/carries    PO trials:   IDDSI 0 (thin) 5cc Bolus (Tsp), Cup, Straw: no anterior bolus loss , swallow timing subjectively appears timely, and no clinical s/s of aspiration    IDDSI 4 (puree): suspect functional A-P bolus transit, swallow timing subjectively appears timely, oral clearance grossly WFL, and no clinical s/s of aspiration    IDDSI 5 (minced and moist): suspect functional A-P bolus transit, swallow timing subjectively appears timely, prolonged mastication, oral clearance grossly WFL, and no clinical s/s of aspiration    3 oz water: KEMAR d/t pt inability to follow commands. Impressions:  Clinical s/s of oral dysphagia likely acute-on-chronic- r/t age, hx of dysphagia, and reduced physical mobility. Instrumental swallow study is not indicated at this time. Given lack of clinical s/s of aspiration at bedside, pt is safe for oral diet at this time. Instrumentation: Not clinically indicated at this time. Will continue to monitor  Diet recommendation: IDDSI 4 Puree Solids; IDDSI 0 Thin Liquids; Meds crushed in puree as able  Risk management: upright for all intake, stay upright for at least 30 mins after intake, small bites/sips, total feed, distant supervision with intake, oral care 2-3x/day to reduce adverse affects in the event of aspiration, slow rate of intake, general GERD precautions, general aspiration precautions, and hold PO and contact SLP if s/s of aspiration or worsening respiratory status develop.     Prognosis: Good    Recommended Intervention:  [x] Dysphagia tx  [] Videostroboscopy                      [] NPO   [] MBS       [] Speech/Cog Eval    [x] Therapeutic PO Trials     [] Ice Chips   [] Other:  [] FEES                                                 Dysphagia Therapeutic Intervention:  []  Bolus control Exercises  []  Oral Motor Exercises  []  [employer] Protocol  []  Thermal Stimulation  [x]  Oral Care    []  Vital Stim/NMES  []  Laryngeal Exercises  [x]  Patient/Family Education  []  Pharyngeal Exercises  [x]  Therapeutic PO trials with SLP  [x]  Diet tolerance monitoring  [] Other:     Referrals:  [] ENT    [] PT  [x] Pulmonology [] GI  [] Neurology  [] RD  [] OT   []     Goals:  Short Term Goals:  Timeframe for Short Term Goals: (5 days 07/27/2022)  Goal 1: The patient will tolerate recommended diet with no clinical s/s of aspiration 5/5  Goal 2: The patient will tolerate therapeutic diet upgrade trials with no clinical s/s of aspiration 5/5  Goal 3: The patient/caregiver will demonstrate understanding of compensatory swallow strategies, for improved swallow safety    Long Term Goals:   Timeframe for Long Term Goals: (7 days 07/29/2022)  Goal 1: The patient will tolerate least restrictive diet with no clinical s/s of aspiration or worsening respiratory/pulmonary status    Treatment:  Skilled instruction completed with patient re: evidenced based practice regarding recommendations and POC, importance of oral care to reduce adverse affects in the event of aspiration, and instruction of recommended compensatory strategies developed based upon clinical exam. Pt unable to recall/demonstrate compensatory strategies despite max cues. Pt Education: SLP educated the patient re: Role of SLP, rationale for completion of assessment, results of assessment, recommendations, and POC  Pt Education Response: verbalized understanding, would benefit from ongoing education, and RN aware    Duration/Frequency of Tx: 2-4x/wk    Individuals Consulted:   [x]  Patient     []  NP         [x]  RN   []  RD                   []  MD      []  Family Member                        []  PA    []  Other:      Safety Devices / Report:  [x]  All fall risk precautions in place [x]  Safety handoff completed with RN  [x]  Bed alarm in place  [x]  Left in bed     []  Chair alarm in place  []  Left in chair   [x]  Call light in reach   []  Other:                                  Total Treatment Time / Charges       Time in Time out Total Time / units   Swallow Eval/Tx Time 1129 1206 37 mins/2 units Signature:  Marielena Martínez  SLP  Clinician

## 2022-07-22 NOTE — PROGRESS NOTES
Patient is not able to demonstrate the ability to move from a reclining position to an upright position within the recliner due to Pt on bedrest .Amanda Jurado RN

## 2022-07-22 NOTE — CONSULTS
Pharmacy Note  Vancomycin Consult    Harsha Tavarez is a 82 y.o. male started on Vancomycin for HAP/UTI; consult received from Dr. Reyes Points to manage therapy. Also receiving the following antibiotics: cefepime & levofloxicin. Allergies:  Fluoxetine, Benazepril, Hydrocodone, Morphine, Penicillins, Simvastatin, Morphine and related, and Sucralfate     Tmax: 101.1    Recent Labs     07/21/22 2039   CREATININE 1.1       Recent Labs     07/21/22 2039   WBC 10.2       Estimated Creatinine Clearance: 52 mL/min (based on SCr of 1.1 mg/dL). Intake/Output Summary (Last 24 hours) at 7/22/2022 0739  Last data filed at 7/22/2022 0127  Gross per 24 hour   Intake 3230 ml   Output --   Net 3230 ml       Wt Readings from Last 1 Encounters:   07/22/22 192 lb 11.2 oz (87.4 kg)         Body mass index is 26.88 kg/m². Culture Date      Source                       Results      Loading dose (critically ill or in ICU, require dialysis or renal replacement therapy): Vancomycin 25 mg/kg IVPB x 1 (maximum 2500 mg). Maintenance dose: 15 mg/kg (maximum: 2000 mg/dose and 4500 mg/day) starting at the next dosing interval determined by renal function  Pulse dose: fluctuating renal function, PILAR, ESRD   Goal Vancomycin trough: 10-15 mcg/mL or 15-20 mcg/mL   Goal Vancomycin AUC: 400-600     Assessment/Plan:  Will initiate Vancomycin with a one time loading dose of 1750 mg x1, followed by 1250 mg IV every 24 hours. Calculated AUC = 476. Vancomycin trough ordered for 7/24 @ 0500. Expected result = 11.3 mcg/ml. Thank you for the consult.

## 2022-07-22 NOTE — PROGRESS NOTES
4 Eyes Skin Assessment     The patient is being assess for   Shift Handoff    I agree that 2 RN's have performed a thorough Head to Toe Skin Assessment on the patient. ALL assessment sites listed below have been assessed. Areas assessed for pressure by both nurses:   [x]   Head, Face, and Ears   [x]   Shoulders, Back, and Chest, Abdomen  [x]   Arms, Elbows, and Hands   [x]   Coccyx, Sacrum, and Ischium  [x]   Legs, Feet, and Heels        Skin Assessed Under all Medical Devices by both nurses:  O2 device tubing              All Mepilex Borders were peeled back and area peeked at by both nurses:  Yes  Please list where Mepilex Borders are located:  Sacrum    No signs of skin breakdown noted                **SHARE this note so that the co-signing nurse is able to place an eSignature**    Co-signer eSignature: Electronically signed by Vasquez Lee RN on 7/22/22 at 5:52 PM EDT    Does the Patient have Skin Breakdown related to pressure?   No          Bridger Prevention initiated:  Yes   Wound Care Orders initiated:  NA      [facility] nurse consulted for Pressure Injury (Stage 3,4, Unstageable, DTI, NWPT, Complex wounds)and New or Established Ostomies:  NA      Primary Nurse eSignature: Electronically signed by Mary Soto RN on 7/22/22 at 5:50 PM EDT

## 2022-07-22 NOTE — PROGRESS NOTES
Sun[facility] Common rounding and updated on Levophed gtt at 2 mcg and rhythm appears at times to go in/out A fib but rate is controlled.   reviews rhythm on monitor Bernard Melendez RN

## 2022-07-22 NOTE — PROGRESS NOTES
Initial exam completed- See doc flowsheet for assessment findings. Pt resting quietly in bed and denies any complaints of shortness of breath. All lines and monitoring devices are in place . Vitals and SpO2 stable. Levophed gtt has been decreased to 3 mcg. Call light is within easy reach. Plan of care and goals reviewed.  Juan Antonio Baltazar RN

## 2022-07-22 NOTE — ED NOTES
Transport arranged at this time.   [facility] will be here around [address] [facility], [address] [place] [address]  07/22/22 2484

## 2022-07-22 NOTE — CARE COORDINATION
Case Management Assessment  Initial Evaluation      Patient Name: Carine Arciniega  YOB: 1935  Diagnosis: Hypoxia [R09.02]  Disease of bronchial airway ([facility] [J47.9]  Septic shock ([facility] [A41.9, R65.21]  Urinary tract infection with hematuria, site unspecified [N39.0, R31.9]  Date / Time: 7/21/2022  8:30 PM    Admission status/Date:07/21/2022 Inpatient   Chart Reviewed: Yes      Patient Interviewed: Yes   Family Interviewed:  No -left a voicemail for pt's legal guardian Lauren Chong at [phone]    Hospitalization in the last 30 days:  No    Health Care Decision Maker :   Primary Decision Maker: Gabi Still - Legal Guardian, Legal Guardian - [phone]    (CM - must 1st enter selection under Navigator - emergency contact- [place] Relationship and pick relationship)   Who do you trust or have selected to make healthcare decisions for you    Met with: pt at bedside    Current PCP: Ursula Soliz MD    Financial  Medicare and Medicaid  Precert required for SNF : N          3 night stay required - N    ADLS  Support Systems/Care Needs:    Transportation: EMS transportation    Meal Preparation: staff at [facility] ([address])    Housing  Living Arrangements: pt lives at [facility] ([address]) Steps: 0  Intent for return to present living arrangements: Awaiting return call from pt's legal guardian  Identified Issues: [id number] [facility] with [address] : No Agency:(Services)     Passport/Waiver : No  :                      Phone Number:    Passport/Waiver Services: [address]   DME Provider: provided by [employer]  Equipment:   Walker___Cane___RTS___ BSC___Shower Chair___Hospital Bed___W/C____Other________  02 at ____Liter(s)---wears(frequency)_______ [facility] ___ CPAP___ BiPap___   N/A____    Home O2 Use :  provided by [employer] ([employer])      Community Service Affiliation  Dialysis:  No    Agency:  Location:  Dialysis Schedule:  Phone:   Fax: Other Community Services: n/a    DISCHARGE PLAN: Explained Case Management role/services. Chart review completed. Completed Interdisciplinary rounds with ICU staff. Met with pt at bedside. Pt stated he sometimes lives at [address]. He denied needs for CM. Attempted calling pt's legal guardian Simran Moore at [phone] times 3 and it rang with no answer or voicemail. Spoke with Tata Corea at [address] who stated pt is long term care with a bed hold and can return on discharge. He provided the following number for Ariela Felton, pt's guardian ([phone]). Message left requesting call back. Added that number to contact     CM will continue to follow and assist. Please notify CM if needs or concerns arise.     Grisel Crabtree MSW, ADRIANNEW-S

## 2022-07-22 NOTE — ED PROVIDER NOTES
[address] PROVIDER NOTE    Patient Identification  Pt Name: Maribel Jacobs  MRN: [id number]  [place] 1935  Date of evaluation: 7/21/2022  Provider: Sam Lobo MD  PCP: Lindsay Hollis MD    Chief Complaint  Chest Pain (Pt presents to ED via [facility] from [place]. States pain started about 45 minutes. States pain to left side of chest that radiates to back. EMS gave 4mg of oral zofran. Pt is alert to self.)      HPI  (History provided by patient and EMS; limited by acute distress)  This is a 82 y.o. male who was brought in by EMS transportation for chest pain. Patient developed sudden onset of pressure-like chest pain approximately 45 minutes prior to arrival.  Pain is severe and pressure-like in character. He has had similar chest pain in the past.  He has a history of previous MI. Patient has had associated shortness of breath, nausea, and vomiting as well. There is no report of fever, but patient was febrile on arrival.    ROS  10 systems reviewed, pertinent positives/negatives per HPI otherwise noted to be negative.     I have reviewed the following nursing documentation:  Allergies: Fluoxetine, Benazepril, Hydrocodone, Morphine, Penicillins, Simvastatin, Morphine and related, and Sucralfate    Past medical history:   Past Medical History:   Diagnosis Date    Atherosclerotic heart disease     CAD (coronary artery disease)     Cancer ([address]     skin    COPD (chronic obstructive pulmonary disease) ([address]     Dementia ([address]     Depression     Duodenal ulcer without hemorrhage or perforation     Falls frequently     GERD without esophagitis     Hernia     Hyperlipidemia     Hypertension     Hypokalemia     Lack of coordination     falls    Melanoma ([address]     MI (myocardial infarction) ([address]     Muscle weakness (generalized)     Protein calorie malnutrition ([address]     Reflux     Sepsis ([address]     Spinal stenosis     UTI (urinary tract infection)      Past surgical history: Past Surgical History:   Procedure Laterality Date    CHOLECYSTECTOMY      COLONOSCOPY  8/10/12    CORONARY ANGIOPLASTY WITH STENT PLACEMENT      PROSTATE BIOPSY      SKIN BIOPSY      UPPER GASTROINTESTINAL ENDOSCOPY  5/14/12    UPPER GASTROINTESTINAL ENDOSCOPY N/A 6/11/2019    EGD BIOPSY performed by Shaunna Jeffrey DO at [address] [place] [address] N/A 8/8/2019    EGD performed by Cassy Rosa MD at [address] medications:   Previous Medications    ACETAMINOPHEN (TYLENOL) 325 MG TABLET    Take 650 mg by mouth every 6 hours as needed for Pain    ACETAMINOPHEN (TYLENOL) 500 MG TABLET    Take 1,000 mg by mouth in the morning and 1,000 mg before bedtime. AMLODIPINE (NORVASC) 10 MG TABLET    Take 10 mg by mouth daily    BISACODYL (DULCOLAX) 10 MG SUPPOSITORY    Place 10 mg rectally daily as needed for Constipation If MOM ineffective. MAGNESIUM HYDROXIDE (MILK OF MAGNESIA) 400 MG/5ML SUSPENSION    Take 30 mLs by mouth daily as needed for Constipation    MELATONIN 3 MG TABS TABLET    Take 3 mg by mouth in the morning. OMEPRAZOLE (PRILOSEC) 20 MG DELAYED RELEASE CAPSULE    Take 20 mg by mouth daily    POTASSIUM CHLORIDE (MICRO-K) 10 MEQ EXTENDED RELEASE CAPSULE    Take 10 mEq by mouth daily    PREGABALIN (LYRICA) 25 MG CAPSULE    Take 75 mg by mouth in the morning and 75 mg at noon and 75 mg before bedtime. SERTRALINE (ZOLOFT) 50 MG TABLET    Take 100 mg by mouth in the morning. Social history:  reports that he has quit smoking. His smoking use included cigarettes. He has a 2.50 pack-year smoking history. He has never used smokeless tobacco. He reports that he does not drink alcohol and does not use drugs. Family history:  History reviewed. No pertinent family history.     Exam  ED Triage Vitals [07/21/22 2028]   BP Temp Temp Source Heart Rate Resp SpO2 Height Weight   (!) 145/54 (!) 101.1 °F (38.4 °C) Oral (!) 120 18 92 % 5' 11\" (1.803 m) 200 lb (90.7 kg)     Nursing note and vitals reviewed. Constitutional: Ill-appearing and in acute distress  HENT:      Head: Normocephalic and atraumatic. Ears: External ears normal.      Nose: Nose normal.     Mouth: Membrane mucosa moist and pink. Eyes: Anicteric sclera. No discharge. Neck: Supple. Trachea midline. Cardiovascular: Tachycardic with regular rhythm; no murmurs, rubs, or gallops. Pulmonary/Chest: Effort normal. No respiratory distress. Diffuse rhonchi. No wheezes. Abdominal: Soft. No distension. Diffusely tender with guarding, no  Musculoskeletal: Moves all extremities. No gross deformity. No lower extremity edema. Neurological: Alert and oriented. Face symmetric. Speech is clear. Skin: Warm and dry. No rash. Psychiatric: Normal mood and affect. Behavior is normal.    Procedures  Placement of Central Venous Catheter  The benefits, risks, and alternatives of central venous access were discussed with the  patient and Verbal consent was obtained for the procedure. Kristian Easley was prepped and draped in standard bedside fashion in the right IJ area. Physical landmarks with ultrasound guidance was used to locate the central vein. Local anesthesia with 3ml of 1% lidocaine was injected. I visualized the needle passing into the vein on ultrasound. Dark red, non-pulsatile blood was aspirated. After guidewire placement, I used ultrasound in the longitudinal plane to visualize the guidewire and confirm that it was only passing into the non-pulsatile vein. Seldinger technique was used for placement of the CVC. All ports ara and flushed. The patient tolerated the procedure well and no acute complications occurred. EKG  The Ekg interpreted by me in the absence of a cardiologist shows. sinus tachycardia, vyih=[id number]    Axis is   Left axis deviation  QTc is  normal  PVCs with occasional bigeminy. No specific ST-T wave changes appreciated. No evidence of acute ischemia.    No ST elevation, ST depression, or T wave changes consistent with MI  PVCs and bigeminy are new. Otherwise, no significant change from prior EKG dated 9/16/2020      Radiology  CT CHEST ABDOMEN PELVIS W CONTRAST   Final Result   1. Mild to moderate bronchial wall thickening with patchy bronchial   secretions potentially due to reactive airways disease or bronchitis. Pulmonary vascular congestion may contribute to the wall thickening. 2. Possible cardiovascular findings of pulmonary hypertension. 3. Suspected cystitis. 4. Unchanged 1.0 cm simple cystic lesion in the pancreatic tail, potentially   a mucinous cystic neoplasm such as branch duct intraductal papillary mucinous   neoplasm or a sequela of prior pancreatitis given changes of chronic   pancreatitis. Recommend follow-up with pancreas protocol abdomen MRI   (preferred) or CT in 2 years per the ACR recommendations for incidental   pancreatic cysts. 5. Additional incidental findings as above for which no dedicated follow-up   is recommended. XR CHEST PORTABLE   Final Result   No focal lung opacity.              Labs  Results for orders placed or performed during the hospital encounter of 07/21/22   COVID-19, Rapid    Specimen: Nasopharyngeal Swab   Result Value Ref Range    SARS-CoV-2, NAAT Not Detected Not Detected   CBC with Auto Differential   Result Value Ref Range    WBC 10.2 4.0 - 11.0 K/uL    RBC 4.90 4.20 - 5.90 M/uL    Hemoglobin 14.5 13.5 - 17.5 g/dL    Hematocrit 43.0 40.5 - 52.5 %    MCV 87.7 80.0 - 100.0 fL    MCH 29.5 26.0 - 34.0 pg    MCHC 33.6 31.0 - 36.0 g/dL    RDW 15.0 12.4 - 15.4 %    Platelets 362 528 - 281 K/uL    MPV 7.2 5.0 - 10.5 fL    Neutrophils % 90.8 %    Lymphocytes % 5.9 %    Monocytes % 0.6 %    Eosinophils % 2.0 %    Basophils % 0.7 %    Neutrophils Absolute 9.3 (H) 1.7 - 7.7 K/uL    Lymphocytes Absolute 0.6 (L) 1.0 - 5.1 K/uL    Monocytes Absolute 0.1 0.0 - 1.3 K/uL    Eosinophils Absolute 0.2 0.0 - 0.6 K/uL    Basophils Absolute 0.1 0.0 - 0.2 K/uL   Comprehensive Metabolic Panel w/ Reflex to MG   Result Value Ref Range    Sodium 137 136 - 145 mmol/L    Potassium reflex Magnesium 4.2 3.5 - 5.1 mmol/L    Chloride 98 (L) 99 - 110 mmol/L    CO2 21 21 - 32 mmol/L    Anion Gap 18 (H) 3 - 16    Glucose 190 (H) 70 - 99 mg/dL    BUN 21 (H) 7 - 20 mg/dL    Creatinine 1.1 0.8 - 1.3 mg/dL    GFR Non-African American >60 >60    GFR African American >60 >60    Calcium 9.8 8.3 - 10.6 mg/dL    Total Protein 7.8 6.4 - 8.2 g/dL    Albumin 4.5 3.4 - 5.0 g/dL    Albumin/Globulin Ratio 1.4 1.1 - 2.2    Total Bilirubin 0.6 0.0 - 1.0 mg/dL    Alkaline Phosphatase 137 (H) 40 - 129 U/L    ALT 15 10 - 40 U/L    AST 18 15 - 37 U/L   Troponin   Result Value Ref Range    Troponin <0.01 <0.01 ng/mL   Lactate, Sepsis   Result Value Ref Range    Lactic Acid, Sepsis 5.5 (HH) 0.4 - 1.9 mmol/L   Lactate, Sepsis   Result Value Ref Range    Lactic Acid, Sepsis 3.5 (H) 0.4 - 1.9 mmol/L   Urinalysis with Reflex to Culture    Specimen: Urine, clean catch   Result Value Ref Range    Color, UA Yellow Straw/Yellow    Clarity, UA Clear Clear    Glucose, Ur Negative Negative mg/dL    Bilirubin Urine Negative Negative    Ketones, Urine Negative Negative mg/dL    Specific Gravity, UA 1.010 1.005 - 1.030    Blood, Urine LARGE (A) Negative    pH, UA 8.0 5.0 - 8.0    Protein, UA TRACE (A) Negative mg/dL    Urobilinogen, Urine 0.2 <2.0 E.U./dL    Nitrite, Urine Negative Negative    Leukocyte Esterase, Urine MODERATE (A) Negative    Microscopic Examination YES     Urine Type NotGiven     Urine Reflex to Culture Yes    Brain Natriuretic Peptide   Result Value Ref Range    Pro- 0 - 449 pg/mL   Lipase   Result Value Ref Range    Lipase 11.0 (L) 13.0 - 60.0 U/L   Procalcitonin   Result Value Ref Range    Procalcitonin 0.17 (H) 0.00 - 0.15 ng/mL   Microscopic Urinalysis   Result Value Ref Range    WBC, UA 21-50 (A) 0 - 5 /HPF    RBC, UA 5-10 (A) 0 - 4 /HPF    Epithelial Cells, UA 0-1 0 - 5 /HPF    Bacteria, UA 1+ (A) None Seen /HPF    Amorphous, UA Rare /HPF   Magnesium   Result Value Ref Range    Magnesium 1.50 (L) 1.80 - 2.40 mg/dL       SEP-1  Is this patient to be included in the SEP-1 Core Measure due to severe sepsis or septic shock? Yes   SEP-1 CORE MEASURE DATA      Sepsis Criteria   Severe Sepsis Criteria   Septic Shock Criteria     Must be confirmed or suspected to move forward with diagnosis of sepsis. Must meet 2:    [x] Temperature > 100.9 F (38.3 C)        or < 96.8 F (36 C)  [x] HR > 90  [] RR > 20  [] WBC > 12 or < 4 or 10% bands      AND:      [] Infection Confirmed or        Suspected. Must meet 1:    [x] Lactate > 2       or   [x] Signs of Organ Dysfunction:    - SBP < 90 or MAP < 65  - Altered mental status  - Creatinine > 2 or increased from      baseline  - Urine Output < 0.5 ml/kg/hr  - Bilirubin > 2  - INR > 1.5 (not anticoagulated)  - Platelets < 446,280  - Acute Respiratory Failure as     evidenced by new need for NIPPV     or mechanical ventilation      [] No criteria met for Severe Sepsis. Must meet 1:    [x] Lactate > 4        or   [] SBP < 90 or MAP < 65 for at        least two readings in the first        hour after fluid bolus        administration      [] Vasopressors initiated (if hypotension persists after fluid resuscitation)        [] No criteria met for Septic Shock.    Patient Vitals for the past 6 hrs:   BP Temp Pulse Resp SpO2   07/21/22 2145 -- -- (!) 107 20 95 %   07/21/22 2200 97/75 -- (!) 106 20 --   07/21/22 2215 121/67 -- (!) 102 22 92 %   07/21/22 2230 (!) 92/54 -- 99 18 92 %   07/21/22 2245 (!) 100/50 -- 97 19 91 %   07/21/22 2300 (!) 98/50 -- 100 20 90 %   07/21/22 2315 (!) 120/58 -- 98 18 90 %   07/21/22 2330 (!) 90/55 98.6 °F (37 °C) 97 20 91 %   07/21/22 2345 (!) 98/50 -- 90 18 91 %   07/22/22 0000 (!) 91/51 -- 87 18 92 %   07/22/22 0015 (!) 100/57 -- 93 18 92 %   07/22/22 0030 (!) 90/53 -- 89 18 92 %   07/22/22 0045 (!) 93/53 -- 88 19 93 %   07/22/22 0100 (!) 94/56 -- 89 20 94 %   07/22/22 0115 -- -- 89 19 95 %   07/22/22 0127 (!) 94/53 -- 87 18 95 %   07/22/22 0130 (!) 97/45 -- 88 18 95 %   07/22/22 0145 -- -- 87 18 95 %   07/22/22 0200 (!) 95/44 -- 88 18 95 %   07/22/22 0215 (!) 86/57 -- 92 18 96 %   07/22/22 0230 -- -- 87 18 96 %   07/22/22 0245 (!) 94/45 -- 89 18 93 %   07/22/22 0300 107/64 -- 90 19 94 %   07/22/22 0315 (!) 109/55 -- 99 20 92 %   07/22/22 0330 (!) 115/55 -- 100 20 90 %      Recent Labs     07/21/22 2039   WBC 10.2   CREATININE 1.1   BILITOT 0.6            Time Septic Shock Identified: [id number]    Fluid Resuscitation Rational: at least 30mL/kg based on entered actual weight at time of triage      Repeat lactate level: improving    Reassessment Exam:   I have reassessed tissue perfusion and hemodynamic status after fluid bolus at this time: heart rate improved, now below 100 bpm.  Brisk capillary refill. Pulses intact. MDM and ED Course  We were notified about the patient prior to arrival as it was suspected he may have ACS. His initial presenting symptoms were consistent with this. However, EKG performed in the emergency department showed no evidence of ST elevation or depression. There is no evidence of acute ischemia or infarction. As the patient had tenderness on abdominal exam as well as complaint of abdominal exam to me, and became more concerned for other etiologies. He also continues to complain of chest pain and shortness of breath. He was mildly hypoxic on arrival, around 89 to 90% on room air. After 2 L nasal cannula, his oxygen stabilized. He is not currently on oxygen at home. Patient was initially hypertensive and tachycardic. He was also febrile and vomiting. After treatment with Reglan, the patient's vomiting stopped. His chest pain also improved and resolved with aspirin and Tylenol.   His blood pressure began to decrease and he met criteria for sepsis, so I initiated an IV fluid bolus of 30 mL/kg of lactated Ringer's. I also initiated a sepsis protocol, including broad-spectrum IV antibiotics. Blood cultures are sent. Lactic acid was initially elevated at 5.5, but improved significantly to 3.5 after his IV fluids. The patient's blood pressure stabilized with a MAP above 65 for several hours. Unfortunately, he gradually developed hypotension again, necessitating pressors and a central line. The central line was completed without complication and pressors were started, resulting in a stable, perfusing pressure. I spoke with Dr. Keith Martinez. We thoroughly discussed the history, physical exam, laboratory and imaging studies, as well as, emergency department course. Based upon that discussion, we've decided to admit Gregory White for further observation and evaluation of Michelle Garrett's septic shock, hypoxia, UTI, and possible pneumonia. As I have deemed necessary from their history, physical, and studies, I have considered and evaluated Gregory White for the following diagnoses:  PULMONARY EMBOLISM, ACUTE CORONARY SYNDROME, CARDIAC TAMPONADE, PNEUMOTHORAX, PNEUMONIA, PERICARDITIS, AORTIC DISSECTION/ANEURYSM, HEMOTHORAX, UTI, MENINGITIS, ENCEPHALITIS, CELLULITIS, SEPSIS, SEVERE SEPSIS, and SEPTIC SHOCK      The total Critical Care time is 75 minutes which excludes separately billable procedures. Final Impression  1. Septic shock ([facility]    2. Urinary tract infection with hematuria, site unspecified    3. Hypoxia    4. Disease of bronchial airway (HCC)        Blood pressure (!) 115/55, pulse 100, temperature 98.6 °F (37 °C), temperature source Oral, resp. rate 20, height 5' 11\" (1.803 m), weight 200 lb (90.7 kg), SpO2 90 %. Disposition:  DISPOSITION Admitted 07/22/2022 02:39:22 AM    This chart was generated using the [address] [place] [address] ERPLYation system. I created this record but it may contain dictation errors given the limitations of this technology.         Anh Briggs MD  07/22/22 9386

## 2022-07-22 NOTE — H&P
History and Physical      Chief Complaint   Patient presents with    Chest Pain     Pt presents to ED via [address] from [place]. States pain started about 45 minutes. States pain to left side of chest that radiates to back. EMS gave 4mg of oral zofran. Pt is alert to self. HISTORY OF PRESENT ILLNESS:    Patient is an 28-year-old white male who lives at the [address] [place] [address]. He came to the emergency room with complaint of chest pain. Patient stated the pain left-sided chest with radiation to the back. Initial troponin was negative. EKG was nonacute. Patient also complained of shortness of breath nausea vomiting. No fever. Work-up in the ER showed no evidence of acute coronary syndrome. Exam revealed abdominal tenderness. He was mildly hypoxic. He was initially hypertensive and then hypotensive. Chest pain improved with aspirin and Tylenol. Due to drop in blood pressure sepsis protocol was initiated. He was started on broad-spectrum antibiotics. Blood cultures were sent. Lactic acidosis was seen on lab work. Initially blood pressure stabilized and then dropped again. A central line was placed. He was started on Levophed. The patient is an unreliable historian. He has metabolic encephalopathy from underlying sepsis and has dementia on top of that. He is seen in the ICU. He is on Levophed. He is on oxygen. He did verbalize that he is not on oxygen at baseline. He denied any chest pain to me. No diarrhea since admission. Patient is allergic to fluoxetine, benazepril, hydrocodone, morphine, penicillins, simvastatin, morphine and related, and sucralfate.     Past Medical History:   Diagnosis Date    Atherosclerotic heart disease     CAD (coronary artery disease)     Cancer (HCC)     skin    COPD (chronic obstructive pulmonary disease) (HCC)     Dementia (HCC)     Depression     Duodenal ulcer without hemorrhage or perforation     Falls frequently     GERD without esophagitis     Hernia     Hyperlipidemia     Hypertension     Hypokalemia     Lack of coordination     falls    Melanoma ([facility]     MI (myocardial infarction) ([facility] [address]     Muscle weakness (generalized)     Protein calorie malnutrition (HCC)     Reflux     Sepsis ([facility] [address]     Spinal stenosis     UTI (urinary tract infection)        Past Surgical History:   Procedure Laterality Date    CHOLECYSTECTOMY      COLONOSCOPY  8/10/12    CORONARY ANGIOPLASTY WITH STENT PLACEMENT      PROSTATE BIOPSY      SKIN BIOPSY      UPPER GASTROINTESTINAL ENDOSCOPY  5/14/12    UPPER GASTROINTESTINAL ENDOSCOPY N/A 6/11/2019    EGD BIOPSY performed by Kailey Ramos DO at [address] N/A 8/8/2019    EGD performed by Court Molina MD at [address]       Scheduled Meds:   cefepime  2,000 mg IntraVENous Q12H    pantoprazole  40 mg IntraVENous Daily    sodium chloride flush  5-40 mL IntraVENous 2 times per day    enoxaparin  40 mg SubCUTAneous Daily    mupirocin   Nasal BID    pregabalin  75 mg Oral TID    sertraline  100 mg Oral Daily    magnesium sulfate  3,000 mg IntraVENous Once    insulin lispro  0-8 Units SubCUTAneous TID WC    insulin lispro  0-4 Units SubCUTAneous Nightly       Continuous Infusions:   sodium chloride      sodium chloride 100 mL/hr at 07/22/22 7682    norepinephrine      dextrose         PRN Meds:  sodium chloride flush, sodium chloride, acetaminophen **OR** acetaminophen, glucose, dextrose bolus **OR** dextrose bolus, glucagon (rDNA), dextrose       reports that he has quit smoking. His smoking use included cigarettes. He has a 2.50 pack-year smoking history. He has never used smokeless tobacco.    History reviewed. No pertinent family history. Social History     Socioeconomic History    Marital status:       Spouse name: None    Number of children: None    Years of education: None    Highest education level: None   Tobacco Use    Smoking status: Former     Packs/day: 0.25     Years: 10.00     Pack years: 2.50     Types: Cigarettes    Smokeless tobacco: Never   Vaping Use    Vaping Use: Never used   Substance and Sexual Activity    Alcohol use: No    Drug use: No    Sexual activity: Not Currently     REVIEW OF SYSTEMS:     Review of exams is difficult to do given patient's mental status. VS:   BP (!) 111/51   Pulse 85   Temp (!) 100.8 °F (38.2 °C) (Axillary)   Resp 20   Ht 5' 11\" (1.803 m)   Wt 192 lb 11.2 oz (87.4 kg)   SpO2 96%   BMI 26.88 kg/m²     Gen: Lethargic. Ill-appearing. Did open eyes and answer few questions. Was able to sit up for exam.  Eyes: PERRL. No sclera icterus. No conjunctival injection. ENT: No discharge. Pharynx clear. Neck: Trachea midline. Normal thyroid. Resp: Minimal accessory muscle use. No crackles. No wheezes. No rhonchi. No dullness on percussion. CV: Regular rate. Regular rhythm. No murmur or rub. No edema. GI: Mild suprapubic tenderness. Non-distended. No masses. No organomegaly. Normal bowel sounds. No hernia. Skin: Warm and dry. No nodule on exposed extremities. No rash on exposed extremities. Lymph: No cervical LAD. No supraclavicular LAD. M/S: No cyanosis. No joint deformity. No clubbing. Neuro: Lethargic. Reflexes 2+ symmetric bilaterally. Moves all 4 extremities, non focal  Psych: Oriented x person. CBC:   Recent Labs     07/21/22 2039 07/22/22  0850   WBC 10.2 19.9*   HGB 14.5 11.7*   HCT 43.0 34.9*   MCV 87.7 88.4    133*     BMP:   Recent Labs     07/21/22 2039 07/22/22  0850    134*   K 4.2 4.3   CL 98* 101   CO2 21 21   BUN 21* 17   CREATININE 1.1 1.0     LIVER PROFILE:   Recent Labs     07/21/22 2039 07/22/22  0850   AST 18 101*   ALT 15 82*   LIPASE 11.0*  --    BILITOT 0.6 0.5   ALKPHOS 137* 107     PT/INR: No results for input(s): PROTIME, INR in the last 72 hours. APTT: No results for input(s): APTT in the last 72 hours.   UA:  Recent Labs 07/22/22  0153   COLORU Yellow   PHUR 8.0   WBCUA 21-50*   RBCUA 5-10*   BACTERIA 1+*   CLARITYU Clear   SPECGRAV 1.010   LEUKOCYTESUR MODERATE*   UROBILINOGEN 0.2   BILIRUBINUR Negative   BLOODU LARGE*   GLUCOSEU Negative   AMORPHOUS Rare      Latest Reference Range & Units 7/22/22 08:50   Lactic Acid 0.4 - 2.0 mmol/L 4.3 (HH)   (HH): Data is critically high   Latest Reference Range & Units Most Recent 7/21/22 20:36 7/21/22 23:34   Lactic Acid, Sepsis 0.4 - 1.9 mmol/L 3.5 (H)  7/21/22 23:34 5.5 (HH) 3.5 (H)   (HH): Data is critically high  (H): Data is abnormally high   Latest Reference Range & Units Most Recent   Procalcitonin 0.00 - 0.15 ng/mL 0.17 (H)  7/21/22 20:39   (H): Data is abnormally high   Latest Reference Range & Units Most Recent 7/21/22 20:39 7/22/22 08:50   Troponin <0.01 ng/mL <0.01  7/22/22 08:50 <0.01 <0.01     XR CHEST PORTABLE   Final Result   1. A new right internal jugular central line terminates in the mid superior   vena cava. No associated pneumothorax. 2. New airspace opacity in the medial right lung base potentially due to   atelectasis, aspiration, or pneumonia. 3. New left basilar subsegmental atelectasis. 4. At least mild peribronchial cuffing potentially due pulmonary vascular   congestion, reactive airways disease, or bronchitis. 5. Possible new trace left pleural effusion. 6. Pulmonary vascular congestion. CT CHEST ABDOMEN PELVIS W CONTRAST   Final Result   1. Mild to moderate bronchial wall thickening with patchy bronchial   secretions potentially due to reactive airways disease or bronchitis. Pulmonary vascular congestion may contribute to the wall thickening. 2. Possible cardiovascular findings of pulmonary hypertension. 3. Suspected cystitis.    4. Unchanged 1.0 cm simple cystic lesion in the pancreatic tail, potentially   a mucinous cystic neoplasm such as branch duct intraductal papillary mucinous   neoplasm or a sequela of prior pancreatitis given changes of chronic   pancreatitis. Recommend follow-up with pancreas protocol abdomen MRI   (preferred) or CT in 2 years per the ACR recommendations for incidental   pancreatic cysts. 5. Additional incidental findings as above for which no dedicated follow-up   is recommended. XR CHEST PORTABLE   Final Result   No focal lung opacity. ECG   Sinus rhythm with frequent Premature ventricular complexes in a pattern of bigeminyNonspecific T wave abnormalityAbnormal ECGWhen compared with ECG of 21-JUL-2022 20:41,ST no longer depressed in Anterior leadsNonspecific T wave abnormality, improved in Inferior leadsNonspecific T wave abnormality, improved in Lateral leads           ASSESSMENT:    Principal Problem:    Septic shock (HCC)  Active Problems:    Acute cystitis with hematuria    Mass of pancreas    CAD (coronary artery disease)    HTN (hypertension)    HLD (hyperlipidemia)    Dementia (HCC)    Moderate malnutrition (HCC)    Lactic acid acidosis  Resolved Problems:    * No resolved hospital problems. *      PLAN:    #Septic shock. Work-up consistent with septic shock. Most likely source is urinary tract infection. Patient is admitted the ICU. Patient received fluid bolus in the ER. Patient is presently on Levophed. Goal is to maintain a MAP greater than 65. Critical care consultation obtained. #Urinary tract infection. Complicated UTI due to the fact that his UTI in a male. Culture sent. On cefepime. Okay to stop Levaquin. #Possible bronchitis for COPD. On cefepime. #Hypertension. Presently not a problem. Hold blood pressure medicines. #CAD. Came to ER with chest pain. Troponin negative. ECG nonacute. #Acute metabolic encephalopathy. Due to sepsis. Monitor for clinical improvement. #Lactic acidosis due to sepsis. Continue to monitor. #Dementia. Patient has dementia at baseline. #Moderate malnutrition. #Mass of the pancreas.   Outpatient work-up. #Mild hypoxemia. Continue to wean oxygen. Lovenox for DVT prophylaxis. IMPORTANT: Please note that some portions of this note may have been created using Dragon voice recognition software. Some \"sound-alike\" and totally wrong word substitutions may have taken place due to known inherent limitations of any such software, including this voice recognition software. In spite of efforts to eliminate such errors, some may not have been corrected. So please read the note with this in mind and recognize such mistakes and understand the correct version using the  context. If there are still uncertainties in the mind of the medical provider reading this note about any aspect of the note, the provider can feel free to contact me. Thanks.            Lee Stapleton MD 7/22/2022 12:58 PM

## 2022-07-22 NOTE — PROGRESS NOTES
4 Eyes Skin Assessment     The patient is being assess for   Admission    I agree that 2 RN's have performed a thorough Head to Toe Skin Assessment on the patient. ALL assessment sites listed below have been assessed. Areas assessed for pressure by both nurses:   [x]   Head, Face, and Ears   [x]   Shoulders, Back, and Chest, Abdomen  [x]   Arms, Elbows, and Hands   [x]   Coccyx, Sacrum, and Ischium  [x]   Legs, Feet, and Heels        Skin Assessed Under all Medical Devices by both nurses:  O2 device tubing              All Mepilex Borders were peeled back and area peeked at by both nurses:  Yes  Please list where Mepilex Borders are located:  coccyx area             Electronically signed by Zhou Hernandez RN on 7/22/2022 at 6:14 AM          Does the Patient have Skin Breakdown related to pressure?   No           Bridger Prevention initiated:  Yes   Wound Care Orders initiated:  No      [facility] nurse consulted for Pressure Injury (Stage 3,4, Unstageable, DTI, NWPT, Complex wounds)and New or Established Ostomies:  NA      Primary Nurse eSignature: Electronically signed by Kevin Fine RN on 7/22/22 at 6:09 AM EDT

## 2022-07-22 NOTE — PROGRESS NOTES
Care rounds completed with Dr Keagan Powell and multidisciplinary team.  Reviewed labs, meds, VS (temp/HR/RR), I/O's, assessment, & plan of care for today. See progress note & new orders for details.  Mavis Escobar RN

## 2022-07-23 PROBLEM — G93.41 ACUTE METABOLIC ENCEPHALOPATHY: Status: ACTIVE | Noted: 2022-07-23

## 2022-07-23 PROBLEM — N39.0 URINARY TRACT INFECTION WITH HEMATURIA: Status: ACTIVE | Noted: 2022-07-23

## 2022-07-23 PROBLEM — J96.01 ACUTE HYPOXEMIC RESPIRATORY FAILURE (HCC): Status: ACTIVE | Noted: 2022-07-23

## 2022-07-23 PROBLEM — R31.9 URINARY TRACT INFECTION WITH HEMATURIA: Status: ACTIVE | Noted: 2022-07-23

## 2022-07-23 LAB
ANION GAP SERPL CALCULATED.3IONS-SCNC: 7 MMOL/L (ref 3–16)
BUN BLDV-MCNC: 12 MG/DL (ref 7–20)
CALCIUM SERPL-MCNC: 8.5 MG/DL (ref 8.3–10.6)
CHLORIDE BLD-SCNC: 103 MMOL/L (ref 99–110)
CO2: 25 MMOL/L (ref 21–32)
CREAT SERPL-MCNC: 0.9 MG/DL (ref 0.8–1.3)
GFR AFRICAN AMERICAN: >60
GFR NON-AFRICAN AMERICAN: >60
GLUCOSE BLD-MCNC: 108 MG/DL (ref 70–99)
GLUCOSE BLD-MCNC: 114 MG/DL (ref 70–99)
GLUCOSE BLD-MCNC: 154 MG/DL (ref 70–99)
HCT VFR BLD CALC: 36.8 % (ref 40.5–52.5)
HEMOGLOBIN: 11.9 G/DL (ref 13.5–17.5)
MAGNESIUM: 2.1 MG/DL (ref 1.8–2.4)
MCH RBC QN AUTO: 28.2 PG (ref 26–34)
MCHC RBC AUTO-ENTMCNC: 32.2 G/DL (ref 31–36)
MCV RBC AUTO: 87.7 FL (ref 80–100)
PDW BLD-RTO: 15 % (ref 12.4–15.4)
PERFORMED ON: ABNORMAL
PERFORMED ON: ABNORMAL
PLATELET # BLD: 113 K/UL (ref 135–450)
PMV BLD AUTO: 8.4 FL (ref 5–10.5)
POTASSIUM SERPL-SCNC: 3.5 MMOL/L (ref 3.5–5.1)
RBC # BLD: 4.2 M/UL (ref 4.2–5.9)
REPORT: NORMAL
SODIUM BLD-SCNC: 135 MMOL/L (ref 136–145)
URINE CULTURE, ROUTINE: NORMAL
WBC # BLD: 13.5 K/UL (ref 4–11)

## 2022-07-23 PROCEDURE — 99233 SBSQ HOSP IP/OBS HIGH 50: CPT | Performed by: INTERNAL MEDICINE

## 2022-07-23 PROCEDURE — 36592 COLLECT BLOOD FROM PICC: CPT

## 2022-07-23 PROCEDURE — 2580000003 HC RX 258: Performed by: INTERNAL MEDICINE

## 2022-07-23 PROCEDURE — 6370000000 HC RX 637 (ALT 250 FOR IP): Performed by: INTERNAL MEDICINE

## 2022-07-23 PROCEDURE — 85027 COMPLETE CBC AUTOMATED: CPT

## 2022-07-23 PROCEDURE — C9113 INJ PANTOPRAZOLE SODIUM, VIA: HCPCS | Performed by: INTERNAL MEDICINE

## 2022-07-23 PROCEDURE — 80048 BASIC METABOLIC PNL TOTAL CA: CPT

## 2022-07-23 PROCEDURE — 99291 CRITICAL CARE FIRST HOUR: CPT | Performed by: INTERNAL MEDICINE

## 2022-07-23 PROCEDURE — 6360000002 HC RX W HCPCS: Performed by: INTERNAL MEDICINE

## 2022-07-23 PROCEDURE — 2000000000 HC ICU R&B

## 2022-07-23 PROCEDURE — 83735 ASSAY OF MAGNESIUM: CPT

## 2022-07-23 RX ADMIN — ENOXAPARIN SODIUM 40 MG: 100 INJECTION SUBCUTANEOUS at 08:43

## 2022-07-23 RX ADMIN — MUPIROCIN: 20 OINTMENT TOPICAL at 08:43

## 2022-07-23 RX ADMIN — Medication 10 ML: at 08:47

## 2022-07-23 RX ADMIN — SERTRALINE 100 MG: 100 TABLET, FILM COATED ORAL at 08:43

## 2022-07-23 RX ADMIN — SODIUM CHLORIDE: 9 INJECTION, SOLUTION INTRAVENOUS at 03:08

## 2022-07-23 RX ADMIN — Medication 10 ML: at 20:31

## 2022-07-23 RX ADMIN — CEFEPIME 2000 MG: 2 INJECTION, POWDER, FOR SOLUTION INTRAVENOUS at 02:11

## 2022-07-23 RX ADMIN — SODIUM CHLORIDE: 9 INJECTION, SOLUTION INTRAVENOUS at 16:49

## 2022-07-23 RX ADMIN — CEFEPIME 2000 MG: 2 INJECTION, POWDER, FOR SOLUTION INTRAVENOUS at 14:58

## 2022-07-23 RX ADMIN — PREGABALIN 75 MG: 25 CAPSULE ORAL at 14:58

## 2022-07-23 RX ADMIN — MUPIROCIN: 20 OINTMENT TOPICAL at 20:30

## 2022-07-23 RX ADMIN — PANTOPRAZOLE SODIUM 40 MG: 40 INJECTION, POWDER, FOR SOLUTION INTRAVENOUS at 06:33

## 2022-07-23 RX ADMIN — PREGABALIN 75 MG: 25 CAPSULE ORAL at 08:43

## 2022-07-23 RX ADMIN — PREGABALIN 75 MG: 25 CAPSULE ORAL at 20:30

## 2022-07-23 ASSESSMENT — PAIN SCALES - GENERAL
PAINLEVEL_OUTOF10: 2
PAINLEVEL_OUTOF10: 3

## 2022-07-23 ASSESSMENT — PAIN DESCRIPTION - DESCRIPTORS: DESCRIPTORS: DISCOMFORT

## 2022-07-23 ASSESSMENT — PAIN DESCRIPTION - LOCATION: LOCATION: NECK

## 2022-07-23 NOTE — PROGRESS NOTES
Data:  CBC:   Recent Labs     07/21/22 2039 07/22/22  0850 07/23/22  0420   WBC 10.2 19.9* 13.5*   HGB 14.5 11.7* 11.9*   HCT 43.0 34.9* 36.8*   MCV 87.7 88.4 87.7    133* 113*     BMP:   Recent Labs     07/21/22 2039 07/22/22  0850 07/23/22  0420    134* 135*   K 4.2 4.3 3.5   CL 98* 101 103   CO2 21 21 25   BUN 21* 17 12   CREATININE 1.1 1.0 0.9     LIVER PROFILE:   Recent Labs     07/21/22 2039 07/22/22  0850   AST 18 101*   ALT 15 82*   LIPASE 11.0*  --    BILITOT 0.6 0.5   ALKPHOS 137* 107       Microbiology:   7/22/2022 urine lab confirmed its reported is plated  2/51/5298 blood NGTD   7/21/2022 SARS-CoV-2 denies change in visual acuity    Imaging:  Chest x-ray 7/22  images reviewed by me and showed:   Bilateral GGOs     CT chest 7/21  1. Mild to moderate bronchial wall thickening with patchy bronchial   secretions potentially due to reactive airways disease or bronchitis. Pulmonary vascular congestion may contribute to the wall thickening. 2. Possible cardiovascular findings of pulmonary hypertension. 3. Suspected cystitis. 4. Unchanged 1.0 cm simple cystic lesion in the pancreatic tail, potentially   a mucinous cystic neoplasm such as branch duct intraductal papillary mucinous   neoplasm or a sequela of prior pancreatitis given changes of chronic   pancreatitis. Recommend follow-up with pancreas protocol abdomen MRI   (preferred) or CT in 2 years per the ACR recommendations for incidental   pancreatic cysts.    5. Additional incidental findings as above for which no dedicated follow-up   is recommended    ASSESSMENT:  Septic shock  Acute hypoxemic respiratory failure  UTI - acute cystitis with hematuria  Acute metabolic encephalopathy, typically the patient is oriented x2, moves about his skilled nursing facility in wheelchair, and speech is fluent  Pancreatic mass  Mild lower lobe bronchiectasis  Nursing home resident     PLAN:  Supplemental oxygen to maintain SaO2 >92%; wean as tolerated   IV fluid resuscitation with crystalloid has been completed  IV Cefepime day #3   cc/hr   IV levophed to maintain MAP of 65  Follow up on cultures  Follow electrolytes  Blood sugar control, with goal 140-180   Prophylaxis: Lovenox, Protonix, Bactroban        Total critical care time caring for this patient with life threatening, unstable organ failure, including direct patient contact, management of life support systems, review of data including imaging and labs, discussions with other team members and physicians is 31 minutes, excluding procedures.

## 2022-07-23 NOTE — PROGRESS NOTES
Internal Medicine ICU Progress Note      Events of Last 24 hours:     Patient is more awake and alert. He is still on Levophed. He continues to have confusion at baseline. He has dementia. Invasive Lines: Right IJ placed on 2022        MV: N/A    No results for input(s): PHART, FBB0PPG, PO2ART in the last 72 hours. MV Settings:     / / /     IV:   sodium chloride      sodium chloride 100 mL/hr at 22 0659    norepinephrine 4 mcg/min (22 0659)    dextrose         Vitals:  Temp  Av.5 °F (37.5 °C)  Min: 98.2 °F (36.8 °C)  Max: 101.6 °F (38.7 °C)  Pulse  Av.8  Min: 55  Max: 103  BP  Min: 88/43  Max: 124/43  SpO2  Av.5 %  Min: 90 %  Max: 99 %  No data found. CVP:        Intake/Output Summary (Last 24 hours) at 2022 1309  Last data filed at 2022 0658  Gross per 24 hour   Intake 3319.8 ml   Output 2500 ml   Net 819.8 ml       EXAM:  General: More awake and alert/still confused  Eyes: PERRL. No sclera icterus. No conjunctiva injected. ENT: No discharge. Pharynx clear. Neck: Trachea midline. Normal thyroid. Resp: No accessory muscle use. No crackles. No wheezing. No rhonchi. No dullness on percussion. CV: Regular rate. Regular rhythm. No mumur or rub. No edema. No JVD. Palpable pedal pulses. GI: Non-tender. Non-distended. No masses. No organmegaly. Normal bowel sounds. No hernia. Skin: Warm and dry. No nodule on exposed extremities. No rash on exposed extremities. Lymph: No cervical LAD. No supraclavicular LAD. M/S: No cyanosis. No joint deformity. No clubbing. Neuro: Awake. Follows commands. Positive pupils/gag/corneals. Normal pain response. Psych: Oriented to person, place, time. No anxiety or agitation.      Medications:  Scheduled Meds:   cefepime  2,000 mg IntraVENous Q12H    pantoprazole  40 mg IntraVENous Daily    sodium chloride flush  5-40 mL IntraVENous 2 times per day    enoxaparin  40 mg SubCUTAneous Daily    mupirocin   Nasal BID pregabalin  75 mg Oral TID    sertraline  100 mg Oral Daily    insulin lispro  0-8 Units SubCUTAneous TID     insulin lispro  0-4 Units SubCUTAneous Nightly       PRN Meds:  sodium chloride flush, sodium chloride, acetaminophen **OR** acetaminophen, glucose, dextrose bolus **OR** dextrose bolus, glucagon (rDNA), dextrose    Results:  CBC:   Recent Labs     07/21/22 2039 07/22/22  0850 07/23/22  0420   WBC 10.2 19.9* 13.5*   HGB 14.5 11.7* 11.9*   HCT 43.0 34.9* 36.8*   MCV 87.7 88.4 87.7    133* 113*     BMP:   Recent Labs     07/21/22 2039 07/22/22  0850 07/23/22  0420    134* 135*   K 4.2 4.3 3.5   CL 98* 101 103   CO2 21 21 25   BUN 21* 17 12   CREATININE 1.1 1.0 0.9     LIVER PROFILE:   Recent Labs     07/21/22 2039 07/22/22  0850   AST 18 101*   ALT 15 82*   LIPASE 11.0*  --    BILITOT 0.6 0.5   ALKPHOS 137* 107     PT/INR: No results for input(s): PROTIME, INR in the last 72 hours. APTT: No results for input(s): APTT in the last 72 hours. UA:  Recent Labs     07/22/22  0153   COLORU Yellow   PHUR 8.0   WBCUA 21-50*   RBCUA 5-10*   BACTERIA 1+*   CLARITYU Clear   SPECGRAV 1.010   LEUKOCYTESUR MODERATE*   UROBILINOGEN 0.2   BILIRUBINUR Negative   BLOODU LARGE*   GLUCOSEU Negative   AMORPHOUS Rare       Cultures:  COVID-19 negative  Urine culture pending  Blood culture no growth to date    Films:    XR CHEST PORTABLE   Final Result   1. A new right internal jugular central line terminates in the mid superior   vena cava. No associated pneumothorax. 2. New airspace opacity in the medial right lung base potentially due to   atelectasis, aspiration, or pneumonia. 3. New left basilar subsegmental atelectasis. 4. At least mild peribronchial cuffing potentially due pulmonary vascular   congestion, reactive airways disease, or bronchitis. 5. Possible new trace left pleural effusion. 6. Pulmonary vascular congestion. CT CHEST ABDOMEN PELVIS W CONTRAST   Final Result   1.  Mild to moderate bronchial wall thickening with patchy bronchial   secretions potentially due to reactive airways disease or bronchitis. Pulmonary vascular congestion may contribute to the wall thickening. 2. Possible cardiovascular findings of pulmonary hypertension. 3. Suspected cystitis. 4. Unchanged 1.0 cm simple cystic lesion in the pancreatic tail, potentially   a mucinous cystic neoplasm such as branch duct intraductal papillary mucinous   neoplasm or a sequela of prior pancreatitis given changes of chronic   pancreatitis. Recommend follow-up with pancreas protocol abdomen MRI   (preferred) or CT in 2 years per the ACR recommendations for incidental   pancreatic cysts. 5. Additional incidental findings as above for which no dedicated follow-up   is recommended. XR CHEST PORTABLE   Final Result   No focal lung opacity. Assessment:    Principal Problem:    Septic shock (HCC)  Active Problems:    Acute cystitis with hematuria    Mass of pancreas    Acute hypoxemic respiratory failure (HCC)    Acute metabolic encephalopathy    Urinary tract infection with hematuria    CAD (coronary artery disease)    HTN (hypertension)    HLD (hyperlipidemia)    Dementia (HCC)    Moderate malnutrition (HCC)    Lactic acid acidosis  Resolved Problems:    * No resolved hospital problems. *         Plan:    #Septic shock. Work-up consistent with septic shock. Most likely source is urinary tract infection. Patient is admitted the ICU. Patient received fluid bolus in the ER. Patient is presently on Levophed. Goal is to maintain a MAP greater than 65. Critical care consultation obtained. #Urinary tract infection. Complicated UTI due to the fact that his UTI in a male. Culture sent. On cefepime day#3. Okay to stop Levaquin. #Possible bronchitis for COPD. On cefepime. #Hypertension. Presently not a problem. Hold blood pressure medicines. #CAD. Came to ER with chest pain.   Troponin negative. ECG nonacute. #Acute metabolic encephalopathy. Due to sepsis. Monitor for clinical improvement. He also has underlying dementia. #Lactic acidosis due to sepsis. Continue to monitor. #Dementia. Patient has dementia at baseline. #Moderate malnutrition. #Mass of the pancreas. Outpatient work-up. #Mild hypoxemia. Continue to wean oxygen. Lovenox for DVT prophylaxis. All questions and concerns were addressed to the patient/family. Alternatives to my treatment were discussed. The note was completed using EMR. Every effort was made to ensure accuracy; however, inadvertent computerized transcription errors may be present.          Victor Manuel Ross MD 1:09 PM 7/23/2022

## 2022-07-23 NOTE — PROGRESS NOTES
No changes, pt still remains confused. Pt refusing to eat, stated Grayson Meriwether it to someone who can use it. \" Encouraged pt to eat for energy, stated he wasn't interested

## 2022-07-23 NOTE — PROGRESS NOTES
Lab called with results for positive blood culture with staph epi. Pharmacist notified, states it may be contaminate. MD notified.

## 2022-07-23 NOTE — PROGRESS NOTES
Patient awake alert, disoriented to place and time ( thinks he is [place] \" [place]\", reorient patient to his situation and facility, on o2 at 2 lpm nasal cannula, sao2 97%, VSS, declined to eat his dinner.

## 2022-07-23 NOTE — PROGRESS NOTES
Shift assessment completed as documented on flowsheet. Pt alert to person, confused and is not able to be reoriented. VSS with levophed support. Denies pain or discomfort. Call light within reach.

## 2022-07-23 NOTE — FLOWSHEET NOTE
7. 23.22/Patient Ryan Bautista   07/23/22 1030   Encounter Summary   Encounter Overview/Reason  Initial Encounter   Service Provided For: Patient   Referral/Consult From: [address] Family members   Last Encounter  07/23/22   Complexity of Encounter Moderate   Begin Time 0935   End Time  0950   Total Time Calculated 15 min   Spiritual/Emotional needs   Type Spiritual Support   Assessment/Intervention/Outcome   Assessment Calm;Coping; Hopeful   Intervention Active listening;Nurtured Hope;Prayer (assurance of)/[place];Read/Provided Scripture;Sustaining Presence/Ministry of presence  (Spiritual support with brief conversation and prayer)   Outcome Comfort;Encouraged;Engaged in conversation;Expressed Gratitude;Expressed feelings, needs, and concerns;Venting emotion  (Spiritual support with brief conversation and prayer)   Spiritual support with brief conversation and prayer

## 2022-07-24 PROBLEM — D69.6 THROMBOCYTOPENIA (HCC): Status: ACTIVE | Noted: 2022-07-24

## 2022-07-24 LAB
ANION GAP SERPL CALCULATED.3IONS-SCNC: 7 MMOL/L (ref 3–16)
BUN BLDV-MCNC: 11 MG/DL (ref 7–20)
CALCIUM SERPL-MCNC: 8.6 MG/DL (ref 8.3–10.6)
CHLORIDE BLD-SCNC: 101 MMOL/L (ref 99–110)
CO2: 27 MMOL/L (ref 21–32)
CREAT SERPL-MCNC: 0.9 MG/DL (ref 0.8–1.3)
GFR AFRICAN AMERICAN: >60
GFR NON-AFRICAN AMERICAN: >60
GLUCOSE BLD-MCNC: 100 MG/DL (ref 70–99)
GLUCOSE BLD-MCNC: 100 MG/DL (ref 70–99)
GLUCOSE BLD-MCNC: 99 MG/DL (ref 70–99)
HCT VFR BLD CALC: 34.1 % (ref 40.5–52.5)
HEMOGLOBIN: 11.5 G/DL (ref 13.5–17.5)
MCH RBC QN AUTO: 29.5 PG (ref 26–34)
MCHC RBC AUTO-ENTMCNC: 33.6 G/DL (ref 31–36)
MCV RBC AUTO: 87.6 FL (ref 80–100)
PDW BLD-RTO: 14.9 % (ref 12.4–15.4)
PERFORMED ON: ABNORMAL
PERFORMED ON: NORMAL
PLATELET # BLD: 104 K/UL (ref 135–450)
PMV BLD AUTO: 8.1 FL (ref 5–10.5)
POTASSIUM SERPL-SCNC: 3.5 MMOL/L (ref 3.5–5.1)
RBC # BLD: 3.9 M/UL (ref 4.2–5.9)
SODIUM BLD-SCNC: 135 MMOL/L (ref 136–145)
WBC # BLD: 8.1 K/UL (ref 4–11)

## 2022-07-24 PROCEDURE — 94761 N-INVAS EAR/PLS OXIMETRY MLT: CPT

## 2022-07-24 PROCEDURE — 99233 SBSQ HOSP IP/OBS HIGH 50: CPT | Performed by: INTERNAL MEDICINE

## 2022-07-24 PROCEDURE — C9113 INJ PANTOPRAZOLE SODIUM, VIA: HCPCS | Performed by: INTERNAL MEDICINE

## 2022-07-24 PROCEDURE — 2700000000 HC OXYGEN THERAPY PER DAY

## 2022-07-24 PROCEDURE — 2580000003 HC RX 258: Performed by: INTERNAL MEDICINE

## 2022-07-24 PROCEDURE — 85027 COMPLETE CBC AUTOMATED: CPT

## 2022-07-24 PROCEDURE — 6360000002 HC RX W HCPCS: Performed by: INTERNAL MEDICINE

## 2022-07-24 PROCEDURE — 1200000000 HC SEMI PRIVATE

## 2022-07-24 PROCEDURE — 6370000000 HC RX 637 (ALT 250 FOR IP): Performed by: INTERNAL MEDICINE

## 2022-07-24 PROCEDURE — 80048 BASIC METABOLIC PNL TOTAL CA: CPT

## 2022-07-24 RX ORDER — POTASSIUM CHLORIDE 750 MG/1
20 TABLET, EXTENDED RELEASE ORAL 2 TIMES DAILY
Status: DISCONTINUED | OUTPATIENT
Start: 2022-07-24 | End: 2022-07-26 | Stop reason: HOSPADM

## 2022-07-24 RX ADMIN — CEFEPIME 2000 MG: 2 INJECTION, POWDER, FOR SOLUTION INTRAVENOUS at 14:18

## 2022-07-24 RX ADMIN — POTASSIUM CHLORIDE 20 MEQ: 750 TABLET, EXTENDED RELEASE ORAL at 09:31

## 2022-07-24 RX ADMIN — PREGABALIN 75 MG: 25 CAPSULE ORAL at 22:16

## 2022-07-24 RX ADMIN — PREGABALIN 75 MG: 25 CAPSULE ORAL at 08:47

## 2022-07-24 RX ADMIN — PANTOPRAZOLE SODIUM 40 MG: 40 INJECTION, POWDER, FOR SOLUTION INTRAVENOUS at 06:43

## 2022-07-24 RX ADMIN — ENOXAPARIN SODIUM 40 MG: 100 INJECTION SUBCUTANEOUS at 08:47

## 2022-07-24 RX ADMIN — CEFEPIME 2000 MG: 2 INJECTION, POWDER, FOR SOLUTION INTRAVENOUS at 02:29

## 2022-07-24 RX ADMIN — MUPIROCIN: 20 OINTMENT TOPICAL at 08:48

## 2022-07-24 RX ADMIN — PREGABALIN 75 MG: 25 CAPSULE ORAL at 14:15

## 2022-07-24 RX ADMIN — POTASSIUM CHLORIDE 20 MEQ: 750 TABLET, EXTENDED RELEASE ORAL at 22:16

## 2022-07-24 RX ADMIN — SERTRALINE 100 MG: 100 TABLET, FILM COATED ORAL at 08:47

## 2022-07-24 RX ADMIN — Medication 10 ML: at 22:17

## 2022-07-24 NOTE — PROGRESS NOTES
4 Eyes Skin Assessment     The patient is being assess for   Shift Handoff    I agree that 2 RN's have performed a thorough Head to Toe Skin Assessment on the patient. ALL assessment sites listed below have been assessed. Areas assessed by both nurses:   [x]   Head, Face, and Ears   [x]   Shoulders, Back, and Chest, Abdomen  [x]   Arms, Elbows, and Hands   [x]   Coccyx, Sacrum, and Ischium  [x]   Legs, Feet, and Heels        Pt has redness to scrotal area, cream applied. Co-signer eSignature: Electronically signed by Vin Vines RN on 7/23/22 at 11:14 PM EDT    Does the Patient have Skin Breakdown?   No          Bridger Prevention initiated:  Yes   Wound Care Orders initiated:  No      WOC nurse consulted for Pressure Injury (Stage 3,4, Unstageable, DTI, NWPT, Complex wounds)and New or Established Ostomies:  No      Primary Nurse eSignature: Electronically signed by Bal Miller RN on 7/23/22 at 10:10 PM EDT

## 2022-07-24 NOTE — PROGRESS NOTES
Shift assessment completed as documented on flowsheet. Pt oriented to person and alert. Attempted to reorient but pt remains confused. VSS without levophed support. Denies pain or discomfort.

## 2022-07-24 NOTE — PROGRESS NOTES
Internal Medicine ICU Progress Note      Events of Last 24 hours:     Patient is more awake and alert. He is still on Levophed. He continues to have confusion at baseline. He has dementia. He is off Levophed. BP stable. Invasive Lines: Right IJ placed on 2022        MV: N/A    No results for input(s): PHART, [id number], PO2ART in the last 72 hours. MV Settings:     / / /     IV:   sodium chloride      dextrose         Vitals:  Temp  Av °F (37.2 °C)  Min: 98.2 °F (36.8 °C)  Max: 99.3 °F (37.4 °C)  Pulse  Av.5  Min: 62  Max: 106  BP  Min: 93/50  Max: 130/61  SpO2  Av.1 %  Min: 87 %  Max: 99 %  Patient Vitals for the past 4 hrs:   BP Temp Temp src Pulse Resp SpO2   22 1000 (!) 106/55 -- -- 74 13 96 %   22 0900 (!) 97/59 -- -- 70 20 99 %   22 0800 (!) 116/48 -- -- 74 15 95 %   22 0700 125/65 99.3 °F (37.4 °C) Axillary 68 21 93 %       CVP:        Intake/Output Summary (Last 24 hours) at 2022 1052  Last data filed at 2022 0644  Gross per 24 hour   Intake 1304.35 ml   Output 2500 ml   Net -1195.65 ml         EXAM:  General: More awake and alert/still confused  Eyes: PERRL. No sclera icterus. No conjunctiva injected. ENT: No discharge. Pharynx clear. Neck: Trachea midline. Normal thyroid. Resp: No accessory muscle use. No crackles. No wheezing. No rhonchi. No dullness on percussion. CV: Regular rate. Regular rhythm. No mumur or rub. No edema. No JVD. Palpable pedal pulses. GI: Non-tender. Non-distended. No masses. No organmegaly. Normal bowel sounds. No hernia. Skin: Warm and dry. No nodule on exposed extremities. No rash on exposed extremities. Lymph: No cervical LAD. No supraclavicular LAD. M/S: No cyanosis. No joint deformity. No clubbing. Neuro: Awake. Follows commands. Positive pupils/gag/corneals. Normal pain response. Psych: Oriented to person, place, time. No anxiety or agitation.      Medications:  Scheduled Meds:   potassium chloride 20 mEq Oral BID    cefepime  2,000 mg IntraVENous Q12H    pantoprazole  40 mg IntraVENous Daily    sodium chloride flush  5-40 mL IntraVENous 2 times per day    enoxaparin  40 mg SubCUTAneous Daily    mupirocin   Nasal BID    pregabalin  75 mg Oral TID    sertraline  100 mg Oral Daily    insulin lispro  0-8 Units SubCUTAneous TID WC    insulin lispro  0-4 Units SubCUTAneous Nightly       PRN Meds:  sodium chloride flush, sodium chloride, acetaminophen **OR** acetaminophen, glucose, dextrose bolus **OR** dextrose bolus, glucagon (rDNA), dextrose    Results:  CBC:   Recent Labs     07/22/22  0850 07/23/22  0420 07/24/22  0510   WBC 19.9* 13.5* 8.1   HGB 11.7* 11.9* 11.5*   HCT 34.9* 36.8* 34.1*   MCV 88.4 87.7 87.6   * 113* 104*       BMP:   Recent Labs     07/22/22  0850 07/23/22  0420 07/24/22  0510   * 135* 135*   K 4.3 3.5 3.5    103 101   CO2 21 25 27   BUN 17 12 11   CREATININE 1.0 0.9 0.9       LIVER PROFILE:   Recent Labs     07/21/22  2039 07/22/22  0850   AST 18 101*   ALT 15 82*   LIPASE 11.0*  --    BILITOT 0.6 0.5   ALKPHOS 137* 107       PT/INR: No results for input(s): PROTIME, INR in the last 72 hours. APTT: No results for input(s): APTT in the last 72 hours. UA:  Recent Labs     07/22/22  0153   COLORU Yellow   PHUR 8.0   WBCUA 21-50*   RBCUA 5-10*   BACTERIA 1+*   CLARITYU Clear   SPECGRAV 1.010   LEUKOCYTESUR MODERATE*   UROBILINOGEN 0.2   BILIRUBINUR Negative   BLOODU LARGE*   GLUCOSEU Negative   AMORPHOUS Rare         Cultures:  COVID-19 negative  Urine culture neg  Blood culture: S epidermidis    Films:    XR CHEST PORTABLE   Final Result   1. A new right internal jugular central line terminates in the mid superior   vena cava. No associated pneumothorax. 2. New airspace opacity in the medial right lung base potentially due to   atelectasis, aspiration, or pneumonia. 3. New left basilar subsegmental atelectasis.    4. At least mild peribronchial cuffing potentially due pulmonary vascular   congestion, reactive airways disease, or bronchitis. 5. Possible new trace left pleural effusion. 6. Pulmonary vascular congestion. CT CHEST ABDOMEN PELVIS W CONTRAST   Final Result   1. Mild to moderate bronchial wall thickening with patchy bronchial   secretions potentially due to reactive airways disease or bronchitis. Pulmonary vascular congestion may contribute to the wall thickening. 2. Possible cardiovascular findings of pulmonary hypertension. 3. Suspected cystitis. 4. Unchanged 1.0 cm simple cystic lesion in the pancreatic tail, potentially   a mucinous cystic neoplasm such as branch duct intraductal papillary mucinous   neoplasm or a sequela of prior pancreatitis given changes of chronic   pancreatitis. Recommend follow-up with pancreas protocol abdomen MRI   (preferred) or CT in 2 years per the ACR recommendations for incidental   pancreatic cysts. 5. Additional incidental findings as above for which no dedicated follow-up   is recommended. XR CHEST PORTABLE   Final Result   No focal lung opacity. Assessment:    Principal Problem:    Septic shock ([address]  Active Problems:    Acute cystitis with hematuria    Mass of pancreas    Acute hypoxemic respiratory failure (HCC)    Acute metabolic encephalopathy    Urinary tract infection with hematuria    Thrombocytopenia (HCC)    CAD (coronary artery disease)    HTN (hypertension)    HLD (hyperlipidemia)    Dementia (HCC)    Moderate malnutrition (HCC)    Lactic acid acidosis  Resolved Problems:    * No resolved hospital problems. *         Plan:    #Septic shock. Work-up consistent with septic shock. Most likely source is urinary tract infection. Patient is admitted the ICU. Patient received fluid bolus in the ER. Patient weaned off Levophed. Critical care consultation obtained. #Urinary tract infection. Complicated UTI due to the fact that his UTI in a male.   Culture sent but culture negative. On cefepime day#4. Okay to stop Levaquin. #Possible bronchitis for COPD. On cefepime. #Hypertension. Presently not a problem. Hold blood pressure medicines. #CAD. Came to ER with chest pain. Troponin negative. ECG nonacute. #Acute metabolic encephalopathy. Due to sepsis. Monitor for clinical improvement. He also has underlying dementia. #Lactic acidosis due to sepsis. stable. #Dementia. Patient has dementia at baseline. #Moderate malnutrition. #Mass of the pancreas. Outpatient work-up. #Mild hypoxemia. Continue to wean oxygen. Lovenox for DVT prophylaxis. Transfer to [facility] surgery      All questions and concerns were addressed to the patient/family. Alternatives to my treatment were discussed. The note was completed using EMR. Every effort was made to ensure accuracy; however, inadvertent computerized transcription errors may be present.          Elisabeth Bullard MD 10:52 AM 7/24/2022

## 2022-07-24 NOTE — PROGRESS NOTES
Physician Progress Note      Melissa King  CSN #:                  [id number]  :                       1935  ADMIT DATE:       2022 8:30 PM  [address] [place] DATE:  RESPONDING  PROVIDER #:        Neena Mccracken MD          QUERY TEXT:    Patient admitted with sepsis. Noted documentation of mild hypoxemia by   Attending and \" Acute hypoxemic respiratory failure\" by Pulmonology. If   possible, please document in progress notes and discharge summary if you are   evaluating and /or treating any of the following: The medical record reflects the following:  Risk Factors: COPD, possible bronchitis, acute illness  Clinical Indicators: RR 12-25, SPO2 90-97%, requiring 2-4 L NC, 89-90% on RA   in ED, No accessory muscle use  Treatment: supplemental oxygen, CXR, Pulmonology consult, supportive care    Thank you,  Tara Lewis RN, CDS  [email or web address]  Options provided:  -- Mild hypoxemia confirmed and  acute hypoxemic respiratory failure ruled out  -- Acute hypoxemic respiratory failure confirmed  -- Other - I will add my own diagnosis  -- Disagree - Not applicable / Not valid  -- Disagree - Clinically unable to determine / Unknown  -- Refer to Clinical Documentation Reviewer    PROVIDER RESPONSE TEXT:    After study, mild hypoxemia confirmed and acute hypoxemic respiratory failure   ruled out.     Query created by: Blu Rodriguez on 2022 9:17 PM      Electronically signed by:  Neena Mccracken MD 2022 10:50 AM

## 2022-07-24 NOTE — PROGRESS NOTES
Pulmonary & Critical Care Medicine ICU Progress Note    CC: Septic shock    Events of Last 24 hours:   RA   Off Levophed   NS off     Vascular lines: IV: R IJ          / / /   No results for input(s): PHART, [id number], PO2ART in the last 72 hours. IV:   sodium chloride      sodium chloride Stopped (22)    norepinephrine Stopped (22)    dextrose         Vitals:  Blood pressure 119/61, pulse 62, temperature 99.2 °F (37.3 °C), temperature source Oral, resp. rate 15, height 5' 11\" (1.803 m), weight 182 lb 1.6 oz (82.6 kg), SpO2 94 %. on RA  Temp  Av.9 °F (37.2 °C)  Min: 98.2 °F (36.8 °C)  Max: 99.2 °F (37.3 °C)    Intake/Output Summary (Last 24 hours) at 2022 0738  Last data filed at 2022 0644  Gross per 24 hour   Intake 1304.35 ml   Output 2500 ml   Net -1195.65 ml     PE:  General: ill appearing    Eyes: PERRL. No sclera icterus. No conjunctival injection. ENT: No discharge. Pharynx clear. Neck: Trachea midline. Normal thyroid. Resp: No accessory muscle use. No crackles. No wheezing. No rhonchi. No dullness on percussion. CV: Regular rate. Regular rhythm. No mumur or rub. No edema. GI: Non-tender. Non-distended. No masses. No organomegaly. Normal bowel sounds. No hernia. Skin: Warm and dry. No nodule on exposed extremities. No rash on exposed extremities. Lymph: No cervical LAD. No supraclavicular LAD. M/S: No cyanosis. No joint deformity. No clubbing. Neuro: Confused. Followed commands.    Psych: No agitation, no anxiety, affect is full     Scheduled Meds:   cefepime  2,000 mg IntraVENous Q12H    pantoprazole  40 mg IntraVENous Daily    sodium chloride flush  5-40 mL IntraVENous 2 times per day    enoxaparin  40 mg SubCUTAneous Daily    mupirocin   Nasal BID    pregabalin  75 mg Oral TID    sertraline  100 mg Oral Daily    insulin lispro  0-8 Units SubCUTAneous TID WC    insulin lispro  0-4 Units SubCUTAneous Nightly       Data:  CBC:   Recent Labs 07/22/22  0850 07/23/22  0420 07/24/22  0510   WBC 19.9* 13.5* 8.1   HGB 11.7* 11.9* 11.5*   HCT 34.9* 36.8* 34.1*   MCV 88.4 87.7 87.6   * 113* 104*     BMP:   Recent Labs     07/22/22  0850 07/23/22  0420 07/24/22  0510   * 135* 135*   K 4.3 3.5 3.5    103 101   CO2 21 25 27   BUN 17 12 11   CREATININE 1.0 0.9 0.9     LIVER PROFILE:   Recent Labs     07/21/22 2039 07/22/22  0850   AST 18 101*   ALT 15 82*   LIPASE 11.0*  --    BILITOT 0.6 0.5   ALKPHOS 137* 107       Microbiology:   7/22/2022 urine lab confirmed its reported is plated  1/49/0910 blood NGTD   7/21/2022 SARS-CoV-2 denies change in visual acuity    Imaging:  Chest x-ray 7/22  images reviewed by me and showed:   Bilateral GGOs     CT chest 7/21  1. Mild to moderate bronchial wall thickening with patchy bronchial   secretions potentially due to reactive airways disease or bronchitis. Pulmonary vascular congestion may contribute to the wall thickening. 2. Possible cardiovascular findings of pulmonary hypertension. 3. Suspected cystitis. 4. Unchanged 1.0 cm simple cystic lesion in the pancreatic tail, potentially   a mucinous cystic neoplasm such as branch duct intraductal papillary mucinous   neoplasm or a sequela of prior pancreatitis given changes of chronic   pancreatitis. Recommend follow-up with pancreas protocol abdomen MRI   (preferred) or CT in 2 years per the ACR recommendations for incidental   pancreatic cysts.    5. Additional incidental findings as above for which no dedicated follow-up   is recommended    ASSESSMENT:  Septic shock  Acute hypoxemic respiratory failure  UTI - acute cystitis with hematuria  Thrombocytopenia   Acute metabolic encephalopathy, typically the patient is oriented x2, moves about his skilled nursing facility in wheelchair, and speech is fluent  Pancreatic mass  Mild lower lobe bronchiectasis  Nursing home resident     PLAN:  Supplemental oxygen to maintain SaO2 >92%; wean as tolerated IV fluid resuscitation with crystalloid has been completed  IV Cefepime day #4  KCL  D/C IVF   D/C IV levophed to maintain MAP of 65  Follow up on cultures  Follow electrolytes  Follow platelets   Blood sugar control, with goal 140-180   Prophylaxis: Lovenox, Protonix, Bactroban  Okay to move out of the ICU from our perspective

## 2022-07-25 PROBLEM — R09.02 HYPOXIA: Status: ACTIVE | Noted: 2022-07-25

## 2022-07-25 LAB
ANION GAP SERPL CALCULATED.3IONS-SCNC: 6 MMOL/L (ref 3–16)
BLOOD CULTURE, ROUTINE: ABNORMAL
BLOOD CULTURE, ROUTINE: ABNORMAL
BUN BLDV-MCNC: 12 MG/DL (ref 7–20)
CALCIUM SERPL-MCNC: 9 MG/DL (ref 8.3–10.6)
CHLORIDE BLD-SCNC: 103 MMOL/L (ref 99–110)
CO2: 28 MMOL/L (ref 21–32)
CREAT SERPL-MCNC: 0.8 MG/DL (ref 0.8–1.3)
GFR AFRICAN AMERICAN: >60
GFR NON-AFRICAN AMERICAN: >60
GLUCOSE BLD-MCNC: 107 MG/DL (ref 70–99)
GLUCOSE BLD-MCNC: 115 MG/DL (ref 70–99)
GLUCOSE BLD-MCNC: 145 MG/DL (ref 70–99)
GLUCOSE BLD-MCNC: 175 MG/DL (ref 70–99)
GLUCOSE BLD-MCNC: 95 MG/DL (ref 70–99)
HCT VFR BLD CALC: 36 % (ref 40.5–52.5)
HEMOGLOBIN: 12.1 G/DL (ref 13.5–17.5)
MCH RBC QN AUTO: 29.2 PG (ref 26–34)
MCHC RBC AUTO-ENTMCNC: 33.4 G/DL (ref 31–36)
MCV RBC AUTO: 87.4 FL (ref 80–100)
ORGANISM: ABNORMAL
ORGANISM: ABNORMAL
PDW BLD-RTO: 14.8 % (ref 12.4–15.4)
PERFORMED ON: ABNORMAL
PERFORMED ON: NORMAL
PLATELET # BLD: 111 K/UL (ref 135–450)
PMV BLD AUTO: 7.8 FL (ref 5–10.5)
POTASSIUM SERPL-SCNC: 3.6 MMOL/L (ref 3.5–5.1)
RBC # BLD: 4.12 M/UL (ref 4.2–5.9)
SODIUM BLD-SCNC: 137 MMOL/L (ref 136–145)
WBC # BLD: 6.6 K/UL (ref 4–11)

## 2022-07-25 PROCEDURE — 1200000000 HC SEMI PRIVATE

## 2022-07-25 PROCEDURE — C9113 INJ PANTOPRAZOLE SODIUM, VIA: HCPCS | Performed by: INTERNAL MEDICINE

## 2022-07-25 PROCEDURE — 2580000003 HC RX 258: Performed by: INTERNAL MEDICINE

## 2022-07-25 PROCEDURE — 6370000000 HC RX 637 (ALT 250 FOR IP): Performed by: INTERNAL MEDICINE

## 2022-07-25 PROCEDURE — 99232 SBSQ HOSP IP/OBS MODERATE 35: CPT | Performed by: INTERNAL MEDICINE

## 2022-07-25 PROCEDURE — 85027 COMPLETE CBC AUTOMATED: CPT

## 2022-07-25 PROCEDURE — 36556 INSERT NON-TUNNEL CV CATH: CPT

## 2022-07-25 PROCEDURE — 99233 SBSQ HOSP IP/OBS HIGH 50: CPT | Performed by: INTERNAL MEDICINE

## 2022-07-25 PROCEDURE — 6360000002 HC RX W HCPCS: Performed by: INTERNAL MEDICINE

## 2022-07-25 PROCEDURE — 80048 BASIC METABOLIC PNL TOTAL CA: CPT

## 2022-07-25 RX ORDER — PANTOPRAZOLE SODIUM 40 MG/1
40 TABLET, DELAYED RELEASE ORAL
Status: DISCONTINUED | OUTPATIENT
Start: 2022-07-26 | End: 2022-07-26 | Stop reason: HOSPADM

## 2022-07-25 RX ADMIN — Medication 10 ML: at 21:09

## 2022-07-25 RX ADMIN — CEFEPIME 2000 MG: 2 INJECTION, POWDER, FOR SOLUTION INTRAVENOUS at 21:15

## 2022-07-25 RX ADMIN — SERTRALINE 100 MG: 100 TABLET, FILM COATED ORAL at 08:28

## 2022-07-25 RX ADMIN — PREGABALIN 75 MG: 25 CAPSULE ORAL at 21:09

## 2022-07-25 RX ADMIN — PREGABALIN 75 MG: 25 CAPSULE ORAL at 14:34

## 2022-07-25 RX ADMIN — SODIUM CHLORIDE: 9 INJECTION, SOLUTION INTRAVENOUS at 02:47

## 2022-07-25 RX ADMIN — CEFEPIME 2000 MG: 2 INJECTION, POWDER, FOR SOLUTION INTRAVENOUS at 12:27

## 2022-07-25 RX ADMIN — POTASSIUM CHLORIDE 20 MEQ: 750 TABLET, EXTENDED RELEASE ORAL at 21:09

## 2022-07-25 RX ADMIN — Medication 10 ML: at 08:28

## 2022-07-25 RX ADMIN — CEFEPIME 2000 MG: 2 INJECTION, POWDER, FOR SOLUTION INTRAVENOUS at 02:48

## 2022-07-25 RX ADMIN — PANTOPRAZOLE SODIUM 40 MG: 40 INJECTION, POWDER, FOR SOLUTION INTRAVENOUS at 06:16

## 2022-07-25 RX ADMIN — PREGABALIN 75 MG: 25 CAPSULE ORAL at 08:28

## 2022-07-25 RX ADMIN — MUPIROCIN: 20 OINTMENT TOPICAL at 09:35

## 2022-07-25 RX ADMIN — ENOXAPARIN SODIUM 40 MG: 100 INJECTION SUBCUTANEOUS at 08:28

## 2022-07-25 RX ADMIN — MUPIROCIN: 20 OINTMENT TOPICAL at 21:15

## 2022-07-25 RX ADMIN — POTASSIUM CHLORIDE 20 MEQ: 750 TABLET, EXTENDED RELEASE ORAL at 08:28

## 2022-07-25 NOTE — PROGRESS NOTES
Morning med given. Morning labs drawn from the white lumen w/o concern and sent to lab. Patient asleep. Telesitter in room. Bed alarm in place. Call light within reach.

## 2022-07-25 NOTE — PROGRESS NOTES
Internal Medicine Progress Note    An 80-year-old male from a nursing home admitted with septic shock    Events of Last 24 hours:     Patient is more awake and alert. He is still on Levophed. He continues to have confusion at baseline. He has dementia. He is off Levophed. BP stable. Invasive Lines: Right IJ placed on 2022        MV: N/A    No results for input(s): PHART, AWS1UTG, PO2ART in the last 72 hours. MV Settings:     / / /     IV:   sodium chloride 25 mL/hr at 22 0247    dextrose         Vitals:  Temp  Av.1 °F (36.7 °C)  Min: 97.3 °F (36.3 °C)  Max: 99.1 °F (37.3 °C)  Pulse  Av.4  Min: 66  Max: 91  BP  Min: 97/54  Max: 154/71  SpO2  Av.6 %  Min: 81 %  Max: 97 %  Patient Vitals for the past 4 hrs:   BP Temp Temp src Pulse Resp SpO2   22 0743 (!) 97/54 97.8 °F (36.6 °C) Oral 91 16 92 %         CVP:        Intake/Output Summary (Last 24 hours) at 2022 0916  Last data filed at 2022 0904  Gross per 24 hour   Intake 120 ml   Output 550 ml   Net -430 ml         EXAM:  General: More awake and alert/still confused  Eyes: PERRL. No sclera icterus. No conjunctiva injected. ENT: No discharge. Pharynx clear. Neck: Trachea midline. Normal thyroid. Resp: No accessory muscle use. No crackles. No wheezing. No rhonchi. No dullness on percussion. CV: Regular rate. Regular rhythm. No mumur or rub. No edema. No JVD. Palpable pedal pulses. GI: Non-tender. Non-distended. No masses. No organmegaly. Normal bowel sounds. No hernia. Skin: Warm and dry. No nodule on exposed extremities. No rash on exposed extremities. Lymph: No cervical LAD. No supraclavicular LAD. M/S: No cyanosis. No joint deformity. No clubbing. Neuro: Awake. Follows commands. Positive pupils/gag/corneals. Normal pain response. Psych: Oriented to person, place, time. No anxiety or agitation.      Medications:  Scheduled Meds:   potassium chloride  20 mEq Oral BID    cefepime  2,000 mg IntraVENous Q12H    pantoprazole  40 mg IntraVENous Daily    sodium chloride flush  5-40 mL IntraVENous 2 times per day    enoxaparin  40 mg SubCUTAneous Daily    mupirocin   Nasal BID    pregabalin  75 mg Oral TID    sertraline  100 mg Oral Daily    insulin lispro  0-8 Units SubCUTAneous TID WC    insulin lispro  0-4 Units SubCUTAneous Nightly       PRN Meds:  sodium chloride flush, sodium chloride, acetaminophen **OR** acetaminophen, glucose, dextrose bolus **OR** dextrose bolus, glucagon (rDNA), dextrose    Results:  CBC:   Recent Labs     07/23/22  0420 07/24/22  0510 07/25/22  0629   WBC 13.5* 8.1 6.6   HGB 11.9* 11.5* 12.1*   HCT 36.8* 34.1* 36.0*   MCV 87.7 87.6 87.4   * 104* 111*       BMP:   Recent Labs     07/23/22  0420 07/24/22  0510 07/25/22  0629   * 135* 137   K 3.5 3.5 3.6    101 103   CO2 25 27 28   BUN 12 11 12   CREATININE 0.9 0.9 0.8       LIVER PROFILE:   No results for input(s): AST, ALT, LIPASE, BILIDIR, BILITOT, ALKPHOS in the last 72 hours. Invalid input(s): AMYLASE,  ALB    PT/INR: No results for input(s): PROTIME, INR in the last 72 hours. APTT: No results for input(s): APTT in the last 72 hours. UA:  No results for input(s): NITRITE, COLORU, PHUR, LABCAST, WBCUA, RBCUA, MUCUS, TRICHOMONAS, YEAST, BACTERIA, CLARITYU, SPECGRAV, LEUKOCYTESUR, UROBILINOGEN, BILIRUBINUR, BLOODU, GLUCOSEU, AMORPHOUS in the last 72 hours. Invalid input(s): [place]      Cultures:  [id number] negative  Urine culture neg  Blood culture: S epidermidis    Films:    XR CHEST PORTABLE   Final Result   1. A new right internal jugular central line terminates in the mid superior   vena cava. No associated pneumothorax. 2. New airspace opacity in the medial right lung base potentially due to   atelectasis, aspiration, or pneumonia. 3. New left basilar subsegmental atelectasis.    4. At least mild peribronchial cuffing potentially due pulmonary vascular   congestion, reactive airways disease, or bronchitis. 5. Possible new trace left pleural effusion. 6. Pulmonary vascular congestion. CT CHEST ABDOMEN PELVIS W CONTRAST   Final Result   1. Mild to moderate bronchial wall thickening with patchy bronchial   secretions potentially due to reactive airways disease or bronchitis. Pulmonary vascular congestion may contribute to the wall thickening. 2. Possible cardiovascular findings of pulmonary hypertension. 3. Suspected cystitis. 4. Unchanged 1.0 cm simple cystic lesion in the pancreatic tail, potentially   a mucinous cystic neoplasm such as branch duct intraductal papillary mucinous   neoplasm or a sequela of prior pancreatitis given changes of chronic   pancreatitis. Recommend follow-up with pancreas protocol abdomen MRI   (preferred) or CT in 2 years per the ACR recommendations for incidental   pancreatic cysts. 5. Additional incidental findings as above for which no dedicated follow-up   is recommended. XR CHEST PORTABLE   Final Result   No focal lung opacity. Assessment:    Principal Problem:    Septic shock ([address]  Active Problems:    Acute cystitis with hematuria    Mass of pancreas    Acute hypoxemic respiratory failure (HCC)    Acute metabolic encephalopathy    Urinary tract infection with hematuria    Thrombocytopenia (HCC)    CAD (coronary artery disease)    HTN (hypertension)    HLD (hyperlipidemia)    Dementia (HCC)    Moderate malnutrition (HCC)    Lactic acid acidosis  Resolved Problems:    * No resolved hospital problems. *         Plan:    #Septic shock. Work-up consistent with septic shock. Most likely source is urinary tract infection. Patient is admitted the ICU. Patient received fluid bolus in the ER. Patient weaned off Levophed. Critical care consultation obtained. bp readings soft      #Urinary tract infection. Complicated UTI due to the fact that his UTI in a male. Culture sent but culture negative. On cefepime day#4.   Okay to stop Levaquin. #Possible bronchitis for COPD. On cefepime. #Hypertension. Presently not a problem. Hold blood pressure medicines. #CAD. Came to ER with chest pain. Troponin negative. ECG nonacute. #Acute metabolic encephalopathy. Due to sepsis. Monitor for clinical improvement. He also has underlying dementia. #Lactic acidosis due to sepsis. stable. #Dementia. Patient has dementia at baseline. Mild thrombocytopenia. Stable. #Moderate malnutrition. #Mass of the pancreas. Outpatient work-up. #Mild hypoxemia. Continue to wean oxygen. Lovenox for DVT prophylaxis.         Susan Seymour MD 9:16 AM 7/25/2022

## 2022-07-25 NOTE — FLOWSHEET NOTE
07/25/22 0743   Vital Signs   Temp 97.8 °F (36.6 °C)   Temp Source Oral   Heart Rate 91   Resp 16   BP (!) 97/54   BP Location Right upper arm   BP Method Automatic   MAP (Calculated) 68.33   Patient Position Semi fowlers   Level of Consciousness Alert (0)   MEWS Score 2   Pain Assessment   Pain Assessment None - Denies Pain   Opioid-Induced Sedation   POSS Score 1   RASS   [place] Agitation Sedation Scale (RASS) 0   Oxygen Therapy   SpO2 92 %   O2 Device None (Room air)     Alert and oriented to self. Skin w/d. Resp e/e unlabored. No cough noted. Lungs clear diminished. Refused breakfast except orange juice. CVC to right neck dressing c/d/I. Nursing asmt completed. No s/s distress noted. Call light in easy reach.

## 2022-07-25 NOTE — PROGRESS NOTES
Patient picked away the dressing to the CVC to the right side of neck. New sterile dressing applied. Lumens flush and get blood return. Telesitter in room. Appropriate lighting on in room. Bed alarm in place. Call light within reach.

## 2022-07-25 NOTE — FLOWSHEET NOTE
22 1935   Vital Signs   Temp 98.3 °F (36.8 °C)   Temp Source Oral   Heart Rate 70   Heart Rate Source Monitor   Resp 16   BP (!) 154/71   BP Location Right upper arm   BP Method Automatic   MAP (Calculated) 98.67   Patient Position Semi fowlers   Level of Consciousness Alert (0)   MEWS Score 1   Oxygen Therapy   SpO2 93 %   O2 Device None (Room air)   Pt alert to name  only pt believes he is at home. Reoriented pt to place and time. Assessment completed. Meds given per MAR in applesauce pt tolerated well. Pt denies any needs.  Call light within reach bed alarm on and tele sitter in room

## 2022-07-25 NOTE — PROGRESS NOTES
Pulmonary & Critical Care Medicine ICU Progress Note    CC: Septic shock    Events of Last 24 hours:   RA     Vascular lines: IV: R IJ 7/22      Vitals:  Blood pressure (!) 97/54, pulse 91, temperature 97.8 °F (36.6 °C), temperature source Oral, resp. rate 16, height 5' 11\" (1.803 m), weight 182 lb 1.6 oz (82.6 kg), SpO2 92 %. on RA    Intake/Output Summary (Last 24 hours) at 7/25/2022 1314  Last data filed at 7/25/2022 0904  Gross per 24 hour   Intake 120 ml   Output --   Net 120 ml       PE:  General: ill appearing    Eyes: PERRL. No sclera icterus. No conjunctival injection. ENT: No discharge. Pharynx clear. Neck: Trachea midline. Normal thyroid. Resp: No accessory muscle use. No crackles. No wheezing. No rhonchi. CV: Regular rate. Regular rhythm. No mumur or rub. No edema. GI: Non-tender. Non-distended. No masses. No organomegaly. Normal bowel sounds. No hernia. Skin: Warm and dry. No nodule on exposed extremities. No rash on exposed extremities. M/S: No cyanosis. No joint deformity. No clubbing. Neuro: Confused. Followed commands.    Psych: No agitation, no anxiety, affect is full     Scheduled Meds:   cefepime  2,000 mg IntraVENous Q8H    potassium chloride  20 mEq Oral BID    pantoprazole  40 mg IntraVENous Daily    sodium chloride flush  5-40 mL IntraVENous 2 times per day    enoxaparin  40 mg SubCUTAneous Daily    mupirocin   Nasal BID    pregabalin  75 mg Oral TID    sertraline  100 mg Oral Daily    insulin lispro  0-8 Units SubCUTAneous TID     insulin lispro  0-4 Units SubCUTAneous Nightly       Data:  CBC:   Recent Labs     07/23/22  0420 07/24/22  0510 07/25/22  0629   WBC 13.5* 8.1 6.6   HGB 11.9* 11.5* 12.1*   HCT 36.8* 34.1* 36.0*   MCV 87.7 87.6 87.4   * 104* 111*       BMP:   Recent Labs     07/23/22  0420 07/24/22  0510 07/25/22  0629   * 135* 137   K 3.5 3.5 3.6    101 103   CO2 25 27 28   BUN 12 11 12   CREATININE 0.9 0.9 0.8       LIVER PROFILE:   No results

## 2022-07-25 NOTE — PROGRESS NOTES
Patient is not able to demonstrate the ability to move from a reclining position to an upright position within the recliner due to confusion. Bedside Mobility Assessment Tool (BMAT):     Assessment Level 1- Sit and Shake    1. From a semi-reclined position, ask patient to sit up and rotate to a seated position at the side of the bed. Can use the bedrail. 2. Ask patient to reach out and grab your hand and shake making sure patient reaches across his/her midline. Pass- Patient is able to come to a seated position, maintain core strength. Maintains seated balance while reaching across midline. Move on to Assessment Level 2. Assessment Level 2- Stretch and Point   1. With patient in seated position at the side of the bed, have patient place both feet on the floor (or stool) with knees no higher than hips. 2. Ask patient to stretch one leg and straighten the knee, then bend the ankle/flex and point the toes. If appropriate, repeat with the other leg. Pass- Patient is able to demonstrate appropriate quad strength on intended weight bearing limb(s). Move onto Assessment Level 3. Assessment Level 3- Stand   1. Ask patient to elevate off the bed or chair (seated to standing) using an assistive device (cane, bedrail). 2. Patient should be able to raise buttocks off be and hold for a count of five. May repeat once. Fail- Patient unable to demonstrate standing stability. Patient is MOBILITY LEVEL 3. Assessment Level 4- Walk   1. Ask patient to march in place at bedside. 2. Then ask patient to advance step and return each foot. Some medical conditions may render a patient from stepping backwards, use your best clinical judgement. Fail- Patient not able to complete tasks OR requires use of assistive device. Patient is MOBILITY LEVEL 3.        Mobility Level- 2

## 2022-07-26 VITALS
SYSTOLIC BLOOD PRESSURE: 119 MMHG | OXYGEN SATURATION: 98 % | BODY MASS INDEX: 26.17 KG/M2 | RESPIRATION RATE: 18 BRPM | HEIGHT: 71 IN | TEMPERATURE: 98.6 F | DIASTOLIC BLOOD PRESSURE: 66 MMHG | WEIGHT: 186.9 LBS | HEART RATE: 51 BPM

## 2022-07-26 LAB
ANION GAP SERPL CALCULATED.3IONS-SCNC: 9 MMOL/L (ref 3–16)
BUN BLDV-MCNC: 15 MG/DL (ref 7–20)
CALCIUM SERPL-MCNC: 8.7 MG/DL (ref 8.3–10.6)
CHLORIDE BLD-SCNC: 100 MMOL/L (ref 99–110)
CO2: 26 MMOL/L (ref 21–32)
CREAT SERPL-MCNC: 0.9 MG/DL (ref 0.8–1.3)
CULTURE, BLOOD 2: NORMAL
GFR AFRICAN AMERICAN: >60
GFR NON-AFRICAN AMERICAN: >60
GLUCOSE BLD-MCNC: 115 MG/DL (ref 70–99)
GLUCOSE BLD-MCNC: 120 MG/DL (ref 70–99)
GLUCOSE BLD-MCNC: 132 MG/DL (ref 70–99)
HCT VFR BLD CALC: 36.7 % (ref 40.5–52.5)
HEMOGLOBIN: 12 G/DL (ref 13.5–17.5)
MCH RBC QN AUTO: 28.7 PG (ref 26–34)
MCHC RBC AUTO-ENTMCNC: 32.8 G/DL (ref 31–36)
MCV RBC AUTO: 87.5 FL (ref 80–100)
PDW BLD-RTO: 14.7 % (ref 12.4–15.4)
PERFORMED ON: ABNORMAL
PERFORMED ON: ABNORMAL
PLATELET # BLD: 118 K/UL (ref 135–450)
PMV BLD AUTO: 8.1 FL (ref 5–10.5)
POTASSIUM SERPL-SCNC: 3.9 MMOL/L (ref 3.5–5.1)
RBC # BLD: 4.19 M/UL (ref 4.2–5.9)
SARS-COV-2, NAAT: NOT DETECTED
SODIUM BLD-SCNC: 135 MMOL/L (ref 136–145)
WBC # BLD: 7.4 K/UL (ref 4–11)

## 2022-07-26 PROCEDURE — 99239 HOSP IP/OBS DSCHRG MGMT >30: CPT | Performed by: INTERNAL MEDICINE

## 2022-07-26 PROCEDURE — 2580000003 HC RX 258: Performed by: INTERNAL MEDICINE

## 2022-07-26 PROCEDURE — 80048 BASIC METABOLIC PNL TOTAL CA: CPT

## 2022-07-26 PROCEDURE — 92526 ORAL FUNCTION THERAPY: CPT

## 2022-07-26 PROCEDURE — 6370000000 HC RX 637 (ALT 250 FOR IP): Performed by: INTERNAL MEDICINE

## 2022-07-26 PROCEDURE — 87635 SARS-COV-2 COVID-19 AMP PRB: CPT

## 2022-07-26 PROCEDURE — 6360000002 HC RX W HCPCS: Performed by: INTERNAL MEDICINE

## 2022-07-26 PROCEDURE — 85027 COMPLETE CBC AUTOMATED: CPT

## 2022-07-26 RX ORDER — CEFDINIR 300 MG/1
300 CAPSULE ORAL 2 TIMES DAILY
Qty: 6 CAPSULE | Refills: 0
Start: 2022-07-26 | End: 2022-07-29

## 2022-07-26 RX ADMIN — PANTOPRAZOLE SODIUM 40 MG: 40 TABLET, DELAYED RELEASE ORAL at 06:04

## 2022-07-26 RX ADMIN — PREGABALIN 75 MG: 25 CAPSULE ORAL at 09:02

## 2022-07-26 RX ADMIN — CEFEPIME 2000 MG: 2 INJECTION, POWDER, FOR SOLUTION INTRAVENOUS at 12:09

## 2022-07-26 RX ADMIN — POTASSIUM CHLORIDE 20 MEQ: 750 TABLET, EXTENDED RELEASE ORAL at 09:02

## 2022-07-26 RX ADMIN — CEFEPIME 2000 MG: 2 INJECTION, POWDER, FOR SOLUTION INTRAVENOUS at 02:34

## 2022-07-26 RX ADMIN — ENOXAPARIN SODIUM 40 MG: 100 INJECTION SUBCUTANEOUS at 09:02

## 2022-07-26 RX ADMIN — SERTRALINE 100 MG: 100 TABLET, FILM COATED ORAL at 09:02

## 2022-07-26 RX ADMIN — ACETAMINOPHEN 650 MG: 325 TABLET ORAL at 12:09

## 2022-07-26 RX ADMIN — Medication 10 ML: at 09:04

## 2022-07-26 RX ADMIN — MUPIROCIN: 20 OINTMENT TOPICAL at 09:02

## 2022-07-26 ASSESSMENT — PAIN SCALES - GENERAL: PAINLEVEL_OUTOF10: 8

## 2022-07-26 NOTE — DISCHARGE INSTR - COC
Continuity of Care Form    Patient Name: Kevin Fairchild   :  1935  MRN:  [id number]    Admit date:  2022  Discharge date:  2022    Code Status Order: Full Code   Advance Directives:     Admitting Physician:  Jo Reed MD  PCP: Azael Bradford MD    Discharging Nurse: [facility] Unit/Room#: 0221/0221-02  Discharging Unit Phone Number: [phone]    Emergency Contact:   Extended Emergency Contact Information  Primary Emergency Contact: Gabi Still  Home Phone: [phone]  Mobile Phone: [phone]  Relation: Legal Guardian  Preferred language: English   needed?  No  Secondary Emergency Contact: [address] Phone: [phone]  Relation: Brother/Sister    Past Surgical History:  Past Surgical History:   Procedure Laterality Date    CHOLECYSTECTOMY      COLONOSCOPY  8/10/12    CORONARY ANGIOPLASTY WITH STENT PLACEMENT      PROSTATE BIOPSY      SKIN BIOPSY      UPPER GASTROINTESTINAL ENDOSCOPY  12    UPPER GASTROINTESTINAL ENDOSCOPY N/A 2019    EGD BIOPSY performed by Martina Dorado DO at [address] [place] [address] N/A 2019    EGD performed by Salty Powell MD at [address] [place] [address]       Immunization History:   Immunization History   Administered Date(s) Administered    COVID-19, PFIZER PURPLE top, DILUTE for use, (age 15 y+), 30mcg/0.3mL 2021, 2021, 02/15/2022    Influenza A ([id number]) Vaccine PF IM 2009    Influenza Virus Vaccine 2009, 2010    Influenza, High Dose (Fluzone 65 yrs and older) 2011, 2013    Pneumococcal Conjugate 13-valent (Nucydqd85) 2016    Pneumococcal Polysaccharide (Uitlkbusn39) 2009    Td vaccine (adult) 2004    Tdap (Boostrix, Adacel) 10/18/2011, 10/28/2018       Active Problems:  Patient Active Problem List   Diagnosis Code    Falls frequently R29.6    Hypokalemia E87.6    CAD (coronary artery disease) I25.10 HTN (hypertension) I10    HLD (hyperlipidemia) E78.5    Dementia (HCC) F03.90    Moderate malnutrition (HCC) E44.0    Abdominal pain R10.9    Abdominal pain, epigastric R10.13    Anemia D64.9    Urinary tract infection without hematuria N39.0    Lactic acid acidosis E87.2    Mediastinal lymphadenopathy R59.0    Septicemia (HCC) A41.9    Pneumonia of right lower lobe due to infectious organism J18.9    PUD (peptic ulcer disease) K27.9    Moderate protein-calorie malnutrition (HCC) E44.0    Aspiration pneumonia (HCC) J69.0    Septic shock (HCC) A41.9, R65.21    Acute cystitis with hematuria N30.01    Mass of pancreas K86.89    Acute hypoxemic respiratory failure (HCC) E71.25    Acute metabolic encephalopathy H66.08    Urinary tract infection with hematuria N39.0, R31.9    Thrombocytopenia (HCC) D69.6    Hypoxia R09.02       Isolation/Infection:   Isolation            No Isolation          Patient Infection Status       Infection Onset Added Last Indicated Last Indicated By Review Planned Expiration Resolved Resolved By    None active    Resolved    COVID-19 (Rule Out) 07/21/22 07/21/22 07/21/22 COVID-19, Rapid (Ordered)   07/21/22 Rule-Out Test Resulted    COVID-19 (Rule Out) 09/16/20 09/16/20 09/16/20 COVID-19 (Ordered)   09/17/20 Rule-Out Test Resulted            Nurse Assessment:  Last Vital Signs: /60   Pulse 66   Temp 97.6 °F (36.4 °C) (Oral)   Resp 18   Ht 5' 11\" (1.803 m)   Wt 186 lb 14.4 oz (84.8 kg)   SpO2 94%   BMI 26.07 kg/m²     Last documented pain score (0-10 scale): Pain Level: 2  Last Weight:   Wt Readings from Last 1 Encounters:   07/26/22 186 lb 14.4 oz (84.8 kg)     Mental Status:  disoriented and alert    IV Access:  - None    Nursing Mobility/ADLs:  Walking   Dependent  Transfer  Dependent  Bathing  Dependent  Dressing  Dependent  Toileting  Dependent  Feeding  Assisted  Med Admin  Dependent  Med Delivery   crushed    Wound Care Documentation and Therapy: Elimination:  Continence: Bowel: No  Bladder: No  Urinary Catheter: None   Colostomy/Ileostomy/Ileal Conduit: No       Date of Last BM: na    Intake/Output Summary (Last 24 hours) at 7/26/2022 0940  Last data filed at 7/26/2022 0916  Gross per 24 hour   Intake 660 ml   Output --   Net 660 ml     I/O last 3 completed shifts: In: 5 [P.O.:540]  Out: -     Safety Concerns: At Risk for Falls    Impairments/Disabilities:      None    Nutrition Therapy:  Current Nutrition Therapy:   - Oral Diet:  Dysphagia 2 mechanically altered    Routes of Feeding: Oral  Liquids: Thin Liquids  Daily Fluid Restriction: no  Last Modified Barium Swallow with Video (Video Swallowing Test): not done    Treatments at the Time of Hospital Discharge:   Respiratory Treatments: na  Oxygen Therapy:  is not on home oxygen therapy. Ventilator:    [address] [employer] Vent VCDA:[id number]}    Rehab Therapies: SN  Weight Bearing Status/Restrictions: No weight bearing restrictions  Other Medical Equipment (for information only, NOT a DME order):  hospital bed  Other Treatments: na    Patient's personal belongings (please select all that are sent with patient):  None    RN SIGNATURE:  Electronically signed by Timo Yuen RN on 7/26/22 at 1:27 PM EDT    CASE MANAGEMENT/SOCIAL WORK SECTION    Inpatient Status Date: ***    Readmission Risk Assessment Score:  Readmission Risk              Risk of Unplanned Readmission:  [id number]           Discharging to Facility/ Agency   Name: [facility]  Address: [place] [address] [address], [address] [place] [address]  Phone: [phone]  Fax: [phone]      D:    / signature: Electronically signed by Gio Krishnan RN on 7/26/22 at 12:05 PM EDT    PHYSICIAN SECTION    Prognosis: {Prognosis:[id number]}    Condition at Discharge: Stable    Rehab Potential (if transferring to Rehab):  Fair    Recommended Labs or Other Treatments After Discharge: none    Physician Certification: I certify the above information and transfer of Carine Arciniega  is necessary for the continuing treatment of the diagnosis listed and that he requires [place] for greater 30 days.      Update Admission H&P: No change in H&P    PHYSICIAN SIGNATURE:  Electronically signed by Dixon Barton MD on 7/26/22 at 9:40 AM EDT

## 2022-07-26 NOTE — PLAN OF CARE
81 yo M who [facility] to [facility] from SNF for L sided CP radiating to back. Found to be septic. BP's continued to drop despite fluids. Currently on 2L O2.       Septic Shock  Acute resp failure w/ hypoxia  UTI, complicated (male)  Possible bronchitis  Lactic acidosis  Pancreatic mass
Problem: Discharge Planning  Goal: Discharge to home or other facility with appropriate resources  7/25/2022 2107 by Thai Alvarez RN  Outcome: Progressing Towards Goal  7/25/2022 1010 by Josué Adames RN  Outcome: Progressing Towards Goal  7/25/2022 0842 by Thai Alvarez RN  Outcome: Progressing Towards Goal     Problem: Pain  Goal: Verbalizes/displays adequate comfort level or baseline comfort level  7/25/2022 2107 by Thai Alvarez RN  Outcome: Progressing Towards Goal  7/25/2022 1010 by Josué Adames RN  Outcome: Progressing Towards Goal  7/25/2022 0842 by Thai Alvarez RN  Outcome: Progressing Towards Goal     Problem: Safety - Adult  Goal: Free from fall injury  7/25/2022 2107 by Thai Alvarez RN  Outcome: Progressing Towards Goal  7/25/2022 1010 by Josué Adames RN  Outcome: Progressing Towards Goal  7/25/2022 0842 by Thai Alvarez RN  Outcome: Progressing Towards Goal     Problem: ABCDS Injury Assessment  Goal: Absence of physical injury  7/25/2022 2107 by Thai Alvarez RN  Outcome: Progressing Towards Goal  7/25/2022 1010 by Josué Adames RN  Outcome: Progressing Towards Goal  7/25/2022 0842 by Thai Alvarez RN  Outcome: Progressing Towards Goal     Problem: Skin/Tissue Integrity  Goal: Absence of new skin breakdown  Description: 1. Monitor for areas of redness and/or skin breakdown  2. Assess vascular access sites hourly  3. Every 4-6 hours minimum:  Change oxygen saturation probe site  4. Every 4-6 hours:  If on nasal continuous positive airway pressure, respiratory therapy assess nares and determine need for appliance change or resting period.   7/25/2022 2107 by Thai Alvarez RN  Outcome: Progressing Towards Goal  7/25/2022 1010 by Josué Adames RN  Outcome: Progressing Towards Goal  7/25/2022 0842 by Thai Alvarez RN  Outcome: Progressing Towards Goal
Problem: Discharge Planning  Goal: Discharge to home or other facility with appropriate resources  Outcome: Progressing     Problem: Pain  Goal: Verbalizes/displays adequate comfort level or baseline comfort level  Outcome: Progressing     Problem: Safety - Adult  Goal: Free from fall injury  Outcome: Progressing
Problem: Discharge Planning  Goal: Discharge to home or other facility with appropriate resources  Outcome: Progressing     Problem: Pain  Goal: Verbalizes/displays adequate comfort level or baseline comfort level  Outcome: Progressing     Problem: Safety - Adult  Goal: Free from fall injury  Outcome: Progressing     Problem: ABCDS Injury Assessment  Goal: Absence of physical injury  Outcome: Progressing     Problem: Skin/Tissue Integrity  Goal: Absence of new skin breakdown  Description: 1. Monitor for areas of redness and/or skin breakdown  2. Assess vascular access sites hourly  3. Every 4-6 hours minimum:  Change oxygen saturation probe site  4. Every 4-6 hours:  If on nasal continuous positive airway pressure, respiratory therapy assess nares and determine need for appliance change or resting period.   Outcome: Progressing
Problem: Discharge Planning  Goal: Discharge to home or other facility with appropriate resources  Outcome: Progressing Towards Goal  Flowsheets (Taken 7/26/2022 0901)  Discharge to home or other facility with appropriate resources: Identify barriers to discharge with patient and caregiver     Problem: Pain  Goal: Verbalizes/displays adequate comfort level or baseline comfort level  Outcome: Progressing Towards Goal     Problem: Safety - Adult  Goal: Free from fall injury  Outcome: Progressing Towards Goal  Flowsheets (Taken 7/26/2022 1120)  Free From Fall Injury: Instruct family/caregiver on patient safety     Problem: ABCDS Injury Assessment  Goal: Absence of physical injury  Outcome: Progressing Towards Goal  Flowsheets (Taken 7/26/2022 1120)  Absence of Physical Injury: Implement safety measures based on patient assessment     Problem: Skin/Tissue Integrity  Goal: Absence of new skin breakdown  Description: 1. Monitor for areas of redness and/or skin breakdown  2. Assess vascular access sites hourly  3. Every 4-6 hours minimum:  Change oxygen saturation probe site  4. Every 4-6 hours:  If on nasal continuous positive airway pressure, respiratory therapy assess nares and determine need for appliance change or resting period.   Outcome: Progressing Towards Goal
Problem: SLP Adult - Impaired Swallowing  Goal: By Discharge: Advance to least restrictive diet without signs or symptoms of aspiration for planned discharge setting. See evaluation for individualized goals. Note: SLP completed evaluation. Please refer to notes in EMR.      [address]  Clinician
No

## 2022-07-26 NOTE — PROGRESS NOTES
Patient resting, eyes closed, respirations witnessed as e/e. No signs of distress. Tele-sitter in room. Bed alarm in place. Call light and bedside table is easy reach.

## 2022-07-26 NOTE — PROGRESS NOTES
R IJ removed. Catheter intact. Pressure applied for 5 mins and pressure dressing applied. Pt tolerated well.

## 2022-07-26 NOTE — PROGRESS NOTES
Report called to [facility] @ [facility] via phone.

## 2022-07-26 NOTE — FLOWSHEET NOTE
07/26/22 0845   Vital Signs   Temp 97.6 °F (36.4 °C)   Temp Source Oral   Heart Rate 66   Heart Rate Source Monitor   Resp 18   /60   BP Location Left upper arm   BP Method Automatic   MAP (Calculated) 74.33   Patient Position High fowlers   Level of Consciousness Alert (0)   MEWS Score 1   Pain Assessment   Pain Assessment None - Denies Pain   Opioid-Induced Sedation   POSS Score 1   Oxygen Therapy   SpO2 94 %   O2 Device None (Room air)   Shift assessment complete. See flow sheet. Scheduled medications given, See MAR. Head to toe complete. Vital signs logged and active bowel sounds in all 4 quadrants. Pt awake in bed. Pt pleasantly confused. Medications taken without difficulty. No further needs noted at this time. Call light and bedside table within reach. Bed in lowest position, wheels locked and side rails up x2.      Sujey Francisca, RN

## 2022-07-26 NOTE — PROGRESS NOTES
Lab call reporting that the lavender tube that was sent for morning labs was clotted. Will attempt to redraw from pts right IJ.

## 2022-07-26 NOTE — PROGRESS NOTES
Morning med given. Morning labs drawn from IJ to right neck from the white lumen and sent to lab. Patient remains pleasantly confused this morning. Telesitter in room. Bed alarm in place. Call light within reach.

## 2022-07-26 NOTE — CARE COORDINATION
CM placed call to listed Legal Guardian [place] at mobile and home phone numbers and LVM with call back number on home line. Discussed with primary CM.      Jocelyn eMlara RN

## 2022-07-26 NOTE — DISCHARGE SUMMARY
Name:  Venkat Roberts  Room:  0221/0221-02  MRN:    [id number]    Discharge Summary      This discharge summary is in conjunction with a complete physical exam done on the day of discharge. Attending Physician: Dr. Ana Deluna  Discharging Physician: Dr. Indra Garcia: 7/21/2022  Discharge:  7/26/2022    HPI:  Patient is an 81-year-old white male who lives at the [address] [place] [address]. He came to the emergency room with complaint of chest pain. Patient stated the pain left-sided chest with radiation to the back. Initial troponin was negative. EKG was nonacute. Patient also complained of shortness of breath nausea vomiting. No fever. Work-up in the ER showed no evidence of acute coronary syndrome. Exam revealed abdominal tenderness. He was mildly hypoxic. He was initially hypertensive and then hypotensive. Chest pain improved with aspirin and Tylenol. Due to drop in blood pressure sepsis protocol was initiated. He was started on broad-spectrum antibiotics. Blood cultures were sent. Lactic acidosis was seen on lab work. Initially blood pressure stabilized and then dropped again. A central line was placed. He was started on Levophed. The patient is an unreliable historian. He has metabolic encephalopathy from underlying sepsis and has dementia on top of that. He is seen in the ICU. He is on Levophed. He is on oxygen. He did verbalize that he is not on oxygen at baseline. He denied any chest pain to me. No diarrhea since admission. Patient is allergic to fluoxetine, benazepril, hydrocodone, morphine, penicillins, simvastatin, morphine and related, and sucralfate. Diagnoses this Admission and Hospital Course   #Septic shock. Work-up consistent with septic shock. Most likely source is urinary tract infection. Patient is admitted the ICU. Patient received fluid bolus in the ER. Patient weaned off Levophed. Critical care consultation obtained. bp readings soft.  BP readings now stable. #Urinary tract infection. Complicated UTI due to the fact that his UTI in a male. Culture sent but culture negative. On cefepime day#5. Okay to stop Levaquin. dc on cefdinir      #Possible bronchitis for COPD. On cefepime. #Hypertension. Presently not a problem. Hold blood pressure medicines. #CAD. Came to ER with chest pain. Troponin negative. ECG nonacute. #Acute metabolic encephalopathy. Due to sepsis. Monitor for clinical improvement. He also has underlying dementia. #Lactic acidosis due to sepsis. stable. #Dementia. Patient has dementia at baseline. Mild thrombocytopenia. Stable. #Moderate malnutrition. #Mass of the pancreas. Outpatient work-up. #Mild hypoxemia. Continue to wean oxygen. Lovenox for DVT prophylaxis.     Ot/pt   Dc to SNF    Procedures (Please Review Full Report for Details)  N/A    Consults    Pulmonology       Physical Exam at Discharge:    /60   Pulse 66   Temp 97.6 °F (36.4 °C) (Oral)   Resp 18   Ht 5' 11\" (1.803 m)   Wt 186 lb 14.4 oz (84.8 kg)   SpO2 94%   BMI 26.07 kg/m²     See today's progress note    CBC:   Recent Labs     07/24/22  0510 07/25/22  0629 07/26/22  0703   WBC 8.1 6.6 7.4   HGB 11.5* 12.1* 12.0*   HCT 34.1* 36.0* 36.7*   MCV 87.6 87.4 87.5   * 111* 118*     BMP:   Recent Labs     07/24/22  0510 07/25/22  0629 07/26/22  0612   * 137 135*   K 3.5 3.6 3.9    103 100   CO2 27 28 26   BUN 11 12 15   CREATININE 0.9 0.8 0.9       U/A:    Lab Results   Component Value Date/Time    NITRITE neg 01/26/2013 10:12 AM    COLORU Yellow 07/22/2022 01:53 AM    WBCUA 21-50 07/22/2022 01:53 AM    RBCUA 5-10 07/22/2022 01:53 AM    MUCUS 3+ 11/17/2017 07:15 AM    BACTERIA 1+ 07/22/2022 01:53 AM    CLARITYU Clear 07/22/2022 01:53 AM    SPECGRAV 1.010 07/22/2022 01:53 AM    LEUKOCYTESUR MODERATE 07/22/2022 01:53 AM    BLOODU LARGE 07/22/2022 01:53 AM    GLUCOSEU Negative 07/22/2022 01:53 AM    AMORPHOUS Rare 07/22/2022 01:53 AM         CULTURES  COVID-19 negative  Urine culture neg  Blood culture: S epidermidis    RADIOLOGY  XR CHEST PORTABLE   Final Result   1. A new right internal jugular central line terminates in the mid superior   vena cava. No associated pneumothorax. 2. New airspace opacity in the medial right lung base potentially due to   atelectasis, aspiration, or pneumonia. 3. New left basilar subsegmental atelectasis. 4. At least mild peribronchial cuffing potentially due pulmonary vascular   congestion, reactive airways disease, or bronchitis. 5. Possible new trace left pleural effusion. 6. Pulmonary vascular congestion. CT CHEST ABDOMEN PELVIS W CONTRAST   Final Result   1. Mild to moderate bronchial wall thickening with patchy bronchial   secretions potentially due to reactive airways disease or bronchitis. Pulmonary vascular congestion may contribute to the wall thickening. 2. Possible cardiovascular findings of pulmonary hypertension. 3. Suspected cystitis. 4. Unchanged 1.0 cm simple cystic lesion in the pancreatic tail, potentially   a mucinous cystic neoplasm such as branch duct intraductal papillary mucinous   neoplasm or a sequela of prior pancreatitis given changes of chronic   pancreatitis. Recommend follow-up with pancreas protocol abdomen MRI   (preferred) or CT in 2 years per the ACR recommendations for incidental   pancreatic cysts. 5. Additional incidental findings as above for which no dedicated follow-up   is recommended. XR CHEST PORTABLE   Final Result   No focal lung opacity. Discharge Medications     Medication List        START taking these medications      cefdinir 300 MG capsule  Commonly known as: OMNICEF  Take 1 capsule by mouth in the morning and 1 capsule before bedtime. Do all this for 3 days.             CHANGE how you take these medications      acetaminophen 325 MG tablet  Commonly known as: TYLENOL  What changed: Another medication with the same name was removed. Continue taking this medication, and follow the directions you see here. CONTINUE taking these medications      bisacodyl 10 MG suppository  Commonly known as: DULCOLAX     magnesium hydroxide 400 MG/5ML suspension  Commonly known as: MILK OF MAGNESIA     melatonin 3 MG Tabs tablet     omeprazole 20 MG delayed release capsule  Commonly known as: PRILOSEC     pregabalin 25 MG capsule  Commonly known as: LYRICA     sertraline 50 MG tablet  Commonly known as: ZOLOFT            STOP taking these medications      amLODIPine 10 MG tablet  Commonly known as: NORVASC     potassium chloride 10 MEQ extended release capsule  Commonly known as: MICRO-K               Where to Get Your Medications        Information about where to get these medications is not yet available    Ask your nurse or doctor about these medications  cefdinir 300 MG capsule           Discharged in stable condition to SNF. Follow Up: Follow up with PCP. Total time spent on discharge is 35 minutes    TOMMY Raman.

## 2022-07-26 NOTE — PROGRESS NOTES
Internal Medicine Progress Note    An 37-year-old male from a nursing home admitted with septic shock    Events of Last 24 hours:     Patient is more awake and alert. He is still on Levophed. He continues to have confusion at baseline. He has dementia. He is off Levophed. BP stable. Invasive Lines: Right IJ placed on 2022        MV: N/A    No results for input(s): PHART, KLF0USB, PO2ART in the last 72 hours. MV Settings:     / / /     IV:   sodium chloride 25 mL/hr at 22 0247    dextrose         Vitals:  Temp  Av.4 °F (36.3 °C)  Min: 96.4 °F (35.8 °C)  Max: 98.5 °F (36.9 °C)  Pulse  Av.5  Min: 55  Max: 88  BP  Min: 103/60  Max: 123/55  SpO2  Av %  Min: 94 %  Max: 96 %  Patient Vitals for the past 4 hrs:   BP Temp Temp src Pulse Resp SpO2 Weight   22 0845 103/60 97.6 °F (36.4 °C) Oral 66 18 94 % --   22 0600 -- -- -- -- -- -- 186 lb 14.4 oz (84.8 kg)         CVP:        Intake/Output Summary (Last 24 hours) at 2022 0935  Last data filed at 2022 0916  Gross per 24 hour   Intake 660 ml   Output --   Net 660 ml         EXAM:  General: More awake and alert/still confused  Eyes: PERRL. No sclera icterus. No conjunctiva injected. ENT: No discharge. Pharynx clear. Neck: Trachea midline. Normal thyroid. Resp: No accessory muscle use. No crackles. No wheezing. No rhonchi. No dullness on percussion. CV: Regular rate. Regular rhythm. No mumur or rub. No edema. No JVD. Palpable pedal pulses. GI: Non-tender. Non-distended. No masses. No organmegaly. Normal bowel sounds. No hernia. Skin: Warm and dry. No nodule on exposed extremities. No rash on exposed extremities. Lymph: No cervical LAD. No supraclavicular LAD. M/S: No cyanosis. No joint deformity. No clubbing. Neuro: Awake. Follows commands. Positive pupils/gag/corneals. Normal pain response. Psych: Oriented to person, place, time. No anxiety or agitation.      Medications:  Scheduled Meds:   cefepime 2,000 mg IntraVENous Q8H    pantoprazole  40 mg Oral QAM AC    potassium chloride  20 mEq Oral BID    sodium chloride flush  5-40 mL IntraVENous 2 times per day    enoxaparin  40 mg SubCUTAneous Daily    mupirocin   Nasal BID    pregabalin  75 mg Oral TID    sertraline  100 mg Oral Daily    insulin lispro  0-8 Units SubCUTAneous TID WC    insulin lispro  0-4 Units SubCUTAneous Nightly       PRN Meds:  sodium chloride flush, sodium chloride, acetaminophen **OR** acetaminophen, glucose, dextrose bolus **OR** dextrose bolus, glucagon (rDNA), dextrose    Results:  CBC:   Recent Labs     07/24/22  0510 07/25/22  0629 07/26/22  0703   WBC 8.1 6.6 7.4   HGB 11.5* 12.1* 12.0*   HCT 34.1* 36.0* 36.7*   MCV 87.6 87.4 87.5   * 111* 118*       BMP:   Recent Labs     07/24/22  0510 07/25/22  0629 07/26/22  0612   * 137 135*   K 3.5 3.6 3.9    103 100   CO2 27 28 26   BUN 11 12 15   CREATININE 0.9 0.8 0.9       LIVER PROFILE:   No results for input(s): AST, ALT, LIPASE, BILIDIR, BILITOT, ALKPHOS in the last 72 hours. Invalid input(s): AMYLASE,  ALB    PT/INR: No results for input(s): PROTIME, INR in the last 72 hours. APTT: No results for input(s): APTT in the last 72 hours. UA:  No results for input(s): NITRITE, COLORU, PHUR, LABCAST, WBCUA, RBCUA, MUCUS, TRICHOMONAS, YEAST, BACTERIA, CLARITYU, SPECGRAV, LEUKOCYTESUR, UROBILINOGEN, BILIRUBINUR, BLOODU, GLUCOSEU, AMORPHOUS in the last 72 hours. Invalid input(s): Marie Wolf      Cultures:  FROLE-94 negative  Urine culture neg  Blood culture: S epidermidis    Films:    XR CHEST PORTABLE   Final Result   1. A new right internal jugular central line terminates in the mid superior   vena cava. No associated pneumothorax. 2. New airspace opacity in the medial right lung base potentially due to   atelectasis, aspiration, or pneumonia. 3. New left basilar subsegmental atelectasis.    4. At least mild peribronchial cuffing potentially due pulmonary vascular congestion, reactive airways disease, or bronchitis. 5. Possible new trace left pleural effusion. 6. Pulmonary vascular congestion. CT CHEST ABDOMEN PELVIS W CONTRAST   Final Result   1. Mild to moderate bronchial wall thickening with patchy bronchial   secretions potentially due to reactive airways disease or bronchitis. Pulmonary vascular congestion may contribute to the wall thickening. 2. Possible cardiovascular findings of pulmonary hypertension. 3. Suspected cystitis. 4. Unchanged 1.0 cm simple cystic lesion in the pancreatic tail, potentially   a mucinous cystic neoplasm such as branch duct intraductal papillary mucinous   neoplasm or a sequela of prior pancreatitis given changes of chronic   pancreatitis. Recommend follow-up with pancreas protocol abdomen MRI   (preferred) or CT in 2 years per the ACR recommendations for incidental   pancreatic cysts. 5. Additional incidental findings as above for which no dedicated follow-up   is recommended. XR CHEST PORTABLE   Final Result   No focal lung opacity. Assessment:    Principal Problem:    Septic shock ([address]  Active Problems:    Acute cystitis with hematuria    Mass of pancreas    Acute hypoxemic respiratory failure (HCC)    Acute metabolic encephalopathy    Urinary tract infection with hematuria    Thrombocytopenia (HCC)    Hypoxia    CAD (coronary artery disease)    HTN (hypertension)    HLD (hyperlipidemia)    Dementia (HCC)    Moderate malnutrition (HCC)    Lactic acid acidosis  Resolved Problems:    * No resolved hospital problems. *         Plan:    #Septic shock. Work-up consistent with septic shock. Most likely source is urinary tract infection. Patient is admitted the ICU. Patient received fluid bolus in the ER. Patient weaned off Levophed. Critical care consultation obtained. bp readings soft. #Urinary tract infection. Complicated UTI due to the fact that his UTI in a male.   Culture sent but culture negative. On cefepime day#5. Okay to stop Levaquin. #Possible bronchitis for COPD. On cefepime. #Hypertension. Presently not a problem. Hold blood pressure medicines. #CAD. Came to ER with chest pain. Troponin negative. ECG nonacute. #Acute metabolic encephalopathy. Due to sepsis. Monitor for clinical improvement. He also has underlying dementia. #Lactic acidosis due to sepsis. stable. #Dementia. Patient has dementia at baseline. Mild thrombocytopenia. Stable. #Moderate malnutrition. #Mass of the pancreas. Outpatient work-up. #Mild hypoxemia. Continue to wean oxygen. Lovenox for DVT prophylaxis.     Ot/pt   Dc to SNF     Tg Raymond MD 9:35 AM 7/26/2022

## 2022-07-26 NOTE — PROGRESS NOTES
Speech Language Pathology  Dysphagia Treatment/Follow-Up Note  Facility/Department: [address] MEDICAL-SURGICAL    Recommendations: SLP recommends to advance to IDDSI 5 Minced and moist Solids; IDDSI 0 Thin Liquids; Meds crushed in puree as able  Risk Management: upright for all intake, stay upright for at least 30 mins after intake, small bites/sips, assist feed, distant supervision with intake, oral care 2-3x/day to reduce adverse affects in the event of aspiration, alternate bites/sips, slow rate of intake, general GERD precautions, general aspiration precautions, and hold PO and contact SLP if s/s of aspiration or worsening respiratory status develop. Ciarra Green  : 1935 (82 y.o.)   MRN: [id number]  ROOM:   ADMISSION DATE: 2022  PATIENT DIAGNOSIS(ES): Hypoxia [R09.02]  Disease of bronchial airway (HCC) [J47.9]  Septic shock (HCC) [A41.9, R65.21]  Urinary tract infection with hematuria, site unspecified [N39.0, R31.9]  Allergies   Allergen Reactions    Fluoxetine     Benazepril      Swollen tongue    Hydrocodone Other (See Comments)    Morphine     Penicillins Other (See Comments)    Simvastatin Other (See Comments)    Morphine And Related Nausea And Vomiting    Sucralfate Rash       DATE ONSET: 2022    Pain: The patient does not complain of pain       Current Diet: ADULT DIET; Dysphagia - Pureed    Diet Tolerance:  Patient tolerating current diet level without signs/symptoms of aspiration. Dysphagia Treatment and Impressions:  Subjective: Pt seen in room at bedside with RN permission  RN Report/Chart Review: Per chart review, pt remains pleasantly confused. Pt reports no difficulty with eating or drinking. SLP fed pt puree and minced and moist d/t pt stating he was having difficulty with his hands. Patient tolerance: Pt reports tolerating current diet with no clinical s/s of aspiration.     Respiratory Status: Pt with SPO2% of 94 on  RA with RR of 20    Liquid PO Trials:   IDDSI 0 Thin:  Assessed via straw: no anterior bolus loss , suspect functional A-P bolus transit, swallow timing subjectively appears timely, and no clinical s/s of aspiration  Solid PO Trials  IDDSI 4 Puree:   suspect functional A-P bolus transit, swallow timing subjectively appears timely, oral clearance grossly WFL, and no clinical s/s of aspiration  IDDSI 5 Minced and Moist:   suspect functional A-P bolus transit, swallow timing subjectively appears timely, grossly functional mastication, oral clearance grossly WFL, and no clinical s/s of aspiration    Education: SLP edu pt re: Role of SLP, Rationale for dysphagia tx, Recommended compensatory strategies, Aspiration precautions, and POC. Pt verbalized understanding, would benefit from ongoing education, and RN aware of recommendations  Assessment: Pt tolerating current diet with no clinical s/s of aspiration. Some carryover of recommended compensatory strategies    Recommendations: SLP recommends to advance to IDDSI 5 Minced and moist Solids; IDDSI 0 Thin Liquids; Meds crushed in puree as able  Risk Management: upright for all intake, stay upright for at least 30 mins after intake, small bites/sips, assist feed, distant supervision with intake, oral care 2-3x/day to reduce adverse affects in the event of aspiration, alternate bites/sips, slow rate of intake, general GERD precautions, general aspiration precautions, and hold PO and contact SLP if s/s of aspiration or worsening respiratory status develop. Dysphagia Goals:  Short Term Goals:  Timeframe for Short Term Goals: (5 days 07/27/2022)  Goal 1: The patient will tolerate recommended diet with no clinical s/s of aspiration 5/5 7/26/2022: Goal addressed, see above. Ongoing, progressing. Goal 2: The patient will tolerate therapeutic diet upgrade trials with no clinical s/s of aspiration 5/5 7/26/2022: Goal addressed, see above. Ongoing, progressing.     Goal 3: The patient/caregiver will demonstrate understanding of compensatory swallow strategies, for improved swallow safety  7/26/2022: Goal addressed, see above. Ongoing, progressing. Long Term Goals:  Timeframe for Long Term Goals: (7 days 07/29/2022)  Goal 1: The patient will tolerate least restrictive diet with no clinical s/s of aspiration or worsening respiratory/pulmonary status  7/26/2022: Ongoing, progressing. Speech/Language/Cog Goals: N/A      Recommendations:  Solid Consistency: ADVANCE TO IDDSI 5 Minced and moist Solids  Liquid Consistency: IDDSI 0 Thin Liquids  Medication: Meds crushed in puree as able    Plan:    Continued Dysphagia treatment with goals per plan of care. Discharge Recommendations: TBD    If pt discharges from hospital prior to Speech/Swallowing discharge, this note serves as tx and discharge summary.      Total Treatment Time / Charges     Time in Time out Total Time / units   Cognitive Tx         Speech Tx      Dysphagia Tx 0931 2796 16 mins/2 units     Signature:  Lillie Amezcua  SLP  Clinician

## 2022-07-26 NOTE — PROGRESS NOTES
Shift assessment complete. See doc flow. Nightly medications given see MAR. Oriented to self only, pleasantly confused. VSS. IJ in place to right neck. Patient with no complaints at this time. Tele-sitter in room. Bed alarm in place. Call light and bedside table within easy reach.

## 2022-07-26 NOTE — CARE COORDINATION
DISCHARGE ORDER  Date/Time 2022 11:38 AM  Completed by: Tylor Sharp RN, Case Management    Patient Name: Kevin Fairchild    : 1935      Admit order Date and Status:2022  Noted discharge order. (verify MD's last order for status of admission/Traditional Medicare 3 [place] Inpatient qualifying stay required for SNF)    Confirmed discharge plan with:              Patient:  No              When pt confirms DC plan does any support person need to be contacted by CM Yes if yes who___Left VM for LG [place] ([phone]) and attempted to call cell phone ([phone]) and not able to leave a VM__                      Discharge to Facility: [address] [place] [address] phone number for staff giving report: Name: [facility]  Address: Anaid Caro [facility] [address] Maame Lira, [address] [place] [address]  Phone: [phone]  Fax: [phone]     Pre-certification completed: not needed to return per [facility] Exemption Notification (HENS) completed: not needed to return   Discharge orders and Continuity of Care faxed to facility:  [phone]      Transportation:               Medical Transport explained with choice list offered to pt/family. Choice:Yes(no preference)  Agency used: [address] [place] [address] Rd up time:   [id number]      Pt/family/Nursing/Facility aware of  time: yes  Yes Names: Zeus Cason, RN, Marina with [employer], Attempted to contact [place], Attempted to contact Weston Easton (daughter), attempted to contact Jana Soto (sister), pt. Ambulance form completed:  yes:      Date Last IMM Given: CM attempted to reach LG, Erin Alberts. _Left VM for LG/daughter Katja Barrientos ([phone]) and attempted to call cell phone ([phone]) and not able to leave a VM. In CM VM explained the importance of reaching CM. Currently do not have LG paperwork on file for pt.  CM contacted Josie Napoles with [employer] to have [employer] send us the LG paperwork,as  she states that she has this. Rainer Sutherland confirms that they have difficult getting ahold of the LG, [place], as well, and have to call the daughter (noted as the second emergency contact) Conner Garcia (who lives in [place]) and she states she cannot ever get ahold of her, either. Rainer Sutherland states that they do not believe that they successfully got ahold of anyone to let them know she was sent to the hospital.    CM called Nancy (other daughter from [place]) at [phone] and CM could not leave a VM, as it was not an option. CM attempted to call sister, Kelli Hastings ([phone]) and the phone just continued to ring and not able to leave a VM, as it stated memory was full. Brittani Duarte RN (supervisor) is aware of the above with verbalized understanding. Pt is agreeable to return back to [facility] today. Comments:Faxed LIANE/AVS to [phone]. Rapid covid for SNF ordered and Tina Hill RN, informed. Addendum 1400: Rapid covid is neg. CM received the LG paperwork from [facility] securely and CM labeled a copy and placed it in patient's soft chart and labeled another copy and placed it in the Medical Records bin to be scanned it and placed in Media tab. Pt is being d/c'd back to [facility] today. Pt's O2 sats are 94% on RA. Discharge timeout done with Tina Hill RN. All discharge needs and concerns addressed. Addendum 2323: CM still has not received any calls back from LG or family. Discharging nurse to complete LIANE, reconcile AVS, and place final copy with patient's discharge packet. Discharging RN to ensure that written prescriptions for  Level II medications are sent with patient to the facility as per protocol.

## 2022-11-03 ENCOUNTER — HOSPITAL ENCOUNTER (INPATIENT)
Age: 87
LOS: 6 days | Discharge: NURSING, REHAB OR LONG-TERM CARE | DRG: 871 | End: 2022-11-09
Attending: INTERNAL MEDICINE | Admitting: INTERNAL MEDICINE
Payer: MEDICARE

## 2022-11-03 ENCOUNTER — APPOINTMENT (OUTPATIENT)
Dept: CT IMAGING | Age: 87
End: 2022-11-03
Payer: MEDICARE

## 2022-11-03 ENCOUNTER — HOSPITAL ENCOUNTER (EMERGENCY)
Age: 87
Discharge: ANOTHER HOSPITAL | End: 2022-11-03
Attending: EMERGENCY MEDICINE
Payer: MEDICARE

## 2022-11-03 ENCOUNTER — APPOINTMENT (OUTPATIENT)
Dept: GENERAL RADIOLOGY | Age: 87
End: 2022-11-03
Payer: MEDICARE

## 2022-11-03 VITALS
DIASTOLIC BLOOD PRESSURE: 50 MMHG | WEIGHT: 260 LBS | HEIGHT: 71 IN | HEART RATE: 94 BPM | RESPIRATION RATE: 20 BRPM | SYSTOLIC BLOOD PRESSURE: 80 MMHG | TEMPERATURE: 100 F | OXYGEN SATURATION: 98 % | BODY MASS INDEX: 36.4 KG/M2

## 2022-11-03 DIAGNOSIS — R77.8 ELEVATED TROPONIN: ICD-10-CM

## 2022-11-03 DIAGNOSIS — E87.20 LACTIC ACIDOSIS: ICD-10-CM

## 2022-11-03 DIAGNOSIS — E86.0 DEHYDRATION: ICD-10-CM

## 2022-11-03 DIAGNOSIS — D72.819 LEUKOPENIA, UNSPECIFIED TYPE: ICD-10-CM

## 2022-11-03 DIAGNOSIS — R50.9 FEBRILE ILLNESS, ACUTE: ICD-10-CM

## 2022-11-03 DIAGNOSIS — N39.0 URINARY TRACT INFECTION IN MALE: Primary | ICD-10-CM

## 2022-11-03 PROBLEM — I24.89 DEMAND ISCHEMIA: Status: ACTIVE | Noted: 2022-11-03

## 2022-11-03 PROBLEM — I24.8 DEMAND ISCHEMIA (HCC): Status: ACTIVE | Noted: 2022-11-03

## 2022-11-03 LAB
A/G RATIO: 1.2 (ref 1.1–2.2)
ALBUMIN SERPL-MCNC: 3.7 G/DL (ref 3.4–5)
ALP BLD-CCNC: 123 U/L (ref 40–129)
ALT SERPL-CCNC: 8 U/L (ref 10–40)
ANION GAP SERPL CALCULATED.3IONS-SCNC: 11 MMOL/L (ref 3–16)
ANION GAP SERPL CALCULATED.3IONS-SCNC: 17 MMOL/L (ref 3–16)
ANTI-XA UNFRAC HEPARIN: 0.2 IU/ML (ref 0.3–0.7)
APTT: 37.8 SEC (ref 23–34.3)
AST SERPL-CCNC: 18 U/L (ref 15–37)
BACTERIA: ABNORMAL /HPF
BASE EXCESS VENOUS: -5.2 MMOL/L (ref -3–3)
BASOPHILS ABSOLUTE: 0 K/UL (ref 0–0.2)
BASOPHILS RELATIVE PERCENT: 0.3 %
BILIRUB SERPL-MCNC: 0.8 MG/DL (ref 0–1)
BILIRUBIN URINE: NEGATIVE
BLOOD, URINE: ABNORMAL
BUN BLDV-MCNC: 26 MG/DL (ref 7–20)
BUN BLDV-MCNC: 27 MG/DL (ref 7–20)
CALCIUM SERPL-MCNC: 8 MG/DL (ref 8.3–10.6)
CALCIUM SERPL-MCNC: 9.3 MG/DL (ref 8.3–10.6)
CARBOXYHEMOGLOBIN: 2.4 % (ref 0–1.5)
CHLORIDE BLD-SCNC: 104 MMOL/L (ref 99–110)
CHLORIDE BLD-SCNC: 107 MMOL/L (ref 99–110)
CLARITY: ABNORMAL
CO2: 20 MMOL/L (ref 21–32)
CO2: 20 MMOL/L (ref 21–32)
COLOR: ABNORMAL
CREAT SERPL-MCNC: 1.4 MG/DL (ref 0.8–1.3)
CREAT SERPL-MCNC: 1.5 MG/DL (ref 0.8–1.3)
CRYSTALS, UA: ABNORMAL /HPF
EKG ATRIAL RATE: 129 BPM
EKG DIAGNOSIS: NORMAL
EKG P-R INTERVAL: 144 MS
EKG Q-T INTERVAL: 320 MS
EKG QRS DURATION: 112 MS
EKG QTC CALCULATION (BAZETT): 468 MS
EKG R AXIS: -60 DEGREES
EKG T AXIS: 89 DEGREES
EKG VENTRICULAR RATE: 129 BPM
EOSINOPHILS ABSOLUTE: 0.1 K/UL (ref 0–0.6)
EOSINOPHILS RELATIVE PERCENT: 2.5 %
EPITHELIAL CELLS, UA: ABNORMAL /HPF (ref 0–5)
GFR SERPL CREATININE-BSD FRML MDRD: 45 ML/MIN/{1.73_M2}
GFR SERPL CREATININE-BSD FRML MDRD: 49 ML/MIN/{1.73_M2}
GLUCOSE BLD-MCNC: 156 MG/DL (ref 70–99)
GLUCOSE BLD-MCNC: 197 MG/DL (ref 70–99)
GLUCOSE URINE: NEGATIVE MG/DL
HCO3 VENOUS: 19 MMOL/L (ref 23–29)
HCT VFR BLD CALC: 35.7 % (ref 40.5–52.5)
HCT VFR BLD CALC: 40.7 % (ref 40.5–52.5)
HEMOGLOBIN: 11.5 G/DL (ref 13.5–17.5)
HEMOGLOBIN: 13.3 G/DL (ref 13.5–17.5)
INR BLD: 1.4 (ref 0.87–1.14)
KETONES, URINE: ABNORMAL MG/DL
LACTIC ACID, SEPSIS: 2.1 MMOL/L (ref 0.4–1.9)
LACTIC ACID: 1.7 MMOL/L (ref 0.4–2)
LACTIC ACID: 1.8 MMOL/L (ref 0.4–2)
LACTIC ACID: 6.1 MMOL/L (ref 0.4–2)
LEUKOCYTE ESTERASE, URINE: ABNORMAL
LIPASE: 10 U/L (ref 13–60)
LV EF: 38 %
LVEF MODALITY: NORMAL
LYMPHOCYTES ABSOLUTE: 0.3 K/UL (ref 1–5.1)
LYMPHOCYTES RELATIVE PERCENT: 12.5 %
MAGNESIUM: 1.4 MG/DL (ref 1.8–2.4)
MAGNESIUM: 1.8 MG/DL (ref 1.8–2.4)
MCH RBC QN AUTO: 27.2 PG (ref 26–34)
MCH RBC QN AUTO: 27.4 PG (ref 26–34)
MCHC RBC AUTO-ENTMCNC: 32.2 G/DL (ref 31–36)
MCHC RBC AUTO-ENTMCNC: 32.7 G/DL (ref 31–36)
MCV RBC AUTO: 83.6 FL (ref 80–100)
MCV RBC AUTO: 84.3 FL (ref 80–100)
METHEMOGLOBIN VENOUS: 0.7 %
MICROSCOPIC EXAMINATION: YES
MONOCYTES ABSOLUTE: 0 K/UL (ref 0–1.3)
MONOCYTES RELATIVE PERCENT: 1 %
NEUTROPHILS ABSOLUTE: 1.8 K/UL (ref 1.7–7.7)
NEUTROPHILS RELATIVE PERCENT: 83.7 %
NITRITE, URINE: NEGATIVE
O2 SAT, VEN: 99 %
O2 THERAPY: ABNORMAL
PCO2, VEN: 32.8 MMHG (ref 40–50)
PDW BLD-RTO: 16.1 % (ref 12.4–15.4)
PDW BLD-RTO: 16.3 % (ref 12.4–15.4)
PH UA: 8.5 (ref 5–8)
PH VENOUS: 7.38 (ref 7.35–7.45)
PLATELET # BLD: 141 K/UL (ref 135–450)
PLATELET # BLD: 149 K/UL (ref 135–450)
PMV BLD AUTO: 7 FL (ref 5–10.5)
PMV BLD AUTO: 7.4 FL (ref 5–10.5)
PO2, VEN: 144.4 MMHG (ref 25–40)
POTASSIUM REFLEX MAGNESIUM: 3.7 MMOL/L (ref 3.5–5.1)
POTASSIUM SERPL-SCNC: 3.3 MMOL/L (ref 3.5–5.1)
PRO-BNP: 406 PG/ML (ref 0–449)
PROTEIN UA: 100 MG/DL
PROTHROMBIN TIME: 17.1 SEC (ref 11.7–14.5)
RAPID INFLUENZA  B AGN: NEGATIVE
RAPID INFLUENZA A AGN: NEGATIVE
RBC # BLD: 4.24 M/UL (ref 4.2–5.9)
RBC # BLD: 4.86 M/UL (ref 4.2–5.9)
RBC UA: ABNORMAL /HPF (ref 0–4)
REPORT: NORMAL
SARS-COV-2, NAAT: NOT DETECTED
SODIUM BLD-SCNC: 138 MMOL/L (ref 136–145)
SODIUM BLD-SCNC: 141 MMOL/L (ref 136–145)
SPECIFIC GRAVITY UA: 1.01 (ref 1–1.03)
TCO2 CALC VENOUS: 20 MMOL/L
TOTAL CK: 105 U/L (ref 39–308)
TOTAL PROTEIN: 6.7 G/DL (ref 6.4–8.2)
TROPONIN: 0.15 NG/ML
TROPONIN: 0.44 NG/ML
TROPONIN: 0.53 NG/ML
TROPONIN: 0.59 NG/ML
URINE REFLEX TO CULTURE: YES
URINE TYPE: ABNORMAL
UROBILINOGEN, URINE: 0.2 E.U./DL
VANCOMYCIN RANDOM: 20 UG/ML
WBC # BLD: 2.1 K/UL (ref 4–11)
WBC # BLD: 21.1 K/UL (ref 4–11)
WBC UA: ABNORMAL /HPF (ref 0–5)

## 2022-11-03 PROCEDURE — 85610 PROTHROMBIN TIME: CPT

## 2022-11-03 PROCEDURE — 6370000000 HC RX 637 (ALT 250 FOR IP): Performed by: NURSE PRACTITIONER

## 2022-11-03 PROCEDURE — 87086 URINE CULTURE/COLONY COUNT: CPT

## 2022-11-03 PROCEDURE — 6360000002 HC RX W HCPCS: Performed by: INTERNAL MEDICINE

## 2022-11-03 PROCEDURE — 2580000003 HC RX 258: Performed by: NURSE PRACTITIONER

## 2022-11-03 PROCEDURE — 82803 BLOOD GASES ANY COMBINATION: CPT

## 2022-11-03 PROCEDURE — 93010 ELECTROCARDIOGRAM REPORT: CPT | Performed by: INTERNAL MEDICINE

## 2022-11-03 PROCEDURE — 6360000002 HC RX W HCPCS: Performed by: EMERGENCY MEDICINE

## 2022-11-03 PROCEDURE — 83605 ASSAY OF LACTIC ACID: CPT

## 2022-11-03 PROCEDURE — 84484 ASSAY OF TROPONIN QUANT: CPT

## 2022-11-03 PROCEDURE — 96361 HYDRATE IV INFUSION ADD-ON: CPT

## 2022-11-03 PROCEDURE — 36415 COLL VENOUS BLD VENIPUNCTURE: CPT

## 2022-11-03 PROCEDURE — 81001 URINALYSIS AUTO W/SCOPE: CPT

## 2022-11-03 PROCEDURE — 99285 EMERGENCY DEPT VISIT HI MDM: CPT

## 2022-11-03 PROCEDURE — 6360000004 HC RX CONTRAST MEDICATION: Performed by: EMERGENCY MEDICINE

## 2022-11-03 PROCEDURE — 87635 SARS-COV-2 COVID-19 AMP PRB: CPT

## 2022-11-03 PROCEDURE — 71045 X-RAY EXAM CHEST 1 VIEW: CPT

## 2022-11-03 PROCEDURE — 6360000002 HC RX W HCPCS

## 2022-11-03 PROCEDURE — 87186 SC STD MICRODIL/AGAR DIL: CPT

## 2022-11-03 PROCEDURE — 6370000000 HC RX 637 (ALT 250 FOR IP): Performed by: EMERGENCY MEDICINE

## 2022-11-03 PROCEDURE — 99223 1ST HOSP IP/OBS HIGH 75: CPT | Performed by: INTERNAL MEDICINE

## 2022-11-03 PROCEDURE — 6370000000 HC RX 637 (ALT 250 FOR IP): Performed by: INTERNAL MEDICINE

## 2022-11-03 PROCEDURE — 96375 TX/PRO/DX INJ NEW DRUG ADDON: CPT

## 2022-11-03 PROCEDURE — 80048 BASIC METABOLIC PNL TOTAL CA: CPT

## 2022-11-03 PROCEDURE — 82550 ASSAY OF CK (CPK): CPT

## 2022-11-03 PROCEDURE — 96365 THER/PROPH/DIAG IV INF INIT: CPT

## 2022-11-03 PROCEDURE — C8929 TTE W OR WO FOL WCON,DOPPLER: HCPCS

## 2022-11-03 PROCEDURE — 83735 ASSAY OF MAGNESIUM: CPT

## 2022-11-03 PROCEDURE — 2500000003 HC RX 250 WO HCPCS: Performed by: NURSE PRACTITIONER

## 2022-11-03 PROCEDURE — 87150 DNA/RNA AMPLIFIED PROBE: CPT

## 2022-11-03 PROCEDURE — 2580000003 HC RX 258: Performed by: EMERGENCY MEDICINE

## 2022-11-03 PROCEDURE — 80053 COMPREHEN METABOLIC PANEL: CPT

## 2022-11-03 PROCEDURE — 85025 COMPLETE CBC W/AUTO DIFF WBC: CPT

## 2022-11-03 PROCEDURE — 2000000000 HC ICU R&B

## 2022-11-03 PROCEDURE — 71260 CT THORAX DX C+: CPT

## 2022-11-03 PROCEDURE — 2580000003 HC RX 258: Performed by: INTERNAL MEDICINE

## 2022-11-03 PROCEDURE — 87804 INFLUENZA ASSAY W/OPTIC: CPT

## 2022-11-03 PROCEDURE — 80202 ASSAY OF VANCOMYCIN: CPT

## 2022-11-03 PROCEDURE — 85027 COMPLETE CBC AUTOMATED: CPT

## 2022-11-03 PROCEDURE — 85730 THROMBOPLASTIN TIME PARTIAL: CPT

## 2022-11-03 PROCEDURE — 2700000000 HC OXYGEN THERAPY PER DAY

## 2022-11-03 PROCEDURE — 2500000003 HC RX 250 WO HCPCS: Performed by: EMERGENCY MEDICINE

## 2022-11-03 PROCEDURE — 83880 ASSAY OF NATRIURETIC PEPTIDE: CPT

## 2022-11-03 PROCEDURE — 94761 N-INVAS EAR/PLS OXIMETRY MLT: CPT

## 2022-11-03 PROCEDURE — 93005 ELECTROCARDIOGRAM TRACING: CPT | Performed by: EMERGENCY MEDICINE

## 2022-11-03 PROCEDURE — 85520 HEPARIN ASSAY: CPT

## 2022-11-03 PROCEDURE — 87040 BLOOD CULTURE FOR BACTERIA: CPT

## 2022-11-03 PROCEDURE — 99291 CRITICAL CARE FIRST HOUR: CPT | Performed by: STUDENT IN AN ORGANIZED HEALTH CARE EDUCATION/TRAINING PROGRAM

## 2022-11-03 PROCEDURE — 83690 ASSAY OF LIPASE: CPT

## 2022-11-03 RX ORDER — KETOROLAC TROMETHAMINE 30 MG/ML
15 INJECTION, SOLUTION INTRAMUSCULAR; INTRAVENOUS ONCE
Status: COMPLETED | OUTPATIENT
Start: 2022-11-03 | End: 2022-11-03

## 2022-11-03 RX ORDER — ONDANSETRON 2 MG/ML
INJECTION INTRAMUSCULAR; INTRAVENOUS
Status: COMPLETED
Start: 2022-11-03 | End: 2022-11-03

## 2022-11-03 RX ORDER — ASPIRIN 81 MG/1
81 TABLET, CHEWABLE ORAL DAILY
Status: DISCONTINUED | OUTPATIENT
Start: 2022-11-03 | End: 2022-11-09 | Stop reason: HOSPADM

## 2022-11-03 RX ORDER — MAGNESIUM SULFATE IN WATER 40 MG/ML
2000 INJECTION, SOLUTION INTRAVENOUS ONCE
Status: COMPLETED | OUTPATIENT
Start: 2022-11-03 | End: 2022-11-03

## 2022-11-03 RX ORDER — 0.9 % SODIUM CHLORIDE 0.9 %
1000 INTRAVENOUS SOLUTION INTRAVENOUS ONCE
Status: COMPLETED | OUTPATIENT
Start: 2022-11-03 | End: 2022-11-03

## 2022-11-03 RX ORDER — SODIUM CHLORIDE 0.9 % (FLUSH) 0.9 %
5-40 SYRINGE (ML) INJECTION PRN
Status: DISCONTINUED | OUTPATIENT
Start: 2022-11-03 | End: 2022-11-09 | Stop reason: HOSPADM

## 2022-11-03 RX ORDER — MAGNESIUM SULFATE 1 G/100ML
1000 INJECTION INTRAVENOUS PRN
Status: DISCONTINUED | OUTPATIENT
Start: 2022-11-03 | End: 2022-11-09 | Stop reason: HOSPADM

## 2022-11-03 RX ORDER — SODIUM CHLORIDE, SODIUM LACTATE, POTASSIUM CHLORIDE, AND CALCIUM CHLORIDE .6; .31; .03; .02 G/100ML; G/100ML; G/100ML; G/100ML
2000 INJECTION, SOLUTION INTRAVENOUS ONCE
Status: COMPLETED | OUTPATIENT
Start: 2022-11-03 | End: 2022-11-03

## 2022-11-03 RX ORDER — ASPIRIN 325 MG
325 TABLET ORAL ONCE
Status: COMPLETED | OUTPATIENT
Start: 2022-11-03 | End: 2022-11-03

## 2022-11-03 RX ORDER — HEPARIN SODIUM 1000 [USP'U]/ML
4000 INJECTION, SOLUTION INTRAVENOUS; SUBCUTANEOUS PRN
Status: DISCONTINUED | OUTPATIENT
Start: 2022-11-03 | End: 2022-11-05

## 2022-11-03 RX ORDER — SODIUM CHLORIDE 9 MG/ML
INJECTION, SOLUTION INTRAVENOUS CONTINUOUS
Status: DISCONTINUED | OUTPATIENT
Start: 2022-11-03 | End: 2022-11-03 | Stop reason: HOSPADM

## 2022-11-03 RX ORDER — HEPARIN SODIUM 10000 [USP'U]/100ML
1720 INJECTION, SOLUTION INTRAVENOUS CONTINUOUS
Status: DISCONTINUED | OUTPATIENT
Start: 2022-11-03 | End: 2022-11-05

## 2022-11-03 RX ORDER — SODIUM CHLORIDE 9 MG/ML
INJECTION, SOLUTION INTRAVENOUS CONTINUOUS
Status: DISCONTINUED | OUTPATIENT
Start: 2022-11-03 | End: 2022-11-04

## 2022-11-03 RX ORDER — HEPARIN SODIUM 1000 [USP'U]/ML
2000 INJECTION, SOLUTION INTRAVENOUS; SUBCUTANEOUS PRN
Status: DISCONTINUED | OUTPATIENT
Start: 2022-11-03 | End: 2022-11-05

## 2022-11-03 RX ORDER — LACTOBACILLUS RHAMNOSUS GG 10B CELL
1 CAPSULE ORAL 2 TIMES DAILY WITH MEALS
Status: DISCONTINUED | OUTPATIENT
Start: 2022-11-03 | End: 2022-11-09 | Stop reason: HOSPADM

## 2022-11-03 RX ORDER — PREGABALIN 75 MG/1
75 CAPSULE ORAL 3 TIMES DAILY
Status: DISCONTINUED | OUTPATIENT
Start: 2022-11-03 | End: 2022-11-03

## 2022-11-03 RX ORDER — ONDANSETRON 2 MG/ML
4 INJECTION INTRAMUSCULAR; INTRAVENOUS EVERY 6 HOURS PRN
Status: DISCONTINUED | OUTPATIENT
Start: 2022-11-03 | End: 2022-11-09 | Stop reason: HOSPADM

## 2022-11-03 RX ORDER — ACETAMINOPHEN 325 MG/1
650 TABLET ORAL ONCE
Status: DISCONTINUED | OUTPATIENT
Start: 2022-11-03 | End: 2022-11-03

## 2022-11-03 RX ORDER — PANTOPRAZOLE SODIUM 40 MG/1
40 TABLET, DELAYED RELEASE ORAL
Status: DISCONTINUED | OUTPATIENT
Start: 2022-11-04 | End: 2022-11-07 | Stop reason: ALTCHOICE

## 2022-11-03 RX ORDER — HEPARIN SODIUM 1000 [USP'U]/ML
4000 INJECTION, SOLUTION INTRAVENOUS; SUBCUTANEOUS ONCE
Status: COMPLETED | OUTPATIENT
Start: 2022-11-03 | End: 2022-11-03

## 2022-11-03 RX ORDER — SODIUM CHLORIDE 9 MG/ML
INJECTION, SOLUTION INTRAVENOUS PRN
Status: DISCONTINUED | OUTPATIENT
Start: 2022-11-03 | End: 2022-11-09 | Stop reason: HOSPADM

## 2022-11-03 RX ORDER — SODIUM CHLORIDE 0.9 % (FLUSH) 0.9 %
5-40 SYRINGE (ML) INJECTION EVERY 12 HOURS SCHEDULED
Status: DISCONTINUED | OUTPATIENT
Start: 2022-11-03 | End: 2022-11-09 | Stop reason: HOSPADM

## 2022-11-03 RX ORDER — ACETAMINOPHEN 650 MG/1
650 SUPPOSITORY RECTAL EVERY 6 HOURS PRN
Status: DISCONTINUED | OUTPATIENT
Start: 2022-11-03 | End: 2022-11-09 | Stop reason: HOSPADM

## 2022-11-03 RX ORDER — LIDOCAINE HYDROCHLORIDE 20 MG/ML
JELLY TOPICAL
Status: DISCONTINUED
Start: 2022-11-03 | End: 2022-11-03 | Stop reason: HOSPADM

## 2022-11-03 RX ORDER — ACETAMINOPHEN 325 MG/1
650 TABLET ORAL EVERY 6 HOURS PRN
Status: DISCONTINUED | OUTPATIENT
Start: 2022-11-03 | End: 2022-11-09 | Stop reason: HOSPADM

## 2022-11-03 RX ORDER — ACETAMINOPHEN 500 MG
1000 TABLET ORAL ONCE
Status: COMPLETED | OUTPATIENT
Start: 2022-11-03 | End: 2022-11-03

## 2022-11-03 RX ORDER — ASPIRIN 81 MG/1
324 TABLET, CHEWABLE ORAL ONCE
Status: COMPLETED | OUTPATIENT
Start: 2022-11-03 | End: 2022-11-03

## 2022-11-03 RX ORDER — BISACODYL 10 MG
10 SUPPOSITORY, RECTAL RECTAL DAILY PRN
Status: DISCONTINUED | OUTPATIENT
Start: 2022-11-03 | End: 2022-11-09 | Stop reason: HOSPADM

## 2022-11-03 RX ORDER — PREGABALIN 25 MG/1
50 CAPSULE ORAL 2 TIMES DAILY
Status: DISCONTINUED | OUTPATIENT
Start: 2022-11-03 | End: 2022-11-09 | Stop reason: HOSPADM

## 2022-11-03 RX ADMIN — PREGABALIN 50 MG: 25 CAPSULE ORAL at 20:03

## 2022-11-03 RX ADMIN — NOREPINEPHRINE BITARTRATE 4 MCG/MIN: 1 SOLUTION INTRAVENOUS at 20:28

## 2022-11-03 RX ADMIN — ACETAMINOPHEN 1000 MG: 500 TABLET ORAL at 07:27

## 2022-11-03 RX ADMIN — KETOROLAC TROMETHAMINE 15 MG: 30 INJECTION, SOLUTION INTRAMUSCULAR at 05:13

## 2022-11-03 RX ADMIN — SERTRALINE HYDROCHLORIDE 100 MG: 50 TABLET ORAL at 16:23

## 2022-11-03 RX ADMIN — ASPIRIN 324 MG: 81 TABLET, CHEWABLE ORAL at 10:48

## 2022-11-03 RX ADMIN — HEPARIN SODIUM 1000 UNITS/HR: 10000 INJECTION, SOLUTION INTRAVENOUS at 15:10

## 2022-11-03 RX ADMIN — ONDANSETRON 4 MG: 2 INJECTION INTRAMUSCULAR; INTRAVENOUS at 22:33

## 2022-11-03 RX ADMIN — SODIUM CHLORIDE: 900 INJECTION INTRAVENOUS at 10:58

## 2022-11-03 RX ADMIN — CEFTRIAXONE 2000 MG: 2 INJECTION, POWDER, FOR SOLUTION INTRAMUSCULAR; INTRAVENOUS at 07:45

## 2022-11-03 RX ADMIN — MAGNESIUM SULFATE HEPTAHYDRATE 2000 MG: 40 INJECTION, SOLUTION INTRAVENOUS at 17:33

## 2022-11-03 RX ADMIN — ONDANSETRON HYDROCHLORIDE 4 MG: 2 INJECTION, SOLUTION INTRAMUSCULAR; INTRAVENOUS at 05:14

## 2022-11-03 RX ADMIN — POTASSIUM BICARBONATE 40 MEQ: 782 TABLET, EFFERVESCENT ORAL at 16:23

## 2022-11-03 RX ADMIN — PHENYLEPHRINE HYDROCHLORIDE 0.2 MG: 10 INJECTION INTRAVENOUS at 08:37

## 2022-11-03 RX ADMIN — NOREPINEPHRINE BITARTRATE 5 MCG/MIN: 1 INJECTION INTRAVENOUS at 10:50

## 2022-11-03 RX ADMIN — IOPAMIDOL 75 ML: 755 INJECTION, SOLUTION INTRAVENOUS at 06:10

## 2022-11-03 RX ADMIN — HEPARIN SODIUM 4000 UNITS: 1000 INJECTION INTRAVENOUS; SUBCUTANEOUS at 14:57

## 2022-11-03 RX ADMIN — SODIUM CHLORIDE, POTASSIUM CHLORIDE, SODIUM LACTATE AND CALCIUM CHLORIDE 2000 ML: 600; 310; 30; 20 INJECTION, SOLUTION INTRAVENOUS at 08:37

## 2022-11-03 RX ADMIN — ASPIRIN 325 MG: 325 TABLET ORAL at 05:45

## 2022-11-03 RX ADMIN — HEPARIN SODIUM 2000 UNITS: 1000 INJECTION INTRAVENOUS; SUBCUTANEOUS at 20:48

## 2022-11-03 RX ADMIN — CEFEPIME 2000 MG: 2 INJECTION, POWDER, FOR SOLUTION INTRAVENOUS at 17:35

## 2022-11-03 RX ADMIN — MUPIROCIN: 20 OINTMENT TOPICAL at 19:59

## 2022-11-03 RX ADMIN — VANCOMYCIN HYDROCHLORIDE 2000 MG: 1 INJECTION, POWDER, LYOPHILIZED, FOR SOLUTION INTRAVENOUS at 14:57

## 2022-11-03 RX ADMIN — CEFEPIME 2000 MG: 2 INJECTION, POWDER, FOR SOLUTION INTRAVENOUS at 20:36

## 2022-11-03 RX ADMIN — SODIUM CHLORIDE: 9 INJECTION, SOLUTION INTRAVENOUS at 12:25

## 2022-11-03 RX ADMIN — SODIUM CHLORIDE, PRESERVATIVE FREE 10 ML: 5 INJECTION INTRAVENOUS at 19:55

## 2022-11-03 RX ADMIN — ASPIRIN 81 MG 81 MG: 81 TABLET ORAL at 16:23

## 2022-11-03 RX ADMIN — ACETAMINOPHEN 650 MG: 650 SUPPOSITORY RECTAL at 23:00

## 2022-11-03 RX ADMIN — Medication 1 CAPSULE: at 16:23

## 2022-11-03 RX ADMIN — SODIUM CHLORIDE 1000 ML: 9 INJECTION, SOLUTION INTRAVENOUS at 07:18

## 2022-11-03 RX ADMIN — NOREPINEPHRINE BITARTRATE 10 MCG/MIN: 1 SOLUTION INTRAVENOUS at 15:03

## 2022-11-03 RX ADMIN — SODIUM CHLORIDE 1000 ML: 9 INJECTION, SOLUTION INTRAVENOUS at 05:13

## 2022-11-03 ASSESSMENT — ENCOUNTER SYMPTOMS
VOMITING: 1
COLOR CHANGE: 0
SHORTNESS OF BREATH: 0
NAUSEA: 1
BACK PAIN: 0
RHINORRHEA: 0
PHOTOPHOBIA: 0
ABDOMINAL PAIN: 1
WHEEZING: 0

## 2022-11-03 ASSESSMENT — PAIN DESCRIPTION - LOCATION: LOCATION: ABDOMEN

## 2022-11-03 ASSESSMENT — PAIN - FUNCTIONAL ASSESSMENT
PAIN_FUNCTIONAL_ASSESSMENT: NONE - DENIES PAIN
PAIN_FUNCTIONAL_ASSESSMENT: 0-10

## 2022-11-03 ASSESSMENT — PAIN DESCRIPTION - PAIN TYPE: TYPE: ACUTE PAIN

## 2022-11-03 ASSESSMENT — PAIN SCALES - GENERAL
PAINLEVEL_OUTOF10: 4
PAINLEVEL_OUTOF10: 5

## 2022-11-03 ASSESSMENT — PAIN DESCRIPTION - DESCRIPTORS: DESCRIPTORS: DISCOMFORT

## 2022-11-03 NOTE — H&P
Internal Medicine History and Physical    Pt evaluated on:  11/3/2022    CHIEF COMPLAINT:  fever, nausea, vomiting, hematuria    History of Present Illness: This is a 82 y.o. WM with mild dementia, CAD, COPD, hx of duodenal ulcer, GERD, HLD, HTN hx of MI and recent septic shock admission for UTI who presents with nausea, vomiting and fever. Pt was also tachycardic. Had hematuria at his nursing facility as well. PT pleasantly demented and thinks he is currently at home. He has had an abnormal EKG and elevated troponin as well. Pt meets criteria for septic shock POA based on hypotension, lactic acidosis, documented UTI, tachycardia, PILAR, leukopenia.     Past Medical History:   Diagnosis Date    Atherosclerotic heart disease     CAD (coronary artery disease)     Cancer (HCC)     skin    COPD (chronic obstructive pulmonary disease) (HCC)     Dementia (HCC)     Depression     Duodenal ulcer without hemorrhage or perforation     Falls frequently     GERD without esophagitis     Hernia     Hyperlipidemia     Hypertension     Hypokalemia     Lack of coordination     falls    Melanoma ([address]     MI (myocardial infarction) ([address]     Muscle weakness (generalized)     Protein calorie malnutrition ([address]     Reflux     Sepsis ([address]     Spinal stenosis     UTI (urinary tract infection)      Active Ambulatory Problems     Diagnosis Date Noted    Falls frequently 03/02/2018    Hypokalemia 03/02/2018    CAD (coronary artery disease) 03/02/2018    HTN (hypertension) 03/02/2018    HLD (hyperlipidemia) 03/02/2018    Dementia ([address] 03/03/2018    Moderate malnutrition ([address] 03/03/2018    Abdominal pain 06/09/2019    Abdominal pain, epigastric     Anemia     Urinary tract infection without hematuria     Lactic acid acidosis     Mediastinal lymphadenopathy     Septicemia ([address] 09/17/2020    Pneumonia of right lower lobe due to infectious organism     PUD (peptic ulcer disease)     Moderate protein-calorie malnutrition ([address] 09/18/2020    Aspiration pneumonia ([address]     Septic shock ([address] 07/22/2022    Acute cystitis with hematuria 07/22/2022    Mass of pancreas 07/22/2022    Acute hypoxemic respiratory failure ([address] 77/43/5448    Acute metabolic encephalopathy 04/02/4256    Urinary tract infection with hematuria 07/23/2022    Thrombocytopenia ([address] 07/24/2022    Hypoxia 07/25/2022     Resolved Ambulatory Problems     Diagnosis Date Noted    Hypokalemia 03/02/2018    Generalized weakness 03/02/2018    Hypomagnesemia 03/03/2018    Weakness 03/03/2018    Severe sepsis ([address] 08/06/2019    Acute delirium     Septic shock ([address]     Bacteremia due to Gram-negative bacteria     Sepsis ([address] 09/25/2019     Past Medical History:   Diagnosis Date    Atherosclerotic heart disease     Cancer (HCC)     COPD (chronic obstructive pulmonary disease) (HCC)     Depression     Duodenal ulcer without hemorrhage or perforation     GERD without esophagitis     Hernia     Hyperlipidemia     Hypertension     Lack of coordination     Melanoma ([address]     MI (myocardial infarction) ([address]     Muscle weakness (generalized)     Protein calorie malnutrition (HCC)     Reflux     Spinal stenosis     UTI (urinary tract infection)          Past Medical History:   Diagnosis Date    Atherosclerotic heart disease     CAD (coronary artery disease)     Cancer (HCC)     skin    COPD (chronic obstructive pulmonary disease) (HCC)     Dementia (HCC)     Depression     Duodenal ulcer without hemorrhage or perforation     Falls frequently     GERD without esophagitis     Hernia     Hyperlipidemia     Hypertension     Hypokalemia     Lack of coordination     falls    Melanoma ([address]     MI (myocardial infarction) ([address]     Muscle weakness (generalized)     Protein calorie malnutrition ([address]     Reflux     Sepsis ([address]     Spinal stenosis     UTI (urinary tract infection)      Past Surgical History:   Procedure Laterality Date    CHOLECYSTECTOMY      COLONOSCOPY  8/10/12    CORONARY ANGIOPLASTY WITH STENT PLACEMENT      PROSTATE BIOPSY      SKIN BIOPSY      UPPER GASTROINTESTINAL ENDOSCOPY  5/14/12    UPPER GASTROINTESTINAL ENDOSCOPY N/A 6/11/2019    EGD BIOPSY performed by Cecy Huggins DO at [facility] N/A 8/8/2019    EGD performed by Arnaldo Murillo MD at [address] [facility]         Medications Prior to Admission:    Medications Prior to Admission: melatonin 3 MG TABS tablet, Take 3 mg by mouth in the morning. sertraline (ZOLOFT) 50 MG tablet, Take 100 mg by mouth in the morning. pregabalin (LYRICA) 25 MG capsule, Take 75 mg by mouth in the morning and 75 mg at noon and 75 mg before bedtime. omeprazole (PRILOSEC) 20 MG delayed release capsule, Take 20 mg by mouth daily  magnesium hydroxide (MILK OF MAGNESIA) 400 MG/5ML suspension, Take 30 mLs by mouth daily as needed for Constipation  bisacodyl (DULCOLAX) 10 MG suppository, Place 10 mg rectally daily as needed for Constipation If MOM ineffective.   acetaminophen (TYLENOL) 325 MG tablet, Take 650 mg by mouth every 6 hours as needed for Pain  Current Facility-Administered Medications   Medication Dose Route Frequency Provider Last Rate Last Admin    norepinephrine (LEVOPHED) 16 mg in dextrose 5 % 250 mL infusion  1-100 mcg/min IntraVENous Continuous France Stairs, APRN - CNP 9.4 mL/hr at 11/03/22 1503 10 mcg/min at 11/03/22 1503    bisacodyl (DULCOLAX) suppository 10 mg  10 mg Rectal Daily PRN Vinnie Crews MD        [START ON 11/4/2022] pantoprazole (PROTONIX) tablet 40 mg  40 mg Oral QAM AC Vinnie Crews MD        sertraline (ZOLOFT) tablet 100 mg  100 mg Oral Daily Vinnie Crews MD        sodium chloride flush 0.9 % injection 5-40 mL  5-40 mL IntraVENous 2 times per day Vinnie Crews MD        sodium chloride flush 0.9 % injection 5-40 mL  5-40 mL IntraVENous PRN Vinnie Crews MD        0.9 % sodium chloride infusion   IntraVENous PRN Lori Reeves MD        acetaminophen (TYLENOL) tablet 650 mg  650 mg Oral Q6H PRN Lori Reeves MD        Or    acetaminophen (TYLENOL) suppository 650 mg  650 mg Rectal Q6H PRN Lori Reeves MD        0.9 % sodium chloride infusion   IntraVENous Continuous Lori Reeves  mL/hr at 11/03/22 1225 New Bag at 11/03/22 1225    cefepime (MAXIPIME) 2000 mg IVPB minibag  2,000 mg IntraVENous Once Lori Reeves MD        cefepime (MAXIPIME) 2000 mg IVPB minibag  2,000 mg IntraVENous Q12H Lori Reeves MD        vancomycin (VANCOCIN) 2,000 mg in sodium chloride 0.9 % 500 mL IVPB  2,000 mg IntraVENous Once Lori Reeves  mL/hr at 11/03/22 1457 2,000 mg at 11/03/22 1457    heparin (porcine) injection 4,000 Units  4,000 Units IntraVENous PRN Lori Reeves MD        heparin (porcine) injection 2,000 Units  2,000 Units IntraVENous PRN Lori Reeves MD        heparin 25,000 units in dextrose 5% 250 mL (premix) infusion  1,000 Units/hr IntraVENous Continuous Lori Reeves MD 10 mL/hr at 11/03/22 1510 1,000 Units/hr at 11/03/22 1510    lactobacillus (CULTURELLE) capsule 1 capsule  1 capsule Oral BID WC Lori Reeves MD        mupirocin (BACTROBAN) 2 % ointment   Nasal BID Maria Elena White APRN - CNP        vancomycin (VANCOCIN) intermittent dosing (placeholder)   Other RX Placeholder Lori Reeves MD        pregabalin (LYRICA) capsule 50 mg  50 mg Oral BID Maria Elena White APRN - CNP        perflutren lipid microspheres (DEFINITY) injection 1.5 mL  1.5 mL IntraVENous ONCE PRN Trent Haddox, DO        magnesium sulfate 1000 mg in dextrose 5% 100 mL IVPB  1,000 mg IntraVENous PRN Maria Elena White APRN - CNP        potassium bicarb-citric acid (EFFER-K) effervescent tablet 40 mEq  40 mEq Oral Once Maria Elena White, APRN - CNP        magnesium sulfate 2000 mg in 50 mL IVPB premix  2,000 mg IntraVENous Once Lori Reeves MD           Prior to Admission medications    Medication Sig Start Date End Date Taking? Authorizing Provider   melatonin 3 MG TABS tablet Take 3 mg by mouth in the morning. Historical Provider, MD   sertraline (ZOLOFT) 50 MG tablet Take 100 mg by mouth in the morning. Historical Provider, MD   pregabalin (LYRICA) 25 MG capsule Take 75 mg by mouth in the morning and 75 mg at noon and 75 mg before bedtime. Historical Provider, MD   omeprazole (PRILOSEC) 20 MG delayed release capsule Take 20 mg by mouth daily    Historical Provider, MD   amLODIPine (NORVASC) 10 MG tablet Take 10 mg by mouth daily  7/26/22  Historical Provider, MD   magnesium hydroxide (MILK OF MAGNESIA) 400 MG/5ML suspension Take 30 mLs by mouth daily as needed for Constipation    Historical Provider, MD   bisacodyl (DULCOLAX) 10 MG suppository Place 10 mg rectally daily as needed for Constipation If MOM ineffective. Historical Provider, MD   acetaminophen (TYLENOL) 325 MG tablet Take 650 mg by mouth every 6 hours as needed for Pain    Historical Provider, MD         Allergies:    Fluoxetine, Benazepril, Hydrocodone, Morphine, Penicillins, Simvastatin, Morphine and related, and Sucralfate    Social History:      reports that he has quit smoking. His smoking use included cigarettes. He has a 2.50 pack-year smoking history. He has never used smokeless tobacco. He reports that he does not drink alcohol and does not use drugs. Family History:     Unable to be reviewed due to dementia    REVIEW OF SYSTEMS:  As above in the HPI. All other review of systems were asked and were negative except for denies pain currently. PHYSICAL EXAM:    Vitals:  BP (!) 145/71   Pulse 77   Temp 99.1 °F (37.3 °C)   Resp 21   SpO2 95%     General:  ill appearing, pale  HEENT:  Normocephalic, atraumatic. Pupils equal, round, reactive to light. No scleral icterus. No conjunctival injection. Normal lips, teeth, and gums. No nasal discharge.   Neck: Supple  Heart:  Normal s1/s2, RRR, no murmurs, gallops, or rubs. no leg edema  Lungs:  diminished bilaterally, no wheeze, no rales, no rhonchi, no use of accessory muscles  Abd: bowel sounds present, soft, nontender, nondistended, no masses  Extrem:  No clubbing, cyanosis,  no edema, 2+ pedal pulses, brisk capillary refill  Skin:  Warm and dry, no open lesions or rash,  pale color/perfusion  Psych:  A&O x  to self, appropriate mood and affect. Neuro: grossly intact, moves all four extremities.    weak   Breast: deferred  Rectal: deferred  Genitalia:  deferred    LABS:  Lab Results   Component Value Date/Time    WBC 21.1 11/03/2022 12:58 PM    RBC 4.24 11/03/2022 12:58 PM    HGB 11.5 11/03/2022 12:58 PM    HCT 35.7 11/03/2022 12:58 PM    MCV 84.3 11/03/2022 12:58 PM    MCH 27.2 11/03/2022 12:58 PM    MCHC 32.2 11/03/2022 12:58 PM    RDW 16.3 11/03/2022 12:58 PM     11/03/2022 12:58 PM    MPV 7.4 11/03/2022 12:58 PM     Lab Results   Component Value Date/Time     11/03/2022 12:58 PM    K 3.3 11/03/2022 12:58 PM    K 3.7 11/03/2022 05:06 AM     11/03/2022 12:58 PM    CO2 20 11/03/2022 12:58 PM    BUN 26 11/03/2022 12:58 PM    CREATININE 1.5 11/03/2022 12:58 PM    GFRAA >60 07/26/2022 06:12 AM    GFRAA >60 02/24/2013 12:45 PM    AGRATIO 1.2 11/03/2022 05:06 AM    LABGLOM 45 11/03/2022 12:58 PM    GLUCOSE 156 11/03/2022 12:58 PM    PROT 6.7 11/03/2022 05:06 AM    PROT 7.1 02/24/2013 12:45 PM    LABALBU 3.7 11/03/2022 05:06 AM    CALCIUM 8.0 11/03/2022 12:58 PM    BILITOT 0.8 11/03/2022 05:06 AM    ALKPHOS 123 11/03/2022 05:06 AM    AST 18 11/03/2022 05:06 AM    ALT 8 11/03/2022 05:06 AM     Lab Results   Component Value Date/Time    PROTIME 17.1 11/03/2022 12:58 PM    INR 1.40 11/03/2022 12:58 PM     Recent Labs     11/03/22  0506 11/03/22  0858 11/03/22  1258   TROPONINI 0.15* 0.59* 0.53*     Lab Results   Component Value Date/Time    NITRU Negative 11/03/2022 07:00 AM    COLORU DARK YELLOW 11/03/2022 07:00 AM    PHUR 8.5 11/03/2022 07:00 AM    WBCUA 21-50 11/03/2022 07:00 AM    RBCUA  11/03/2022 07:00 AM    MUCUS 3+ 11/17/2017 07:15 AM    YEAST Present 09/25/2019 03:35 PM    BACTERIA 3+ 11/03/2022 07:00 AM    CLARITYU SL CLOUDY 11/03/2022 07:00 AM    SPECGRAV 1.010 11/03/2022 07:00 AM    LEUKOCYTESUR LARGE 11/03/2022 07:00 AM    UROBILINOGEN 0.2 11/03/2022 07:00 AM    BILIRUBINUR Negative 11/03/2022 07:00 AM    BILIRUBINUR neg 01/26/2013 10:12 AM    BLOODU LARGE 11/03/2022 07:00 AM    GLUCOSEU Negative 11/03/2022 07:00 AM    KETUA TRACE 11/03/2022 07:00 AM    AMORPHOUS Rare 07/22/2022 01:53 AM     Lab Results   Component Value Date/Time    MG 1.40 11/03/2022 12:58 PM    PHOS 2.7 09/19/2020 07:32 AM     Lab Results   Component Value Date/Time    LABA1C 6.6 03/05/2018 05:36 AM     Lab Results   Component Value Date/Time    COEPXKIF23 730 03/03/2018 05:45 AM    FOLATE 5.68 03/03/2018 05:45 AM    IRON 41 06/11/2019 06:43 AM    TIBC 200 06/11/2019 06:43 AM     Lab Results   Component Value Date/Time    TRIG 75 02/20/2011 04:20 AM    HDL 34 02/20/2011 04:20 AM    LDLCALC 77 02/20/2011 04:20 AM    LABVLDL 15 02/20/2011 04:20 AM     Lab Results   Component Value Date/Time    AMYLASE 33 02/08/2016 03:43 AM    LIPASE 10.0 11/03/2022 05:06 AM     Lab Results   Component Value Date/Time    BNP 15 01/26/2013 09:05 AM     Lab Results   Component Value Date/Time    LACTA 1.8 11/03/2022 12:58 PM     No results found for: PHART, PH, WSS4SMX, PCO2, PO2ART, PO2, RDL5EQY, HCO3, BEART, BE, THGBART, THB, SWA8ZPE, X1XPRZRI, O2SAT  No results found for: LABAMPH, BARBSCNU, LABBENZ, CANNAB, COCAINESCRN, LABMETH, OPIATESCREENURINE, PHENCYCLIDINESCREENURINE, PPXUR, ETOH  Lab Results   Component Value Date/Time    DDIMER 355 09/25/2019 09:55 PM       Lab Results   Component Value Date/Time    VITD25 18.7 03/03/2018 05:45 AM       Last 3 Troponin:    Lab Results   Component Value Date/Time    TROPONINI 0.53 11/03/2022 12:58 PM    TROPONINI 0.59 11/03/2022 08:58 AM    TROPONINI 0.15 11/03/2022 05:06 AM       RADIOLOGY:  XR CHEST PORTABLE    Result Date: 11/3/2022  EXAMINATION: ONE XRAY VIEW OF THE CHEST 11/3/2022 5:12 am COMPARISON: 07/22/2022 radiograph HISTORY: ORDERING SYSTEM PROVIDED HISTORY: sob TECHNOLOGIST PROVIDED HISTORY: Reason for exam:->sob Reason for Exam: sob FINDINGS: The heart, mediastinum normal.  Decreasing perihilar opacities in comparison to the patient's prior exam.  No focal consolidation or significant pleural fluid. There are no skeletal abnormalities in the chest.     Improving aeration in the lungs centrally. Suspected mild pattern of vascular congestion. CT CHEST ABDOMEN PELVIS W CONTRAST Additional Contrast? None    Result Date: 11/3/2022  EXAMINATION: CT OF THE CHEST, ABDOMEN, AND PELVIS WITH CONTRAST 11/3/2022 6:09 am TECHNIQUE: CT of the chest, abdomen and pelvis was performed with the administration of intravenous contrast. Multiplanar reformatted images are provided for review. Automated exposure control, iterative reconstruction, and/or weight based adjustment of the mA/kV was utilized to reduce the radiation dose to as low as reasonably achievable. COMPARISON: 07/21/2022 CT HISTORY: ORDERING SYSTEM PROVIDED HISTORY: abd pain/sob TECHNOLOGIST PROVIDED HISTORY: Reason for exam:->abd pain/sob Additional Contrast?->None Decision Support Exception - unselect if not a suspected or confirmed emergency medical condition->Emergency Medical Condition (MA) Reason for Exam: abd pain FINDINGS: Chest: Mediastinum: The heart, aorta and pulmonary arteries show no acute abnormality. Thyroid gland and esophagus are unremarkable. Asymmetric pattern of right mediastinal and hilar lymph node enlargement. Index right hilar node measures 16 mm. This is similar to prior exam. Lungs/pleura: Severe peribronchial thickening in the bilateral lower lobes.  Scattered reticular and ground-glass opacities peripherally in the lower lobes. No focal consolidation or significant pleural fluid. Pattern has progressed from prior exam.  Additional scattered reticular and ground-glass opacities are mild in the upper lobes diffusely. Soft Tissues/Bones: No skeletal abnormalities are appreciated within the chest. Abdomen/Pelvis: Organs: The gallbladder is absent. The liver, biliary ducts and spleen are normal.  Fat atrophy of the pancreas. Kidneys and adrenal glands are normal. GI/Bowel: The stomach, duodenum and small bowel are normal. A normal appendix is visualized. Extensive diverticular change throughout the colon. No pattern of acute inflammation. Pelvis: The prostate is enlarged contributing to mass effect upon the base of the bladder. Diffuse pattern of moderate bladder mucosal thickening. No intraluminal filling defect. Peritoneum/Retroperitoneum: The aorta tapers normally. No lymph node enlargement. Bones/Soft Tissues: No significant skeletal abnormalities. 1. Increasing peribronchial thickening and interstitial opacities in the lungs favoring the lower lobes. Chronic bronchiolitis or another viral infection is suspected. A degree of pulmonary edema is not excluded. 2. Bladder mucosal thickening may represent chronic bladder outlet obstruction or underlying cystitis. The prostate is enlarged. 3. Diverticulosis coli.        EKG:  Sinus tachy, rate of 130, RBBB    PHYSICIAN CERTIFICATION    I certify that Xochilt Kaufman is expected to be hospitalized for >2 midnights based on the following assessment and plan:      ASSESSMENT:      Patient Active Problem List   Diagnosis    Falls frequently    Hypokalemia    CAD (coronary artery disease)    HTN (hypertension)    HLD (hyperlipidemia)    Dementia (HCC)    Moderate malnutrition (HCC)    Abdominal pain    Abdominal pain, epigastric    Anemia    Urinary tract infection without hematuria    Lactic acid acidosis    Mediastinal lymphadenopathy    Septicemia (HCC)    Pneumonia of right lower lobe due to infectious organism    PUD (peptic ulcer disease)    Moderate protein-calorie malnutrition ([address]    Aspiration pneumonia ([address]    Septic shock ([address]    Acute cystitis with hematuria    Mass of pancreas    Acute hypoxemic respiratory failure (HCC)    Acute metabolic encephalopathy    Urinary tract infection with hematuria    Thrombocytopenia (HCC)    Hypoxia    Demand ischemia (HCC)       Principal Problem:    Septic shock (HCC)  Active Problems:    Acute metabolic encephalopathy    Urinary tract infection with hematuria    Demand ischemia (HCC)    Hypokalemia    CAD (coronary artery disease)    HTN (hypertension)    HLD (hyperlipidemia)    Dementia (HCC)  Resolved Problems:    * No resolved hospital problems. *      PLAN:     Septic shock - wean pressors as tolerated - IV abx, await speciation  UTI - cefepime and vanco and await cultures -  once cordero is out - check for urinary retention  Demand ischemia - suspect related to sepsis - EKG shows RBBB with no signs of acute ischemia - started on heparin drip due to increasing troponin - denies chest pain currently  Hypokalemia - replace  Hypomag  -replace  Mild PILAR - should improved with improved BP - continue pressors at present time  Acute metabolic encephalopathy on chronic mild dementia - pt unaware of where he is - monitor lines and tubes closely      DVT prophylaxis Yes:  IV heparin  GI prophylaxis protonix  Antibiotic prophylaxis:  Yes    Code status Full code    I spent 35 minutes providing critical care for septic shock. This time is excluding time spent performing procedures.     Please note that over 50 minutes was spent in evaluating the patient, review of records and results, discussion with staff/family, etc.    Electronically signed by Kingsley Glez MD on 11/3/2022 at 3:30 PM

## 2022-11-03 NOTE — CONSULTS
Pharmacy to Manage Heparin Infusion per [facility]    Pt wt = 595.2 kg (will use adjusted wt if actual body weight > 120% ideal body weight = 92.3 kg). Baseline aPTT pending from 1258    Heparin (weight-based) Infusion: CAD/STEMI/NSTEMI/UA/AFib)   Heparin 60 units/kg IVP bolus (max 4,000 units) followed by Heparin infusion at 12 units/kg/hr (recommended max initial rate: 1000 units/hr). Recheck anti-Xa (unless aPTT being used) in 6 hours. Goal anti-Xa 0.3-0.7 IU/mL    Pharmacy to manage Heparin - contact for questions. Heparin 60 units/kg IV x 1 (max 4,000 units), then 12 units/kg/hr (recommended max initial rate 1,000 units/hr). Adjust infusion rate based off anti-Xa results below. anti-Xa < 0.1    Heparin 60 units/kg bolus  Increase infusion by 4 units/kg/hr  anti-Xa 0.1-0.29 Heparin 30 units/kg bolus Increase infusion by 2 units/kg/hr  anti-Xa 0.3-0.7    No bolus No change   anti-Xa 0.71-0.8   No bolus Decrease infusion by 1 units/kg/hr  anti-Xa 0.81-0.99    No bolus Decrease infusion by 2 units/kg/hr  anti-Xa 1 or more     Hold heparin for 1 hour Decrease infusion by 3 units/kg/hr    Obtain anti-Xa hours after bolus and 6 hours after any dose change until two consecutive therapeutic anti-Xa are achieved- then daily. Jonathan Ross PharmD, [facility]   11/3/2022 1:41 PM    aPTT 37.8 seconds at 1258. Jonathan Ross PharmD, BCPS   11/3/2022 1:55 PM    11/4 @ 1923  Anti-Xa level = 0.20  Give 4000 units bolus and increase rate to 1180 units/hr  Will obtain Anti=Xa level in 6 hours  JASWINDER Valdez BLAISE [facility]  ------------------------------  11/4 5473  Low-Dose Heparin Drip  Current Rate: 1180 units/hr  11/4 @ 0243 Anti-Xa Level= 0.13  Plan: Per pharmacy protocol, we will give a bolus dose of 2000 units and increase heparin drip rate to 1360 units/hr    Next Anti-Xa Level: 11/4 @ [address], PharmD 11/4/2022 3:19 AM    Anti-Xa 0.27 at 0649   Heparin 2000 units IVP bolus then increase Heparin infusion to 1540 units/hr  Recheck anti-Xa in 6 hours. Abigail Matamoros.  Vandana PharmD, BCPS   11/4/2022 7:52 AM    11/4/2022  anti-Xa level = 0.14 IU/mL at 1321  Give 2000 units bolus  Increase Heparin infusion rate to 1720 units/hr  Recheck level in 6 hours  Juancarlos Holt, PharmD  11/4/2022 3:43 PM      11/4/2022  anti-Xa level = 0.40 IU/mL at 2209  Continue Heparin infusion rate at 1720 units/hr   Recheck level in 6 hours at [address], PharmD  11/4/2022 10:27 PM  -----------------------------------------  11/5 3739  Low-Dose Heparin Drip  Current Rate: 1720 units/hr  11/5 @ 0403 Anti-Xa Level= 0.33  Plan: Per pharmacy protocol, we will not make any changes at this time and move to daily Anti-Xa level monitoring    Next Anti-Xa Level: 11/6 @ 2230 Antonette Muller, PharmD 11/5/2022 4:42 AM

## 2022-11-03 NOTE — ED NOTES
Call back from [facility]. Per Dr. Sukumar Quintero, Cardiology wants the patient at [facility]. Transfer center aware.        Luis De La Cruz RN  11/03/22 3187

## 2022-11-03 NOTE — ED NOTES
[facility] here to transport pt.  Pt remains alert and without c/o's     Dayami Ledezma RN  11/03/22 2136

## 2022-11-03 NOTE — ED NOTES
Called transfer center at [address] to [address] [facility]  11/03/22 1717

## 2022-11-03 NOTE — CONSULTS
PULMONARY AND CRITICAL CARE INPATIENT CONSULTATION      11/3/2022    Patient Name:  Joe Park       1935       Evaluation was requested by Dr. Cathy Leung regarding hypotension. HPI:   Patient is a 82 y.o. male with significant past medical history of dementia, CAD, COPD, hyperlipidemia, recent admission for septic shock, that presents with nausea vomiting and fever. Unable to obtain history from patient due to dementia. Patient is admitted to the ICU for septic shock secondary to UTI. Pt also found to have elevated troponin and transferred to [facility] for further cardiac work-up. He has no complaints at this time. Invasive Lines: PICC D#None   CVC D#None  Art Line D#None        ROS:   Review of Systems   Unable to perform ROS: Dementia        Past Medical History:   Diagnosis Date    Atherosclerotic heart disease     CAD (coronary artery disease)     Cancer ([address]     skin    COPD (chronic obstructive pulmonary disease) ([address]     Dementia ([address]     Depression     Duodenal ulcer without hemorrhage or perforation     Falls frequently     GERD without esophagitis     Hernia     Hyperlipidemia     Hypertension     Hypokalemia     Lack of coordination     falls    Melanoma ([address]     MI (myocardial infarction) ([address]     Muscle weakness (generalized)     Protein calorie malnutrition ([address]     Reflux     Sepsis ([address]     Spinal stenosis     UTI (urinary tract infection)         Past Surgical History:   Procedure Laterality Date    CHOLECYSTECTOMY      COLONOSCOPY  8/10/12    CORONARY ANGIOPLASTY WITH STENT PLACEMENT      PROSTATE BIOPSY      SKIN BIOPSY      UPPER GASTROINTESTINAL ENDOSCOPY  5/14/12    UPPER GASTROINTESTINAL ENDOSCOPY N/A 6/11/2019    EGD BIOPSY performed by Ann Marie Martínez DO at [facility] N/A 8/8/2019    EGD performed by Jodi Huizar MD at [address] [place] [address]        No family history on file.      Social History     Tobacco Use Smoking status: Former     Packs/day: 0.25     Years: 10.00     Pack years: 2.50     Types: Cigarettes    Smokeless tobacco: Never   Substance Use Topics    Alcohol use: No        Allergies   Allergen Reactions    Fluoxetine     Benazepril      Swollen tongue    Hydrocodone Other (See Comments)    Morphine     Penicillins Other (See Comments)    Simvastatin Other (See Comments)    Morphine And Related Nausea And Vomiting    Sucralfate Rash         Vital Signs:  Blood pressure (!) 87/49, pulse 75, temperature 99.1 °F (37.3 °C), temperature source Oral, resp. rate 20, SpO2 96 %.' on RA  There is no height or weight on file to calculate BMI. CVP:      Intake/Output Summary (Last 24 hours) at 11/3/2022 1549  Last data filed at 11/3/2022 1458  Gross per 24 hour   Intake 300 ml   Output 200 ml   Net 100 ml         PHYSICAL EXAM:  Physical Exam  Constitutional:       General: He is not in acute distress. Appearance: He is not toxic-appearing. HENT:      Head: Normocephalic and atraumatic. Nose: Nose normal.      Mouth/Throat:      Mouth: Mucous membranes are moist.   Eyes:      General: No scleral icterus. Right eye: No discharge. Left eye: No discharge. Cardiovascular:      Rate and Rhythm: Normal rate. Heart sounds: No murmur heard. No gallop. Abdominal:      General: Abdomen is flat. Bowel sounds are normal. There is no distension. Tenderness: There is no abdominal tenderness. Musculoskeletal:         General: No swelling. Skin:     General: Skin is warm and dry. Neurological:      Mental Status: He is alert.       Comments: Alert and oriented to person   Psychiatric:         Mood and Affect: Mood normal.        Results:  CBC:   Recent Labs     11/03/22  0506 11/03/22  1258   WBC 2.1* 21.1*   HGB 13.3* 11.5*   HCT 40.7 35.7*   MCV 83.6 84.3    141     BMP:   Recent Labs     11/03/22  0506 11/03/22  1258    138   K 3.7 3.3*    107   CO2 20* 20*   BUN 27* 26*   CREATININE 1.4* 1.5*       Echocardiogram:  11/3/22  Conclusions      Summary   The left ventricle is normal in size with normal wall thickness. Left ventricular systolic function is moderately reduced with a visually   estimated ejection fraction of 35-40 %. EF estimated by Jain's method at 35 %. The mid to apical septum, mid-apical inferior, mid-apical anterior, apical   lateral segments appear hypokinetic. Basilar hyperkinesis. Distribution of wall motion abnormalities seen with Takotsubo versus   extensive infarction. Grade I diastolic dysfunction with normal LV pressure. There is no evidence of a left ventricular thrombus. Normal right ventricular size and function. Systolic pulmonary artery pressure (SPAP) is normal and estimated at 26 mmHg   (right atrial pressure 8 mmHg). The IVC is dilated in size (>2.1 cm) but collapses >50% with respiration   consistent with elevated right atrial pressures (8 mmHg) . Imaging:  I have reviewed radiology images personally. XR CHEST PORTABLE    Result Date: 11/3/2022  Improving aeration in the lungs centrally. Suspected mild pattern of vascular congestion. CT CHEST ABDOMEN PELVIS W CONTRAST Additional Contrast? None    Result Date: 11/3/2022  1. Increasing peribronchial thickening and interstitial opacities in the lungs favoring the lower lobes. Chronic bronchiolitis or another viral infection is suspected. A degree of pulmonary edema is not excluded. 2. Bladder mucosal thickening may represent chronic bladder outlet obstruction or underlying cystitis. The prostate is enlarged. 3. Diverticulosis coli.       ASSESSMENT:  Septic shock  UTI  NSTEMI  Dementia  HFrEF  PILAR  CAD  Non-anion gap metabolic acidosis  Lactic Acidosis    Plan:   - cont Levophed with goal MAP > 65 or SBP > 90, patient was fluid resuscitated at outside hospital  - cont IV heparin per Cardiology recs for NSTEMI  - ASA, statin  - cont supplemental O2 therapy with goal saturation 88-92%  - If no plans for procedure, would put patient on cardiac diet  - cont Protonix for GI ppx  - PILAR likely hypoperfusion, cont treatment of sepsis, he received GDT fluids, will monitor UOP and strict I&O's  - cont Vanc/cefepime, will f/u blood and urine cx  - SSl for hyperglycemia    Ppx/Cordero/Lines  - cordero  - PIV, may need central line with high levo requirements  - Heparin gtt for ppx, takes PPI at home, cont GI ppx    Critical care time spent reviewing labs/films, examining patient, collaborating with other physicians but excluding procedures for life threatening organ failure is 40 minutes. Thank you for the consultation. We will continue to follow with you.     Electronically signed by:  Austin Desai MD    11/3/2022    3:49 PM.

## 2022-11-03 NOTE — ED NOTES
Pt refusing cordero catheter, states \"I hate those damn things! Just let me sleep and they can do it later. \" MD made aware     Sangita Chapman RN  11/03/22 9206

## 2022-11-03 NOTE — CONSULTS
Pharmacy Note  Vancomycin Consult    Lucas Toribio is a 82 y.o. male started on Vancomycin for sepsis; consult received from Dr. Hoda Pradhan to manage therapy. Also receiving the following antibiotics: cefepime    Allergies:  Fluoxetine, Benazepril, Hydrocodone, Morphine, Penicillins, Simvastatin, Morphine and related, and Sucralfate     Recent Labs     11/03/22  0506 11/03/22  1258   CREATININE 1.4* 1.5*     Recent Labs     11/03/22  0506 11/03/22  1258   WBC 2.1* 21.1*     Estimated Creatinine Clearance: 47 mL/min (A) (based on SCr of 1.5 mg/dL (H)). No intake or output data in the 24 hours ending 11/03/22 1343    Wt Readings from Last 1 Encounters:   11/03/22 260 lb (117.9 kg)       Height 180.3 cm  Weight 117.9 kg    Pulse dose: fluctuating renal function, PILAR, ESRD   Goal Vancomycin trough: 15-20 mcg/mL   Goal Vancomycin AUC: 400-600     Assessment/Plan:  Will initiate Vancomycin 2000 mg IVPB x 1 then Vancomycin level tonight at 2200. Timing of trough level will be determined based on culture results, renal function, and clinical response. Thank you for the consult. Felicita StubbsD, [facility]   11/3/2022 1:48 PM  ---------------------------------  11/3 2308  Vancomycin Day: 1  Recent Labs     11/03/22  2224   VANCORANDOM 20.0     Recent Labs     11/03/22  0506 11/03/22  1258   CREATININE 1.4* 1.5*     Estimated Creatinine Clearance: 47 mL/min (A) (based on SCr of 1.5 mg/dL (H)).   Recent Labs     11/03/22  0506 11/03/22  1258   WBC 2.1* 21.1*     [facility] has been updated with administration times, serum creatinine levels, and vancomycin blood levels    Will change dose to 1250 mg every 24 hours for a calculated AUC of 507 mg/L.hr  AUC24,ss: 507 mg/L.hr  Probability of AUC24 > 400: 84 %  Ctrough,ss: 15.2 mg/L  Probability of Ctrough,ss > 20: 19 %  Probability of nephrotoxicity (Lodise OLIVIA 2009): 10 %    Level ordered for 11/5 at [address], PharmD 11/3/2022 11:02 PM    Day 2  Estimated Creatinine Clearance: 47 mL/min (A) (based on SCr of 1.5 mg/dL (H)). Vancomycin 1250 mg IVPB q24h ordered  Vancomycin   Vancomycin random level 11/5 at 1430. Derick Ross PharmD, BCPS   11/4/2022 7:48 AM

## 2022-11-03 NOTE — CARE COORDINATION
CASE MANAGEMENT INITIAL ASSESSMENT      Reviewed chart and completed assessment with patient:chart review- and call to guardian  Family present: no  Explained Case Management role/services. Primary contact information:    Health Care Decision Maker :   Primary Decision Maker: [employer] - Legal Guardian, Legal Guardian - [phone]          Can this person be reached and be able to respond quickly, such as within a few minutes or hours? Yes  Who would be your back-up decision maker? Admit date/status:11/3 IP  Diagnosis:septic shock   Is this a Readmission?:  No      Insurance:[facility]   Precert required for SNF: No       3 night stay required: No    Living arrangements, Adls, care needs, prior to [address] at home:  Walker__Cane__RTS__ BSC__Shower Chair__  02__ HHN__ CPAP__  BiPap__  Hospital Bed__ W/C___ Other_per facility____    Services in the home and/or outpatient, prior to admission:per facility    Current PCP: Fredrick Berger MD                               Medications: Prescription coverage? Yes Will pt require financial assistance with medications No     Transportation needs: ambulance to SNF         PT/OT recs:none yet    Hospital Exemption Notification (HEN):not to return    Barriers to discharge:none known    Plan/comments: Guardian plans return VGT     ECOC on chart for MD signature    Liyah Keane RN

## 2022-11-03 NOTE — ED NOTES
Attempted to call report.  [facility] ICU RN to return call     Adia Patricia RN  11/03/22 1200

## 2022-11-03 NOTE — ED NOTES
Pt to ambulance via cot and without c/o's. IV 0.9 NSS & Levophed infusing per order upon departure per order and without s/s infiltration     Rosamaria Perez RN  11/03/22 0560

## 2022-11-03 NOTE — ED PROVIDER NOTES
ED attending note:  's EKG was interpreted by myself revealing sinus tachycardia with a right bundle branch pattern there is no signs of an acute ischemic or injury pattern. Rate is very regular. rate is 129 patient's findings did reveal a lactic acidosis at 6.1  He also has a marked leukopenia 2100  His anion gap is just minimally elevated at 17 he is dehydrated with a BUN of 27 and creatinine 1.4 his CT shows a large prostate with thickened urinary bladder  Review of his records revealed that he was admitted with sepsis on July 21 of this year to July 26 he was had a history of dementia hypertension metabolic encephalopathy from his infection he had a lactic acid of 4 then  Patient presents today with altered mental status fever. Please see my very thorough partners evaluation of this gentleman when he arrived during the night shift    Was given intravenous antibiotics. The cardiology service who was contacted did request the patient go to [facility] due to his mildly elevated troponin level. Patient was given Rocephin 2 g patient discussed with the internal medicine hospitalist at [facility] patient we did try to place a central line but was unsuccessful and could not get it catheterized with a wire but did obtain easy and recurrent access through the right internal jugular under ultrasound. We did give him several little boluses of Claudy-Synephrine. These with 4 L of fluid did not really seem to help his blood pressure much. He had received antibiotics so we did start Levophed.   Patient is being transferred expeditiously to [facility] is sepsis assessment is sepsis  Urinary tract infection      Patient given antibiotics fluid resuscitation Levophed being transferred  Signed MD Jcoelin Tapia MD  11/03/22 2000       Jocelin Balbuena MD  11/06/22 8514

## 2022-11-03 NOTE — PROGRESS NOTES
Patient arrived from [address] to [facility] ICU rm 237. He is alert to self only. Baseline dementia. Levophed gtt infusing. On 2L NC. Skin assessment and CHG bath completed. Call light and belongings in place.  Will continue to monitor

## 2022-11-03 NOTE — ED PROVIDER NOTES
[address]      Pt Name: Yves Burroughs  MRN: [id number]  [place] 1935  Date ofevaluation: 11/3/2022  Provider: Diallo Smith MD    CHIEF COMPLAINT       Chief Complaint   Patient presents with    Abdominal Pain     Pt via squad from [facility] with c/o \"blood in urine, shaking, emesis, abdominal pain\" starting last night. \"Possible STEMI\" per squad, no IV established          HISTORY OF PRESENT ILLNESS   (Location/Symptom, Timing/Onset,Context/Setting, Quality, Duration, Modifying Factors, Severity)  Note limiting factors. Yves Burroughs is a 82 y.o. male  who  has a past medical history of Atherosclerotic heart disease, CAD (coronary artery disease), Cancer ([address] COPD (chronic obstructive pulmonary disease) ([address] Dementia ([address] Depression, Duodenal ulcer without hemorrhage or perforation, Falls frequently, GERD without esophagitis, Hernia, Hyperlipidemia, Hypertension, Hypokalemia, Lack of coordination, Melanoma ([address] MI (myocardial infarction) ([address] Muscle weakness (generalized), Protein calorie malnutrition ([address] Reflux, Sepsis ([address] Spinal stenosis, and UTI (urinary tract infection). who presents to the emergency department for evaluation of nausea vomiting and reported hematuria. Onset was reportedly shortly before arrival.  Patient reportedly gone to bed in his normal state of health. On arrival patient to be tachycardic and febrile. He does report epigastric pain. Patient intermittently retching. He denies chest pains or shortness of breath. EMS reports they attempted to give him oral Zofran. HPI    NursingNotes were reviewed. REVIEW OF SYSTEMS    (2-9 systems for level 4, 10 or more for level 5)     Review of Systems   Constitutional:  Positive for fever. Negative for activity change and chills. HENT:  Negative for congestion and rhinorrhea. Eyes:  Negative for photophobia and visual disturbance.    Respiratory:  Negative for shortness of breath and wheezing. Cardiovascular:  Negative for palpitations and leg swelling. Gastrointestinal:  Positive for abdominal pain, nausea and vomiting. Endocrine: Negative for polydipsia and polyuria. Genitourinary:  Positive for hematuria. Negative for difficulty urinating and frequency. Musculoskeletal:  Negative for back pain and gait problem. Skin:  Negative for color change and rash. Neurological:  Negative for light-headedness and headaches. Psychiatric/Behavioral:  Negative for confusion. The patient is not nervous/anxious. Except as noted above the remainder of the review of systems was reviewed and negative.        PAST MEDICAL HISTORY     Past Medical History:   Diagnosis Date    Atherosclerotic heart disease     CAD (coronary artery disease)     Cancer (HCC)     skin    COPD (chronic obstructive pulmonary disease) (HCC)     Dementia (HCC)     Depression     Duodenal ulcer without hemorrhage or perforation     Falls frequently     GERD without esophagitis     Hernia     Hyperlipidemia     Hypertension     Hypokalemia     Lack of coordination     falls    Melanoma ([address]     MI (myocardial infarction) ([address]     Muscle weakness (generalized)     Protein calorie malnutrition ([address]     Reflux     Sepsis ([address]     Spinal stenosis     UTI (urinary tract infection)          SURGICALHISTORY       Past Surgical History:   Procedure Laterality Date    CHOLECYSTECTOMY      COLONOSCOPY  8/10/12    CORONARY ANGIOPLASTY WITH STENT PLACEMENT      PROSTATE BIOPSY      SKIN BIOPSY      UPPER GASTROINTESTINAL ENDOSCOPY  5/14/12    UPPER GASTROINTESTINAL ENDOSCOPY N/A 6/11/2019    EGD BIOPSY performed by Willy Reardon DO at [facility] N/A 8/8/2019    EGD performed by Kandy Villarreal MD at [address] [place] [address]       Previous Medications    ACETAMINOPHEN (TYLENOL) 325 MG TABLET    Take 650 mg by mouth every 6 hours as needed for Pain    BISACODYL (DULCOLAX) 10 MG SUPPOSITORY    Place 10 mg rectally daily as needed for Constipation If MOM ineffective. MAGNESIUM HYDROXIDE (MILK OF MAGNESIA) 400 MG/5ML SUSPENSION    Take 30 mLs by mouth daily as needed for Constipation    MELATONIN 3 MG TABS TABLET    Take 3 mg by mouth in the morning. OMEPRAZOLE (PRILOSEC) 20 MG DELAYED RELEASE CAPSULE    Take 20 mg by mouth daily    PREGABALIN (LYRICA) 25 MG CAPSULE    Take 75 mg by mouth in the morning and 75 mg at noon and 75 mg before bedtime. SERTRALINE (ZOLOFT) 50 MG TABLET    Take 100 mg by mouth in the morning. Fluoxetine, Benazepril, Hydrocodone, Morphine, Penicillins, Simvastatin, Morphine and related, and Sucralfate    FAMILY HISTORY     History reviewed. No pertinent family history. SOCIAL HISTORY       Social History     Socioeconomic History    Marital status:      Spouse name: None    Number of children: None    Years of education: None    Highest education level: None   Tobacco Use    Smoking status: Former     Packs/day: 0.25     Years: 10.00     Pack years: 2.50     Types: Cigarettes    Smokeless tobacco: Never   Vaping Use    Vaping Use: Never used   Substance and Sexual Activity    Alcohol use: No    Drug use: No    Sexual activity: Not Currently       SCREENINGS    Roge Coma Scale  Eye Opening: Spontaneous  Best Verbal Response: Oriented  Best Motor Response: Obeys commands  Roge Coma Scale Score: 15        PHYSICAL EXAM    (up to 7 for level 4, 8 or more for level 5)     ED Triage Vitals [11/03/22 0457]   BP Temp Temp Source Heart Rate Resp SpO2 Height Weight   127/79 (!) 101.5 °F (38.6 °C) Oral (!) 151 16 92 % 5' 11\" (1.803 m) 260 lb (117.9 kg)       Physical Exam  Vitals reviewed. Constitutional:       General: He is not in acute distress. Appearance: He is well-developed. HENT:      Head: Normocephalic and atraumatic.       Mouth/Throat:      Mouth: Mucous membranes are moist.   Eyes:      Conjunctiva/sclera: Conjunctivae normal.   Neck:      Trachea: No tracheal deviation. Cardiovascular:      Rate and Rhythm: Regular rhythm. Tachycardia present. Pulmonary:      Effort: Pulmonary effort is normal.      Breath sounds: Rhonchi and rales present. No wheezing. Abdominal:      General: There is no distension. Palpations: Abdomen is soft. Tenderness: There is abdominal tenderness in the epigastric area. Musculoskeletal:         General: No deformity. Normal range of motion. Cervical back: Normal range of motion. Skin:     General: Skin is warm and dry. Capillary Refill: Capillary refill takes less than 2 seconds. Neurological:      Mental Status: He is alert and oriented to person, place, and time. RESULTS     EKG: All EKG's are interpreted by the Emergency Department Physician who either signs or Co-signsthis chart in the absence of a cardiologist.    EKG demonstrates a sinus rhythm ventricular rate of 129 bpm.  NC interval and QTc interval within normal limits. Patient has left axis deviation. There are no significant ST elevations or depressions EKG is nondiagnostic for ACS. Teagan Rich Compared EKG from 7/22/2022 there is no longer ectopy present patient has nonspecific ST changes to the inferior and anterolateral leads.      RADIOLOGY:   Non-plain filmimages such as CT, Ultrasound and MRI are read by the radiologist. Plain radiographic images are visualized and preliminarily interpreted by the emergency physician with the below findings:      Interpretation per the Radiologist below, if available at the time ofthis note:    XR CHEST PORTABLE    (Results Pending)         ED BEDSIDE ULTRASOUND:   Performed by ED Physician - none    LABS:  Labs Reviewed   COVID-19, RAPID   CULTURE, BLOOD 1   CBC WITH AUTO DIFFERENTIAL   COMPREHENSIVE METABOLIC PANEL W/ REFLEX TO MG FOR LOW K   TROPONIN   LIPASE   LACTIC ACID   URINALYSIS WITH REFLEX TO CULTURE   BRAIN NATRIURETIC PEPTIDE   BRAIN NATRIURETIC PEPTIDE       All other labs were within normal range or not returned as of this dictation. EMERGENCY DEPARTMENT COURSE and DIFFERENTIAL DIAGNOSIS/MDM:   Vitals:    Vitals:    11/03/22 0457   BP: 127/79   Pulse: (!) 151   Resp: 16   Temp: (!) 101.5 °F (38.6 °C)   TempSrc: Oral   SpO2: 92%   Weight: 260 lb (117.9 kg)   Height: 5' 11\" (1.803 m)       Patient was given thefollowing medications:  Medications   ondansetron (ZOFRAN) 4 MG/2ML injection (has no administration in time range)   0.9 % sodium chloride bolus (has no administration in time range)   acetaminophen (TYLENOL) tablet 650 mg (has no administration in time range)   ketorolac (TORADOL) injection 15 mg (has no administration in time range)       ED COURSE & MEDICAL DECISION MAKING    Pertinent Labs & Imaging studies reviewed. (See chart for details)   -  Patient seen and evaluated in the emergency department. -  Triage and nursing notes reviewed and incorporated. -  Old chart records reviewed and incorporated. -  Differential diagnosis includes: Differential diagnosis: Abdominal Aortic Aneurysm, Acute Coronary Syndrome, Ischemic Bowel, Bowel Obstruction (including Gastric Outlet Obstruction), PUD, GERD, Acute Cholecystitis, Pancreatitis, Hepatitis, Colitis, SMA Syndrome, Mesenteric Steal Syndrome, Splanchnic Vein Thrombosis, other    -  Work-up included:  See above  -  ED treatment included: See above  -  Results discussed with patient. 51-year-old male presents with from a [facility] for reported nausea vomiting and hematuria. On arrival patient noted be tachycardic and febrile. Presentation concerning for sepsis. He is actively vomiting, and emesis appears bilious. labs show leukopenia. Patient's lactate elevated at 6. Troponin elevated, suspected from demand ischemia she not currently having chest pain. Renal function slightly elevated.   imaging studies show no acute findings on chest x-ray.  CT of the chest and abdomen obtained. Patient signed out the oncoming provider pending resolution of CT of the chest and abdomen. Elect require admission for severe sepsis/shock. Currently there is no source. Is this patient to be included in the SEP-1 Core Measure due to severe sepsis or septic shock? Yes   SEP-1 CORE MEASURE DATA      Sepsis Criteria   Severe Sepsis Criteria   Septic Shock Criteria     Must be confirmed or suspected to move forward with diagnosis of sepsis. Must meet 2:    [] Temperature > 100.9 F (38.3 C)        or < 96.8 F (36 C)  [] HR > 90  [] RR > 20  [] WBC > 12 or < 4 or 10% bands      AND:      [] Infection Confirmed or        Suspected. Must meet 1:    [] Lactate > 2       or   [] Signs of Organ Dysfunction:    - SBP < 90 or MAP < 65  - Altered mental status  - Creatinine > 2 or increased from      baseline  - Urine Output < 0.5 ml/kg/hr  - Bilirubin > 2  - INR > 1.5 (not anticoagulated)  - Platelets < 597,137  - Acute Respiratory Failure as     evidenced by new need for NIPPV     or mechanical ventilation      [] No criteria met for Severe Sepsis. Must meet 1:    [] Lactate > 4        or   [] SBP < 90 or MAP < 65 for at        least two readings in the first        hour after fluid bolus        administration      [] Vasopressors initiated (if hypotension persists after fluid resuscitation)        [] No criteria met for Septic Shock. No data found. Recent Labs     11/09/22  0500   CREATININE 0.8         Time Septic Shock Identified: 0700    Fluid Resuscitation Rational: at least 30mL/kg based on entered actual weight at time of triage      Repeat lactate level: ordered and pending at this time    Reassessment Exam:   Patient  signed out to oncoming provider. REASSESSMENT          CRITICAL CARE TIME   Total Critical Care time was 35 minutes, excluding separately reportable procedures.   There was a high probability of clinically significant/life threatening deterioration in the patient's condition which required my urgent intervention. CONSULTS:  None    PROCEDURES:  Unless otherwise noted below, none     Procedures    FINAL IMPRESSION      1. Urinary tract infection in male    2. Leukopenia, unspecified type    3. Lactic acidosis    4. Dehydration    5. Febrile illness, acute    6. Elevated troponin          DISPOSITION/PLAN   DISPOSITION        PATIENT REFERREDTO:  No follow-up provider specified.     DISCHARGEMEDICATIONS:  New Prescriptions    No medications on file          (Please note that portions of this note were completed with a voice recognition program.  Efforts were made to edit the dictations but occasionally words are mis-transcribed.)    Lyla Castaneda MD (electronically signed)  Attending Emergency Physician         Lyla Castaneda MD  11/12/22 9794

## 2022-11-03 NOTE — CONSULTS
[address] [place] [address]  [phone]      Attending Physician: Dontrell Jimenes MD  Reason for Consultation/Chief Complaint: NSTEMI    Subjective   History of Present Illness:  Justen Nicholas is a 82 y.o. male with a history of dementia, CAD status post remote MI and PCI, essential hypertension, hyperlipidemia, COPD, and GERD who was initially taken to [facility] ED from his nursing home for further evaluation of nausea/vomiting and hematuria. The patient has underlying dementia with possible superimposed metabolic encephalopathy and is a very limited historian. On arrival to [facility], he was found to be febrile, tachycardic, and hypotensive. UA was concerning for infection and he was started on broad-spectrum IV antibiotics. Initial WBC count was 2.1 and then 21.1 on repeat. CT chest abdomen pelvis with IV contrast was obtained and showed increasing peribronchial thickening and interstitial patient is in the lungs suggestive of chronic bronchiolitis versus other viral infection versus possible pulmonary edema. The prostate appeared enlarged and there is bladder mucosal thickening possibly representing underlying cystitis. EKG showed sinus tachycardia with left axis deviation, RBBB,  and left anterior fascicular block. Troponin T trended 0.15-->0.59-->0.53 and he was started on an IV heparin infusion. Creatinine was elevated at 1.4 on initial check and 1.5 on repeat. He has now been transferred to [facility] ICU for further management of sepsis and his NSTEMI. He is currently on levophed at 10 mcg/kg/min. He denies any chest pain, but does note some occasional shortness of breath.       Past Medical History:   has a past medical history of Atherosclerotic heart disease, CAD (coronary artery disease), Cancer ([facility] COPD (chronic obstructive pulmonary disease) ([facility] Depression, Duodenal ulcer without hemorrhage or perforation, Falls frequently, GERD without esophagitis, Hernia, Hyperlipidemia, Hypertension, Hypokalemia, Lack of coordination, Melanoma ([facility] [address] MI (myocardial infarction) ([facility] Muscle weakness (generalized), Protein calorie malnutrition ([facility] Reflux, Sepsis ([facility] Spinal stenosis, and UTI (urinary tract infection). Surgical History:   has a past surgical history that includes Coronary angioplasty with stent; Cholecystectomy; Prostate biopsy; skin biopsy; Upper gastrointestinal endoscopy (5/14/12); Colonoscopy (8/10/12); Upper gastrointestinal endoscopy (N/A, 6/11/2019); and Upper gastrointestinal endoscopy (N/A, 8/8/2019). Social History:   reports that he has quit smoking. His smoking use included cigarettes. He has a 2.50 pack-year smoking history. He has never used smokeless tobacco. He reports that he does not drink alcohol and does not use drugs. Family History:  Unobtainable due to altered mental status. Home Medications:  Were reviewed and are listed in nursing record and/or below  Prior to Admission medications    Medication Sig Start Date End Date Taking? Authorizing Provider   melatonin 3 MG TABS tablet Take 3 mg by mouth in the morning. Historical Provider, MD   sertraline (ZOLOFT) 50 MG tablet Take 100 mg by mouth in the morning. Historical Provider, MD   pregabalin (LYRICA) 25 MG capsule Take 75 mg by mouth in the morning and 75 mg at noon and 75 mg before bedtime. Historical Provider, MD   omeprazole (PRILOSEC) 20 MG delayed release capsule Take 20 mg by mouth daily    Historical Provider, MD   amLODIPine (NORVASC) 10 MG tablet Take 10 mg by mouth daily  7/26/22  Historical Provider, MD   magnesium hydroxide (MILK OF MAGNESIA) 400 MG/5ML suspension Take 30 mLs by mouth daily as needed for Constipation    Historical Provider, MD   bisacodyl (DULCOLAX) 10 MG suppository Place 10 mg rectally daily as needed for Constipation If MOM ineffective.     Historical Provider, MD   acetaminophen (TYLENOL) 325 MG tablet Take 650 mg by mouth every 6 hours as needed for Pain    Historical Provider, MD        CURRENT Medications:  norepinephrine (LEVOPHED) 16 mg in dextrose 5 % 250 mL infusion, Continuous  bisacodyl (DULCOLAX) suppository 10 mg, Daily PRN  [START ON 11/4/2022] pantoprazole (PROTONIX) tablet 40 mg, QAM AC  sertraline (ZOLOFT) tablet 100 mg, Daily  sodium chloride flush 0.9 % injection 5-40 mL, 2 times per day  sodium chloride flush 0.9 % injection 5-40 mL, PRN  0.9 % sodium chloride infusion, PRN  acetaminophen (TYLENOL) tablet 650 mg, Q6H PRN   Or  acetaminophen (TYLENOL) suppository 650 mg, Q6H PRN  0.9 % sodium chloride infusion, Continuous  cefepime (MAXIPIME) 2000 mg IVPB minibag, Once  cefepime (MAXIPIME) 2000 mg IVPB minibag, Q12H  vancomycin (VANCOCIN) 2,000 mg in sodium chloride 0.9 % 500 mL IVPB, Once  heparin (porcine) injection 4,000 Units, Once  heparin (porcine) injection 4,000 Units, PRN  heparin (porcine) injection 2,000 Units, PRN  heparin 25,000 units in dextrose 5% 250 mL (premix) infusion, Continuous  lactobacillus (CULTURELLE) capsule 1 capsule, BID WC  mupirocin (BACTROBAN) 2 % ointment, BID  vancomycin (VANCOCIN) intermittent dosing (placeholder), RX Placeholder  pregabalin (LYRICA) capsule 50 mg, BID  perflutren lipid microspheres (DEFINITY) injection 1.5 mL, ONCE PRN        Allergies:  Fluoxetine, Benazepril, Hydrocodone, Morphine, Penicillins, Simvastatin, Morphine and related, and Sucralfate     Review of Systems:   Limited due to underlying dementia with possible superimposed metabolic encephalopathy. Positive for occasional shortness of breath. Negative for chest pain. Objective   PHYSICAL EXAM:    Vitals:    11/03/22 1430   BP: (!) 96/52   Pulse: 77   Resp: 19   Temp:    SpO2: 93%            General: Elderly male with dementia lying in bed in no acute distress.   HEENT: Normocephalic, atraumatic, non-icteric, hearing intact, nares normal, mucous membranes moist.  Neck: Supple, trachea midline. No adenopathy. No thyromegaly. No JVD. Heart: Heart sounds distant. Regular rate and rhythm. Normal S1 and S2. No appreciable murmurs, gallops or rubs. Lungs: Normal respiratory effort. Breath sounds diminished bilaterally. No wheezes, rales, or rhonchi. Abdomen: Soft, non-tender. Normoactive bowel sounds. No masses or organomegaly. Skin: No rashes, wounds, or lesions. Pulses: 2+ and symmetric. Extremities: No clubbing, cyanosis, or edema. Musculoskeletal: Spontaneously moves all four extremities. Neuro: Alerted and oriented to person, but not place or time. Has dementia with possible superimposed metabolic encephalopathy. Labs   CBC:   Lab Results   Component Value Date/Time    WBC 21.1 11/03/2022 12:58 PM    RBC 4.24 11/03/2022 12:58 PM    HGB 11.5 11/03/2022 12:58 PM    HCT 35.7 11/03/2022 12:58 PM    MCV 84.3 11/03/2022 12:58 PM    RDW 16.3 11/03/2022 12:58 PM     11/03/2022 12:58 PM     CMP:  Lab Results   Component Value Date/Time     11/03/2022 12:58 PM    K 3.3 11/03/2022 12:58 PM    K 3.7 11/03/2022 05:06 AM     11/03/2022 12:58 PM    CO2 20 11/03/2022 12:58 PM    BUN 26 11/03/2022 12:58 PM    CREATININE 1.5 11/03/2022 12:58 PM    GFRAA >60 07/26/2022 06:12 AM    GFRAA >60 02/24/2013 12:45 PM    AGRATIO 1.2 11/03/2022 05:06 AM    LABGLOM 45 11/03/2022 12:58 PM    GLUCOSE 156 11/03/2022 12:58 PM    PROT 6.7 11/03/2022 05:06 AM    PROT 7.1 02/24/2013 12:45 PM    CALCIUM 8.0 11/03/2022 12:58 PM    BILITOT 0.8 11/03/2022 05:06 AM    ALKPHOS 123 11/03/2022 05:06 AM    AST 18 11/03/2022 05:06 AM    ALT 8 11/03/2022 05:06 AM     PT/INR:  No results found for: PTINR  HgBA1c:  Lab Results   Component Value Date    LABA1C 6.6 03/05/2018     Lab Results   Component Value Date    CKTOTAL 89 11/18/2010    TROPONINI 0.53 () 11/03/2022         Cardiac Data     Last EKG: Sinus tachycardia with left axis deviation, RBBB, left anterior fascicular block.     Echo:  TTE 6/19/13:  Summary:   Overall left ventricular ejection fraction is estimated to be 50-55%. The left ventricular wall motion is normal.   Left ventricular systolic function is normal. (An ejection fraction   >or= to 55% is considered normal)   The left ventricle is moderately dilated. Trace tricuspid regurgitation is present. There is trace mitral regurgitation. Mild aortic sclerosis present without evidence of stenosis. .   The diastolic function is impaired and classified as Grade 1   (impaired relaxation). Stress Test:  Pharmacologic Nuclear SPECT Stress 6/19/13:  Interpetation Summary:   The LV EF is 50%   Normal global and regional wall motion in all territories. There is a medium sized, fixed defect in the inferior wall. This defect is consistent with diaphragm attenuation. 1.Non-diagnostic ECG for ischemia with pharmocologic stress. 2.Negative nuclear stress imaging for inducible ischemia. 3.Nuclear stress image findings indicate low risk for future       ischemic event . Cath: N/A    Other Studies:   Chest x-ray 11/3/22:  Improving aeration in the lungs centrally. Suspected mild pattern of   vascular congestion. CT chest/abdomen/pelvis with contrast 11/3/22:  1. Increasing peribronchial thickening and interstitial opacities in the   lungs favoring the lower lobes. Chronic bronchiolitis or another viral   infection is suspected. A degree of pulmonary edema is not excluded. 2. Bladder mucosal thickening may represent chronic bladder outlet   obstruction or underlying cystitis. The prostate is enlarged. 3. Diverticulosis coli. Assessment and Plan      1. NSTEMI - May be secondary to demand ischemia in the setting of underlying sepsis and further complicated by delayed cardiac enzyme clearance secondary to PILAR.   However, given magnitude of troponin T elevation and history of CAD with multiple potential risk factors for disease progression, a true type I NSTEMI cannot be definitively excluded at this time. He denies active angina, but again is a very limited historian. EKG shows normal sinus rhythm with bifascicular block. -Troponins appear to be plateauing and would recommend continuing IV and heparin infusion x48 hours  -Start aspirin 81 mg daily  -Has reported adverse reaction to simvastatin and will hold on initiating another statin until type of reaction has been confirmed  -Not currently able to tolerate beta-blocker due to underlying sepsis and hypotension  -OK to stop trending troponins  -Will obtain TTE for complete assessment of cardiac structure and function  -Once infection has been adequately treated, mental status and kidney function improve/stabilize, and pending patient and family wishes regarding goals of care, can consider additional ischemic evaluation with invasive angiography versus nuclear SPECT stress testing    2. Sepsis/UTI  -Agree with treatment with broad-spectrum IV antibiotics  -Hydrate with IVF fluids  -Blood and urine cultures pending    3. CAD  -S/p remote MI and PCI (previous cath records not currently available)  -Starting aspirin 81 mg daily as above  -Hold off on initiating statin until previous adverse reaction can be confirmed    4. PILAR on CKD  -May be secondary to renal hypoperfusion in setting of sepsis  -Recommend hydration with IV fluids and continued treatment of sepsis as above  -Avoid nephrotoxic agents as able    5. Lactic acidosis  -In setting of sepsis  -Treatment as above    6. Dementia with possible superimposed metabolic encephalopathy  -Continue aggressive treatment of sepsis as above  -Re-direct as needed      Thank you for allowing us to participate in the care of Diogo Harrington. Please call me with any questions [phone].       Padmini Obrien DO  [facility]  [phone] [facility]  [phone] [address] [place] [address]  11/3/2022 2:49 PM      I will address the patient's cardiac risk factors and adjusted pharmacologic treatment as needed. In addition, I have reinforced the need for patient directed risk factor modification. All questions and concerns were addressed to the patient/family. Alternatives to my treatment were discussed. The note was completed using EMR. Every effort was made to ensure accuracy; however, inadvertent computerized transcription errors may be present.

## 2022-11-03 NOTE — ED NOTES
Pts emergency contact called and updated on pts status and plan of care.  RN @ [employer] called and updated     Kadi Hunter RN  11/03/22 5143

## 2022-11-04 ENCOUNTER — APPOINTMENT (OUTPATIENT)
Dept: GENERAL RADIOLOGY | Age: 87
DRG: 871 | End: 2022-11-04
Attending: INTERNAL MEDICINE
Payer: MEDICARE

## 2022-11-04 PROBLEM — E87.20 LACTIC ACIDOSIS: Status: ACTIVE | Noted: 2022-11-04

## 2022-11-04 PROBLEM — I21.4 NSTEMI (NON-ST ELEVATED MYOCARDIAL INFARCTION) (HCC): Status: ACTIVE | Noted: 2022-11-04

## 2022-11-04 PROBLEM — I42.9 CARDIOMYOPATHY (HCC): Status: ACTIVE | Noted: 2022-11-04

## 2022-11-04 LAB
ANION GAP SERPL CALCULATED.3IONS-SCNC: 11 MMOL/L (ref 3–16)
ANTI-XA UNFRAC HEPARIN: 0.13 IU/ML (ref 0.3–0.7)
ANTI-XA UNFRAC HEPARIN: 0.14 IU/ML (ref 0.3–0.7)
ANTI-XA UNFRAC HEPARIN: 0.27 IU/ML (ref 0.3–0.7)
ANTI-XA UNFRAC HEPARIN: 0.4 IU/ML (ref 0.3–0.7)
APTT: 96.7 SEC (ref 23–34.3)
BASOPHILS ABSOLUTE: 0.2 K/UL (ref 0–0.2)
BASOPHILS RELATIVE PERCENT: 1.1 %
BUN BLDV-MCNC: 24 MG/DL (ref 7–20)
CALCIUM SERPL-MCNC: 7.6 MG/DL (ref 8.3–10.6)
CHLORIDE BLD-SCNC: 107 MMOL/L (ref 99–110)
CO2: 19 MMOL/L (ref 21–32)
CREAT SERPL-MCNC: 1.5 MG/DL (ref 0.8–1.3)
EOSINOPHILS ABSOLUTE: 0 K/UL (ref 0–0.6)
EOSINOPHILS RELATIVE PERCENT: 0.1 %
GFR SERPL CREATININE-BSD FRML MDRD: 45 ML/MIN/{1.73_M2}
GLUCOSE BLD-MCNC: 113 MG/DL (ref 70–99)
GLUCOSE BLD-MCNC: 88 MG/DL (ref 70–99)
GLUCOSE BLD-MCNC: 92 MG/DL (ref 70–99)
GLUCOSE BLD-MCNC: 93 MG/DL (ref 70–99)
HCT VFR BLD CALC: 35.5 % (ref 40.5–52.5)
HEMOGLOBIN: 11.4 G/DL (ref 13.5–17.5)
INR BLD: 1.51 (ref 0.87–1.14)
LYMPHOCYTES ABSOLUTE: 0.6 K/UL (ref 1–5.1)
LYMPHOCYTES RELATIVE PERCENT: 2.6 %
MCH RBC QN AUTO: 26.9 PG (ref 26–34)
MCHC RBC AUTO-ENTMCNC: 32.1 G/DL (ref 31–36)
MCV RBC AUTO: 83.9 FL (ref 80–100)
MONOCYTES ABSOLUTE: 0.8 K/UL (ref 0–1.3)
MONOCYTES RELATIVE PERCENT: 3.5 %
NEUTROPHILS ABSOLUTE: 21.6 K/UL (ref 1.7–7.7)
NEUTROPHILS RELATIVE PERCENT: 92.7 %
PDW BLD-RTO: 16.1 % (ref 12.4–15.4)
PERFORMED ON: NORMAL
PLATELET # BLD: 118 K/UL (ref 135–450)
PMV BLD AUTO: 8.2 FL (ref 5–10.5)
POTASSIUM SERPL-SCNC: 3.6 MMOL/L (ref 3.5–5.1)
PROCALCITONIN: 87.69 NG/ML (ref 0–0.15)
PROTHROMBIN TIME: 18.2 SEC (ref 11.7–14.5)
RBC # BLD: 4.23 M/UL (ref 4.2–5.9)
SODIUM BLD-SCNC: 137 MMOL/L (ref 136–145)
TOTAL CK: 116 U/L (ref 39–308)
TOTAL CK: 128 U/L (ref 39–308)
TROPONIN: 0.42 NG/ML
TROPONIN: 0.42 NG/ML
TROPONIN: 0.51 NG/ML
URINE CULTURE, ROUTINE: NORMAL
WBC # BLD: 23.2 K/UL (ref 4–11)

## 2022-11-04 PROCEDURE — 99291 CRITICAL CARE FIRST HOUR: CPT | Performed by: STUDENT IN AN ORGANIZED HEALTH CARE EDUCATION/TRAINING PROGRAM

## 2022-11-04 PROCEDURE — 84484 ASSAY OF TROPONIN QUANT: CPT

## 2022-11-04 PROCEDURE — 85730 THROMBOPLASTIN TIME PARTIAL: CPT

## 2022-11-04 PROCEDURE — 84145 PROCALCITONIN (PCT): CPT

## 2022-11-04 PROCEDURE — 71045 X-RAY EXAM CHEST 1 VIEW: CPT

## 2022-11-04 PROCEDURE — 6360000002 HC RX W HCPCS: Performed by: STUDENT IN AN ORGANIZED HEALTH CARE EDUCATION/TRAINING PROGRAM

## 2022-11-04 PROCEDURE — 85520 HEPARIN ASSAY: CPT

## 2022-11-04 PROCEDURE — 6360000002 HC RX W HCPCS: Performed by: INTERNAL MEDICINE

## 2022-11-04 PROCEDURE — 80048 BASIC METABOLIC PNL TOTAL CA: CPT

## 2022-11-04 PROCEDURE — 6370000000 HC RX 637 (ALT 250 FOR IP): Performed by: INTERNAL MEDICINE

## 2022-11-04 PROCEDURE — 85025 COMPLETE CBC W/AUTO DIFF WBC: CPT

## 2022-11-04 PROCEDURE — 2000000000 HC ICU R&B

## 2022-11-04 PROCEDURE — 99233 SBSQ HOSP IP/OBS HIGH 50: CPT | Performed by: INTERNAL MEDICINE

## 2022-11-04 PROCEDURE — 85610 PROTHROMBIN TIME: CPT

## 2022-11-04 PROCEDURE — 36415 COLL VENOUS BLD VENIPUNCTURE: CPT

## 2022-11-04 PROCEDURE — 2580000003 HC RX 258: Performed by: INTERNAL MEDICINE

## 2022-11-04 PROCEDURE — 93005 ELECTROCARDIOGRAM TRACING: CPT | Performed by: INTERNAL MEDICINE

## 2022-11-04 PROCEDURE — 82550 ASSAY OF CK (CPK): CPT

## 2022-11-04 PROCEDURE — 2580000003 HC RX 258: Performed by: STUDENT IN AN ORGANIZED HEALTH CARE EDUCATION/TRAINING PROGRAM

## 2022-11-04 PROCEDURE — 6370000000 HC RX 637 (ALT 250 FOR IP): Performed by: NURSE PRACTITIONER

## 2022-11-04 RX ORDER — HEPARIN SODIUM 1000 [USP'U]/ML
2000 INJECTION, SOLUTION INTRAVENOUS; SUBCUTANEOUS ONCE
Status: COMPLETED | OUTPATIENT
Start: 2022-11-04 | End: 2022-11-04

## 2022-11-04 RX ORDER — INSULIN LISPRO 100 [IU]/ML
0-4 INJECTION, SOLUTION INTRAVENOUS; SUBCUTANEOUS
Status: DISCONTINUED | OUTPATIENT
Start: 2022-11-04 | End: 2022-11-09 | Stop reason: HOSPADM

## 2022-11-04 RX ORDER — HEPARIN SODIUM 1000 [USP'U]/ML
2000 INJECTION, SOLUTION INTRAVENOUS; SUBCUTANEOUS ONCE
Status: DISCONTINUED | OUTPATIENT
Start: 2022-11-04 | End: 2022-11-04 | Stop reason: ALTCHOICE

## 2022-11-04 RX ORDER — INSULIN LISPRO 100 [IU]/ML
0-4 INJECTION, SOLUTION INTRAVENOUS; SUBCUTANEOUS NIGHTLY
Status: DISCONTINUED | OUTPATIENT
Start: 2022-11-04 | End: 2022-11-09 | Stop reason: HOSPADM

## 2022-11-04 RX ORDER — DEXTROSE MONOHYDRATE 100 MG/ML
INJECTION, SOLUTION INTRAVENOUS CONTINUOUS PRN
Status: DISCONTINUED | OUTPATIENT
Start: 2022-11-04 | End: 2022-11-09 | Stop reason: HOSPADM

## 2022-11-04 RX ADMIN — HEPARIN SODIUM 2000 UNITS: 1000 INJECTION INTRAVENOUS; SUBCUTANEOUS at 03:44

## 2022-11-04 RX ADMIN — CEFEPIME 2000 MG: 2 INJECTION, POWDER, FOR SOLUTION INTRAVENOUS at 08:24

## 2022-11-04 RX ADMIN — PREGABALIN 50 MG: 25 CAPSULE ORAL at 08:17

## 2022-11-04 RX ADMIN — MEROPENEM 1000 MG: 1 INJECTION, POWDER, FOR SOLUTION INTRAVENOUS at 17:57

## 2022-11-04 RX ADMIN — HEPARIN SODIUM 2000 UNITS: 1000 INJECTION INTRAVENOUS; SUBCUTANEOUS at 16:09

## 2022-11-04 RX ADMIN — SODIUM CHLORIDE, PRESERVATIVE FREE 10 ML: 5 INJECTION INTRAVENOUS at 20:02

## 2022-11-04 RX ADMIN — MUPIROCIN: 20 OINTMENT TOPICAL at 08:18

## 2022-11-04 RX ADMIN — Medication 1 CAPSULE: at 16:09

## 2022-11-04 RX ADMIN — Medication 1 CAPSULE: at 08:17

## 2022-11-04 RX ADMIN — SODIUM CHLORIDE: 9 INJECTION, SOLUTION INTRAVENOUS at 03:11

## 2022-11-04 RX ADMIN — MUPIROCIN: 20 OINTMENT TOPICAL at 20:01

## 2022-11-04 RX ADMIN — PANTOPRAZOLE SODIUM 40 MG: 40 TABLET, DELAYED RELEASE ORAL at 08:17

## 2022-11-04 RX ADMIN — SERTRALINE HYDROCHLORIDE 100 MG: 50 TABLET ORAL at 08:17

## 2022-11-04 RX ADMIN — ASPIRIN 81 MG 81 MG: 81 TABLET ORAL at 08:17

## 2022-11-04 RX ADMIN — HEPARIN SODIUM 2000 UNITS: 1000 INJECTION INTRAVENOUS; SUBCUTANEOUS at 08:18

## 2022-11-04 RX ADMIN — MEROPENEM 1000 MG: 1 INJECTION, POWDER, FOR SOLUTION INTRAVENOUS at 10:11

## 2022-11-04 NOTE — CARE COORDINATION
LOS 1. Care managed by [facility]. Levo gtt- sep shock and NSTEMI. From [facility]- plan return- has legal Enmanuel Screven- see contacts.   Hiral Pickens RN

## 2022-11-04 NOTE — PLAN OF CARE
Problem: Discharge Planning  Goal: Discharge to home or other facility with appropriate resources  Outcome: Progressing  Flowsheets (Taken 11/3/2022 1945)  Discharge to home or other facility with appropriate resources:   Arrange for needed discharge resources and transportation as appropriate   Identify discharge learning needs (meds, wound care, etc)   Refer to discharge planning if patient needs post-hospital services based on physician order or complex needs related to functional status, cognitive ability or social support system     Problem: Skin/Tissue Integrity  Goal: Absence of new skin breakdown  Description: 1. Monitor for areas of redness and/or skin breakdown  2. Assess vascular access sites hourly  3. Every 4-6 hours minimum:  Change oxygen saturation probe site  4. Every 4-6 hours:  If on nasal continuous positive airway pressure, respiratory therapy assess nares and determine need for appliance change or resting period.   Outcome: Progressing

## 2022-11-04 NOTE — RESULT ENCOUNTER NOTE
Blood cultures are positive. Patient was admitted to the hospital and therefore admitting team should be made aware of positive blood cultures.

## 2022-11-04 NOTE — PROGRESS NOTES
Other RX Placeholder    pregabalin  50 mg Oral BID    aspirin  81 mg Oral Daily    vancomycin  1,250 mg IntraVENous Q24H       PRN Meds:  bisacodyl, sodium chloride flush, sodium chloride, acetaminophen **OR** acetaminophen, heparin (porcine), heparin (porcine), perflutren lipid microspheres, magnesium sulfate, ondansetron    Results:  CBC:   Recent Labs     11/03/22  0506 11/03/22  1258 11/04/22  0243   WBC 2.1* 21.1* 23.2*   HGB 13.3* 11.5* 11.4*   HCT 40.7 35.7* 35.5*   MCV 83.6 84.3 83.9    141 118*     BMP:   Recent Labs     11/03/22  0506 11/03/22  1258 11/04/22  0243    138 137   K 3.7 3.3* 3.6    107 107   CO2 20* 20* 19*   BUN 27* 26* 24*   CREATININE 1.4* 1.5* 1.5*     LIVER PROFILE:   Recent Labs     11/03/22  0506   AST 18   ALT 8*   LIPASE 10.0*   BILITOT 0.8   ALKPHOS 123     PT/INR:   Recent Labs     11/03/22  1258 11/04/22  0243   PROTIME 17.1* 18.2*   INR 1.40* 1.51*     APTT:   Recent Labs     11/03/22  1258 11/04/22  0243   APTT 37.8* 96.7*     UA:  Recent Labs     11/03/22  0700   COLORU DARK YELLOW*   PHUR 8.5*   WBCUA 21-50*   RBCUA *   BACTERIA 3+*   CLARITYU SL CLOUDY*   SPECGRAV 1.010   LEUKOCYTESUR LARGE*   UROBILINOGEN 0.2   BILIRUBINUR Negative   BLOODU LARGE*   GLUCOSEU Negative       Cultures:  Blood cx and urine cx pending    Echocardiogram:  11/3/22  Conclusions      Summary   The left ventricle is normal in size with normal wall thickness. Left ventricular systolic function is moderately reduced with a visually   estimated ejection fraction of 35-40 %. EF estimated by Jain's method at 35 %. The mid to apical septum, mid-apical inferior, mid-apical anterior, apical   lateral segments appear hypokinetic. Basilar hyperkinesis. Distribution of wall motion abnormalities seen with Takotsubo versus   extensive infarction. Grade I diastolic dysfunction with normal LV pressure. There is no evidence of a left ventricular thrombus.    Normal right ventricular size and function. Systolic pulmonary artery pressure (SPAP) is normal and estimated at 26 mmHg   (right atrial pressure 8 mmHg). The IVC is dilated in size (>2.1 cm) but collapses >50% with respiration   consistent with elevated right atrial pressures (8 mmHg) . Imaging:  I have reviewed radiology images personally. XR CHEST PORTABLE     Result Date: 11/3/2022  Improving aeration in the lungs centrally. Suspected mild pattern of vascular congestion. CT CHEST ABDOMEN PELVIS W CONTRAST Additional Contrast? None     Result Date: 11/3/2022  1. Increasing peribronchial thickening and interstitial opacities in the lungs favoring the lower lobes. Chronic bronchiolitis or another viral infection is suspected. A degree of pulmonary edema is not excluded. 2. Bladder mucosal thickening may represent chronic bladder outlet obstruction or underlying cystitis. The prostate is enlarged. 3. Diverticulosis coli.      ASSESSMENT:  Septic shock  UTI  NSTEMI  Dementia  HFrEF  PILAR  CAD  Non-anion gap metabolic acidosis  Lactic Acidosis     Plan:   - cont Levophed with goal MAP > 65 or SBP > 90, patient was fluid resuscitated at outside hospital   - Pt on RA, cont off supplemental O2 therapy with goal saturation 90-92%  - cont ASA, IV heparin per Cardiology recs for NSTEMI  - If no plans for procedure, will start patient on cardiac diet  - cont Protonix for GI ppx  - PILAR likely hypoperfusion, cont treatment of sepsis, he received GDT fluids, will monitor UOP and strict I&O's for today though Cr increasing.   - Spiked fevers overnight with increase in white count, he has blood cultures that are positive from outside hospital. Will switch to [facility] for hyperglycemia     Ppx/Cordero/Lines  - Will hold off on CVC given decrease in pressor requirements  - cordero  - PIV  - Heparin gtt for ppx, takes PPI at home, cont GI ppx    Critical care time spent reviewing labs/films, examining patient, collaborating with other physicians but excluding procedures for life threatening organ failure is 38 minutes.         Electronically signed by:  Viktoria Katz MD    11/4/2022    7:17 AM.

## 2022-11-04 NOTE — PROGRESS NOTES
Hospitalist ICU Progress Note    CC: Septic shock [facility])    Hospital course:  82 y.o. WM with mild dementia, CAD, COPD, hx of duodenal ulcer, GERD, HLD, HTN hx of MI and recent septic shock admission for UTI who presents with nausea, vomiting and fever. Pt was also tachycardic. Had hematuria at his nursing facility as well. PT pleasantly demented and thinks he is currently at home. He has had an abnormal EKG and elevated troponin as well. Pt meets criteria for septic shock POA based on hypotension, lactic acidosis, documented UTI, tachycardia, PILAR, leukopenia. Troponin trending down. Appreciate consults from cardiology and critical care. May be ischemic disease vs demand ischemia based on hx of cardiac disease - started on ASA daily, ECHO ordered and 48 hours total of heparin (stop tomorrow at 1pm). Pt has no c/o but is poor historian. Blood cx growing gram neg rods - suspect E coli    Admit date: 11/3/2022  Days in hospital:  1    24 Hour Events: remains on pressors    Subjective: troponin trending down, denies c/o but poor historian - blood cx showing gram neg rods - suspect E coli - await speciation  - can stop vanco    ROS:  A comprehensive review of systems was negative except for: denies pain, but unreliable ROS due to dementia      Objective:    VITALS:  BP 95/62   Pulse 66   Temp 97.8 °F (36.6 °C) (Bladder)   Resp 20   SpO2 92%     Gen: pale, ill appearing  HEENT: NC/AT, moist mucous membranes, no oropharyngeal erythema or exudate    Neck: supple, trachea midline, no anterior cervical or SC LAD  Heart:  Normal s1/s2, RRR, no murmurs, gallops, or rubs.  no leg edema  Lungs:  diminished bilaterally, no wheeze, no rales, no rhonchi, no crackles, no use of accessory muscles  Abd: bowel sounds present, soft, nontender, nondistended, no masses  Extrem:  No clubbing, cyanosis,  no edema, peripheral pulses 1+, sluggish capillary refill  Skin: no rashes or lesions, pale color/perfusion  Psych:  Not oriented to date or Not oriented to place    Neuro: grossly intact, moves all four extremities. Radiology (personally reviewed by me):  CXR:  Bibasilar opacities, correlating with bronchial wall thickening and   atelectasis. Bronchial wall thickening is nonspecific for acute or chronic   bronchitis or reactive airway disease. There is no significant change since   yesterday. Assessment:    Principal Problem:    Septic shock (HCC)  Active Problems:    Acute metabolic encephalopathy    Urinary tract infection with hematuria    Demand ischemia (HCC)    Hypokalemia    CAD (coronary artery disease)    HTN (hypertension)    HLD (hyperlipidemia)    Dementia (HCC)  Resolved Problems:    * No resolved hospital problems. *      Plan:   Septic shock - still requiring levophed - continue with IVF and BP support along with abx. Gram neg bacteremia - continue with meropenem - await speciation  Bronchitis vs reactive airways disease - meropenem should cover - can add   Acute metabolic encephalopathy in setting of dementia - due to severe sepsis  NSTEMI vs demand ischemia - on IV heparin - may be more than demand ischemia with hx of CAD - ECHO pending - cardiology following  Acute kidney injury - has not improved much - pt likely candidate for PICC line once blood cx clear - not a good HD candidate        Prognosis:  Fair    Code status:  Full code    DVT prophylaxis: Heparin IV  GI prophylaxis: Proton Pump Inhibitor  Antibiotic prophylaxis indicated:   yes - lactobacillus    Diet:  ADULT DIET; Regular;  No Added Salt (3-4 gm)    Disposition:  Long term nursing facility or group home    Medications:  Scheduled Meds:   meropenem  1,000 mg IntraVENous Once    Followed by    meropenem  1,000 mg IntraVENous Q12H    insulin lispro  0-4 Units SubCUTAneous TID WC    insulin lispro  0-4 Units SubCUTAneous Nightly    pantoprazole  40 mg Oral QAM AC    sertraline  100 mg Oral Daily sodium chloride flush  5-40 mL IntraVENous 2 times per day    lactobacillus  1 capsule Oral BID WC    mupirocin   Nasal BID    [Held by provider] pregabalin  50 mg Oral BID    aspirin  81 mg Oral Daily       PRN Meds:  glucose, dextrose bolus **OR** dextrose bolus, glucagon (rDNA), dextrose, bisacodyl, sodium chloride flush, sodium chloride, acetaminophen **OR** acetaminophen, heparin (porcine), heparin (porcine), perflutren lipid microspheres, magnesium sulfate, ondansetron    IV:   dextrose      norepinephrine 9 mcg/min (11/04/22 0703)    sodium chloride      sodium chloride 150 mL/hr at 11/04/22 0703    heparin (PORCINE) Infusion 1,540 Units/hr (11/04/22 0826)         Intake/Output Summary (Last 24 hours) at 11/4/2022 1011  Last data filed at 11/4/2022 0842  Gross per 24 hour   Intake 1787.59 ml   Output 935 ml   Net 852.59 ml       Results:  CBC:   Recent Labs     11/03/22  0506 11/03/22  1258 11/04/22  0243   WBC 2.1* 21.1* 23.2*   HGB 13.3* 11.5* 11.4*   HCT 40.7 35.7* 35.5*   MCV 83.6 84.3 83.9    141 118*     BMP:   Recent Labs     11/03/22  0506 11/03/22  1258 11/04/22  0243    138 137   K 3.7 3.3* 3.6    107 107   CO2 20* 20* 19*   BUN 27* 26* 24*   CREATININE 1.4* 1.5* 1.5*     Mag: No results for input(s): MAG in the last 72 hours.   LIVER PROFILE:   Recent Labs     11/03/22  0506   AST 18   ALT 8*   LIPASE 10.0*   BILITOT 0.8   ALKPHOS 123     PT/INR:   Recent Labs     11/03/22  1258 11/04/22  0243   PROTIME 17.1* 18.2*   INR 1.40* 1.51*     APTT:   Recent Labs     11/03/22  1258 11/04/22  0243   APTT 37.8* 96.7*     UA:  Recent Labs     11/03/22  0700   COLORU DARK YELLOW*   PHUR 8.5*   WBCUA 21-50*   RBCUA *   BACTERIA 3+*   CLARITYU SL CLOUDY*   SPECGRAV 1.010   LEUKOCYTESUR LARGE*   UROBILINOGEN 0.2   BILIRUBINUR Negative   BLOODU LARGE*   GLUCOSEU Negative       ABG: No results found for: PHART, PH, QIE7WNJ, PCO2, PO2ART, PO2, MFO0GAN, HCO3, BEART, BE, THGBART, THB, GDN2VYP, F7FTGNND, O2SAT    Lab Results   Component Value Date    CALCIUM 7.6 (L) 11/04/2022    PHOS 2.7 09/19/2020             Electronically signed by Jason Espinoza MD on 11/4/2022 at 10:11 AM

## 2022-11-04 NOTE — PLAN OF CARE
Problem: Discharge Planning  Goal: Discharge to home or other facility with appropriate resources  11/4/2022 0858 by Ralph Jenkins RN  Outcome: Progressing  11/3/2022 2128 by Valentina Lebron RN  Outcome: Progressing  Flowsheets (Taken 11/3/2022 1945)  Discharge to home or other facility with appropriate resources:   Arrange for needed discharge resources and transportation as appropriate   Identify discharge learning needs (meds, wound care, etc)   Refer to discharge planning if patient needs post-hospital services based on physician order or complex needs related to functional status, cognitive ability or social support system     Problem: Skin/Tissue Integrity  Goal: Absence of new skin breakdown  Description: 1. Monitor for areas of redness and/or skin breakdown  2. Assess vascular access sites hourly  3. Every 4-6 hours minimum:  Change oxygen saturation probe site  4. Every 4-6 hours:  If on nasal continuous positive airway pressure, respiratory therapy assess nares and determine need for appliance change or resting period.   11/4/2022 0858 by Ralph Jenkins RN  Outcome: Progressing  11/3/2022 2128 by Valentina Lebron, RN  Outcome: Progressing

## 2022-11-04 NOTE — PROGRESS NOTES
Patient's levophed requirement has increased to 8mcg/min and is running through a peripheral IV. Dr. Gwendolyn Faith notified. He does not want to place a central line at this time. Will notify him if pressor requirements continue to increase.

## 2022-11-04 NOTE — PROGRESS NOTES
[address]   PROGRESS NOTE  [phone]      Attending Physician: Narendra Arriaga MD  Reason for Consultation/Chief Complaint:  NSTEMI     Subjective     Weaned off levophed earlier today. Denies any active chest pain or shortness of breath, but is a very limited historian due to underlying dementia. CURRENT Medications:  norepinephrine (LEVOPHED) 16 mg in dextrose 5 % 250 mL infusion, Continuous  bisacodyl (DULCOLAX) suppository 10 mg, Daily PRN  pantoprazole (PROTONIX) tablet 40 mg, QAM AC  sertraline (ZOLOFT) tablet 100 mg, Daily  sodium chloride flush 0.9 % injection 5-40 mL, 2 times per day  sodium chloride flush 0.9 % injection 5-40 mL, PRN  0.9 % sodium chloride infusion, PRN  acetaminophen (TYLENOL) tablet 650 mg, Q6H PRN   Or  acetaminophen (TYLENOL) suppository 650 mg, Q6H PRN  0.9 % sodium chloride infusion, Continuous  cefepime (MAXIPIME) 2000 mg IVPB minibag, Q12H  heparin (porcine) injection 4,000 Units, PRN  heparin (porcine) injection 2,000 Units, PRN  heparin 25,000 units in dextrose 5% 250 mL (premix) infusion, Continuous  lactobacillus (CULTURELLE) capsule 1 capsule, BID WC  mupirocin (BACTROBAN) 2 % ointment, BID  vancomycin (VANCOCIN) intermittent dosing (placeholder), RX Placeholder  pregabalin (LYRICA) capsule 50 mg, BID  perflutren lipid microspheres (DEFINITY) injection 1.5 mL, ONCE PRN  magnesium sulfate 1000 mg in dextrose 5% 100 mL IVPB, PRN  aspirin chewable tablet 81 mg, Daily  ondansetron (ZOFRAN) injection 4 mg, Q6H PRN  vancomycin (VANCOCIN) 1,250 mg in dextrose 5 % 250 mL IVPB, Q24H        Allergies:  Fluoxetine, Benazepril, Hydrocodone, Morphine, Penicillins, Simvastatin, Morphine and related, and Sucralfate     Review of Systems:   Limited due to underlying dementia. Denies chest pain and shortness of breath.       Objective   PHYSICAL EXAM:    Vitals:    11/04/22 0700   BP: (!) 95/51   Pulse: 57   Resp: 17   Temp:    SpO2:       General: Elderly male with dementia lying in bed in no acute distress. HEENT: Normocephalic, atraumatic, non-icteric, hearing intact, nares normal, mucous membranes moist.  Neck: JVP mildly elevated. Heart: Heart sounds distant. Regular rate and rhythm. Normal S1 and S2. No appreciable murmurs, gallops or rubs. Lungs: Normal respiratory effort. Breath sounds diminished bilaterally. No wheezes, rales, or rhonchi. Abdomen: Soft, non-tender. Normoactive bowel sounds. No masses or organomegaly. Skin: No rashes, wounds, or lesions. Pulses: 2+ and symmetric. Extremities: No clubbing, cyanosis, or edema. Musculoskeletal: Spontaneously moves all four extremities. Neuro: Alerted and oriented to person, but not place or time. Has at least moderate dementia.       Labs   CBC:   Lab Results   Component Value Date/Time    WBC 23.2 11/04/2022 02:43 AM    RBC 4.23 11/04/2022 02:43 AM    HGB 11.4 11/04/2022 02:43 AM    HCT 35.5 11/04/2022 02:43 AM    MCV 83.9 11/04/2022 02:43 AM    RDW 16.1 11/04/2022 02:43 AM     11/04/2022 02:43 AM     CMP:  Lab Results   Component Value Date/Time     11/04/2022 02:43 AM    K 3.6 11/04/2022 02:43 AM    K 3.7 11/03/2022 05:06 AM     11/04/2022 02:43 AM    CO2 19 11/04/2022 02:43 AM    BUN 24 11/04/2022 02:43 AM    CREATININE 1.5 11/04/2022 02:43 AM    GFRAA >60 07/26/2022 06:12 AM    GFRAA >60 02/24/2013 12:45 PM    AGRATIO 1.2 11/03/2022 05:06 AM    LABGLOM 45 11/04/2022 02:43 AM    GLUCOSE 113 11/04/2022 02:43 AM    PROT 6.7 11/03/2022 05:06 AM    PROT 7.1 02/24/2013 12:45 PM    CALCIUM 7.6 11/04/2022 02:43 AM    BILITOT 0.8 11/03/2022 05:06 AM    ALKPHOS 123 11/03/2022 05:06 AM    AST 18 11/03/2022 05:06 AM    ALT 8 11/03/2022 05:06 AM     PT/INR:  No results found for: PTINR  HgBA1c:  Lab Results   Component Value Date    LABA1C 6.6 03/05/2018     Lab Results   Component Value Date    CKTOTAL 116 11/04/2022    TROPONINI 0.42 (H) 11/04/2022         Cardiac Data     Last EKG: Nuclear SPECT Stress 6/19/13 @ [facility]:  Interpetation Summary:   The LV EF is 50%   Normal global and regional wall motion in all territories. There is a medium sized, fixed defect in the inferior wall. This defect is consistent with diaphragm attenuation. 1.Non-diagnostic ECG for ischemia with pharmocologic stress. 2.Negative nuclear stress imaging for inducible ischemia. 3.Nuclear stress image findings indicate low risk for future       ischemic event . Cath: N/A     Other Studies:   Chest x-ray 11/3/22:  Improving aeration in the lungs centrally. Suspected mild pattern of   vascular congestion. CT chest/abdomen/pelvis with contrast 11/3/22:  1. Increasing peribronchial thickening and interstitial opacities in the   lungs favoring the lower lobes. Chronic bronchiolitis or another viral   infection is suspected. A degree of pulmonary edema is not excluded. 2. Bladder mucosal thickening may represent chronic bladder outlet   obstruction or underlying cystitis. The prostate is enlarged. 3. Diverticulosis coli. Assessment and Plan      1. NSTEMI - May be secondary to demand ischemia in the setting of underlying sepsis and further complicated by delayed cardiac enzyme clearance secondary to PILAR versus possible Takotsubo cardiomyopathy. However, given magnitude of troponin T elevation and history of CAD with multiple potential risk factors for disease progression, a true type I NSTEMI cannot be definitively excluded at this time. LVEF was estimated at 35-40% on TTE with wall motion abnormality pattern suggestive of possible Takotsubo cardiomyopathy, but underlying obstructive coronary artery disease would need to be ruled out before confirming diagnosis. He continues to deny angina, but again is a very limited historian.  EKG shows normal sinus rhythm with bifascicular block.  -Will plan to continue IV heparin infusion x48 hours  -Continue aspirin 81 mg daily  -Has reported adverse reaction to simvastatin and will hold on initiating another statin until type of reaction has been confirmed  -Not currently able to tolerate beta-blocker due to underlying sepsis and recent hypotension  -OK to stop trending troponins  -I attempted to reach out to patient's daughter, Shad Crenshaw, who is his listed guardian to discuss his condition via telephone but she was not available. At this point, I think that patient's mental state is likely more reflective of chronic dementia as opposed to acute metabolic encephalopathy. Given his advanced age and underlying dementia, it is unclear whether he would want to pursue further aggressive invasive evaluation and this will need to be discussed further with his daughter, once she is available. -If his daughter ultimately wishes to pursue invasive angiography/LHC/RHC, would plan to perform once infection has been adequately treated and kidney function stabilizes/improves    2. LV systolic heart failure/newly diagnosed cardiomyopathy  -Plan as discussed above  -Currently unable to tolerate addition of beta-blocker or ACEI/ARB due to underlying sepsis and recent hypotension  -Would recommend holding off on additional IVF as JVP appears mildly elevated on exam, and given reduced LVEF, will need to be careful not to overhydrate    3. Sepsis/UTI  -Blood cultures now growing Proteus mirabilis  -Currently on IV meropenem  -Weaned off levophed earlier today    4. CAD  -S/p remote MI and PCI (previous cath records not currently available)  -Starting aspirin 81 mg daily as above  -Hold off on initiating statin until previous adverse reaction can be confirmed     5. PILAR on CKD  -May be secondary to renal hypoperfusion in setting of sepsis  -Continue to avoid nephrotoxic agents as able     6. Lactic acidosis  -In setting of sepsis  -Treatment as above     7.  Dementia  -Continue to re-direct as needed      Thank you for allowing us to participate in the care of Madelyn Zamora Mitchel Herrera. Please call me with any questions [phone]. Ara Stone,   [address]  [phone] [facility]  [phone] [address] [place] [address]  11/4/2022 7:47 AM      I will address the patient's cardiac risk factors and adjusted pharmacologic treatment as needed. In addition, I have reinforced the need for patient directed risk factor modification. All questions and concerns were addressed to the patient/family. Alternatives to my treatment were discussed. The note was completed using EMR. Every effort was made to ensure accuracy; however, inadvertent computerized transcription errors may be present.

## 2022-11-04 NOTE — PROGRESS NOTES
Department of Pharmacy    Notification received from laboratory of positive blood culture results.     Organism(s) detected: Proteus spp in 1 out of 2 bottles  Applicable Antimicrobial Resistance Genes: none detected    Recommendation: Continue cefepime until sensitivities result    Please contact inpatient pharmacy with any questions regarding antimicrobial therapy  Thank you,  Paul Roche, PharmD 11/3/2022 11:10 PM

## 2022-11-04 NOTE — DISCHARGE INSTR - COC
Continuity of Care Form    Patient Name: Humza Elena   :  1935  MRN:  [id number]    Admit date:  11/3/2022  Discharge date:  2022    Code Status Order: Full Code   Advance Directives:     Admitting Physician:  Rashida Lehman MD  PCP: Denis Valencia MD    Discharging Nurse: [facility] Unit/Room#: 5838/3104-72  Discharging Unit Phone Number: [phone]    Emergency Contact:   Extended Emergency Contact Information  Primary Emergency Contact: Gabi Still  Home Phone: [phone]  Mobile Phone: [phone]  Relation: Legal Guardian  Preferred language: English   needed?  No  Secondary Emergency Contact: [address] Phone: [phone]  Relation: Brother/Sister    Past Surgical History:  Past Surgical History:   Procedure Laterality Date    CHOLECYSTECTOMY      COLONOSCOPY  8/10/12    CORONARY ANGIOPLASTY WITH STENT PLACEMENT      PROSTATE BIOPSY      SKIN BIOPSY      UPPER GASTROINTESTINAL ENDOSCOPY  12    UPPER GASTROINTESTINAL ENDOSCOPY N/A 2019    EGD BIOPSY performed by Lyle Das DO at [address] [place] [address] N/A 2019    EGD performed by Una Gotti MD at [address] [place] [address]       Immunization History:   Immunization History   Administered Date(s) Administered    COVID-19, PFIZER PURPLE top, DILUTE for use, (age 15 y+), 30mcg/0.3mL 2021, 2021, 02/15/2022    Influenza A (V1U1-46) Vaccine PF IM 2009    Influenza Virus Vaccine 2009, 2010    Influenza, High Dose (Fluzone 65 yrs and older) 2011, 2013    Pneumococcal Conjugate 13-valent (Frwpiaa24) 2016    Pneumococcal Polysaccharide (Wmqhdwutf40) 2009    Td vaccine (adult) 2004    Tdap (Boostrix, Adacel) 10/18/2011, 10/28/2018       Active Problems:  Patient Active Problem List   Diagnosis Code    Falls frequently R29.6    Hypokalemia E87.6    CAD (coronary artery disease) I25.10    HTN (hypertension) I10    HLD (hyperlipidemia) E78.5    Dementia (HCC) F03.90    Moderate malnutrition (HCC) E44.0    Abdominal pain R10.9    Abdominal pain, epigastric R10.13    Anemia D64.9    Urinary tract infection without hematuria N39.0    Lactic acid acidosis E87.20    Mediastinal lymphadenopathy R59.0    Septicemia (HCC) A41.9    Pneumonia of right lower lobe due to infectious organism J18.9    PUD (peptic ulcer disease) K27.9    Moderate protein-calorie malnutrition (HCC) E44.0    Aspiration pneumonia (HCC) J69.0    Septic shock (HCC) A41.9, R65.21    Acute cystitis with hematuria N30.01    Mass of pancreas K86.89    Acute hypoxemic respiratory failure (HCC) D26.76    Acute metabolic encephalopathy V20.32    Urinary tract infection with hematuria N39.0, R31.9    Thrombocytopenia (HCC) D69.6    Hypoxia R09.02    Demand ischemia (HCC) I24.8       Isolation/Infection:   Isolation            No Isolation          Patient Infection Status       Infection Onset Added Last Indicated Last Indicated By Review Planned Expiration Resolved Resolved By    None active    Resolved    COVID-19 (Rule Out) 11/03/22 11/03/22 11/03/22 COVID-19, Rapid (Ordered)   11/03/22 Rule-Out Test Resulted    COVID-19 (Rule Out) 07/26/22 07/26/22 07/26/22 COVID-19, Rapid (Ordered)   07/26/22 Rule-Out Test Resulted    COVID-19 (Rule Out) 07/21/22 07/21/22 07/21/22 COVID-19, Rapid (Ordered)   07/21/22 Rule-Out Test Resulted    COVID-19 (Rule Out) 09/16/20 09/16/20 09/16/20 COVID-19 (Ordered)   09/17/20 Rule-Out Test Resulted            Nurse Assessment:  Last Vital Signs: BP 95/62   Pulse 66   Temp 97.8 °F (36.6 °C) (Bladder)   Resp 20   SpO2 92%     Last documented pain score (0-10 scale):    Last Weight:   Wt Readings from Last 1 Encounters:   11/03/22 260 lb (117.9 kg)     Mental Status:  Alert to self.  Dementia at baseline     IV Access:  - None    Nursing Mobility/ADLs:  Walking   Dependent  Transfer  Dependent  Bathing Dependent  Dressing  Dependent  Toileting  Dependent  Feeding  Assisted  Med Admin  Assisted  Med Delivery   crushed with applesauce and pudding     Wound Care Documentation and Therapy:        Elimination:  Continence: Bowel: No  Bladder: No  Urinary Catheter: Removal date 11/9/22  Colostomy/Ileostomy/Ileal Conduit: No       Date of Last BM: 11/9/22    Intake/Output Summary (Last 24 hours) at 11/4/2022 1256  Last data filed at 11/4/2022 1125  Gross per 24 hour   Intake 2082.77 ml   Output 1110 ml   Net 972.77 ml     I/O last 3 completed shifts: In: 829.8 [P.O.:300; I.V.:437.8; IV Piggyback:92]  Out: 900 [Urine:900]    Safety Concerns: At Risk for Falls    Impairments/Disabilities:          Nutrition Therapy:  Current Nutrition Therapy:   - Oral Diet:  Dysphagia 2 mechanically altered    Routes of Feeding: Oral  Liquids: Thin Liquids  Daily Fluid Restriction: no  Last Modified Barium Swallow with Video (Video Swallowing Test): Done on 11/7    Treatments at the Time of Hospital Discharge:   Respiratory Treatments:   Oxygen Therapy:    Ventilator:        Rehab Therapies:   Weight Bearing Status/Restrictions: Other Medical Equipment (for information only, NOT a DME order): Other Treatments:     Patient's personal belongings (please select all that are sent with patient):   With patient     RN SIGNATURE:  Electronically signed by Nola Phillips RN on 11/9/22 at 4:57 PM EST    CASE MANAGEMENT/SOCIAL WORK SECTION    Inpatient Status Date: 11/3/22    Readmission Risk Assessment Score:  Readmission Risk              Risk of Unplanned Readmission:  27           Discharging to Facility/ [address] [place], [place] [address]   [phone]         / signature: Electronically signed by Dean Dumont RN on 11/9/22 at 2:08 PM EST    PHYSICIAN SECTION    Prognosis: Fair    Condition at Discharge: Stable    Rehab Potential (if transferring to Rehab): Fair    Recommended Labs or Other Treatments After Discharge: Repeat Renal function panel/Mag on 11/11/22. Follow up with [address] [facility] as instructed    Physician Certification: I certify the above information and transfer of Mary Berkowitz  is necessary for the continuing treatment of the diagnosis listed and that he requires [address] for less 30 days.      Update Admission H&P: No change in H&P    PHYSICIAN SIGNATURE:  Electronically signed by Lizz Dale MD on 11/9/22 at 11:40 AM EST

## 2022-11-05 LAB
ANION GAP SERPL CALCULATED.3IONS-SCNC: 7 MMOL/L (ref 3–16)
ANTI-XA UNFRAC HEPARIN: 0.33 IU/ML (ref 0.3–0.7)
BLOOD CULTURE, ROUTINE: ABNORMAL
BUN BLDV-MCNC: 18 MG/DL (ref 7–20)
CALCIUM SERPL-MCNC: 7.6 MG/DL (ref 8.3–10.6)
CHLORIDE BLD-SCNC: 108 MMOL/L (ref 99–110)
CO2: 23 MMOL/L (ref 21–32)
CREAT SERPL-MCNC: 1.1 MG/DL (ref 0.8–1.3)
EKG ATRIAL RATE: 70 BPM
EKG DIAGNOSIS: NORMAL
EKG P AXIS: 18 DEGREES
EKG P-R INTERVAL: 146 MS
EKG Q-T INTERVAL: 436 MS
EKG QRS DURATION: 98 MS
EKG QTC CALCULATION (BAZETT): 470 MS
EKG R AXIS: -18 DEGREES
EKG T AXIS: 266 DEGREES
EKG VENTRICULAR RATE: 70 BPM
GFR SERPL CREATININE-BSD FRML MDRD: >60 ML/MIN/{1.73_M2}
GLUCOSE BLD-MCNC: 67 MG/DL (ref 70–99)
GLUCOSE BLD-MCNC: 72 MG/DL (ref 70–99)
GLUCOSE BLD-MCNC: 72 MG/DL (ref 70–99)
GLUCOSE BLD-MCNC: 75 MG/DL (ref 70–99)
GLUCOSE BLD-MCNC: 77 MG/DL (ref 70–99)
HCT VFR BLD CALC: 33.7 % (ref 40.5–52.5)
HEMOGLOBIN: 11 G/DL (ref 13.5–17.5)
MCH RBC QN AUTO: 27.2 PG (ref 26–34)
MCHC RBC AUTO-ENTMCNC: 32.6 G/DL (ref 31–36)
MCV RBC AUTO: 83.3 FL (ref 80–100)
ORGANISM: ABNORMAL
PDW BLD-RTO: 16.2 % (ref 12.4–15.4)
PERFORMED ON: ABNORMAL
PERFORMED ON: NORMAL
PLATELET # BLD: 91 K/UL (ref 135–450)
PMV BLD AUTO: 8.6 FL (ref 5–10.5)
POTASSIUM SERPL-SCNC: 3.6 MMOL/L (ref 3.5–5.1)
RBC # BLD: 4.05 M/UL (ref 4.2–5.9)
SODIUM BLD-SCNC: 138 MMOL/L (ref 136–145)
WBC # BLD: 13.8 K/UL (ref 4–11)

## 2022-11-05 PROCEDURE — 36415 COLL VENOUS BLD VENIPUNCTURE: CPT

## 2022-11-05 PROCEDURE — 2000000000 HC ICU R&B

## 2022-11-05 PROCEDURE — 87040 BLOOD CULTURE FOR BACTERIA: CPT

## 2022-11-05 PROCEDURE — 2580000003 HC RX 258: Performed by: INTERNAL MEDICINE

## 2022-11-05 PROCEDURE — 99291 CRITICAL CARE FIRST HOUR: CPT | Performed by: STUDENT IN AN ORGANIZED HEALTH CARE EDUCATION/TRAINING PROGRAM

## 2022-11-05 PROCEDURE — 2580000003 HC RX 258: Performed by: STUDENT IN AN ORGANIZED HEALTH CARE EDUCATION/TRAINING PROGRAM

## 2022-11-05 PROCEDURE — 80048 BASIC METABOLIC PNL TOTAL CA: CPT

## 2022-11-05 PROCEDURE — 93010 ELECTROCARDIOGRAM REPORT: CPT | Performed by: INTERNAL MEDICINE

## 2022-11-05 PROCEDURE — 6370000000 HC RX 637 (ALT 250 FOR IP): Performed by: INTERNAL MEDICINE

## 2022-11-05 PROCEDURE — 92610 EVALUATE SWALLOWING FUNCTION: CPT

## 2022-11-05 PROCEDURE — 85027 COMPLETE CBC AUTOMATED: CPT

## 2022-11-05 PROCEDURE — 6360000002 HC RX W HCPCS: Performed by: INTERNAL MEDICINE

## 2022-11-05 PROCEDURE — 85520 HEPARIN ASSAY: CPT

## 2022-11-05 PROCEDURE — 99233 SBSQ HOSP IP/OBS HIGH 50: CPT | Performed by: INTERNAL MEDICINE

## 2022-11-05 PROCEDURE — 92526 ORAL FUNCTION THERAPY: CPT

## 2022-11-05 PROCEDURE — 6360000002 HC RX W HCPCS: Performed by: STUDENT IN AN ORGANIZED HEALTH CARE EDUCATION/TRAINING PROGRAM

## 2022-11-05 RX ORDER — METOPROLOL SUCCINATE 25 MG/1
25 TABLET, EXTENDED RELEASE ORAL DAILY
Status: DISCONTINUED | OUTPATIENT
Start: 2022-11-05 | End: 2022-11-06

## 2022-11-05 RX ORDER — ENOXAPARIN SODIUM 100 MG/ML
30 INJECTION SUBCUTANEOUS 2 TIMES DAILY
Status: DISCONTINUED | OUTPATIENT
Start: 2022-11-05 | End: 2022-11-06

## 2022-11-05 RX ADMIN — SERTRALINE HYDROCHLORIDE 100 MG: 50 TABLET ORAL at 11:48

## 2022-11-05 RX ADMIN — SODIUM CHLORIDE, PRESERVATIVE FREE 10 ML: 5 INJECTION INTRAVENOUS at 21:06

## 2022-11-05 RX ADMIN — MEROPENEM 1000 MG: 1 INJECTION, POWDER, FOR SOLUTION INTRAVENOUS at 14:05

## 2022-11-05 RX ADMIN — HEPARIN SODIUM 1720 UNITS/HR: 10000 INJECTION, SOLUTION INTRAVENOUS at 04:05

## 2022-11-05 RX ADMIN — Medication 1 CAPSULE: at 17:02

## 2022-11-05 RX ADMIN — METOPROLOL SUCCINATE 25 MG: 25 TABLET, EXTENDED RELEASE ORAL at 15:30

## 2022-11-05 RX ADMIN — MUPIROCIN: 20 OINTMENT TOPICAL at 21:01

## 2022-11-05 RX ADMIN — MEROPENEM 1000 MG: 1 INJECTION, POWDER, FOR SOLUTION INTRAVENOUS at 20:59

## 2022-11-05 RX ADMIN — ENOXAPARIN SODIUM 30 MG: 100 INJECTION SUBCUTANEOUS at 21:01

## 2022-11-05 RX ADMIN — MEROPENEM 1000 MG: 1 INJECTION, POWDER, FOR SOLUTION INTRAVENOUS at 06:04

## 2022-11-05 NOTE — PROGRESS NOTES
Pulmonary & Critical Care Medicine ICU Progress Note      Events of Last 24 hours:   Pt didn't sleep overnight. He has no complaints and still confused at baseline. He had an aspiration event this morning and was made NPO. SLP evaluated him and changed diet. Invasive Lines: PICC D#None   CVC D#None  Art Line D#None            Vitals:  BP (!) 99/47   Pulse 66   Temp 99 °F (37.2 °C) (Bladder)   Resp 17   SpO2 91%    Tmax:  CVP:        Intake/Output Summary (Last 24 hours) at 11/5/2022 1003  Last data filed at 11/5/2022 0800  Gross per 24 hour   Intake 497.21 ml   Output 1550 ml   Net -1052.79 ml       EXAM:  Physical Exam  Constitutional:       General: He is not in acute distress. Appearance: He is not toxic-appearing. HENT:      Head: Normocephalic and atraumatic. Nose: Nose normal.      Mouth/Throat:      Pharynx: No oropharyngeal exudate. Eyes:      General: No scleral icterus. Right eye: No discharge. Left eye: No discharge. Cardiovascular:      Rate and Rhythm: Normal rate and regular rhythm. Heart sounds: No murmur heard. No friction rub. No gallop. Pulmonary:      Effort: Pulmonary effort is normal. No respiratory distress. Breath sounds: No wheezing or rales. Abdominal:      General: Abdomen is flat. Bowel sounds are normal.      Palpations: Abdomen is soft. Musculoskeletal:         General: Normal range of motion. Cervical back: Normal range of motion. Skin:     General: Skin is warm and dry. Neurological:      General: No focal deficit present. Mental Status: He is alert.       Comments: Alert and oriented to self   Psychiatric:         Mood and Affect: Mood normal.        Medications:  Scheduled Meds:   meropenem  1,000 mg IntraVENous Q8H    insulin lispro  0-4 Units SubCUTAneous TID WC    insulin lispro  0-4 Units SubCUTAneous Nightly    pantoprazole  40 mg Oral QAM AC    sertraline  100 mg Oral Daily    sodium chloride flush  5-40 mL IntraVENous 2 times per day    lactobacillus  1 capsule Oral BID     mupirocin   Nasal BID    [Held by provider] pregabalin  50 mg Oral BID    aspirin  81 mg Oral Daily       PRN Meds:  glucose, dextrose bolus **OR** dextrose bolus, glucagon (rDNA), dextrose, bisacodyl, sodium chloride flush, sodium chloride, acetaminophen **OR** acetaminophen, heparin (porcine), heparin (porcine), perflutren lipid microspheres, magnesium sulfate, ondansetron    Results:  CBC:   Recent Labs     11/03/22  1258 11/04/22  0243 11/05/22  0403   WBC 21.1* 23.2* 13.8*   HGB 11.5* 11.4* 11.0*   HCT 35.7* 35.5* 33.7*   MCV 84.3 83.9 83.3    118* 91*     BMP:   Recent Labs     11/03/22  1258 11/04/22  0243 11/05/22  0403    137 138   K 3.3* 3.6 3.6    107 108   CO2 20* 19* 23   BUN 26* 24* 18   CREATININE 1.5* 1.5* 1.1     LIVER PROFILE:   Recent Labs     11/03/22  0506   AST 18   ALT 8*   LIPASE 10.0*   BILITOT 0.8   ALKPHOS 123     PT/INR:   Recent Labs     11/03/22  1258 11/04/22  0243   PROTIME 17.1* 18.2*   INR 1.40* 1.51*     APTT:   Recent Labs     11/03/22  1258 11/04/22  0243   APTT 37.8* 96.7*     UA:  Recent Labs     11/03/22  0700   COLORU DARK YELLOW*   PHUR 8.5*   WBCUA 21-50*   RBCUA *   BACTERIA 3+*   CLARITYU SL CLOUDY*   SPECGRAV 1.010   LEUKOCYTESUR LARGE*   UROBILINOGEN 0.2   BILIRUBINUR Negative   BLOODU LARGE*   GLUCOSEU Negative       Cultures:  Blood cx x2 - Proteus  Urine cx - mixed gilberto    Imaging:  I have reviewed radiology images personally. XR CHEST PORTABLE     Result Date: 11/3/2022  Improving aeration in the lungs centrally. Suspected mild pattern of vascular congestion. CT CHEST ABDOMEN PELVIS W CONTRAST Additional Contrast? None     Result Date: 11/3/2022  1. Increasing peribronchial thickening and interstitial opacities in the lungs favoring the lower lobes. Chronic bronchiolitis or another viral infection is suspected. A degree of pulmonary edema is not excluded.  2. Bladder mucosal thickening may represent chronic bladder outlet obstruction or underlying cystitis. The prostate is enlarged. 3. Diverticulosis coli. ASSESSMENT:  Septic shock  UTI  Aspiration  Proteus Bacteremia  NSTEMI  Dementia  HFrEF  PILAR  CAD  Non-anion gap metabolic acidosis  Lactic Acidosis  Thrombocytopenia      Plan:   - Mental status at baseline, cont to monitor with septic shock  - Off levophed for past 24 hours, cont tx septic shock   - cont supplemental O2 with goal saturation 90-92%, he had an aspiration event this morning with recovery. 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 Patient is stable for transfer to step-down    Critical care time spent reviewing labs/films, examining patient, collaborating with other physicians but excluding procedures for life threatening organ failure is 37 minutes.         Electronically signed by:  Reji Barbosa MD    11/5/2022    10:03 AM.

## 2022-11-05 NOTE — PROGRESS NOTES
Speech Language Pathology    Speech Language Pathology  Clinical Bedside Swallow Assessment  Facility/Department: [facility] C2 CARD TELEMETRY        Recommendations:  Diet recommendation: IDDSI 5 Minced and moist Solids; IDDSI 2 Mildly Thick (nectar) Liquids; Meds crushed in puree as able  Instrumentation: MBSS requested on   Risk management: upright for all intake, stay upright for at least 30 mins after intake, small bites/sips, no straws, alternate bites/sips, slow rate of intake, general GERD precautions, general aspiration precautions, and hold PO and contact SLP if s/s of aspiration or worsening respiratory status develop. Olivia Ashby  : 1935 (82 y.o.)   MRN: [id number]  ROOM: [address] [place] [address]-  ADMISSION DATE: 11/3/2022  PATIENT DIAGNOSIS(ES): Septic shock ([address] [A41.9, R65.21]  No chief complaint on file.     Patient Active Problem List    Diagnosis Date Noted    NSTEMI (non-ST elevated myocardial infarction) ([address] 2022    Cardiomyopathy ([address] 2022    Lactic acidosis 2022    Demand ischemia ([address] 2022    Hypoxia 2022    Thrombocytopenia ([address] 2022    Acute hypoxemic respiratory failure ([address]     Acute metabolic encephalopathy     Urinary tract infection with hematuria 2022    Septic shock ([address] 2022    Acute cystitis with hematuria 2022    Mass of pancreas 2022    Aspiration pneumonia ([address]     Moderate protein-calorie malnutrition ([address] 2020    Septicemia ([address] 2020    Pneumonia of right lower lobe due to infectious organism     PUD (peptic ulcer disease)     Mediastinal lymphadenopathy     Urinary tract infection without hematuria     Lactic acid acidosis     Abdominal pain, epigastric     Anemia     Abdominal pain 2019    Dementia ([address] 2018    Moderate malnutrition ([address] 2018    Falls frequently 2018    Hypokalemia 2018    CAD (coronary artery disease) 2018 yesterday. Pain: pt does not complain of pain this date    Reason for Referral  Kesha Black was referred for a bedside swallow evaluation to assess the efficiency of their swallow function, identify signs and symptoms of aspiration and make recommendations regarding safe dietary consistencies, effective compensatory strategies, and safe eating environment. Assessment    Medical record review/interview: Per MD H&P: \"This is a 82 y.o. WM with mild dementia, CAD, COPD, hx of duodenal ulcer, GERD, HLD, HTN hx of MI and recent septic shock admission for UTI who presents with nausea, vomiting and fever. Pt was also tachycardic. Had hematuria at his nursing facility as well. PT pleasantly demented and thinks he is currently at home. He has had an abnormal EKG and elevated troponin as well. Pt meets criteria for septic shock POA based on hypotension, lactic acidosis, documented UTI, tachycardia, PILAR, leukopenia. \"    Predisposing dysphagia risk factors: Cognitive Deficit, Age, and GERD  Clinical signs of possible chronic dysphagia: weight loss and reduced PO intake  Precipitating dysphagia risk factors: reduced physical mobility, increased O2 demands, and AMS    Patient Complaints:  Odynophagia: [] Yes [x] No  Globus Sensation: [] Yes [x] No  SOB with PO intake: [] Yes [x] No  Increased WOB with PO intake: [] Yes [x] No  Reflux Sx's: [] Yes [x] No  Weight loss: [] Yes [x] No  Coughing/Choking with PO intake: [x] Yes [] No  Reduced Appetite: [] Yes [x] No    Additional Reported Symptoms/Complaints/Hospital Course:   Per RN note from 11/5: \"Around 0000 the patient requested a drink of water and had been tolerating it well. Head of the bed was elevated to >45 degrees and he took a drink. He immediately started coughing. Doctor notified and diet order changed to NPO. \"    Vitals/labs:   Temp: 99  SpO2: 97  RR: 15  BP: 98/54  HR: 65  O2 device: Room Air    CBC:   Recent Labs     11/05/22  0403   WBC 13.8* HGB 11.0*   PLT 91*      BMP:  Recent Labs     11/05/22  0403      K 3.6      CO2 23   BUN 18   CREATININE 1.1   GLUCOSE 72        Cranial nerve exam:   CN V (trigeminal): ophthalmic, maxillary, and mandibular facial sensation- WFL  CN VII (facial): WFL and Reduced  CN IX/X (glossopharyngeal/vagus): vocal quality: WFL, hoarse, and weak; cough: Strong-perceptually and Productive  CN XII (hypoglossal): Impaired and Reduced    Laryngeal function exam:   Secretions:  None  Vocal quality: See CN exam above  MPT: See CN exam above  Pitch range: See CN exam above  Cough: See CN exam above    Oral Care Status:    [x] Oral Care [facility]  [] Poor oral care status  [x] Edentulous  [] Upper Dentures  [] Lower Dentures  [] Missing/Broken Teeth  [] Evidence of dental cavities/carries    PO trials:   IDDSI 0 (thin): Pt x1 large cough after small cup sip of TL. SLP d/c TL trials d/t pt's overt s/s of aspiration with TL.    IDDSI 2 (mildly thick): Pt demonstrated no overt s/s of aspiration. Pt able to tolerate NTL by tsp and cup sip w/ bolus control and transfer WFL. Pt denied any globus sensation. Pt hyolaryngeal excursion slightly delayed but WFL upon palpation. No wet vocal quality noted. IDDSI 3 (moderately thick): Pt demonstrated no overt s/s of aspiration. Pt able to tolerate HTL by tsp and cup sip w/ bolus control and transfer WFL. Pt denied any globus sensation or need to cough. No wet vocal quality noted. IDDSI 4 (puree): Pt demonstrated no overt s/s of aspiration. Pt able to tolerate puree solids without globus sensation or need to cough. No wet vocal quality noted. Pt stated it was \"easy\" to swallow puree. No increased WOB or SOB noted. IDDSI 7 (regular): Pt demonstrated prolonged oral phase with uncoordinated lingual movements and difficulty with bolus control. Pt's O2 stats dropped from 88-72 during PO trial of reg solids (bite sized amount). SLP cued pt to breath deeply.  Pt able to recover O2 stat after 1 min of 72-75 O2 level. SLP d/c solid trials after decreased O2 level- indicative of increased WOB and SOB. 3 oz water: FAIL    Impressions:  SLP recommending Minced and Moist diet d/t edentulous status and drop in O2 levels with solid PO trial. Pt stated it is difficult for him to chew foods well. Pt demonstrated overt s/s of aspiration with TL. Pt demonstrated no overt s/s of aspiration with NTL or HTL- SLP recommending HTL for least restrictive diet. MBSS requested d/t pt's change in TL tolerance with LOS and current medical status. MD agreed to this. Pt demonstrated pleasantly confusion during time of eval- will need staff assistance with meals and continuous reminders/cues for compensatory swallow strategies. Recommendations:  Diet recommendation: IDDSI 5 Minced and moist Solids; IDDSI 2 Mildly Thick (nectar) Liquids; Meds crushed in puree as able  Instrumentation: MBSS requested on 11/5  Risk management: upright for all intake, stay upright for at least 30 mins after intake, small bites/sips, no straws, alternate bites/sips, slow rate of intake, general GERD precautions, general aspiration precautions, and hold PO and contact SLP if s/s of aspiration or worsening respiratory status develop.     Prognosis: Fair    Recommended Intervention:   [x] Dysphagia tx  [] Videostroboscopy                      [] NPO   [x] MBS       [] Speech/Cog Eval    [] Therapeutic PO Trials     [] Ice Chips   [] Other:  [] FEES                                                 Dysphagia Therapeutic Intervention:   []  Bolus control Exercises  []  Oral Motor Exercises  []  [employer] Protocol  []  Thermal Stimulation  [x]  Oral Care    []  Vital Stim/NMES  []  Laryngeal Exercises  [x]  Patient/Family Education  []  Pharyngeal Exercises  []  Therapeutic PO trials with SLP  [x]  Diet tolerance monitoring  []  Other:     Referrals:  [] ENT    [] PT  [] Pulmonology [] GI  [] Neurology  [] RD  [] OT   [] Social Worker    Goals:  Short Term Goals:  Timeframe for Short Term Goals: (5 days 11/10/2022)  Goal 1: The patient will tolerate recommended diet with no clinical s/s of aspiration 5/5  Goal 2: The patient will tolerate therapeutic diet upgrade trials with no clinical s/s of aspiration 5/5  Goal 3: The patient will tolerate instrumental assessment when able   Goal 4: The patient/caregiver will demonstrate understanding of compensatory swallow strategies, for improved swallow safety    Long Term Goals:   Timeframe for Long Term Goals: (7 days 11/12/2022)  Goal 1: The patient will tolerate least restrictive diet with no clinical s/s of aspiration or worsening respiratory/pulmonary status    Treatment:  Skilled instruction completed with patient re: evidenced based practice regarding recommendations and POC, importance of oral care to reduce adverse affects in the event of aspiration, and instruction of recommended compensatory strategies developed based upon clinical exam. Pt able to recall/demonstrate compensatory strategies with max cues. Pt Education: SLP educated the patient re: Role of SLP, rationale for completion of assessment, anatomical components of swallow structures as they pertain to airway protection, recommendations, and rationale for MBS  Pt Education Response: verbalized understanding, would benefit from ongoing education, and RN aware    Duration/Frequency of Tx: 7 days (3-5x/week)    Individuals Consulted:   [x]  Patient     []  NP         [x]  RN   []  RD                   [x]  MD      []  Family Member                        []  PA    []  Other:      Safety Devices / Report:  [x]  All fall risk precautions in place []  Safety handoff completed with RN  [x]  Bed alarm in place  [x]  Left in bed     []  Chair alarm in place  []  Left in chair   [x]  Call light in reach   []  Other:        Total Treatment Time / Charges       Time in Time out Total Time / units   Swallow Eval/Tx Time  9000 930 30 mins/ 2 units     Signature:  Cassius SERRA  [id number] [facility]  Speech Language Pathologist  [phone]

## 2022-11-05 NOTE — PROGRESS NOTES
Around 0000 the patient requested a drink of water and had been tolerating it well. Head of the bed was elevated to >45 degrees and he took a drink. He immediately started coughing. Doctor notified and diet order changed to NPO.

## 2022-11-05 NOTE — PROGRESS NOTES
Hospitalist Progress Note      PCP: Ryan Mariano MD    Date of Admission: 11/3/2022    Chief Complaint:  septic shock    Hospital Course:     82 y.o. WM with pmh of dementia, CAD, COPD, hx of duodenal ulcer, GERD, HLD, HTN hx of MI and recent septic shock admission for UTI. He presented with nausea, vomiting and fever. Pt was also tachycardic. Had hematuria at his nursing facility as well. He has had an abnormal EKG and elevated troponin as well. He was admitted to the ICU. He is being followed by cardiology and critical care in consultation. His condition seems to be stabilizing he was requiring pressors to maintain blood pressure but now these been discontinued and vital signs are remaining pretty stable. ECHO ordered and 48 hours total of heparin (stop [id number]). Pt has no c/o but is poor historian. Blood cx growing Proteus Mirabeilis. He is currently on Merrem which it is sensitive to. Subjective:   Sitting up in bed, in no acute distress has no complaints is pleasantly confused.       Medications:  Reviewed    Infusion Medications    dextrose      sodium chloride       Scheduled Medications    meropenem  1,000 mg IntraVENous Q8H    metoprolol succinate  25 mg Oral Daily    insulin lispro  0-4 Units SubCUTAneous TID WC    insulin lispro  0-4 Units SubCUTAneous Nightly    pantoprazole  40 mg Oral QAM AC    sertraline  100 mg Oral Daily    sodium chloride flush  5-40 mL IntraVENous 2 times per day    lactobacillus  1 capsule Oral BID WC    mupirocin   Nasal BID    [Held by provider] pregabalin  50 mg Oral BID    aspirin  81 mg Oral Daily     PRN Meds: glucose, dextrose bolus **OR** dextrose bolus, glucagon (rDNA), dextrose, bisacodyl, sodium chloride flush, sodium chloride, acetaminophen **OR** acetaminophen, perflutren lipid microspheres, magnesium sulfate, ondansetron      Intake/Output Summary (Last 24 hours) at 11/5/2022 1443  Last data filed at 11/5/2022 1405  Gross per 24 hour Intake 202.03 ml   Output 1425 ml   Net -1222.97 ml       Physical Exam Performed:    /82   Pulse 71   Temp 99 °F (37.2 °C) (Bladder)   Resp 14   SpO2 90%     General appearance: Elderly male lying in bed, looks weak and tired. He is pleasantly confused and cooperative to the best of his ability  HEENT: Pupils equal, round, and reactive to light. Conjunctivae/corneas clear. Neck: Supple, with full range of motion. No jugular venous distention. Trachea midline. Respiratory:  Normal respiratory effort. Clear to auscultation, bilaterally without Rales/Wheezes/Rhonchi. Cardiovascular: Regular rate and rhythm with normal S1/S2 without  Abdomen: Soft, non-tender, non-distended with normal bowel sounds. Musculoskeletal: No clubbing, cyanosis or edema bilaterally. Full range of motion without deformity. .  Neurologic:  Neurovascularly intact without any focal sensory/motor deficits. Cranial nerves: II-XII intact, grossly non-focal.  Psychiatric: He is confused, he is unable to tell me the month, the year or where he is. Short-term recall is 0/3 objects after 1 minute.   Insight is poor  Peripheral Pulses: +2 palpable, equal bilaterally       Labs:   Recent Labs     11/03/22  1258 11/04/22  0243 11/05/22  0403   WBC 21.1* 23.2* 13.8*   HGB 11.5* 11.4* 11.0*   HCT 35.7* 35.5* 33.7*    118* 91*     Recent Labs     11/03/22  1258 11/04/22  0243 11/05/22  0403    137 138   K 3.3* 3.6 3.6    107 108   CO2 20* 19* 23   BUN 26* 24* 18   CREATININE 1.5* 1.5* 1.1   CALCIUM 8.0* 7.6* 7.6*     Recent Labs     11/03/22  0506   AST 18   ALT 8*   BILITOT 0.8   ALKPHOS 123     Recent Labs     11/03/22  1258 11/04/22  0243   INR 1.40* 1.51*     Recent Labs     11/03/22  1923 11/04/22  0044 11/04/22  0649 11/04/22  1321   CKTOTAL 105 128 116  --    TROPONINI 0.44* 0.51* 0.42* 0.42*       Urinalysis:      Lab Results   Component Value Date/Time    NITRU Negative 11/03/2022 07:00 AM    WBCUA 21-50 11/03/2022 07:00 AM    BACTERIA 3+ 11/03/2022 07:00 AM    RBCUA  11/03/2022 07:00 AM    BLOODU LARGE 11/03/2022 07:00 AM    SPECGRAV 1.010 11/03/2022 07:00 AM    GLUCOSEU Negative 11/03/2022 07:00 AM       Radiology:  XR CHEST PORTABLE   Final Result   Bibasilar opacities, correlating with bronchial wall thickening and   atelectasis. Bronchial wall thickening is nonspecific for acute or chronic   bronchitis or reactive airway disease. There is no significant change since   yesterday. FL MODIFIED BARIUM SWALLOW W VIDEO    (Results Pending)           Assessment/Plan:    Active Hospital Problems    Diagnosis     NSTEMI (non-ST elevated myocardial infarction) ([facility] [I21.4]      Priority: Medium    Cardiomyopathy ([facility] [I42.9]      Priority: Medium    Lactic acidosis [E87.20]      Priority: Medium    Demand ischemia ([facility] [I24.8]      Priority: Medium    Acute metabolic encephalopathy [W22.92]      Priority: Medium    Urinary tract infection with hematuria [N39.0, R31.9]      Priority: Medium    Septic shock ([facility] [A41.9, R65.21]      Priority: Medium    Dementia ([facility] [F03.90]     CAD (coronary artery disease) [I25.10]     HTN (hypertension) [I10]     HLD (hyperlipidemia) [E78.5]     Hypokalemia [E87.6]      Septic shock : Hemodynamically he seems to be stabilizing and is no longer requiring any pressor support. Continue with IVF and  and abx. 2.  bacteremia -blood cultures growing Proteus. This is susceptible to  meropenem. Continue antibiotics, continue supportive care, and will follow      Acute metabolic encephalopathy in setting of dementia - due to severe sepsis. He seems to be close to his baseline status. Continue supportive care and follow    NSTEMI vs demand ischemia -he denies any chest pain. Has no other significant symptoms at this time. He was on  IV heparin which has been now discontinued. May be more than demand ischemia with hx of CAD - ECHO pending - cardiology following.   Continue medical therapy. Acute kidney injury -renal functions are improved today. Continue IV fluids, follow urine output avoid nephrotoxic agents and will follow closely. - pt likely candidate for PICC line once blood cx clear - not a good HD candidate       DVT Prophylaxis: Lovenox  Diet: ADULT DIET;  Dysphagia - Minced and Moist; Mildly Thick (Nectar)  Code Status: Full Code  PT/OT Eval Status: Not ordered yet    Dispo -2-3 days    Appropriate for A1 Discharge Unit: Renu Starr MD

## 2022-11-05 NOTE — PROGRESS NOTES
[facility]      Admit Date:  11/3/2022    Subjective:  Mr. Nirmala Herrmann is seen for abnormal EKG and elevated troponins  Interval history  No angina   he wants to get out of bed  He is disoriented   History of Present Illness: seen by my associate Dr Jackie Burden 11.3.22 as following history  Tere Gonzalez is a 82 y.o. male with a history of dementia, CAD status post remote MI and PCI, essential hypertension, hyperlipidemia, COPD, and GERD who was initially taken to [facility] ED from his nursing home for further evaluation of nausea/vomiting and hematuria. The patient has underlying dementia with possible superimposed metabolic encephalopathy and is a very limited historian. On arrival to [facility], he was found to be febrile, tachycardic, and hypotensive. UA was concerning for infection and he was started on broad-spectrum IV antibiotics. Initial WBC count was 2.1 and then 21.1 on repeat. CT chest abdomen pelvis with IV contrast was obtained and showed increasing peribronchial thickening and interstitial patient is in the lungs suggestive of chronic bronchiolitis versus other viral infection versus possible pulmonary edema. The prostate appeared enlarged and there is bladder mucosal thickening possibly representing underlying cystitis. EKG showed sinus tachycardia with left axis deviation, RBBB,  and left anterior fascicular block. Troponin T trended 0.15-->0.59-->0.53 and he was started on an IV heparin infusion. Creatinine was elevated at 1.4 on initial check and 1.5 on repeat. He has now been transferred to [facility] ICU for further management of sepsis and his NSTEMI. He is currently on levophed at 10 mcg/kg/min. He denies any chest pain, but does note some occasional shortness of breath.   ROS:  12 point ROS negative in all areas as listed below except as in [place]  Constitutional, EENT, pulmonary, GI, , Musculoskeletal, skin, neurological, hematological, endocrine, Psychiatric    Past Medical History:   Diagnosis Date    Atherosclerotic heart disease     CAD (coronary artery disease)     Cancer (HCC)     skin    COPD (chronic obstructive pulmonary disease) (HCC)     Dementia (HCC)     Depression     Duodenal ulcer without hemorrhage or perforation     Falls frequently     GERD without esophagitis     Hernia     Hyperlipidemia     Hypertension     Hypokalemia     Lack of coordination     falls    Melanoma ([facility] [address]     MI (myocardial infarction) ([address]     Muscle weakness (generalized)     Protein calorie malnutrition ([address]     Reflux     Sepsis ([address]     Spinal stenosis     UTI (urinary tract infection)      Past Surgical History:   Procedure Laterality Date    CHOLECYSTECTOMY      COLONOSCOPY  8/10/12    CORONARY ANGIOPLASTY WITH STENT PLACEMENT      PROSTATE BIOPSY      SKIN BIOPSY      UPPER GASTROINTESTINAL ENDOSCOPY  5/14/12    UPPER GASTROINTESTINAL ENDOSCOPY N/A 6/11/2019    EGD BIOPSY performed by Katelynn Correa DO at [address] [place] [address] N/A 8/8/2019    EGD performed by Belén Sky MD at [facility]       Objective:   BP (!) 105/52   Pulse 61   Temp 99 °F (37.2 °C) (Bladder)   Resp 18   SpO2 96%     Intake/Output Summary (Last 24 hours) at 11/5/2022 1316  Last data filed at 11/5/2022 0800  Gross per 24 hour   Intake 202.03 ml   Output 1375 ml   Net -1172.97 ml       TELEMETRY: Sinus     Physical Exam:  General: No Respiratory distress, appears well developed and well nourished. Eyes:  Sclera nonicteric  Nose/Sinuses:  negative findings: nose shows no deformity, asymmetry, or inflammation, nasal mucosa normal, septum midline with no perforation or bleeding  Back:  no pain to palpation  Joint:  no active joint inflammation  Musculoskeletal:  negative  Skin:  Warm and dry  Neck:  Negative for JVD and Carotid Bruits.    Chest:  Clear to auscultation, respiration easy  Cardiovascular:  RRR, S1S2 normal, no murmur, no rub or thrill. Abdomen:  Soft normal liver and spleen  Extremities:   No edema, clubbing, cyanosis,  Pulses: Femoral and pedal pulses are normal.  Neuro:  confused but follows commands    Medications:    meropenem  1,000 mg IntraVENous Q8H    insulin lispro  0-4 Units SubCUTAneous TID WC    insulin lispro  0-4 Units SubCUTAneous Nightly    pantoprazole  40 mg Oral QAM AC    sertraline  100 mg Oral Daily    sodium chloride flush  5-40 mL IntraVENous 2 times per day    lactobacillus  1 capsule Oral BID WC    mupirocin   Nasal BID    [Held by provider] pregabalin  50 mg Oral BID    aspirin  81 mg Oral Daily      dextrose      sodium chloride      heparin (PORCINE) Infusion 1,720 Units/hr (11/05/22 0405)   Lab Data:  CBC:   Recent Labs     11/03/22  1258 11/04/22  0243 11/05/22  0403   WBC 21.1* 23.2* 13.8*   HGB 11.5* 11.4* 11.0*   HCT 35.7* 35.5* 33.7*   MCV 84.3 83.9 83.3    118* 91*     BMP:   Recent Labs     11/03/22  1258 11/04/22  0243 11/05/22  0403    137 138   K 3.3* 3.6 3.6    107 108   CO2 20* 19* 23   BUN 26* 24* 18   CREATININE 1.5* 1.5* 1.1     LIVER PROFILE:   Recent Labs     11/03/22  0506   AST 18   ALT 8*   LIPASE 10.0*   BILITOT 0.8   ALKPHOS 123     PT/INR:   Recent Labs     11/03/22  1258 11/04/22  0243   PROTIME 17.1* 18.2*   INR 1.40* 1.51*     APTT:   Recent Labs     11/03/22  1258 11/04/22  0243   APTT 37.8* 96.7*     BNP:  No results for input(s): BNP in the last 72 hours. IMAGING:   Normal sinus rhythmLow voltage QRSST & T wave abnormality, consider anterolateral ischemiaconsider anterior wall injury please correlate clinically. Prolonged QTAbnormal ECGWhen compared with ECG of 03-NOV-2022 05:07,Vent.  rate has decreased BY  59 BPMRight bundle branch block is no longer PresentConfirmed by Ezequiel Roberts MD, [address] ([id number]) on 11/5/2022 6:47:23 AM   Assessment:  NSTEMI elevated troponin and EKG criteria of ischemia  CAD with no angina  Dementia  Cardiomyopathy echo pending  Hypertension   Hyperlipidemia    Recommend  Asa  Statin  Low dose beta blockers  Echo doppler for EF  Stop heparin   May get up with help in chair if felt safe  I spoke to his nurse. He is not a candidate for ischemia eval due to his dementia and no symptoms.         Patient Active Problem List    Diagnosis Date Noted    NSTEMI (non-ST elevated myocardial infarction) ([facility] [address] 11/04/2022    Cardiomyopathy ([address] 11/04/2022    Lactic acidosis 11/04/2022    Demand ischemia ([address] 11/03/2022    Hypoxia 07/25/2022    Thrombocytopenia ([address] 07/24/2022    Acute hypoxemic respiratory failure ([address] 67/40/6533    Acute metabolic encephalopathy 45/40/3031    Urinary tract infection with hematuria 07/23/2022    Septic shock ([address] 07/22/2022    Acute cystitis with hematuria 07/22/2022    Mass of pancreas 07/22/2022    Aspiration pneumonia (HCC)     Moderate protein-calorie malnutrition ([address] 09/18/2020    Septicemia ([address] 09/17/2020    Pneumonia of right lower lobe due to infectious organism     PUD (peptic ulcer disease)     Mediastinal lymphadenopathy     Urinary tract infection without hematuria     Lactic acid acidosis     Abdominal pain, epigastric     Anemia     Abdominal pain 06/09/2019    Dementia ([address] 03/03/2018    Moderate malnutrition ([address] 03/03/2018    Falls frequently 03/02/2018    Hypokalemia 03/02/2018    CAD (coronary artery disease) 03/02/2018    HTN (hypertension) 03/02/2018    HLD (hyperlipidemia) 03/02/2018       Diaz Valdivia MD, MD 11/5/2022 1:16 PM

## 2022-11-05 NOTE — PLAN OF CARE
Patient educated on role of ST. Please see ST evaluation and treatment notes for specific information re: plan of care, interventions provided and progress toward goals.

## 2022-11-06 LAB
ANION GAP SERPL CALCULATED.3IONS-SCNC: 9 MMOL/L (ref 3–16)
BUN BLDV-MCNC: 13 MG/DL (ref 7–20)
CALCIUM SERPL-MCNC: 8.1 MG/DL (ref 8.3–10.6)
CHLORIDE BLD-SCNC: 106 MMOL/L (ref 99–110)
CO2: 23 MMOL/L (ref 21–32)
CREAT SERPL-MCNC: 1.1 MG/DL (ref 0.8–1.3)
CULTURE, BLOOD 2: ABNORMAL
CULTURE, BLOOD 2: ABNORMAL
GFR SERPL CREATININE-BSD FRML MDRD: >60 ML/MIN/{1.73_M2}
GLUCOSE BLD-MCNC: 70 MG/DL (ref 70–99)
GLUCOSE BLD-MCNC: 78 MG/DL (ref 70–99)
GLUCOSE BLD-MCNC: 80 MG/DL (ref 70–99)
GLUCOSE BLD-MCNC: 88 MG/DL (ref 70–99)
GLUCOSE BLD-MCNC: 89 MG/DL (ref 70–99)
GLUCOSE BLD-MCNC: 94 MG/DL (ref 70–99)
GLUCOSE BLD-MCNC: 96 MG/DL (ref 70–99)
HCT VFR BLD CALC: 35.5 % (ref 40.5–52.5)
HEMOGLOBIN: 11.8 G/DL (ref 13.5–17.5)
MCH RBC QN AUTO: 27.3 PG (ref 26–34)
MCHC RBC AUTO-ENTMCNC: 33.3 G/DL (ref 31–36)
MCV RBC AUTO: 81.9 FL (ref 80–100)
ORGANISM: ABNORMAL
ORGANISM: ABNORMAL
PDW BLD-RTO: 16 % (ref 12.4–15.4)
PERFORMED ON: NORMAL
PLATELET # BLD: 99 K/UL (ref 135–450)
PMV BLD AUTO: 8.6 FL (ref 5–10.5)
POTASSIUM SERPL-SCNC: 3.5 MMOL/L (ref 3.5–5.1)
PROCALCITONIN: 17.88 NG/ML (ref 0–0.15)
RBC # BLD: 4.33 M/UL (ref 4.2–5.9)
SODIUM BLD-SCNC: 138 MMOL/L (ref 136–145)
WBC # BLD: 12.1 K/UL (ref 4–11)

## 2022-11-06 PROCEDURE — 36415 COLL VENOUS BLD VENIPUNCTURE: CPT

## 2022-11-06 PROCEDURE — 85027 COMPLETE CBC AUTOMATED: CPT

## 2022-11-06 PROCEDURE — 2580000003 HC RX 258: Performed by: STUDENT IN AN ORGANIZED HEALTH CARE EDUCATION/TRAINING PROGRAM

## 2022-11-06 PROCEDURE — 2060000000 HC ICU INTERMEDIATE R&B

## 2022-11-06 PROCEDURE — 6360000002 HC RX W HCPCS: Performed by: STUDENT IN AN ORGANIZED HEALTH CARE EDUCATION/TRAINING PROGRAM

## 2022-11-06 PROCEDURE — 2580000003 HC RX 258: Performed by: INTERNAL MEDICINE

## 2022-11-06 PROCEDURE — 6370000000 HC RX 637 (ALT 250 FOR IP): Performed by: INTERNAL MEDICINE

## 2022-11-06 PROCEDURE — 84145 PROCALCITONIN (PCT): CPT

## 2022-11-06 PROCEDURE — 6360000002 HC RX W HCPCS: Performed by: INTERNAL MEDICINE

## 2022-11-06 PROCEDURE — 80048 BASIC METABOLIC PNL TOTAL CA: CPT

## 2022-11-06 RX ORDER — ENOXAPARIN SODIUM 100 MG/ML
40 INJECTION SUBCUTANEOUS DAILY
Status: DISCONTINUED | OUTPATIENT
Start: 2022-11-07 | End: 2022-11-09 | Stop reason: HOSPADM

## 2022-11-06 RX ADMIN — MEROPENEM 1000 MG: 1 INJECTION, POWDER, FOR SOLUTION INTRAVENOUS at 05:40

## 2022-11-06 RX ADMIN — METOPROLOL TARTRATE 25 MG: 25 TABLET, FILM COATED ORAL at 11:28

## 2022-11-06 RX ADMIN — SODIUM CHLORIDE, PRESERVATIVE FREE 10 ML: 5 INJECTION INTRAVENOUS at 07:44

## 2022-11-06 RX ADMIN — Medication 1 CAPSULE: at 07:38

## 2022-11-06 RX ADMIN — Medication 1 CAPSULE: at 17:19

## 2022-11-06 RX ADMIN — SODIUM CHLORIDE: 9 INJECTION, SOLUTION INTRAVENOUS at 21:52

## 2022-11-06 RX ADMIN — SODIUM CHLORIDE: 9 INJECTION, SOLUTION INTRAVENOUS at 05:39

## 2022-11-06 RX ADMIN — MEROPENEM 1000 MG: 1 INJECTION, POWDER, FOR SOLUTION INTRAVENOUS at 21:53

## 2022-11-06 RX ADMIN — SERTRALINE HYDROCHLORIDE 100 MG: 50 TABLET ORAL at 07:38

## 2022-11-06 RX ADMIN — MEROPENEM 1000 MG: 1 INJECTION, POWDER, FOR SOLUTION INTRAVENOUS at 14:08

## 2022-11-06 RX ADMIN — SODIUM CHLORIDE, PRESERVATIVE FREE 10 ML: 5 INJECTION INTRAVENOUS at 21:49

## 2022-11-06 RX ADMIN — ASPIRIN 81 MG 81 MG: 81 TABLET ORAL at 07:38

## 2022-11-06 RX ADMIN — ENOXAPARIN SODIUM 30 MG: 100 INJECTION SUBCUTANEOUS at 07:43

## 2022-11-06 ASSESSMENT — PAIN DESCRIPTION - DESCRIPTORS: DESCRIPTORS: DISCOMFORT

## 2022-11-06 ASSESSMENT — PAIN DESCRIPTION - LOCATION: LOCATION: BACK

## 2022-11-06 ASSESSMENT — PAIN DESCRIPTION - PAIN TYPE: TYPE: ACUTE PAIN

## 2022-11-06 ASSESSMENT — PAIN DESCRIPTION - ORIENTATION: ORIENTATION: OTHER (COMMENT)

## 2022-11-06 ASSESSMENT — PAIN SCALES - GENERAL: PAINLEVEL_OUTOF10: 7

## 2022-11-06 ASSESSMENT — PAIN - FUNCTIONAL ASSESSMENT: PAIN_FUNCTIONAL_ASSESSMENT: ACTIVITIES ARE NOT PREVENTED

## 2022-11-06 NOTE — PROGRESS NOTES
Hospitalist Progress Note      PCP: Ramón Darling MD    Date of Admission: 11/3/2022    Chief Complaint:   septic shock    Hospital Course:      82 y.o. male with pmh of dementia, CAD, COPD, hx of duodenal ulcer, GERD, HLD, HTN hx of MI and recent septic shock admission for UTI. He presented with nausea, vomiting and fever. Pt was also tachycardic. He has had an abnormal EKG and elevated troponin as well. He was admitted to the ICU. He is being followed by cardiology and critical care in consultation. His condition seems to be stabilizing he was requiring pressors to maintain blood pressure but now these been discontinued and vital signs are remaining pretty stable. He was transferred out of the ICU and is currently in a telemetry bed. ECHO ordered and he did receive 48 hours total of heparin (stopped on 11/5/22). Pt has no c/o but is poor historian. Blood cx growing Proteus Mirabeilis. He is currently on Merrem which Proteus sensitive to. Subjective:   He is lying in bed in no acute distress.   He is pleasantly confused and has no complaints for me at this time      Medications:  Reviewed    Infusion Medications    dextrose      sodium chloride 10 mL/hr at 11/06/22 0539     Scheduled Medications    meropenem  1,000 mg IntraVENous Q8H    metoprolol succinate  25 mg Oral Daily    enoxaparin  30 mg SubCUTAneous BID    insulin lispro  0-4 Units SubCUTAneous TID WC    insulin lispro  0-4 Units SubCUTAneous Nightly    pantoprazole  40 mg Oral QAM AC    sertraline  100 mg Oral Daily    sodium chloride flush  5-40 mL IntraVENous 2 times per day    lactobacillus  1 capsule Oral BID WC    [Held by provider] pregabalin  50 mg Oral BID    aspirin  81 mg Oral Daily     PRN Meds: glucose, dextrose bolus **OR** dextrose bolus, glucagon (rDNA), dextrose, bisacodyl, sodium chloride flush, sodium chloride, acetaminophen **OR** acetaminophen, perflutren lipid microspheres, magnesium sulfate, ondansetron      Intake/Output Summary (Last 24 hours) at 11/6/2022 1044  Last data filed at 11/6/2022 1005  Gross per 24 hour   Intake 80 ml   Output 1250 ml   Net -1170 ml       Physical Exam Performed:    BP (!) 111/52   Pulse 58   Temp 97.8 °F (36.6 °C) (Oral)   Resp 20   Wt 167 lb 8.8 oz (76 kg)   SpO2 95%   BMI 23.37 kg/m²     General appearance: Elderly male lying in bed, looks weak and tired. He is pleasantly confused and cooperative to the best of his ability  HEENT: Pupils equal, round, and reactive to light. Conjunctivae/corneas clear. Neck: Supple, with full range of motion. No jugular venous distention. Trachea midline. Respiratory:  Normal respiratory effort. Clear to auscultation, bilaterally without Rales/Wheezes/Rhonchi. Cardiovascular: Regular rate and rhythm with normal S1/S2 without  Abdomen: Soft, non-tender, non-distended with normal bowel sounds. Musculoskeletal: No clubbing, cyanosis or edema bilaterally. Full range of motion without deformity. .  Neurologic:  Neurovascularly intact without any focal sensory/motor deficits. Cranial nerves: II-XII intact, grossly non-focal.  Psychiatric: He is confused, he is unable to tell me the month, the year or where he is. Short-term recall is 0/3 objects after 1 minute. Insight is poor  Peripheral Pulses: +2 palpable, equal bilaterally        Labs:   Recent Labs     11/04/22  0243 11/05/22  0403 11/06/22  0453   WBC 23.2* 13.8* 12.1*   HGB 11.4* 11.0* 11.8*   HCT 35.5* 33.7* 35.5*   * 91* 99*     Recent Labs     11/04/22  0243 11/05/22  0403 11/06/22  0453    138 138   K 3.6 3.6 3.5    108 106   CO2 19* 23 23   BUN 24* 18 13   CREATININE 1.5* 1.1 1.1   CALCIUM 7.6* 7.6* 8.1*     No results for input(s): AST, ALT, BILIDIR, BILITOT, ALKPHOS in the last 72 hours.   Recent Labs     11/03/22  1258 11/04/22  0243   INR 1.40* 1.51*     Recent Labs     11/03/22  1923 11/04/22  0044 11/04/22  0649 11/04/22  1321   CKTOTAL 105 128 116  --    TROPONINI 0.44* 0.51* 0.42* 0.42*       Urinalysis:      Lab Results   Component Value Date/Time    NITRU Negative 11/03/2022 07:00 AM    WBCUA 21-50 11/03/2022 07:00 AM    BACTERIA 3+ 11/03/2022 07:00 AM    RBCUA  11/03/2022 07:00 AM    BLOODU LARGE 11/03/2022 07:00 AM    SPECGRAV 1.010 11/03/2022 07:00 AM    GLUCOSEU Negative 11/03/2022 07:00 AM       Radiology:  XR CHEST PORTABLE   Final Result   Bibasilar opacities, correlating with bronchial wall thickening and   atelectasis. Bronchial wall thickening is nonspecific for acute or chronic   bronchitis or reactive airway disease. There is no significant change since   yesterday. FL MODIFIED BARIUM SWALLOW W VIDEO    (Results Pending)           Assessment/Plan:    Active Hospital Problems    Diagnosis     NSTEMI (non-ST elevated myocardial infarction) ([facility] [address] [I21.4]      Priority: Medium    Cardiomyopathy ([facility] [address] [I42.9]      Priority: Medium    Lactic acidosis [E87.20]      Priority: Medium    Demand ischemia ([address] [I24.8]      Priority: Medium    Acute metabolic encephalopathy [K36.84]      Priority: Medium    Urinary tract infection with hematuria [N39.0, R31.9]      Priority: Medium    Septic shock ([facility] [address] [A41.9, R65.21]      Priority: Medium    D[facility] [F03.90]     CAD (coronary artery disease) [I25.10]     HTN (hypertension) [I10]     HLD (hyperlipidemia) [E78.5]     Hypokalemia [E87.6]      Septic shock : Hemodynamically he seems to be stabilizing and is no longer requiring any pressor support. Continue with IVF and  and abx. 2.  bacteremia -blood cultures growing Proteus. This is susceptible to  meropenem. Continue antibiotics, continue supportive care, and will follow. Acute metabolic encephalopathy in setting of dementia - due to severe sepsis. He seems to be close to his baseline status. Continue supportive care and follow     NSTEMI vs demand ischemia -he denies any chest pain.   Has no other significant symptoms at this time. He was on  IV heparin which has been now discontinued. May be more than demand ischemia with hx of CAD - ECHO pending - cardiology following. Continue medical therapy. Acute kidney injury -renal functions are improved today. Continue IV fluids, follow urine output avoid nephrotoxic agents and will follow closely. - pt likely candidate for PICC line once blood cx clear - not a good HD candidate       DVT Prophylaxis: Lovenox  Diet: ADULT DIET;  Dysphagia - Minced and Moist; Mildly Thick (Nectar)  Code Status: Full Code  PT/OT Eval Status: not Ordered yet    Dispo -1 to 2 days    Appropriate for A1 Discharge Unit: No      Adarsh Ramirez MD

## 2022-11-06 NOTE — PROGRESS NOTES
Spoke with pts legal guardian, Irene Chill. Got admission done, gave an update, and updated pt contacts.  Pts sister was taken off the contacts list due to guardians request.

## 2022-11-06 NOTE — PROGRESS NOTES
Pt stated he had chest pain, but is very confused and when asked to rate the chest pain/when it started he states \"I don't have pain\". Perfect serve sent to [facility], awaiting response for any recommendations/orders. MD coming to see pt.

## 2022-11-07 ENCOUNTER — APPOINTMENT (OUTPATIENT)
Dept: GENERAL RADIOLOGY | Age: 87
DRG: 871 | End: 2022-11-07
Attending: INTERNAL MEDICINE
Payer: MEDICARE

## 2022-11-07 LAB
ANION GAP SERPL CALCULATED.3IONS-SCNC: 10 MMOL/L (ref 3–16)
BUN BLDV-MCNC: 12 MG/DL (ref 7–20)
CALCIUM SERPL-MCNC: 8.3 MG/DL (ref 8.3–10.6)
CHLORIDE BLD-SCNC: 105 MMOL/L (ref 99–110)
CO2: 24 MMOL/L (ref 21–32)
CREAT SERPL-MCNC: 0.9 MG/DL (ref 0.8–1.3)
GFR SERPL CREATININE-BSD FRML MDRD: >60 ML/MIN/{1.73_M2}
GLUCOSE BLD-MCNC: 109 MG/DL (ref 70–99)
GLUCOSE BLD-MCNC: 112 MG/DL (ref 70–99)
GLUCOSE BLD-MCNC: 87 MG/DL (ref 70–99)
GLUCOSE BLD-MCNC: 97 MG/DL (ref 70–99)
GLUCOSE BLD-MCNC: 99 MG/DL (ref 70–99)
HCT VFR BLD CALC: 36.4 % (ref 40.5–52.5)
HEMOGLOBIN: 11.5 G/DL (ref 13.5–17.5)
MAGNESIUM: 1.7 MG/DL (ref 1.8–2.4)
MCH RBC QN AUTO: 26.4 PG (ref 26–34)
MCHC RBC AUTO-ENTMCNC: 31.6 G/DL (ref 31–36)
MCV RBC AUTO: 83.3 FL (ref 80–100)
PDW BLD-RTO: 16 % (ref 12.4–15.4)
PERFORMED ON: ABNORMAL
PERFORMED ON: ABNORMAL
PERFORMED ON: NORMAL
PERFORMED ON: NORMAL
PLATELET # BLD: 109 K/UL (ref 135–450)
PMV BLD AUTO: 8.9 FL (ref 5–10.5)
POTASSIUM SERPL-SCNC: 3.3 MMOL/L (ref 3.5–5.1)
RBC # BLD: 4.37 M/UL (ref 4.2–5.9)
SODIUM BLD-SCNC: 139 MMOL/L (ref 136–145)
WBC # BLD: 8.3 K/UL (ref 4–11)

## 2022-11-07 PROCEDURE — 2060000000 HC ICU INTERMEDIATE R&B

## 2022-11-07 PROCEDURE — 2580000003 HC RX 258: Performed by: INTERNAL MEDICINE

## 2022-11-07 PROCEDURE — 6370000000 HC RX 637 (ALT 250 FOR IP): Performed by: NURSE PRACTITIONER

## 2022-11-07 PROCEDURE — 6360000002 HC RX W HCPCS: Performed by: STUDENT IN AN ORGANIZED HEALTH CARE EDUCATION/TRAINING PROGRAM

## 2022-11-07 PROCEDURE — 2580000003 HC RX 258: Performed by: STUDENT IN AN ORGANIZED HEALTH CARE EDUCATION/TRAINING PROGRAM

## 2022-11-07 PROCEDURE — 74230 X-RAY XM SWLNG FUNCJ C+: CPT

## 2022-11-07 PROCEDURE — 6370000000 HC RX 637 (ALT 250 FOR IP): Performed by: INTERNAL MEDICINE

## 2022-11-07 PROCEDURE — 6360000002 HC RX W HCPCS: Performed by: INTERNAL MEDICINE

## 2022-11-07 PROCEDURE — 36415 COLL VENOUS BLD VENIPUNCTURE: CPT

## 2022-11-07 PROCEDURE — 85027 COMPLETE CBC AUTOMATED: CPT

## 2022-11-07 PROCEDURE — 92611 MOTION FLUOROSCOPY/SWALLOW: CPT

## 2022-11-07 PROCEDURE — 99233 SBSQ HOSP IP/OBS HIGH 50: CPT | Performed by: NURSE PRACTITIONER

## 2022-11-07 PROCEDURE — 80048 BASIC METABOLIC PNL TOTAL CA: CPT

## 2022-11-07 PROCEDURE — 92526 ORAL FUNCTION THERAPY: CPT

## 2022-11-07 PROCEDURE — 83735 ASSAY OF MAGNESIUM: CPT

## 2022-11-07 RX ORDER — POTASSIUM CHLORIDE 750 MG/1
10 TABLET, EXTENDED RELEASE ORAL 2 TIMES DAILY
Status: DISCONTINUED | OUTPATIENT
Start: 2022-11-07 | End: 2022-11-07

## 2022-11-07 RX ORDER — CARVEDILOL 6.25 MG/1
6.25 TABLET ORAL 2 TIMES DAILY WITH MEALS
Status: DISCONTINUED | OUTPATIENT
Start: 2022-11-07 | End: 2022-11-09 | Stop reason: HOSPADM

## 2022-11-07 RX ORDER — LANSOPRAZOLE
30 KIT DAILY
Status: DISCONTINUED | OUTPATIENT
Start: 2022-11-08 | End: 2022-11-09 | Stop reason: HOSPADM

## 2022-11-07 RX ORDER — MAGNESIUM SULFATE IN WATER 40 MG/ML
2000 INJECTION, SOLUTION INTRAVENOUS ONCE
Status: COMPLETED | OUTPATIENT
Start: 2022-11-07 | End: 2022-11-07

## 2022-11-07 RX ADMIN — Medication 1 CAPSULE: at 17:28

## 2022-11-07 RX ADMIN — ASPIRIN 81 MG 81 MG: 81 TABLET ORAL at 08:17

## 2022-11-07 RX ADMIN — MEROPENEM 1000 MG: 1 INJECTION, POWDER, FOR SOLUTION INTRAVENOUS at 21:02

## 2022-11-07 RX ADMIN — CARVEDILOL 6.25 MG: 6.25 TABLET, FILM COATED ORAL at 17:28

## 2022-11-07 RX ADMIN — SODIUM CHLORIDE, PRESERVATIVE FREE 10 ML: 5 INJECTION INTRAVENOUS at 08:45

## 2022-11-07 RX ADMIN — Medication 1 CAPSULE: at 08:17

## 2022-11-07 RX ADMIN — ENOXAPARIN SODIUM 40 MG: 100 INJECTION SUBCUTANEOUS at 08:17

## 2022-11-07 RX ADMIN — MEROPENEM 1000 MG: 1 INJECTION, POWDER, FOR SOLUTION INTRAVENOUS at 13:45

## 2022-11-07 RX ADMIN — POTASSIUM BICARBONATE 20 MEQ: 782 TABLET, EFFERVESCENT ORAL at 10:26

## 2022-11-07 RX ADMIN — MEROPENEM 1000 MG: 1 INJECTION, POWDER, FOR SOLUTION INTRAVENOUS at 05:55

## 2022-11-07 RX ADMIN — PANTOPRAZOLE SODIUM 40 MG: 40 TABLET, DELAYED RELEASE ORAL at 05:56

## 2022-11-07 RX ADMIN — SODIUM CHLORIDE, PRESERVATIVE FREE 10 ML: 5 INJECTION INTRAVENOUS at 21:04

## 2022-11-07 RX ADMIN — SERTRALINE HYDROCHLORIDE 100 MG: 50 TABLET ORAL at 08:17

## 2022-11-07 RX ADMIN — METOPROLOL TARTRATE 25 MG: 25 TABLET, FILM COATED ORAL at 08:17

## 2022-11-07 RX ADMIN — MAGNESIUM SULFATE HEPTAHYDRATE 2000 MG: 40 INJECTION, SOLUTION INTRAVENOUS at 15:18

## 2022-11-07 NOTE — PROGRESS NOTES
Speech Language Pathology    SPEECH/LANGUAGE PATHOLOGY  VIDEOFLUOROSCOPIC STUDY OF SWALLOWING (MBS)    Diet Recommendation: IDDSI 5 Minced and moist Solids ; IDDSI 0 Thin Liquids - NO straws ; Meds crushed in puree as able  Risk Management: upright for all intake, stay upright for at least 30 mins after intake, small bites/sips, downgrade to mildly thick if s/s of aspiration develop, distant supervision with intake, oral care 2-3x/day to reduce adverse affects in the event of aspiration, increase physical mobility as able, slow rate of intake, general aspiration precautions, and hold PO and contact SLP if s/s of aspiration or worsening respiratory status develop. PATIENT NAME:  Satnam Mcnamara      :  1935    Room: 3039/0381-02   TODAY'S DATE:  2022    [x]The admitting diagnosis and active problem list, as listed below have been reviewed prior to initiation of this evaluation.      ADMITTING DIAGNOSIS: Septic shock ([address] [A41.9, R65.21]     ACTIVE PROBLEM LIST:   Patient Active Problem List   Diagnosis    Falls frequently    Hypokalemia    CAD (coronary artery disease)    HTN (hypertension)    HLD (hyperlipidemia)    Dementia (HCC)    Moderate malnutrition (HCC)    Abdominal pain    Abdominal pain, epigastric    Anemia    Urinary tract infection without hematuria    Lactic acid acidosis    Mediastinal lymphadenopathy    Septicemia (HCC)    Pneumonia of right lower lobe due to infectious organism    PUD (peptic ulcer disease)    Moderate protein-calorie malnutrition ([address]    Aspiration pneumonia ([address]    Septic shock ([address]    Acute cystitis with hematuria    Mass of pancreas    Acute hypoxemic respiratory failure (HCC)    Acute metabolic encephalopathy    Urinary tract infection with hematuria    Thrombocytopenia (HCC)    Hypoxia    Demand ischemia (HCC)    NSTEMI (non-ST elevated myocardial infarction) ([address]    Cardiomyopathy ([address]    Lactic acidosis           PAST MEDICAL HISTORY Diagnosis Date    Atherosclerotic heart disease     CAD (coronary artery disease)     Cancer (HCC)     skin    COPD (chronic obstructive pulmonary disease) (HCC)     Dementia (HCC)     Depression     Duodenal ulcer without hemorrhage or perforation     Falls frequently     GERD without esophagitis     Hernia     Hyperlipidemia     Hypertension     Hypokalemia     Lack of coordination     falls    Melanoma ([facility]     MI (myocardial infarction) ([address]     Muscle weakness (generalized)     Protein calorie malnutrition ([address]     Reflux     Sepsis ([address]     Spinal stenosis     UTI (urinary tract infection)        PAST SURGICAL HISTORY    Past Surgical History:   Procedure Laterality Date    CHOLECYSTECTOMY      COLONOSCOPY  8/10/12    CORONARY ANGIOPLASTY WITH STENT PLACEMENT      PROSTATE BIOPSY      SKIN BIOPSY      UPPER GASTROINTESTINAL ENDOSCOPY  5/14/12    UPPER GASTROINTESTINAL ENDOSCOPY N/A 6/11/2019    EGD BIOPSY performed by Jacqui Perdomo DO at [address] [place] [address] ENDOSCOPY N/A 8/8/2019    EGD performed by Judie Gonsales MD at [place] [address]    Allergies   Allergen Reactions    Fluoxetine     Benazepril      Swollen tongue    Hydrocodone Other (See Comments)    Morphine     Penicillins Other (See Comments)    Simvastatin Other (See Comments)    Morphine And Related Nausea And Vomiting    Sucralfate Rash       Referring Provider: Dr. Rosa Ramirez  Radiologist: Dr. Nupur Kilgore      Reason For Assessment: to assess oropharyngeal swallow function, identify signs and symptoms of aspiration and make recommendations regarding safe dietary consistencies, effective compensatory strategies, and safe eating environment. Recent Chest Radiography: [x] Chest XR   [] CT of Chest  [] No recent chest  radiography on file  Date:10/04/2022  Impressions  Bibasilar opacities, correlating with bronchial wall thickening and   atelectasis.   Bronchial wall thickening is consistency. Administered via tsp (5 ml boluses), by cup or straw in single and sequentially swallowed boluses as tolerated. Solid evaluated with 1/2 diomedes cracker/3 ml barium pudding coated as tolerated. Consistencies Administered During the Evaluation   Liquids: [x] Thin (IDDSI 0)        [x] Mildly Thick (Nectar-IDDSI 2)         [x] Moderately Thick (Honey- IDDSI 3)   Solids:  [x] Pureed/Pudding (IDDSI 4)    [x] Soft Solid      [] Regular Solid  [] Other:     Method of Intake:      [x]Self Fed       [x]Fed by Clinician     [x]Cup      [x]Spoon     []Straw             Oral Phase Severity: Mild-Moderate  Oral Phase Characteristics :  decreased tongue control during bolus hold resulting in escape of thin liquids, mildly thick, moderately thick, and pudding bolus to anterior floor of mouth which pt recollects prior to swallow; disorganized chewing/munching of solids; rapid oral transport of all consistencies [facility]); and small amounts of puree and solid residue in oral cavity collecting on oral structures. Pharyngeal Phase Severity: Mild  Pharyngeal Phase Characteristics: partial anterior hyoid excursion; partial laryngeal elevation, incomplete laryngeal vestibule closure, and trace residue after the swallow in valleculae with minced and moist solid, resulting in penetration of thin liquids during the swallow. Chin tuck is ineffective in eliminating penetration.  Large thin liquids bolus swallow results in trace penetration which is ejected in smaller amounts than tsp thin liquids swallows    Esophageal Phase: esophageal phase of the swallow grossly functional from lateral view of cervical esophagus to oropharynx      LARYNGEAL PENETRATION/ASPIRATION  []Laryngeal penetration was not present during this evaluation    [x]Aspiration was not present during this evaluation      Response to aspiration:  [x] N/A- no aspiration occurred  [] spontaneous cough  [] throat clearing  [] inconsistent/delayedcough   [] absent cough          Aspiration Scale  []  1 Material does not enter the airway   [x]  2 Material enters the airway, remains above the vocal folds, and is ejected from the airway   []  3 Material enters the airway, remains above the vocal folds, and is not ejected from the airway   []  4 Material enters the airway, contacts the vocal folds, an is ejected from the airway   []  5 Material enters the airway, contacts the vocal folds, and is not ejected from the airway   []  6 Material enters the airway, passes below the vocal folds and is ejected into the larynx or out of the airway   []  7 Material enters the airway, passes below the vocal folds, and is not ejected from the trachea despite effort   []  8 Material enters the airway, passes below the vocal folds, and no effort is made to eject. Assessment: mild oropharyngeal dysphagia, likely chronic related to hx of dysphagia, reduced physical mobility, impaired cognition, and hx of neurological disease. Swallow safety is preserved ; swallow efficiency is impaired Pt appears to be at moderate risk for aspiration PNA, and low risk for malnutrition/dehydration. Diet modification is warranted indicated. Swallow prognosis is fair given progressive nature of condition/disease. Patient appears to be a fair candidate for behavioral swallow rehabilitation. Diet Recommendation: IDDSI 5 Minced and moist Solids ; IDDSI 0 Thin Liquids - NO straws ; Meds crushed in puree as able  Risk Management: upright for all intake, stay upright for at least 30 mins after intake, small bites/sips, downgrade to mildly thick if s/s of aspiration develop, distant supervision with intake, oral care 2-3x/day to reduce adverse affects in the event of aspiration, increase physical mobility as able, slow rate of intake, general aspiration precautions, and hold PO and contact SLP if s/s of aspiration or worsening respiratory status develop.   Specialist Referrals:N/A  Ancillary Tests:N/A  Goals: Tolerate LRD  Follow-up exam: not recommended at this time    Diagnosis Code: R 13.12    Education  SLP educated the patient re: Role of SLP, rationale for completion of assessment, anatomical components of swallow structures as they pertain to airway protection, results of assessment, recommendations, POC, and rationale for MBS  Response to education:   [x] Verbalized Understanding  [] Demonstrated Understanding  [] No evidence of learning  [x] Ongoing education is recommended  [] RN aware of results and recommendations    Goals  Short Term Goals  Timeframe for short term goals: 5 days (11/10/2022)  Goal 1: The patient will tolerate recommended diet with no clinical s/s of aspiration 5/5  Goal 2: The patient will tolerate therapeutic diet upgrade trials with no clinical s/s of aspiration 5/5  Goal 3: The patient will tolerate instrumental assessment when able 11/07 GOAL MET. See above  Goal 4: The patient/caregiver will demonstrate understanding of compensatory swallow strategies, for improved swallow safety 11/07 Ongoing. Results reviewed with pt and mother in law. Pt and family would benefit from ongoing education and confirmation of goals of care. Long Term Goals  Timeframe for Long term goals 7 days (11/12/2022)  Goal 1: The patient will tolerate least restrictive diet with no clinical s/s of aspiration or worsening respiratory/pulmonary status 11/07 Ongoing. Not progressing. Pain: The patient does not complain of pain       Therapy Time:   Individual   Time In  1130   Time Out  1154   Minutes        Signature:    Shanda Faust. Olga Lidia Bernard M.A.  [address] Pathologist  [id number]

## 2022-11-07 NOTE — PROGRESS NOTES
Hospitalist Progress Note      PCP: Valery Holloway MD    Date of Admission: 11/3/2022    Chief Complaint:   septic shock     Hospital Course:       82 y.o. male with pmh of dementia, CAD, COPD, hx of duodenal ulcer, GERD, HLD, HTN hx of MI and recent septic shock admission for UTI. He presented with nausea, vomiting and fever. Pt was also tachycardic. He has had an abnormal EKG and elevated troponin as well. He was admitted to the ICU. He is being followed by cardiology and critical care in consultation. His condition seems to be stabilizing he was requiring pressors to maintain blood pressure but now these been discontinued and vital signs are remaining pretty stable. He was transferred out of the ICU and is currently in a telemetry bed. ECHO ordered and he did receive 48 hours total of heparin (stopped on 11/5/22). Pt has no c/o but is poor historian. Blood cx growing Proteus Mirabeilis. He is currently on Merrem which Proteus sensitive to.     Subjective: resting in bed, pleasantly confused, denies cp/sob , cardiology service at bedside also      Medications:  Reviewed    Infusion Medications    dextrose      sodium chloride 10 mL/hr at 11/06/22 2152     Scheduled Medications    metoprolol tartrate  25 mg Oral Daily    enoxaparin  40 mg SubCUTAneous Daily    meropenem  1,000 mg IntraVENous Q8H    insulin lispro  0-4 Units SubCUTAneous TID WC    insulin lispro  0-4 Units SubCUTAneous Nightly    pantoprazole  40 mg Oral QAM AC    sertraline  100 mg Oral Daily    sodium chloride flush  5-40 mL IntraVENous 2 times per day    lactobacillus  1 capsule Oral BID WC    [Held by provider] pregabalin  50 mg Oral BID    aspirin  81 mg Oral Daily     PRN Meds: glucose, dextrose bolus **OR** dextrose bolus, glucagon (rDNA), dextrose, bisacodyl, sodium chloride flush, sodium chloride, acetaminophen **OR** acetaminophen, perflutren lipid microspheres, magnesium sulfate, ondansetron      Intake/Output Summary (Last 24 hours) at 11/7/2022 0909  Last data filed at 11/7/2022 0405  Gross per 24 hour   Intake 75 ml   Output 1550 ml   Net -1475 ml       Physical Exam Performed:    BP (!) 151/70   Pulse 69   Temp 97.7 °F (36.5 °C) (Oral)   Resp 20   Wt 167 lb 7 oz (75.9 kg)   SpO2 95%   BMI 23.35 kg/m²     General appearance: Elderly male lying in bed, looks weak and tired. He is pleasantly confused and cooperative to the best of his ability  HEENT: Pupils equal, round, and reactive to light. Conjunctivae/corneas clear. Neck: Supple, with full range of motion. No jugular venous distention. Trachea midline. Respiratory:  Normal respiratory effort. Clear to auscultation, bilaterally without Wheezes/Rhonchi. Mild rales at  bases  Cardiovascular: Regular rate and rhythm with normal S1/S2 without  Abdomen: Soft, non-tender, non-distended with normal bowel sounds. Musculoskeletal: No clubbing, cyanosis or edema bilaterally. Full range of motion without deformity. .  Neurologic:  Neurovascularly intact without any focal sensory/motor deficits. Cranial nerves: II-XII intact, grossly non-focal.  Psychiatric: He is confused, he is unable to tell me the month, the year or where he is. Short-term recall is 0/3 objects after 1 minute. Insight is poor  Peripheral Pulses: +2 palpable, equal bilaterally       Labs:   Recent Labs     11/05/22  0403 11/06/22  0453 11/07/22  0506   WBC 13.8* 12.1* 8.3   HGB 11.0* 11.8* 11.5*   HCT 33.7* 35.5* 36.4*   PLT 91* 99* 109*     Recent Labs     11/05/22  0403 11/06/22  0453 11/07/22  0506    138 139   K 3.6 3.5 3.3*    106 105   CO2 23 23 24   BUN 18 13 12   CREATININE 1.1 1.1 0.9   CALCIUM 7.6* 8.1* 8.3     No results for input(s): AST, ALT, BILIDIR, BILITOT, ALKPHOS in the last 72 hours. No results for input(s): INR in the last 72 hours.   Recent Labs     11/04/22  1321   TROPONINI 0.42*       Urinalysis:      Lab Results   Component Value Date/Time    NITRU Negative 11/03/2022 07:00 AM    WBCUA 21-50 11/03/2022 07:00 AM    BACTERIA 3+ 11/03/2022 07:00 AM    RBCUA  11/03/2022 07:00 AM    BLOODU LARGE 11/03/2022 07:00 AM    SPECGRAV 1.010 11/03/2022 07:00 AM    GLUCOSEU Negative 11/03/2022 07:00 AM       Radiology:  XR CHEST PORTABLE   Final Result   Bibasilar opacities, correlating with bronchial wall thickening and   atelectasis. Bronchial wall thickening is nonspecific for acute or chronic   bronchitis or reactive airway disease. There is no significant change since   yesterday. [place] MODIFIED BARIUM SWALLOW W VIDEO    (Results Pending)           Assessment/Plan:    Active Hospital Problems    Diagnosis     NSTEMI (non-ST elevated myocardial infarction) ([facility] [I21.4]      Priority: Medium    Cardiomyopathy ([facility] [I42.9]      Priority: Medium    Lactic acidosis [E87.20]      Priority: Medium    Demand ischemia ([facility] [I24.8]      Priority: Medium    Acute metabolic encephalopathy [J57.68]      Priority: Medium    Urinary tract infection with hematuria [N39.0, R31.9]      Priority: Medium    Septic shock ([facility] [A41.9, R65.21]      Priority: Medium    Dementia ([facility] [F03.90]     CAD (coronary artery disease) [I25.10]     HTN (hypertension) [I10]     HLD (hyperlipidemia) [E78.5]     Hypokalemia [E87.6]        Septic shock : Hemodynamically he seems to be stabilizing and is no longer requiring any pressor support. Continued with IVF and  and abx. 2.  bacteremia -blood cultures growing Proteus. This is susceptible to  meropenem. Continue antibiotics, continue supportive care, and will follow. Acute metabolic encephalopathy in setting of dementia - due to severe sepsis. He seems to be close to his baseline status. Continue supportive care and follow     NSTEMI vs demand ischemia -he denies any chest pain. Has no other significant symptoms at this time. He was on  IV heparin which has been now discontinued.   May be more than demand ischemia with hx of CAD - ECHO done   - cardiology following. Continued medical therapy. Planned to add bb and arb     Acute kidney injury -renal functions are improved today. Continued IV fluids, follow urine output avoid nephrotoxic agents and will follow closely. - pt likely candidate for PICC line once blood cx clear - not a good HD candidate        DVT Prophylaxis: Lovenox  Diet: ADULT DIET;  Dysphagia - Minced and Moist; Mildly Thick (Nectar)  Code Status: Full Code  PT/OT Eval Status: not yet ordered    Dispo - pending MBS, addition of bb/arb, ?by wed    Appropriate for A1 Discharge Unit: Renu Figueroa Officer, MD

## 2022-11-07 NOTE — PLAN OF CARE
Problem: Discharge Planning  Goal: Discharge to home or other facility with appropriate resources  11/7/2022 1006 by Harley Perkins RN  Outcome: Progressing  11/6/2022 2206 by Nanci Casiano RN  Outcome: Progressing     Problem: Skin/Tissue Integrity  Goal: Absence of new skin breakdown  Description: 1. Monitor for areas of redness and/or skin breakdown  2. Assess vascular access sites hourly  3. Every 4-6 hours minimum:  Change oxygen saturation probe site  4. Every 4-6 hours:  If on nasal continuous positive airway pressure, respiratory therapy assess nares and determine need for appliance change or resting period.   11/7/2022 1006 by Harley Perkins RN  Outcome: Progressing  11/6/2022 2206 by Nanci Casiano RN  Outcome: Progressing     Problem: ABCDS Injury Assessment  Goal: Absence of physical injury  11/7/2022 1006 by Harley Perkins RN  Outcome: Progressing  11/6/2022 2206 by Nanci Casiano RN  Outcome: Progressing     Problem: Pain  Goal: Verbalizes/displays adequate comfort level or baseline comfort level  11/7/2022 1006 by Harley Perkins RN  Outcome: Progressing  11/6/2022 2206 by Nanci Casiano RN  Outcome: Progressing     Problem: Safety - Adult  Goal: Free from fall injury  11/7/2022 1006 by Harley Perkins RN  Outcome: Progressing  11/6/2022 2206 by Nanci Casiano RN  Outcome: Progressing

## 2022-11-07 NOTE — CARE COORDINATION
Case Management Follow up      Chart reviewed and case discussed during huddle and rounds. Pt is admitted day # 4. Unit C-4. Diagnosis and currently status as per MD progress: Septic shock- off levo gtt. Bacteremia- IV meropenem. Acute encephalopathy- returning to baseline. NSTEMI- echo done. Cards following. Services following:IM, Cards    Anticipated Discharge date:pending progress- maybe weds. , 11/9    Expected Plan for Discharge: return to [address] [place] [address]. Pre cert needed: n/a    Potential Barriers:none    RN CM will continue to follow Pt clinical course for DCP needs.

## 2022-11-07 NOTE — PROGRESS NOTES
[address]   Daily Progress Note    Admit Date:  11/3/2022  HPI:   Tess Keita presented from nursing facility with nausea vomiting and hematuria. Cardiology consulted for elevated troponin and abnormal EKG. He has a history of CAD with prior MI and PCI, hypertension, hyperlipidemia as well as COPD and dementia. Subjective:  Mr. Mitchel Herrera was sleeping quietly but awoke to voice. Disoriented to person and place.     Objective:   Patient Vitals for the past 24 hrs:   BP Temp Temp src Pulse Resp SpO2 Weight   11/07/22 0800 (!) 151/70 97.7 °F (36.5 °C) Oral 69 20 95 % --   11/07/22 4394 -- -- -- -- -- -- 167 lb 7 oz (75.9 kg)   11/07/22 0404 (!) 146/75 98.1 °F (36.7 °C) Axillary 65 22 94 % --   11/07/22 0006 134/63 97.9 °F (36.6 °C) Oral 61 16 95 % --   11/06/22 1931 138/78 98 °F (36.7 °C) Oral 68 20 95 % --   11/06/22 1544 123/68 97.7 °F (36.5 °C) Oral 66 16 96 % --   11/06/22 1500 123/68 -- -- -- -- -- --   11/06/22 1445 123/68 -- -- 62 -- -- --   11/06/22 1115 (!) 141/76 97.8 °F (36.6 °C) Oral 61 20 96 % --       Intake/Output Summary (Last 24 hours) at 11/7/2022 4049  Last data filed at 11/7/2022 0405  Gross per 24 hour   Intake 75 ml   Output 1550 ml   Net -1475 ml     Wt Readings from Last 3 Encounters:   11/07/22 167 lb 7 oz (75.9 kg)   11/03/22 260 lb (117.9 kg)   07/26/22 186 lb 14.4 oz (84.8 kg)         ASSESSMENT:   NSTEMI: Denies chest pain though poor historian, received IV heparin x48 hours, on aspirin and beta-blocker  CAD: History of prior MI and PCI (details unknown), on aspirin and beta-blocker, intolerance listed to statins  Cardiomyopathy, ischemic: EF dropped to 35% by echo, no CHF on exam  Bacteremia with sepsis: Per IM  UTI  PILAR: Resolved  Dementia: Makes assessment of symptoms difficult  HYPERTENSION: Stable  HLD      PLAN:  Change metoprolol to carvedilol 6.25 mg bid  If BP and renal function stable tomorrow, add low dose ARB (valsartan 20 mg daily)  Continue aspirin  He is not a candidate for invasive coronary angiography given dementia, absence of chest pain   Agree with potassium replacement  BMP in AM, daily weights    Reviewed with Dr. Kemal Ellison. Andrea Parks, APRN - CNP, 11/7/2022, 9:25 AM  [facility]   [phone]       Telemetry: SR 60s, frequent PVCs and PACs, 5 beats NSVT, 6 seconds PAT  NYHA: III    Physical Exam:  General:  Awake, alert, disoriented to person and place, pleasantly confused  Skin:  Warm and dry  Neck:  JVP normal upright  Chest: Bibasilar Rales  Cardiovascular:  RRR, normal T6U3, + systolic murmur, no GR  Abdomen:  Soft, nontender, +bowel sounds  Extremities: No BLE edema        Medications:    metoprolol tartrate  25 mg Oral Daily    enoxaparin  40 mg SubCUTAneous Daily    meropenem  1,000 mg IntraVENous Q8H    insulin lispro  0-4 Units SubCUTAneous TID WC    insulin lispro  0-4 Units SubCUTAneous Nightly    pantoprazole  40 mg Oral QAM AC    sertraline  100 mg Oral Daily    sodium chloride flush  5-40 mL IntraVENous 2 times per day    lactobacillus  1 capsule Oral BID WC    [Held by provider] pregabalin  50 mg Oral BID    aspirin  81 mg Oral Daily      dextrose      sodium chloride 10 mL/hr at 11/06/22 2152       Lab Data: Lab results independently reviewed and analyzed by myself 11/7/2022    CBC:   Recent Labs     11/05/22  0403 11/06/22  0453 11/07/22  0506   WBC 13.8* 12.1* 8.3   HGB 11.0* 11.8* 11.5*   PLT 91* 99* 109*     BMP:    Recent Labs     11/05/22  0403 11/06/22  0453 11/07/22  0506    138 139   K 3.6 3.5 3.3*   CO2 23 23 24   BUN 18 13 12   CREATININE 1.1 1.1 0.9     INR:  No results for input(s): INR in the last 72 hours. BNP:  No results for input(s): PROBNP in the last 72 hours.   Cardiac Enzymes:   Recent Labs     11/04/22  1321   TROPONINI 0.42*     Lipids:   Lab Results   Component Value Date/Time    TRIG 75 02/20/2011 04:20 AM    HDL 34 02/20/2011 04:20 AM    LDLCALC 77 02/20/2011 04:20 AM       Cardiac Imaging: Echocardiogram 11/3/2022:   Summary   The left ventricle is normal in size with normal wall thickness. Left ventricular systolic function is moderately reduced with a visually estimated ejection fraction of  35-40 %. EF estimated by Jain's method at 35 %. The mid to apical septum, mid-apical inferior, mid-apical anterior, apical lateral segments appear  hypokinetic. Basilar hyperkinesis. Distribution of wall motion abnormalities seen with Takotsubo versus extensive infarction. Grade I diastolic dysfunction with normal LV pressure. There is no evidence of a left ventricular thrombus. Normal right ventricular size and function. Systolic pulmonary artery pressure (SPAP) is normal and estimated at 26 mmHg (right atrial pressure 8 mmHg). The IVC is dilated in size (>2.1 cm) but collapses >50% with respiration consistent with elevated right atrial pressures (8 mmHg) . Compared with the prior study 3/5/2018, significant changes have occurred. Echocardiogram 3/5/2018   Technically difficult examination. Normal left ventricle systolic function with an estimated ejection fraction of 55%. No regional wall motion abnormalities are seen. Grade I diastolic dysfunction with normal filing pressure. Aortic valve sclerosis without stenosis. Echo:  TTE 6/19/13:  Summary:   Overall left ventricular ejection fraction is estimated to be 50-55%. The left ventricular wall motion is normal.   Left ventricular systolic function is normal. (An ejection fraction >or= to 55% is considered normal)   The left ventricle is moderately dilated. Trace tricuspid regurgitation is present. There is trace mitral regurgitation. Mild aortic sclerosis present without evidence of stenosis. .   The diastolic function is impaired and classified as Grade 1 (impaired relaxation).       Stress Test:  Pharmacologic Nuclear SPECT Stress 6/19/13:  Interpetation Summary:   The LV EF is 50%   Normal global and regional wall motion in all territories. There is a medium sized, fixed defect in the inferior wall. This defect is consistent with diaphragm attenuation. 1.Non-diagnostic ECG for ischemia with pharmocologic stress. 2.Negative nuclear stress imaging for inducible ischemia. 3.Nuclear stress image findings indicate low risk for future       ischemic event .

## 2022-11-07 NOTE — PLAN OF CARE
Problem: Discharge Planning  Goal: Discharge to home or other facility with appropriate resources  11/6/2022 2206 by Riddhi Palacios RN  Outcome: Progressing  11/6/2022 1312 by Rajani Chopra RN  Outcome: Progressing     Problem: Skin/Tissue Integrity  Goal: Absence of new skin breakdown  Description: 1. Monitor for areas of redness and/or skin breakdown  2. Assess vascular access sites hourly  3. Every 4-6 hours minimum:  Change oxygen saturation probe site  4. Every 4-6 hours:  If on nasal continuous positive airway pressure, respiratory therapy assess nares and determine need for appliance change or resting period.   11/6/2022 2206 by Riddhi Plaacios RN  Outcome: Progressing  11/6/2022 1312 by Rajani Chopra RN  Outcome: Progressing     Problem: ABCDS Injury Assessment  Goal: Absence of physical injury  11/6/2022 2206 by Riddhi Palacios RN  Outcome: Progressing  11/6/2022 1312 by Rajani Chopra RN  Outcome: Progressing     Problem: Pain  Goal: Verbalizes/displays adequate comfort level or baseline comfort level  11/6/2022 2206 by Riddhi Palacios RN  Outcome: Progressing  11/6/2022 1312 by Rajani Chopra RN  Outcome: Progressing     Problem: Safety - Adult  Goal: Free from fall injury  11/6/2022 2206 by Riddhi Palacios RN  Outcome: Progressing  11/6/2022 1312 by Rajani Chopra RN  Outcome: Progressing

## 2022-11-08 LAB
ANION GAP SERPL CALCULATED.3IONS-SCNC: 9 MMOL/L (ref 3–16)
BUN BLDV-MCNC: 9 MG/DL (ref 7–20)
CALCIUM SERPL-MCNC: 8.2 MG/DL (ref 8.3–10.6)
CHLORIDE BLD-SCNC: 107 MMOL/L (ref 99–110)
CO2: 25 MMOL/L (ref 21–32)
CREAT SERPL-MCNC: 0.9 MG/DL (ref 0.8–1.3)
GFR SERPL CREATININE-BSD FRML MDRD: >60 ML/MIN/{1.73_M2}
GLUCOSE BLD-MCNC: 100 MG/DL (ref 70–99)
GLUCOSE BLD-MCNC: 103 MG/DL (ref 70–99)
GLUCOSE BLD-MCNC: 103 MG/DL (ref 70–99)
GLUCOSE BLD-MCNC: 104 MG/DL (ref 70–99)
GLUCOSE BLD-MCNC: 113 MG/DL (ref 70–99)
HCT VFR BLD CALC: 36.4 % (ref 40.5–52.5)
HEMOGLOBIN: 11.8 G/DL (ref 13.5–17.5)
MCH RBC QN AUTO: 27.1 PG (ref 26–34)
MCHC RBC AUTO-ENTMCNC: 32.6 G/DL (ref 31–36)
MCV RBC AUTO: 83.1 FL (ref 80–100)
PDW BLD-RTO: 15.7 % (ref 12.4–15.4)
PERFORMED ON: ABNORMAL
PLATELET # BLD: 125 K/UL (ref 135–450)
PMV BLD AUTO: 8.9 FL (ref 5–10.5)
POTASSIUM SERPL-SCNC: 3.5 MMOL/L (ref 3.5–5.1)
RBC # BLD: 4.37 M/UL (ref 4.2–5.9)
SODIUM BLD-SCNC: 141 MMOL/L (ref 136–145)
WBC # BLD: 9.2 K/UL (ref 4–11)

## 2022-11-08 PROCEDURE — 36415 COLL VENOUS BLD VENIPUNCTURE: CPT

## 2022-11-08 PROCEDURE — 2060000000 HC ICU INTERMEDIATE R&B

## 2022-11-08 PROCEDURE — 6360000002 HC RX W HCPCS: Performed by: STUDENT IN AN ORGANIZED HEALTH CARE EDUCATION/TRAINING PROGRAM

## 2022-11-08 PROCEDURE — 6370000000 HC RX 637 (ALT 250 FOR IP): Performed by: INTERNAL MEDICINE

## 2022-11-08 PROCEDURE — 2580000003 HC RX 258: Performed by: STUDENT IN AN ORGANIZED HEALTH CARE EDUCATION/TRAINING PROGRAM

## 2022-11-08 PROCEDURE — 6370000000 HC RX 637 (ALT 250 FOR IP): Performed by: NURSE PRACTITIONER

## 2022-11-08 PROCEDURE — 92526 ORAL FUNCTION THERAPY: CPT

## 2022-11-08 PROCEDURE — 2580000003 HC RX 258: Performed by: INTERNAL MEDICINE

## 2022-11-08 PROCEDURE — 6360000002 HC RX W HCPCS: Performed by: INTERNAL MEDICINE

## 2022-11-08 PROCEDURE — 85027 COMPLETE CBC AUTOMATED: CPT

## 2022-11-08 PROCEDURE — 80048 BASIC METABOLIC PNL TOTAL CA: CPT

## 2022-11-08 PROCEDURE — 99233 SBSQ HOSP IP/OBS HIGH 50: CPT | Performed by: NURSE PRACTITIONER

## 2022-11-08 RX ORDER — VALSARTAN 80 MG/1
20 TABLET ORAL NIGHTLY
Status: DISCONTINUED | OUTPATIENT
Start: 2022-11-08 | End: 2022-11-09 | Stop reason: HOSPADM

## 2022-11-08 RX ADMIN — MEROPENEM 1000 MG: 1 INJECTION, POWDER, FOR SOLUTION INTRAVENOUS at 13:44

## 2022-11-08 RX ADMIN — SODIUM CHLORIDE: 9 INJECTION, SOLUTION INTRAVENOUS at 13:42

## 2022-11-08 RX ADMIN — ASPIRIN 81 MG 81 MG: 81 TABLET ORAL at 08:02

## 2022-11-08 RX ADMIN — SODIUM CHLORIDE, PRESERVATIVE FREE 10 ML: 5 INJECTION INTRAVENOUS at 20:11

## 2022-11-08 RX ADMIN — SERTRALINE HYDROCHLORIDE 100 MG: 50 TABLET ORAL at 08:02

## 2022-11-08 RX ADMIN — MEROPENEM 1000 MG: 1 INJECTION, POWDER, FOR SOLUTION INTRAVENOUS at 21:08

## 2022-11-08 RX ADMIN — MEROPENEM 1000 MG: 1 INJECTION, POWDER, FOR SOLUTION INTRAVENOUS at 06:32

## 2022-11-08 RX ADMIN — POTASSIUM BICARBONATE 20 MEQ: 782 TABLET, EFFERVESCENT ORAL at 08:02

## 2022-11-08 RX ADMIN — CARVEDILOL 6.25 MG: 6.25 TABLET, FILM COATED ORAL at 08:02

## 2022-11-08 RX ADMIN — LANSOPRAZOLE 30 MG: KIT at 08:06

## 2022-11-08 RX ADMIN — Medication 1 CAPSULE: at 08:02

## 2022-11-08 RX ADMIN — ENOXAPARIN SODIUM 40 MG: 100 INJECTION SUBCUTANEOUS at 08:02

## 2022-11-08 RX ADMIN — CARVEDILOL 6.25 MG: 6.25 TABLET, FILM COATED ORAL at 16:55

## 2022-11-08 RX ADMIN — Medication 1 CAPSULE: at 16:55

## 2022-11-08 RX ADMIN — VALSARTAN 20 MG: 80 TABLET, FILM COATED ORAL at 20:10

## 2022-11-08 RX ADMIN — SODIUM CHLORIDE, PRESERVATIVE FREE 10 ML: 5 INJECTION INTRAVENOUS at 08:02

## 2022-11-08 ASSESSMENT — PAIN DESCRIPTION - DESCRIPTORS: DESCRIPTORS: CRAMPING

## 2022-11-08 ASSESSMENT — PAIN SCALES - GENERAL
PAINLEVEL_OUTOF10: 0

## 2022-11-08 ASSESSMENT — PAIN DESCRIPTION - LOCATION: LOCATION: HAND

## 2022-11-08 NOTE — PROGRESS NOTES
Hospitalist Progress Note      PCP: Valery Holloway MD    Date of Admission: 11/3/2022    Chief Complaint:   septic shock     Hospital Course:       82 y.o. male with pmh of dementia, CAD, COPD, hx of duodenal ulcer, GERD, HLD, HTN hx of MI and recent septic shock admission for UTI. He presented with nausea, vomiting and fever. Pt was also tachycardic. He has had an abnormal EKG and elevated troponin as well. He was admitted to the ICU. He is being followed by cardiology and critical care in consultation. His condition seems to be stabilizing he was requiring pressors to maintain blood pressure but now these been discontinued and vital signs are remaining pretty stable. He was transferred out of the ICU and is currently in a telemetry bed. ECHO ordered and he did receive 48 hours total of heparin (stopped on 11/5/22). Pt has no c/o but is poor historian. Blood cx growing Proteus Mirabeilis. He is currently on Merrem which Proteus sensitive to.      Subjective: resting in bed, pleasantly confused, denies cp/sob , no overnight events         Medications:  Reviewed    Infusion Medications    dextrose      sodium chloride 10 mL/hr at 11/06/22 2152     Scheduled Medications    carvedilol  6.25 mg Oral BID WC    lansoprazole  30 mg Oral Daily    enoxaparin  40 mg SubCUTAneous Daily    meropenem  1,000 mg IntraVENous Q8H    insulin lispro  0-4 Units SubCUTAneous TID WC    insulin lispro  0-4 Units SubCUTAneous Nightly    sertraline  100 mg Oral Daily    sodium chloride flush  5-40 mL IntraVENous 2 times per day    lactobacillus  1 capsule Oral BID WC    [Held by provider] pregabalin  50 mg Oral BID    aspirin  81 mg Oral Daily     PRN Meds: glucose, dextrose bolus **OR** dextrose bolus, glucagon (rDNA), dextrose, bisacodyl, sodium chloride flush, sodium chloride, acetaminophen **OR** acetaminophen, perflutren lipid microspheres, magnesium sulfate, ondansetron      Intake/Output Summary (Last 24 hours) at 11/8/2022 0835  Last data filed at 11/8/2022 0557  Gross per 24 hour   Intake 380 ml   Output 1450 ml   Net -1070 ml       Physical Exam Performed:    BP (!) 142/66   Pulse 71   Temp 97.7 °F (36.5 °C) (Oral)   Resp 18   Wt 162 lb 7.7 oz (73.7 kg)   SpO2 94%   BMI 22.66 kg/m²     General appearance: Elderly male lying in bed, looks weak and tired. He is pleasantly confused and cooperative to the best of his ability  HEENT: Pupils equal, round, and reactive to light. Conjunctivae/corneas clear. Neck: Supple, with full range of motion. No jugular venous distention. Trachea midline. Respiratory:  Normal respiratory effort. Clear to auscultation, bilaterally without Wheezes/Rhonchi. Mild rales at  bases  Cardiovascular: Regular rate and rhythm with normal S1/S2 without  Abdomen: Soft, non-tender, non-distended with normal bowel sounds. Musculoskeletal: No clubbing, cyanosis or edema bilaterally. Full range of motion without deformity. .  Neurologic:  Neurovascularly intact without any focal sensory/motor deficits. Cranial nerves: II-XII intact, grossly non-focal.  Psychiatric: He is confused, he is unable to tell me the month, the year or where he is. Short-term recall is 0/3 objects after 1 minute. Insight is poor  Peripheral Pulses: +2 palpable, equal bilaterally     Labs:   Recent Labs     11/06/22  0453 11/07/22  0506 11/08/22  0503   WBC 12.1* 8.3 9.2   HGB 11.8* 11.5* 11.8*   HCT 35.5* 36.4* 36.4*   PLT 99* 109* 125*     Recent Labs     11/06/22  0453 11/07/22  0506 11/08/22  0503    139 141   K 3.5 3.3* 3.5    105 107   CO2 23 24 25   BUN 13 12 9   CREATININE 1.1 0.9 0.9   CALCIUM 8.1* 8.3 8.2*     No results for input(s): AST, ALT, BILIDIR, BILITOT, ALKPHOS in the last 72 hours. No results for input(s): INR in the last 72 hours. No results for input(s): [place] Knock in the last 72 hours.     Urinalysis:      Lab Results   Component Value Date/Time    NITRU Negative 11/03/2022 07:00 AM    WBCUA 21-50 11/03/2022 07:00 AM    BACTERIA 3+ 11/03/2022 07:00 AM    RBCUA  11/03/2022 07:00 AM    BLOODU LARGE 11/03/2022 07:00 AM    SPECGRAV 1.010 11/03/2022 07:00 AM    GLUCOSEU Negative 11/03/2022 07:00 AM       Radiology:  FL MODIFIED BARIUM SWALLOW W VIDEO   Final Result   No lakisha aspiration. Laryngeal penetration with thin liquid. Please see separate speech pathology report for full discussion of findings   and recommendations. XR CHEST PORTABLE   Final Result   Bibasilar opacities, correlating with bronchial wall thickening and   atelectasis. Bronchial wall thickening is nonspecific for acute or chronic   bronchitis or reactive airway disease. There is no significant change since   yesterday. Assessment/Plan:    Active Hospital Problems    Diagnosis     NSTEMI (non-ST elevated myocardial infarction) ([facility] [address] [I21.4]      Priority: Medium    Cardiomyopathy ([facility] [I42.9]      Priority: Medium    Lactic acidosis [E87.20]      Priority: Medium    Demand ischemia ([facility] [address] [I24.8]      Priority: Medium    Acute metabolic encephalopathy [L36.73]      Priority: Medium    Urinary tract infection with hematuria [N39.0, R31.9]      Priority: Medium    Septic shock ([facility] [address] [A41.9, R65.21]      Priority: Medium    Dementia ([facility] [F03.90]     CAD (coronary artery disease) [I25.10]     HTN (hypertension) [I10]     HLD (hyperlipidemia) [E78.5]     Hypokalemia [E87.6]        Septic shock : resolved,Hemodynamically he seems to be stabilizing and is no longer requiring any pressor support. Continued with IVF and  and abx. Bacteremia -blood cultures growing Proteus. This is susceptible to  meropenem. Continue antibiotics, continue supportive care, and will follow. -repeat bcx were ngtd     Acute metabolic encephalopathy in setting of dementia - due to severe sepsis. He seems to be close to his baseline status.   Continue supportive care and follow   MBS done, rec minced/moist and thin liquids, no straws, meds crushed in puree    NSTEMI vs demand ischemia -he denies any chest pain. Has no other significant symptoms at this time. He was on  IV heparin which has been now discontinued. May be more than demand ischemia with hx of CAD - ECHO done   - cardiology was following. Continued medical therapy. Planned to add bb and arb     Acute kidney injury -renal functions are improved today. Continued IV fluids, follow urine output avoid nephrotoxic agents and will follow closely. - pt likely candidate for PICC line once blood cx clear - not a good HD candidate        DVT Prophylaxis: Lovenox  Diet: ADULT DIET;  Dysphagia - Minced and Moist  Code Status: Full Code    PT/OT Eval Status: not yet ordered     Dispo - pending tolerating addition of bb/arb, ?by wed    Appropriate for A1 Discharge Unit: No      Pavan Ivory MD

## 2022-11-08 NOTE — PROGRESS NOTES
[facility]   Daily Progress Note    Admit Date:  11/3/2022  HPI:   Joe Park presented from nursing facility with nausea vomiting and hematuria. Cardiology consulted for elevated troponin and abnormal EKG. He has a history of CAD with prior MI and PCI, hypertension, hyperlipidemia as well as COPD and dementia. Subjective:  Mr. Robert Blevins awake and alert but confused to person and place. Denies chest pain or shortness of breath.      Objective:   Patient Vitals for the past 24 hrs:   BP Temp Temp src Pulse Resp SpO2 Weight   11/08/22 1114 109/62 -- -- 64 18 96 % --   11/08/22 0744 (!) 142/66 97.7 °F (36.5 °C) Oral 71 18 94 % --   11/08/22 0347 133/65 97.9 °F (36.6 °C) Oral 63 18 95 % 162 lb 7.7 oz (73.7 kg)   11/07/22 2351 126/73 97.9 °F (36.6 °C) Oral 62 20 94 % --   11/07/22 2006 128/69 97.8 °F (36.6 °C) Oral 68 16 91 % --   11/07/22 1619 138/72 97.1 °F (36.2 °C) -- 67 -- 94 % --   11/07/22 1615 138/72 97.1 °F (36.2 °C) Oral 65 17 95 % --         Intake/Output Summary (Last 24 hours) at 11/8/2022 1245  Last data filed at 11/8/2022 0557  Gross per 24 hour   Intake 240 ml   Output 1450 ml   Net -1210 ml       Wt Readings from Last 3 Encounters:   11/08/22 162 lb 7.7 oz (73.7 kg)   11/03/22 260 lb (117.9 kg)   07/26/22 186 lb 14.4 oz (84.8 kg)         ASSESSMENT:   NSTEMI: Denies chest pain though poor historian, received IV heparin x48 hours, on aspirin and beta-blocker  CAD: History of prior MI and PCI (details unknown), on aspirin and beta-blocker, intolerance listed to statins  Cardiomyopathy, ischemic: EF dropped to 35% by echo, + rales but no edema, weight down 5 lbs  Bacteremia with sepsis: Per IM  UTI  PILAR: Resolved  Dementia: Makes assessment of symptoms difficult  HYPERTENSION: Stable  HLD      PLAN:  Continue carvedilol 6.25 mg bid  Add low dose ARB valsartan 20 mg daily qHS  Continue aspirin  He is not a candidate for invasive coronary angiography given dementia, absence of chest pain BMP in AM, daily weights      Cyndy Hannah, APRN - CNP, 11/8/2022, 12:45 PM  [facility]   [phone]       Telemetry: SR 50-70  NYHA: III    Physical Exam:  General:  Awake, alert, disoriented to person and place, pleasantly confused  Skin:  Warm and dry  Neck:  JVP normal upright  Chest: left basilar rales  Cardiovascular:  RRR, normal E6N4, + systolic murmur, no GR  Abdomen:  Soft, nontender, +bowel sounds  Extremities: No BLE edema        Medications:    carvedilol  6.25 mg Oral BID WC    lansoprazole  30 mg Oral Daily    enoxaparin  40 mg SubCUTAneous Daily    meropenem  1,000 mg IntraVENous Q8H    insulin lispro  0-4 Units SubCUTAneous TID WC    insulin lispro  0-4 Units SubCUTAneous Nightly    sertraline  100 mg Oral Daily    sodium chloride flush  5-40 mL IntraVENous 2 times per day    lactobacillus  1 capsule Oral BID WC    [Held by provider] pregabalin  50 mg Oral BID    aspirin  81 mg Oral Daily      dextrose      sodium chloride 10 mL/hr at 11/06/22 2152       Lab Data: Lab results independently reviewed and analyzed by myself 11/8/2022    CBC:   Recent Labs     11/06/22  0453 11/07/22  0506 11/08/22  0503   WBC 12.1* 8.3 9.2   HGB 11.8* 11.5* 11.8*   PLT 99* 109* 125*       BMP:    Recent Labs     11/06/22  0453 11/07/22  0506 11/08/22  0503    139 141   K 3.5 3.3* 3.5   CO2 23 24 25   BUN 13 12 9   CREATININE 1.1 0.9 0.9       INR:  No results for input(s): INR in the last 72 hours. BNP:  No results for input(s): PROBNP in the last 72 hours. Cardiac Enzymes:   No results for input(s): TROPONINI in the last 72 hours. Lipids:   Lab Results   Component Value Date/Time    TRIG 75 02/20/2011 04:20 AM    HDL 34 02/20/2011 04:20 AM    LDLCALC 77 02/20/2011 04:20 AM       Cardiac Imaging:   Echocardiogram 11/3/2022:   Summary   The left ventricle is normal in size with normal wall thickness.    Left ventricular systolic function is moderately reduced with a visually estimated ischemia with pharmocologic stress. 2.Negative nuclear stress imaging for inducible ischemia. 3.Nuclear stress image findings indicate low risk for future       ischemic event .

## 2022-11-08 NOTE — PROGRESS NOTES
Speech Language Pathology    Speech Language Pathology  Dysphagia Treatment/Follow-Up Note  Facility/Department: [place] [address] [facility]U      Recommendations: SLP recommends to continue with IDDSI 5 Minced and moist Solids; IDDSI 0 Thin Liquids - NO straws ; Meds crushed in puree as able  Ensure no residue left in oral cavity. Risk Management: upright for all intake, stay upright for at least 30 mins after intake, small bites/sips, assist feed, downgrade to puree if difficulty with minced and moist solids noted and mildly thick if s/s of aspiration with thin liquids develop, oral care 2-3x/day to reduce adverse affects in the event of aspiration, increase physical mobility as able, slow rate of intake, general aspiration precautions, and hold PO and contact SLP if s/s of aspiration or worsening respiratory status develop. Recommend delirium precautions (light on during day/blinds open during day, familiar routines (tv programs), frequent reorienting to situation, familiar blanket/clothes if family visits and able to provide)    Young Bob  : 1935 (82 y.o.)   MRN: [id number]  ROOM: [address] [place] [address]  ADMISSION DATE: 11/3/2022  PATIENT DIAGNOSIS(ES): Septic shock ([facility] [A41.9, R65.21]  Allergies   Allergen Reactions    Fluoxetine     Benazepril      Swollen tongue    Hydrocodone Other (See Comments)    Morphine     Penicillins Other (See Comments)    Simvastatin Other (See Comments)    Morphine And Related Nausea And Vomiting    Sucralfate Rash       DATE ONSET: 11/3/2022    Pain: The patient does not complain of pain       Current Diet: ADULT DIET; Dysphagia - Minced and Moist    Diet Tolerance:  Patient tolerating current diet level without signs/symptoms of aspiration.       Dysphagia Treatment and Impressions:  Subjective: Pt seen in room at bedside with RN permission  RN Report/Chart Review: Angy/RN reports pt eating minced and moist solid and drinking thin liquids with assist with no overt clinical signs of dysphagia. Patient tolerance: see RN comment above; pt report not reliable d/t confusion. Pt appears more confused this day compared to yesterday. Appears not to recognize cup in hand. Questions what IV is. RN notified. Pt recognized [employer] and what to do wit hit during lunch this day. Respiratory Status: Pt with SPO2% of 96% on RA with RR of 16    Liquid PO Trials:   IDDSI 0 Thin:  Assessed via cup: no anterior bolus loss , suspect functional A-P bolus transit, swallow timing subjectively appears timely, and no clinical s/s of aspiration    Solid PO Trials  IDDSI 5 Minced and Moist:   no anterior bolus loss , suspect reduced/impaired A-P bolus transit, impaired mastication , prolonged oral phase, oral stasis noted post swallow, and no clinical s/s of aspiration. Pt appears to lose focus with minced and moist solids in oral cavity and requires cues to swallow and clear solid residue from tongue. Pt eventually spits out cookie residue from tongue. No overt signs of aspiration when swallow is initiated. Pt re-oriented to place and time, blinds raised, and tv set to pt preference to increase orientation and deecrease potnatial for delirium. Education: SLP edu pt re: Role of SLP, Rationale for dysphagia tx, Recommended compensatory strategies, Aspiration precautions, MBS results, POC, and Evidenced based practice for current recommendations and treatment. Pt would benefit from ongoing education, no evidence of learning, and RN aware of recommendations  Assessment: Pt grossly tolerating current diet with no clinical s/s of aspiration. Poor carryover of recommended compensatory strategies    Recommendations: SLP recommends to continue with IDDSI 5 Minced and moist Solids; IDDSI 0 Thin Liquids - NO straws ;  Meds crushed in puree as able  Risk Management: upright for all intake, stay upright for at least 30 mins after intake, small bites/sips, assist feed, downgrade to puree if difficulty with minced and moist solids noted and mildly thick if s/s of aspiration with thin liquids develop, oral care 2-3x/day to reduce adverse affects in the event of aspiration, increase physical mobility as able, slow rate of intake, general aspiration precautions, and hold PO and contact SLP if s/s of aspiration or worsening respiratory status develop. Ensure no residue left in oral cavity. Dysphagia Goals:    Timeframe for Long-term Goals: 7 days 11/12/2022  Goal 1: The patient will tolerate least restrictive diet with no clinical s/s of aspiration or worsening respiratory/pulmonary status 11/08 Ongoing. See above     Short-term Goals  Timeframe for Short-term Goals: 5 days 11/10/2022  Goal 1: The patient will tolerate recommended diet with no clinical s/s of aspiration 5/5 11/08 Ongoing. See above  Goal 2: The patient will tolerate therapeutic diet upgrade trials with no clinical s/s of aspiration 5/5 11/08 Not addressed this session  Goal 3: The patient will tolerate instrumental assessment when able GOAL MET 11/07  Goal 4: The patient/caregiver will demonstrate understanding of compensatory swallow strategies, for improved swallow safety   11/08 Ongoing. See above    Speech/Language/Cog Goals: N/A                        Recommendations:  Solid Consistency: IDDSI 5 Minced and moist Solids  Liquid Consistency: IDDSI 0 Thin Liquids - NO straws   Medication: Meds crushed in puree as able    Plan:    Continued Dysphagia treatment with goals per plan of care. Discharge Recommendations: TBD    If pt discharges from hospital prior to Speech/Swallowing discharge, this note serves as tx and discharge summary. Total Treatment Time / Charges     Time in Time out Total Time / units   Cognitive Tx         Speech Tx      Dysphagia Tx 8978 0007 19 min/ 1 unit     Signature:  Josie Nelson. Elsi Garrison M.A.  [address] [facility]  Speech-Language Pathologist

## 2022-11-08 NOTE — FLOWSHEET NOTE
11/08/22 0744   Vital Signs   Temp 97.7 °F (36.5 °C)   Temp Source Oral   Heart Rate 71   Heart Rate Source Monitor   Resp 18   BP (!) 142/66   BP Location Left upper arm   MAP (Calculated) 91.33   Patient Position Semi fowlers   Level of Consciousness 0   MEWS Score 1   Oxygen Therapy   SpO2 94 %   O2 Device None (Room air)

## 2022-11-08 NOTE — PROGRESS NOTES
Shift assessment completed. Pt is alert and oriented to person only. Vital signs are WNL. Respirations are even & easy. No complaints voiced. Pt denies needs at this time. SR up x 2 and bed in low position. Call light is within reach. Will monitor.

## 2022-11-09 VITALS
HEART RATE: 70 BPM | TEMPERATURE: 97.8 F | OXYGEN SATURATION: 95 % | DIASTOLIC BLOOD PRESSURE: 77 MMHG | RESPIRATION RATE: 18 BRPM | WEIGHT: 160.27 LBS | SYSTOLIC BLOOD PRESSURE: 147 MMHG | BODY MASS INDEX: 22.35 KG/M2

## 2022-11-09 LAB
ANION GAP SERPL CALCULATED.3IONS-SCNC: 10 MMOL/L (ref 3–16)
BLOOD CULTURE, ROUTINE: NORMAL
BUN BLDV-MCNC: 9 MG/DL (ref 7–20)
CALCIUM SERPL-MCNC: 8 MG/DL (ref 8.3–10.6)
CHLORIDE BLD-SCNC: 103 MMOL/L (ref 99–110)
CO2: 26 MMOL/L (ref 21–32)
CREAT SERPL-MCNC: 0.8 MG/DL (ref 0.8–1.3)
CULTURE, BLOOD 2: NORMAL
GFR SERPL CREATININE-BSD FRML MDRD: >60 ML/MIN/{1.73_M2}
GLUCOSE BLD-MCNC: 105 MG/DL (ref 70–99)
GLUCOSE BLD-MCNC: 88 MG/DL (ref 70–99)
GLUCOSE BLD-MCNC: 99 MG/DL (ref 70–99)
PERFORMED ON: NORMAL
PERFORMED ON: NORMAL
POTASSIUM SERPL-SCNC: 3.5 MMOL/L (ref 3.5–5.1)
PRO-BNP: 9746 PG/ML (ref 0–449)
SARS-COV-2, NAAT: NOT DETECTED
SODIUM BLD-SCNC: 139 MMOL/L (ref 136–145)

## 2022-11-09 PROCEDURE — 6360000002 HC RX W HCPCS: Performed by: STUDENT IN AN ORGANIZED HEALTH CARE EDUCATION/TRAINING PROGRAM

## 2022-11-09 PROCEDURE — 36415 COLL VENOUS BLD VENIPUNCTURE: CPT

## 2022-11-09 PROCEDURE — 80048 BASIC METABOLIC PNL TOTAL CA: CPT

## 2022-11-09 PROCEDURE — 6370000000 HC RX 637 (ALT 250 FOR IP): Performed by: INTERNAL MEDICINE

## 2022-11-09 PROCEDURE — 99232 SBSQ HOSP IP/OBS MODERATE 35: CPT | Performed by: NURSE PRACTITIONER

## 2022-11-09 PROCEDURE — 83880 ASSAY OF NATRIURETIC PEPTIDE: CPT

## 2022-11-09 PROCEDURE — 2580000003 HC RX 258: Performed by: INTERNAL MEDICINE

## 2022-11-09 PROCEDURE — 2580000003 HC RX 258: Performed by: STUDENT IN AN ORGANIZED HEALTH CARE EDUCATION/TRAINING PROGRAM

## 2022-11-09 PROCEDURE — 87635 SARS-COV-2 COVID-19 AMP PRB: CPT

## 2022-11-09 PROCEDURE — 6370000000 HC RX 637 (ALT 250 FOR IP): Performed by: NURSE PRACTITIONER

## 2022-11-09 PROCEDURE — 6360000002 HC RX W HCPCS: Performed by: INTERNAL MEDICINE

## 2022-11-09 RX ORDER — CARVEDILOL 6.25 MG/1
6.25 TABLET ORAL 2 TIMES DAILY WITH MEALS
Qty: 60 TABLET | Refills: 1
Start: 2022-11-09

## 2022-11-09 RX ORDER — SPIRONOLACTONE 25 MG/1
12.5 TABLET ORAL DAILY
Status: DISCONTINUED | OUTPATIENT
Start: 2022-11-09 | End: 2022-11-09 | Stop reason: HOSPADM

## 2022-11-09 RX ORDER — LACTOBACILLUS RHAMNOSUS GG 10B CELL
1 CAPSULE ORAL 2 TIMES DAILY WITH MEALS
Qty: 28 CAPSULE | Refills: 0
Start: 2022-11-09 | End: 2022-11-23

## 2022-11-09 RX ORDER — VALSARTAN 40 MG/1
20 TABLET ORAL NIGHTLY
Qty: 30 TABLET | Refills: 0
Start: 2022-11-09

## 2022-11-09 RX ORDER — CIPROFLOXACIN 250 MG/1
250 TABLET, FILM COATED ORAL EVERY 12 HOURS SCHEDULED
Qty: 6 TABLET | Refills: 0
Start: 2022-11-10 | End: 2022-11-13

## 2022-11-09 RX ORDER — CIPROFLOXACIN 500 MG/1
250 TABLET, FILM COATED ORAL EVERY 12 HOURS SCHEDULED
Status: DISCONTINUED | OUTPATIENT
Start: 2022-11-10 | End: 2022-11-09 | Stop reason: HOSPADM

## 2022-11-09 RX ORDER — ASPIRIN 81 MG/1
81 TABLET ORAL DAILY
Qty: 90 TABLET | Refills: 0
Start: 2022-11-09

## 2022-11-09 RX ORDER — SPIRONOLACTONE 25 MG/1
12.5 TABLET ORAL DAILY
Qty: 30 TABLET | Refills: 0
Start: 2022-11-09

## 2022-11-09 RX ADMIN — ASPIRIN 81 MG 81 MG: 81 TABLET ORAL at 12:31

## 2022-11-09 RX ADMIN — SERTRALINE HYDROCHLORIDE 100 MG: 50 TABLET ORAL at 12:31

## 2022-11-09 RX ADMIN — SODIUM CHLORIDE: 9 INJECTION, SOLUTION INTRAVENOUS at 14:30

## 2022-11-09 RX ADMIN — MEROPENEM 1000 MG: 1 INJECTION, POWDER, FOR SOLUTION INTRAVENOUS at 14:30

## 2022-11-09 RX ADMIN — SPIRONOLACTONE 12.5 MG: 25 TABLET, FILM COATED ORAL at 12:50

## 2022-11-09 RX ADMIN — Medication 1 CAPSULE: at 12:31

## 2022-11-09 RX ADMIN — ENOXAPARIN SODIUM 40 MG: 100 INJECTION SUBCUTANEOUS at 12:31

## 2022-11-09 RX ADMIN — MEROPENEM 1000 MG: 1 INJECTION, POWDER, FOR SOLUTION INTRAVENOUS at 05:30

## 2022-11-09 RX ADMIN — CARVEDILOL 6.25 MG: 6.25 TABLET, FILM COATED ORAL at 12:31

## 2022-11-09 ASSESSMENT — PAIN SCALES - GENERAL: PAINLEVEL_OUTOF10: 0

## 2022-11-09 NOTE — CARE COORDINATION
CASE MANAGEMENT DISCHARGE SUMMARY      Discharge to: [facility] completed: [facility] Exemption Notification (HENS) completed: N/A    IMM given: (date) Today    New Durable Medical Equipment ordered/agency: Defer to [facility]    Transportation:       Medical Transport explained to pt/family. Pt/family voice no agency preference.   None  Agency used:[facility]   time:5 pm   Ambulance form completed: Yes    Confirmed discharge plan with:MD. Sanjuana PAULINO/ Hipolito Sol legal guardian     Patient: yes     Family:  yes   Name: Carmen Cannon number: [phone]     Facility/Agency, name:  [facility] Per Sofie Mcdonough in admissions orders received from [facility]   Phone number for report to facility: [phone]     RN, name: Guru Amor

## 2022-11-09 NOTE — PROGRESS NOTES
[address]   Daily Progress Note    Admit Date:  11/3/2022  HPI:   Natividad Erazo presented from nursing facility with nausea vomiting and hematuria. Cardiology consulted for elevated troponin and abnormal EKG. He has a history of CAD with prior MI and PCI, hypertension, hyperlipidemia as well as COPD and dementia. Subjective:  Mr. Mack Rho awake, alert but pleasantly confused. Denies any chest pain.   Poor appetite/ intake    Objective:   Patient Vitals for the past 24 hrs:   BP Temp Temp src Pulse Resp SpO2 Weight   11/09/22 1230 (!) 147/77 -- -- 70 18 95 % --   11/09/22 0745 (!) 129/55 97.8 °F (36.6 °C) Oral 66 16 94 % --   11/09/22 0534 -- -- -- -- -- -- 160 lb 4.4 oz (72.7 kg)   11/09/22 0350 (!) 143/74 97.7 °F (36.5 °C) Oral 69 17 94 % --   11/08/22 2305 124/63 97.7 °F (36.5 °C) Oral 64 17 94 % --   11/08/22 2010 (!) 120/53 -- -- -- -- -- --   11/08/22 1930 (!) 120/53 98.1 °F (36.7 °C) Oral 71 18 94 % --         Intake/Output Summary (Last 24 hours) at 11/9/2022 1242  Last data filed at 11/9/2022 0557  Gross per 24 hour   Intake 300 ml   Output 1325 ml   Net -1025 ml       Wt Readings from Last 3 Encounters:   11/09/22 160 lb 4.4 oz (72.7 kg)   11/03/22 260 lb (117.9 kg)   07/26/22 186 lb 14.4 oz (84.8 kg)         ASSESSMENT:   NSTEMI: Denies chest pain though poor historian, received IV heparin x48 hours, on aspirin and beta-blocker  CAD: History of prior MI and PCI (details unknown), on aspirin and beta-blocker, intolerance listed to statins  Cardiomyopathy, ischemic: EF dropped to 35% by echo, + rales but no edema, weight down another 2 lbs, elevated BNP  Bacteremia with sepsis: Per IM  UTI  PILAR: Resolved  Dementia: Makes assessment of symptoms difficult  HYPERTENSION: Stable  HLD      PLAN:  Continue carvedilol 6.25 mg bid and valsartan 20 mg daily qHS  Add spironolactone 12.5 mg daily as mild diuretic (reviewed with Dr. Vikki Birmingham)  Continue aspirin  He is not a candidate for invasive Value Date/Time    TRIG 75 02/20/2011 04:20 AM    HDL 34 02/20/2011 04:20 AM    LDLCALC 77 02/20/2011 04:20 AM       Cardiac Imaging:   Echocardiogram 11/3/2022:   Summary   The left ventricle is normal in size with normal wall thickness. Left ventricular systolic function is moderately reduced with a visually estimated ejection fraction of  35-40 %. EF estimated by Jain's method at 35 %. The mid to apical septum, mid-apical inferior, mid-apical anterior, apical lateral segments appear  hypokinetic. Basilar hyperkinesis. Distribution of wall motion abnormalities seen with Takotsubo versus extensive infarction. Grade I diastolic dysfunction with normal LV pressure. There is no evidence of a left ventricular thrombus. Normal right ventricular size and function. Systolic pulmonary artery pressure (SPAP) is normal and estimated at 26 mmHg (right atrial pressure 8 mmHg). The IVC is dilated in size (>2.1 cm) but collapses >50% with respiration consistent with elevated right atrial pressures (8 mmHg) . Compared with the prior study 3/5/2018, significant changes have occurred. Echocardiogram 3/5/2018   Technically difficult examination. Normal left ventricle systolic function with an estimated ejection fraction of 55%. No regional wall motion abnormalities are seen. Grade I diastolic dysfunction with normal filing pressure. Aortic valve sclerosis without stenosis. Echo:  TTE 6/19/13:  Summary:   Overall left ventricular ejection fraction is estimated to be 50-55%. The left ventricular wall motion is normal.   Left ventricular systolic function is normal. (An ejection fraction >or= to 55% is considered normal)   The left ventricle is moderately dilated. Trace tricuspid regurgitation is present. There is trace mitral regurgitation. Mild aortic sclerosis present without evidence of stenosis. .   The diastolic function is impaired and classified as Grade 1 (impaired relaxation). Stress Test:  Pharmacologic Nuclear SPECT Stress 6/19/13:  Interpetation Summary:   The LV EF is 50%   Normal global and regional wall motion in all territories. There is a medium sized, fixed defect in the inferior wall. This defect is consistent with diaphragm attenuation. 1.Non-diagnostic ECG for ischemia with pharmocologic stress. 2.Negative nuclear stress imaging for inducible ischemia. 3.Nuclear stress image findings indicate low risk for future       ischemic event .

## 2022-11-09 NOTE — DISCHARGE SUMMARY
Hospital Medicine Discharge Summary    Patient ID: Megan Cruz      Patient's PCP: Matt Ferguson MD    Admit Date: 11/3/2022     Discharge Date: 11/9/2022      Admitting Provider: No admitting provider for patient encounter. Discharge Provider: Yared Garcia MD     Discharge Diagnoses: Active Hospital Problems    Diagnosis     NSTEMI (non-ST elevated myocardial infarction) ([facility] [I21.4]      Priority: Medium    Cardiomyopathy ([address] [I42.9]      Priority: Medium    Lactic acidosis [E87.20]      Priority: Medium    Demand ischemia ([address] [I24.8]      Priority: Medium    Acute metabolic encephalopathy [J51.85]      Priority: Medium    Urinary tract infection with hematuria [N39.0, R31.9]      Priority: Medium    Septic shock ([address] [A41.9, R65.21]      Priority: Medium    Dementia ([facility] [F03.90]     CAD (coronary artery disease) [I25.10]     HTN (hypertension) [I10]     HLD (hyperlipidemia) [E78.5]     Hypokalemia [E87.6]        The patient was seen and examined on day of discharge and this discharge summary is in conjunction with any daily progress note from day of discharge. Hospital Course:   History of Present Illness: This is a 82 y.o. WM with mild dementia, CAD, COPD, hx of duodenal ulcer, GERD, HLD, HTN hx of MI and recent septic shock admission for UTI who presents with nausea, vomiting and fever. Pt was also tachycardic. Had hematuria at his nursing facility as well. PT pleasantly demented and thinks he is currently at home. He has had an abnormal EKG and elevated troponin as well. Pt meets criteria for septic shock POA based on hypotension, lactic acidosis, documented UTI, tachycardia, PILAR, leukopenia. Septic shock : resolved,Hemodynamically , pt was stabilized and was no longer requiring any pressor support. Continued with IVF and  and abx. Bacteremia -blood cultures growing Proteus. Possibly from UTI. This is susceptible to  meropenem/cipro.   Continue antibiotics, continue supportive care, and will follow. -repeat bcx were ngtd   -switched to po abx on dc to complete course    Acute metabolic encephalopathy in setting of dementia - due to severe sepsis. He seems to be close to his baseline status. Continue supportive care and follow   MBS done, rec minced/moist and thin liquids, no straws, meds crushed in puree     NSTEMI vs demand ischemia -he denies any chest pain. Has no other significant symptoms at this time. He was on  IV heparin which was now discontinued. May be more than demand ischemia with hx of CAD - ECHO done   - cardiology was following. noted prior hx of cad/MI, Continued medical therapy. Added bb and arb and spironolactone     Cardiomyopathy-new dx this admission, Echo done(ef 35%, basilar hyperkinesis, hypokinesis of mid to apical septum, mid-apical inferior, mid- apical anterior, apical lateral segments, g1 dd, no LV thrombus)  -started on bb, arb  -added spironolactone  -pt not a candidate for intervention given dementia/no chest pain    Acute kidney injury -renal functions are improved today. Continued IV fluids, follow urine output avoid nephrotoxic agents and will follow closely. - pt likely candidate for PICC line once blood cx clear - not a good HD candidate          Physical Exam Performed:     BP (!) 147/77   Pulse 70   Temp 97.8 °F (36.6 °C) (Oral)   Resp 18   Wt 160 lb 4.4 oz (72.7 kg)   SpO2 95%   BMI 22.35 kg/m²       General appearance:  No apparent distress, appears stated age and cooperative. HEENT:  Normal cephalic, atraumatic without obvious deformity. Pupils equal, round, and reactive to light. Extra ocular muscles intact. Conjunctivae/corneas clear. Neck: Supple, with full range of motion. No jugular venous distention. Trachea midline. Respiratory:  Normal respiratory effort. Clear to auscultation, bilaterally without Rales/Wheezes/Rhonchi.   Cardiovascular:  Regular rate and rhythm with normal S1/S2 without murmurs, rubs or gallops. Abdomen: Soft, non-tender, non-distended with normal bowel sounds. Musculoskeletal:  No clubbing, cyanosis or edema bilaterally. Full range of motion without deformity. Skin: Skin color, texture, turgor normal.  No rashes or lesions. Neurologic:  Neurovascularly intact without any focal sensory/motor deficits. Cranial nerves: II-XII intact, grossly non-focal.  Psychiatric:  Alert and oriented, thought content appropriate, normal insight  Capillary Refill: Brisk,< 3 seconds   Peripheral Pulses: +2 palpable, equal bilaterally       Labs: For convenience and continuity at follow-up the following most recent labs are provided:      CBC:    Lab Results   Component Value Date/Time    WBC 9.2 11/08/2022 05:03 AM    HGB 11.8 11/08/2022 05:03 AM    HCT 36.4 11/08/2022 05:03 AM     11/08/2022 05:03 AM       Renal:    Lab Results   Component Value Date/Time     11/09/2022 05:00 AM    K 3.5 11/09/2022 05:00 AM    K 3.7 11/03/2022 05:06 AM     11/09/2022 05:00 AM    CO2 26 11/09/2022 05:00 AM    BUN 9 11/09/2022 05:00 AM    CREATININE 0.8 11/09/2022 05:00 AM    CALCIUM 8.0 11/09/2022 05:00 AM    PHOS 2.7 09/19/2020 07:32 AM         Significant Diagnostic Studies    Radiology:   FL MODIFIED BARIUM SWALLOW W VIDEO   Final Result   No lakisha aspiration. Laryngeal penetration with thin liquid. Please see separate speech pathology report for full discussion of findings   and recommendations. XR CHEST PORTABLE   Final Result   Bibasilar opacities, correlating with bronchial wall thickening and   atelectasis. Bronchial wall thickening is nonspecific for acute or chronic   bronchitis or reactive airway disease. There is no significant change since   yesterday.                 Consults:     IP CONSULT TO CRITICAL CARE  IP CONSULT TO CASE MANAGEMENT  PHARMACY TO DOSE VANCOMYCIN    Disposition:  Long term care([facility])     Condition at Discharge: Stable    Discharge Instructions/Follow-up:  [facility] as outpt in 1 month    Code Status:  FULL    Activity: activity as tolerated    Diet:  dysphagia minced and moist      Discharge Medications:     Discharge Medication List as of 11/9/2022  4:57 PM             Details   lactobacillus (CULTURELLE) capsule Take 1 capsule by mouth 2 times daily (with meals) for 14 days, Disp-28 capsule, R-0NO PRINT      aspirin EC 81 MG EC tablet Take 1 tablet by mouth daily, Disp-90 tablet, R-0NO PRINT      valsartan (DIOVAN) 40 MG tablet Take 0.5 tablets by mouth at bedtime, Disp-30 tablet, R-0NO PRINT      carvedilol (COREG) 6.25 MG tablet Take 1 tablet by mouth 2 times daily (with meals), Disp-60 tablet, R-1NO PRINT      spironolactone (ALDACTONE) 25 MG tablet Take 0.5 tablets by mouth daily, Disp-30 tablet, R-0NO PRINT      ciprofloxacin (CIPRO) 250 MG tablet Take 1 tablet by mouth every 12 hours for 6 doses, Disp-6 tablet, R-0NO PRINT                Details   melatonin 3 MG TABS tablet Take 3 mg by mouth in the morning. Historical Med      sertraline (ZOLOFT) 50 MG tablet Take 100 mg by mouth in the morning. Historical Med      pregabalin (LYRICA) 25 MG capsule Take 75 mg by mouth in the morning and 75 mg at noon and 75 mg before bedtime. Historical Med      omeprazole (PRILOSEC) 20 MG delayed release capsule Take 20 mg by mouth dailyHistorical Med      magnesium hydroxide (MILK OF MAGNESIA) 400 MG/5ML suspension Take 30 mLs by mouth daily as needed for ConstipationHistorical Med      bisacodyl (DULCOLAX) 10 MG suppository Place 10 mg rectally daily as needed for Constipation If MOM ineffective. Historical Med      acetaminophen (TYLENOL) 325 MG tablet Take 650 mg by mouth every 6 hours as needed for PainHistorical Med             Time Spent on discharge is more than 30 minutes in the examination, evaluation, counseling and review of medications and discharge plan.       Signed:    Galileo Spangler MD   11/10/2022      Thank you Trinity Whittington MD for the opportunity to be involved in this patient's care. If you have any questions or concerns, please feel free to contact me at [phone].

## 2022-11-09 NOTE — CARE COORDINATION
CMA scheduled transport with [facility] at 1630 to take patient to [facility]. CM is aware.

## 2022-11-09 NOTE — CARE COORDINATION
Cancelled transport for 4:30 for Melchor Alanis because was unsure if MD was going to send  today. During MD rounds MD stated he could discharge today. Rescheduled for 5 PM with [facility].

## 2022-11-09 NOTE — PROGRESS NOTES
Gave report to [facility] at [address].      Removing gil per [address]

## 2022-11-09 NOTE — FLOWSHEET NOTE
11/09/22 0745   Vital Signs   Temp 97.8 °F (36.6 °C)   Temp Source Oral   Heart Rate 66   Heart Rate Source Monitor   Resp 16   BP (!) 129/55   BP Location Left upper arm   MAP (Calculated) 79.67   Patient Position Semi fowlers   Level of Consciousness 0   MEWS Score 1   Pain Assessment   Pain Assessment None - Denies Pain   Pain Level 0   Oxygen Therapy   SpO2 94 %   O2 Device None (Room air)

## 2022-11-09 NOTE — DISCHARGE INSTRUCTIONS
FOLLOW-UP APPOINTMENTS    [place] OFFICE - Follow-up appointment on January 17th at 3:00pm with Dr Tres Becker, [address]. [facility],  [facility], [address] [place] [address] [address], [address] [place], [place], [facility]. Office #: [phone]. If you are unable to make this appointment, please call to reschedule. Directions to [place] [address] towards [place]. [address] [facility] exit. Right off exit. Cross over [employer]. Right on [address] Left into hospital. Follow the signs to the emergency room ( turn left toward the Emergency room). Go right at the first stop sign. Just past the Emergency room at the second stop sign turn right and go up the ramp and park on the top level if possible. Go in the glass doors of the [facility] we on the top level of the garage [address]. As soon as you get in the door turn left and our office is the one with the glass doors.

## 2022-11-09 NOTE — PLAN OF CARE
Problem: Discharge Planning  Goal: Discharge to home or other facility with appropriate resources  Outcome: Progressing  Flowsheets (Taken 11/8/2022 0644 by Luis Felipe Burch RN)  Discharge to home or other facility with appropriate resources: Arrange for needed discharge resources and transportation as appropriate     Problem: Skin/Tissue Integrity  Goal: Absence of new skin breakdown  Description: 1. Monitor for areas of redness and/or skin breakdown  2. Assess vascular access sites hourly  3. Every 4-6 hours minimum:  Change oxygen saturation probe site  4. Every 4-6 hours:  If on nasal continuous positive airway pressure, respiratory therapy assess nares and determine need for appliance change or resting period.   Outcome: Progressing     Problem: ABCDS Injury Assessment  Goal: Absence of physical injury  Outcome: Progressing     Problem: Pain  Goal: Verbalizes/displays adequate comfort level or baseline comfort level  Outcome: Progressing     Problem: Safety - Adult  Goal: Free from fall injury  Outcome: Progressing

## 2022-12-06 ENCOUNTER — HOSPITAL ENCOUNTER (EMERGENCY)
Age: 87
Discharge: NURSING, REHAB OR LONG-TERM CARE | End: 2022-12-06
Attending: EMERGENCY MEDICINE
Payer: MEDICARE

## 2022-12-06 ENCOUNTER — APPOINTMENT (OUTPATIENT)
Dept: GENERAL RADIOLOGY | Age: 87
End: 2022-12-06
Payer: MEDICARE

## 2022-12-06 VITALS
HEIGHT: 67 IN | RESPIRATION RATE: 20 BRPM | SYSTOLIC BLOOD PRESSURE: 135 MMHG | TEMPERATURE: 99.1 F | BODY MASS INDEX: 23.54 KG/M2 | DIASTOLIC BLOOD PRESSURE: 69 MMHG | WEIGHT: 150 LBS | OXYGEN SATURATION: 95 % | HEART RATE: 79 BPM

## 2022-12-06 DIAGNOSIS — E86.0 DEHYDRATION: Primary | ICD-10-CM

## 2022-12-06 DIAGNOSIS — Z86.16 HISTORY OF COVID-19: ICD-10-CM

## 2022-12-06 DIAGNOSIS — R41.0 CONFUSION: ICD-10-CM

## 2022-12-06 LAB
A/G RATIO: 1 (ref 1.1–2.2)
ALBUMIN SERPL-MCNC: 3.6 G/DL (ref 3.4–5)
ALP BLD-CCNC: 113 U/L (ref 40–129)
ALT SERPL-CCNC: <5 U/L (ref 10–40)
ANION GAP SERPL CALCULATED.3IONS-SCNC: 8 MMOL/L (ref 3–16)
AST SERPL-CCNC: 12 U/L (ref 15–37)
BASOPHILS ABSOLUTE: 0 K/UL (ref 0–0.2)
BASOPHILS RELATIVE PERCENT: 0.4 %
BILIRUB SERPL-MCNC: 0.3 MG/DL (ref 0–1)
BILIRUBIN URINE: NEGATIVE
BLOOD, URINE: NEGATIVE
BUN BLDV-MCNC: 25 MG/DL (ref 7–20)
CALCIUM SERPL-MCNC: 9.2 MG/DL (ref 8.3–10.6)
CHLORIDE BLD-SCNC: 101 MMOL/L (ref 99–110)
CLARITY: CLEAR
CO2: 27 MMOL/L (ref 21–32)
COLOR: YELLOW
CREAT SERPL-MCNC: 1.4 MG/DL (ref 0.8–1.3)
EKG ATRIAL RATE: 100 BPM
EKG DIAGNOSIS: NORMAL
EKG Q-T INTERVAL: 382 MS
EKG QRS DURATION: 100 MS
EKG QTC CALCULATION (BAZETT): 451 MS
EKG R AXIS: -9 DEGREES
EKG T AXIS: 132 DEGREES
EKG VENTRICULAR RATE: 84 BPM
EOSINOPHILS ABSOLUTE: 0.7 K/UL (ref 0–0.6)
EOSINOPHILS RELATIVE PERCENT: 11.7 %
GFR SERPL CREATININE-BSD FRML MDRD: 49 ML/MIN/{1.73_M2}
GLUCOSE BLD-MCNC: 88 MG/DL (ref 70–99)
GLUCOSE URINE: NEGATIVE MG/DL
HCT VFR BLD CALC: 39.5 % (ref 40.5–52.5)
HEMOGLOBIN: 12.8 G/DL (ref 13.5–17.5)
KETONES, URINE: NEGATIVE MG/DL
LEUKOCYTE ESTERASE, URINE: NEGATIVE
LYMPHOCYTES ABSOLUTE: 1.3 K/UL (ref 1–5.1)
LYMPHOCYTES RELATIVE PERCENT: 22.7 %
MCH RBC QN AUTO: 26.8 PG (ref 26–34)
MCHC RBC AUTO-ENTMCNC: 32.3 G/DL (ref 31–36)
MCV RBC AUTO: 83.1 FL (ref 80–100)
MICROSCOPIC EXAMINATION: NORMAL
MONOCYTES ABSOLUTE: 0.4 K/UL (ref 0–1.3)
MONOCYTES RELATIVE PERCENT: 7.3 %
NEUTROPHILS ABSOLUTE: 3.3 K/UL (ref 1.7–7.7)
NEUTROPHILS RELATIVE PERCENT: 57.9 %
NITRITE, URINE: NEGATIVE
PDW BLD-RTO: 17.1 % (ref 12.4–15.4)
PH UA: 6 (ref 5–8)
PLATELET # BLD: 173 K/UL (ref 135–450)
PMV BLD AUTO: 8 FL (ref 5–10.5)
POTASSIUM REFLEX MAGNESIUM: 4.1 MMOL/L (ref 3.5–5.1)
PROTEIN UA: NEGATIVE MG/DL
RBC # BLD: 4.75 M/UL (ref 4.2–5.9)
SODIUM BLD-SCNC: 136 MMOL/L (ref 136–145)
SPECIFIC GRAVITY UA: 1.02 (ref 1–1.03)
TOTAL PROTEIN: 7.1 G/DL (ref 6.4–8.2)
TROPONIN: <0.01 NG/ML
URINE REFLEX TO CULTURE: NORMAL
URINE TYPE: NORMAL
UROBILINOGEN, URINE: 1 E.U./DL
WBC # BLD: 5.7 K/UL (ref 4–11)

## 2022-12-06 PROCEDURE — 80053 COMPREHEN METABOLIC PANEL: CPT

## 2022-12-06 PROCEDURE — 84484 ASSAY OF TROPONIN QUANT: CPT

## 2022-12-06 PROCEDURE — 71045 X-RAY EXAM CHEST 1 VIEW: CPT

## 2022-12-06 PROCEDURE — 36415 COLL VENOUS BLD VENIPUNCTURE: CPT

## 2022-12-06 PROCEDURE — 96360 HYDRATION IV INFUSION INIT: CPT

## 2022-12-06 PROCEDURE — 85025 COMPLETE CBC W/AUTO DIFF WBC: CPT

## 2022-12-06 PROCEDURE — 2580000003 HC RX 258: Performed by: EMERGENCY MEDICINE

## 2022-12-06 PROCEDURE — 93005 ELECTROCARDIOGRAM TRACING: CPT | Performed by: EMERGENCY MEDICINE

## 2022-12-06 PROCEDURE — 81003 URINALYSIS AUTO W/O SCOPE: CPT

## 2022-12-06 PROCEDURE — 99285 EMERGENCY DEPT VISIT HI MDM: CPT

## 2022-12-06 PROCEDURE — 93010 ELECTROCARDIOGRAM REPORT: CPT | Performed by: INTERNAL MEDICINE

## 2022-12-06 PROCEDURE — 96361 HYDRATE IV INFUSION ADD-ON: CPT

## 2022-12-06 RX ORDER — 0.9 % SODIUM CHLORIDE 0.9 %
1000 INTRAVENOUS SOLUTION INTRAVENOUS ONCE
Status: COMPLETED | OUTPATIENT
Start: 2022-12-06 | End: 2022-12-06

## 2022-12-06 RX ADMIN — SODIUM CHLORIDE 1000 ML: 9 INJECTION, SOLUTION INTRAVENOUS at 13:42

## 2022-12-06 ASSESSMENT — PAIN - FUNCTIONAL ASSESSMENT: PAIN_FUNCTIONAL_ASSESSMENT: 0-10

## 2022-12-06 ASSESSMENT — PAIN SCALES - GENERAL: PAINLEVEL_OUTOF10: 8

## 2022-12-06 NOTE — DISCHARGE INSTRUCTIONS
Your kidney function was slightly increased at 1.4 today (baseline of 0.8-1.1). Please have your doctor recheck it in the next several days to week to ensure it is improving as expected with hydration.

## 2022-12-06 NOTE — ED NOTES
[id number] [facility] ECG completed and given to Dr. Kalli Tristan for review.       Kayla Jeffery  12/06/22 1225

## 2022-12-06 NOTE — ED PROVIDER NOTES
[address] ED      CHIEF COMPLAINT  Altered Mental Status (Per Ángel, pt not at baseline. )       HISTORY OF PRESENT ILLNESS  Marco Antonio Gleason is a 82 y.o. male  who presents to the ED complaining of concern for bradycardia, hypoxia and altered mental status. Patient currently resides in a nursing facility. He has a significant history of COPD, cardiac history as well and has dementia. At baseline he does have some confusion but seem to be more confused than usual.  Per report, he was not recognizing the nursing staff as usual.  He is also reportedly bradycardic although in route EMS noted that his heart rate has been in the 60s. Here his heart rate is currently around 80s during my assessment. He denies any chest pain. He denies any vomiting. He does state that he has been feeling slightly short of breath and per report has been tested and positive COVID. No report of any fall. No focal numbness tingling or weakness. No other complaints, modifying factors or associated symptoms. I have reviewed the following from the nursing documentation.     Past Medical History:   Diagnosis Date    Atherosclerotic heart disease     CAD (coronary artery disease)     Cancer (HCC)     skin    COPD (chronic obstructive pulmonary disease) (HCC)     Dementia (HCC)     Depression     Duodenal ulcer without hemorrhage or perforation     Falls frequently     GERD without esophagitis     Hernia     Hyperlipidemia     Hypertension     Hypokalemia     Lack of coordination     falls    Melanoma ([address]     MI (myocardial infarction) ([address]     Muscle weakness (generalized)     Protein calorie malnutrition ([address]     Reflux     Sepsis ([address]     Spinal stenosis     UTI (urinary tract infection)      Past Surgical History:   Procedure Laterality Date    CHOLECYSTECTOMY      COLONOSCOPY  8/10/12    CORONARY ANGIOPLASTY WITH STENT PLACEMENT      PROSTATE BIOPSY      SKIN BIOPSY      UPPER GASTROINTESTINAL ENDOSCOPY 5/14/12    UPPER GASTROINTESTINAL ENDOSCOPY N/A 6/11/2019    EGD BIOPSY performed by Malinda Graff DO at [facility] N/A 8/8/2019    EGD performed by Corin Orozco MD at [address] [place] [address] Real     History reviewed. No pertinent family history. Social History     Socioeconomic History    Marital status:      Spouse name: Not on file    Number of children: Not on file    Years of education: Not on file    Highest education level: Not on file   Occupational History    Not on file   Tobacco Use    Smoking status: Former     Packs/day: 0.25     Years: 10.00     Pack years: 2.50     Types: Cigarettes    Smokeless tobacco: Never   Vaping Use    Vaping Use: Never used   Substance and Sexual Activity    Alcohol use: No    Drug use: No    Sexual activity: Not Currently   Other Topics Concern    Not on file   Social History Narrative    Not on file     Social Determinants of Health     Financial Resource Strain: Not on file   Food Insecurity: Not on file   Transportation Needs: Not on file   Physical Activity: Not on file   Stress: Not on file   Social Connections: Not on file   Intimate Partner Violence: Not on file   Housing Stability: Not on file     No current facility-administered medications for this encounter. Current Outpatient Medications   Medication Sig Dispense Refill    aspirin EC 81 MG EC tablet Take 1 tablet by mouth daily 90 tablet 0    valsartan (DIOVAN) 40 MG tablet Take 0.5 tablets by mouth at bedtime 30 tablet 0    carvedilol (COREG) 6.25 MG tablet Take 1 tablet by mouth 2 times daily (with meals) 60 tablet 1    spironolactone (ALDACTONE) 25 MG tablet Take 0.5 tablets by mouth daily 30 tablet 0    melatonin 3 MG TABS tablet Take 3 mg by mouth in the morning. sertraline (ZOLOFT) 50 MG tablet Take 100 mg by mouth in the morning.       pregabalin (LYRICA) 25 MG capsule Take 75 mg by mouth in the morning and 75 mg at noon and 75 mg before bedtime. omeprazole (PRILOSEC) 20 MG delayed release capsule Take 20 mg by mouth daily      magnesium hydroxide (MILK OF MAGNESIA) 400 MG/5ML suspension Take 30 mLs by mouth daily as needed for Constipation      bisacodyl (DULCOLAX) 10 MG suppository Place 10 mg rectally daily as needed for Constipation If MOM ineffective. acetaminophen (TYLENOL) 325 MG tablet Take 650 mg by mouth every 6 hours as needed for Pain       Allergies   Allergen Reactions    Fluoxetine     Benazepril      Swollen tongue    Hydrocodone Other (See Comments)    Morphine     Penicillins Other (See Comments)    Simvastatin Other (See Comments)    Morphine And Related Nausea And Vomiting    Sucralfate Rash       REVIEW OF SYSTEMS  10 systems reviewed, pertinent positives per HPI otherwise noted to be negative. PHYSICAL EXAM  BP (!) 115/57   Pulse 59   Temp 99.1 °F (37.3 °C) (Oral)   Resp 14   Ht 5' 7\" (1.702 m)   Wt 150 lb (68 kg)   SpO2 95%   BMI 23.49 kg/m²    GENERAL APPEARANCE: Awake and alert. Cooperative. Chronically ill-appearing. No acute distress. HENT: Normocephalic. Atraumatic. Mucous membranes are slightly tacky. No drooling or stridor. NECK: Supple. EYES: PERRL. EOM's grossly intact. HEART/CHEST: RRR. No murmurs. 2+ radial pulses bilaterally. LUNGS: Respirations unlabored. No gross wheezes rales or rhonchi appreciated. Grossly clear. Velora Kales air exchange. Speaking comfortably in full sentences. ABDOMEN: No tenderness. Soft. Non-distended. No masses. No organomegaly. No guarding or rebound. MUSCULOSKELETAL: No extremity edema. Compartments soft. No deformity. No tenderness in the extremities. All extremities neurovascularly intact. SKIN: Warm and dry. No acute rashes. NEUROLOGICAL: Alert and oriented to person only. CN's 2-12 intact. No gross facial drooping. Moves all 4 extremities without gross focal deficit appreciated. PSYCHIATRIC: Normal mood and affect.     LABS  I have reviewed Urine Negative Negative    Leukocyte Esterase, Urine Negative Negative    Microscopic Examination Not Indicated     Urine Type NotGiven     Urine Reflex to Culture Not Indicated    EKG 12 Lead   Result Value Ref Range    Ventricular Rate 84 BPM    Atrial Rate 100 BPM    QRS Duration 100 ms    Q-T Interval 382 ms    QTc Calculation (Bazett) 451 ms    R Axis -9 degrees    T Axis 132 degrees    Diagnosis       Accelerated Junctional rhythm with frequent Premature ventricular complexesIncomplete right bundle branch blockST & T wave abnormality, consider anterior ischemiaAbnormal ECGWhen compared with ECG of 04-NOV-2022 19:55,Junctional rhythm has replaced Sinus rhythmNon-specific change in ST segment in Anterior leadsNonspecific T wave abnormality, improved in Inferior leadsNonspecific T wave abnormality has replaced inverted T waves in Lateral leads         ECG  The Ekg interpreted by me shows  normal sinus rhythm with a rate of 84 with PVCs  Axis is   Normal  QTc is  within an acceptable range  Intervals and Durations are unremarkable. ST Segments: nonspecific changes  No significant change from prior EKG dated 11/4/22    RADIOLOGY  XR CHEST PORTABLE    Result Date: 12/6/2022  EXAMINATION: ONE XRAY VIEW OF THE CHEST 12/6/2022 12:40 pm COMPARISON: 11/04/2022 HISTORY: ORDERING SYSTEM PROVIDED HISTORY: ams TECHNOLOGIST PROVIDED HISTORY: Reason for exam:->ams Reason for Exam: AMS FINDINGS: Cardiomediastinal silhouette appears unremarkable. Interstitial coarsening redemonstrated bilaterally. No parenchymal consolidation. No effusion or pneumothorax. No aggressive osseous lesion. Chronic left-side rib fracture deformities. Stable appearing interstitial coarsening bilaterally which may reflect atypical infection and/or reactive airways disease. Pulmonary interstitial edema considered less likely. No superimposed consolidation.      FL MODIFIED BARIUM SWALLOW W VIDEO    Result Date: 11/7/2022  EXAMINATION: MODIFIED BARIUM SWALLOW WAS PERFORMED IN CONJUNCTION WITH SPEECH PATHOLOGY SERVICES TECHNIQUE: Under fluoroscopic evaluation cineradiography/videoradiography recordings were performed in conjunction with the speech-language pathologist (SLP). Various liquid, solid and/or semi-solid barium preparations were used to assess swallowing function. FLUOROSCOPY DOSE AND TYPE OR TIME AND EXPOSURES: 2.2 minutes fluoroscopy, 12 fluoroscopic loop images, 5.65 mGy COMPARISON: None HISTORY: ORDERING SYSTEM PROVIDED HISTORY: SLP recs, aspiration risk TECHNOLOGIST PROVIDED HISTORY: Reason for exam:->SLP recs, aspiration risk FINDINGS: The study was conducted in conjunction with the department speech pathology. Laryngeal penetration was seen with thin liquid. No evidence of aspiration. No penetration or aspiration was seen with additional administered consistencies. Patient tolerated procedure well without immediate complication. No lakisha aspiration. Laryngeal penetration with thin liquid. Please see separate speech pathology report for full discussion of findings and recommendations. ED COURSE/MDM  Patient seen and evaluated. Old records reviewed. Labs and imaging reviewed and results discussed with patient. Patient presenting for evaluation of concerns for possible altered mental status. He has a history of dementia and is able to identify himself. Certainly he may have had some delirium. However, he is answering questions appropriately at this time. He has not had any significant bradycardia for rest.  He was recently diagnosed with COVID per report and does appear to be somewhat dehydrated with mild elevation of his creatinine at 1.4. We will hydrate the patient. He denies any further complaints other than some fatigue. At this time I felt it was reasonable to discharge him back to the nursing facility.   He has no confluent infiltrate or definite evidence of pneumonia as his x-ray is showing some possible atypical infectious changes which I would feel are consistent with the COVID that he has been diagnosed with. He is not hypoxic or having any increased work of breathing. No evidence of urinary infection. No complaint of any chest or abdominal pain at this time. No fall or head injury. Will discharge back to the nursing facility at this time with close follow-up and recheck also of his renal function as an outpatient. All questions answered at time of discharge. I, Dr. Nunu Santa MD, am the primary clinician of record. Is this patient to be included in the SEP-1 Core Measure? No   Exclusion criteria - the patient is NOT to be included for SEP-1 Core Measure due to:  Viral etiology found or highly suspected (including COVID-19) without concomitant bacterial infection     During the patient's ED course, the patient was given:  Medications   0.9 % sodium chloride bolus (0 mLs IntraVENous Stopped 12/6/22 1529)        CLINICAL IMPRESSION  1. Dehydration    2. Confusion    3. History of COVID-19        Blood pressure (!) 115/57, pulse 59, temperature 99.1 °F (37.3 °C), temperature source Oral, resp. rate 14, height 5' 7\" (1.702 m), weight 150 lb (68 kg), SpO2 95 %. Deuce Soto was discharged to home to his SNF in stable condition. Patient was given scripts for the following medications. I counseled patient how to take these medications. New Prescriptions    No medications on file       Follow-up with:  Fanny Delgado, [address] [facility], [place], operated by [facility]  [phone]    Schedule an appointment as soon as possible for a visit in 2 days  For recheck and to have your kidney function rechecked    DISCLAIMER: This chart was created using Dragon dictation software. Efforts were made by me to ensure accuracy, however some errors may be present due to limitations of this technology and occasionally words are not transcribed correctly.         Sharda Lowe Jennifer Oliva MD  12/06/22 9577

## 2022-12-06 NOTE — ED NOTES
[facility] for report. Report given to Michelle Crowley.       Solitario Delgado, FÉLIX  12/06/22 3066

## 2023-03-13 ENCOUNTER — HOSPITAL ENCOUNTER (INPATIENT)
Age: 88
LOS: 3 days | Discharge: HOME | DRG: 177 | End: 2023-03-16
Attending: EMERGENCY MEDICINE | Admitting: INTERNAL MEDICINE
Payer: MEDICARE

## 2023-03-13 ENCOUNTER — APPOINTMENT (OUTPATIENT)
Dept: GENERAL RADIOLOGY | Age: 88
DRG: 177 | End: 2023-03-13
Payer: MEDICARE

## 2023-03-13 ENCOUNTER — APPOINTMENT (OUTPATIENT)
Dept: CT IMAGING | Age: 88
DRG: 177 | End: 2023-03-13
Payer: MEDICARE

## 2023-03-13 DIAGNOSIS — J18.9 COMMUNITY ACQUIRED PNEUMONIA, UNSPECIFIED LATERALITY: ICD-10-CM

## 2023-03-13 DIAGNOSIS — K29.61 GASTROINTESTINAL HEMORRHAGE ASSOCIATED WITH OTHER GASTRITIS: ICD-10-CM

## 2023-03-13 DIAGNOSIS — J96.01 ACUTE RESPIRATORY FAILURE WITH HYPOXIA (HCC): Primary | ICD-10-CM

## 2023-03-13 LAB
A/G RATIO: 1 (ref 1.1–2.2)
ALBUMIN SERPL-MCNC: 3.8 G/DL (ref 3.4–5)
ALP BLD-CCNC: 113 U/L (ref 40–129)
ALT SERPL-CCNC: 9 U/L (ref 10–40)
ANION GAP SERPL CALCULATED.3IONS-SCNC: 12 MMOL/L (ref 3–16)
AST SERPL-CCNC: 20 U/L (ref 15–37)
BACTERIA: ABNORMAL /HPF
BASE EXCESS VENOUS: -2.5 MMOL/L (ref -3–3)
BASOPHILS ABSOLUTE: 0 K/UL (ref 0–0.2)
BASOPHILS RELATIVE PERCENT: 0.2 %
BILIRUB SERPL-MCNC: 0.5 MG/DL (ref 0–1)
BILIRUBIN URINE: NEGATIVE
BLOOD, URINE: ABNORMAL
BUN BLDV-MCNC: 29 MG/DL (ref 7–20)
CALCIUM SERPL-MCNC: 9.2 MG/DL (ref 8.3–10.6)
CARBOXYHEMOGLOBIN: 5.2 % (ref 0–1.5)
CHLORIDE BLD-SCNC: 105 MMOL/L (ref 99–110)
CLARITY: ABNORMAL
CO2: 21 MMOL/L (ref 21–32)
COLOR: YELLOW
CREAT SERPL-MCNC: 1.3 MG/DL (ref 0.8–1.3)
EOSINOPHILS ABSOLUTE: 0.5 K/UL (ref 0–0.6)
EOSINOPHILS RELATIVE PERCENT: 5.2 %
GFR SERPL CREATININE-BSD FRML MDRD: 54 ML/MIN/{1.73_M2}
GLUCOSE BLD-MCNC: 130 MG/DL (ref 70–99)
GLUCOSE URINE: NEGATIVE MG/DL
HCO3 VENOUS: 21.1 MMOL/L (ref 23–29)
HCT VFR BLD CALC: 42.2 % (ref 40.5–52.5)
HEMOGLOBIN: 13.9 G/DL (ref 13.5–17.5)
KETONES, URINE: ABNORMAL MG/DL
LACTIC ACID, SEPSIS: 1.5 MMOL/L (ref 0.4–1.9)
LEUKOCYTE ESTERASE, URINE: NEGATIVE
LIPASE: 10 U/L (ref 13–60)
LYMPHOCYTES ABSOLUTE: 0.4 K/UL (ref 1–5.1)
LYMPHOCYTES RELATIVE PERCENT: 3.9 %
MCH RBC QN AUTO: 28.6 PG (ref 26–34)
MCHC RBC AUTO-ENTMCNC: 33 G/DL (ref 31–36)
MCV RBC AUTO: 86.6 FL (ref 80–100)
METHEMOGLOBIN VENOUS: 0 %
MICROSCOPIC EXAMINATION: YES
MONOCYTES ABSOLUTE: 0.4 K/UL (ref 0–1.3)
MONOCYTES RELATIVE PERCENT: 4 %
NEUTROPHILS ABSOLUTE: 8.2 K/UL (ref 1.7–7.7)
NEUTROPHILS RELATIVE PERCENT: 86.7 %
NITRITE, URINE: NEGATIVE
O2 SAT, VEN: 83 %
O2 THERAPY: ABNORMAL
PCO2, VEN: 33.5 MMHG (ref 40–50)
PDW BLD-RTO: 17.8 % (ref 12.4–15.4)
PH UA: 6 (ref 5–8)
PH VENOUS: 7.42 (ref 7.35–7.45)
PLATELET # BLD: 167 K/UL (ref 135–450)
PMV BLD AUTO: 8.3 FL (ref 5–10.5)
PO2, VEN: 45.3 MMHG (ref 25–40)
POTASSIUM REFLEX MAGNESIUM: 4.9 MMOL/L (ref 3.5–5.1)
PROTEIN UA: ABNORMAL MG/DL
RAPID INFLUENZA  B AGN: NEGATIVE
RAPID INFLUENZA A AGN: NEGATIVE
RBC # BLD: 4.88 M/UL (ref 4.2–5.9)
RBC UA: >100 /HPF (ref 0–4)
SARS-COV-2, NAAT: NOT DETECTED
SODIUM BLD-SCNC: 138 MMOL/L (ref 136–145)
SPECIFIC GRAVITY UA: 1.02 (ref 1–1.03)
TCO2 CALC VENOUS: 22 MMOL/L
TOTAL PROTEIN: 7.5 G/DL (ref 6.4–8.2)
TROPONIN: <0.01 NG/ML
URINE REFLEX TO CULTURE: ABNORMAL
URINE TYPE: ABNORMAL
UROBILINOGEN, URINE: 0.2 E.U./DL
WBC # BLD: 9.5 K/UL (ref 4–11)
WBC UA: ABNORMAL /HPF (ref 0–5)

## 2023-03-13 PROCEDURE — 2580000003 HC RX 258: Performed by: INTERNAL MEDICINE

## 2023-03-13 PROCEDURE — 80053 COMPREHEN METABOLIC PANEL: CPT

## 2023-03-13 PROCEDURE — 6360000002 HC RX W HCPCS: Performed by: INTERNAL MEDICINE

## 2023-03-13 PROCEDURE — 84145 PROCALCITONIN (PCT): CPT

## 2023-03-13 PROCEDURE — 96367 TX/PROPH/DG ADDL SEQ IV INF: CPT

## 2023-03-13 PROCEDURE — 99285 EMERGENCY DEPT VISIT HI MDM: CPT

## 2023-03-13 PROCEDURE — 6360000004 HC RX CONTRAST MEDICATION: Performed by: EMERGENCY MEDICINE

## 2023-03-13 PROCEDURE — 36415 COLL VENOUS BLD VENIPUNCTURE: CPT

## 2023-03-13 PROCEDURE — 70450 CT HEAD/BRAIN W/O DYE: CPT

## 2023-03-13 PROCEDURE — 96375 TX/PRO/DX INJ NEW DRUG ADDON: CPT

## 2023-03-13 PROCEDURE — 85025 COMPLETE CBC W/AUTO DIFF WBC: CPT

## 2023-03-13 PROCEDURE — 2580000003 HC RX 258: Performed by: EMERGENCY MEDICINE

## 2023-03-13 PROCEDURE — 84484 ASSAY OF TROPONIN QUANT: CPT

## 2023-03-13 PROCEDURE — 82803 BLOOD GASES ANY COMBINATION: CPT

## 2023-03-13 PROCEDURE — 71045 X-RAY EXAM CHEST 1 VIEW: CPT

## 2023-03-13 PROCEDURE — 1200000000 HC SEMI PRIVATE

## 2023-03-13 PROCEDURE — 6360000002 HC RX W HCPCS: Performed by: EMERGENCY MEDICINE

## 2023-03-13 PROCEDURE — 6360000002 HC RX W HCPCS

## 2023-03-13 PROCEDURE — 83690 ASSAY OF LIPASE: CPT

## 2023-03-13 PROCEDURE — 81001 URINALYSIS AUTO W/SCOPE: CPT

## 2023-03-13 PROCEDURE — 74177 CT ABD & PELVIS W/CONTRAST: CPT

## 2023-03-13 PROCEDURE — 87040 BLOOD CULTURE FOR BACTERIA: CPT

## 2023-03-13 PROCEDURE — 96365 THER/PROPH/DIAG IV INF INIT: CPT

## 2023-03-13 PROCEDURE — 83605 ASSAY OF LACTIC ACID: CPT

## 2023-03-13 PROCEDURE — 93005 ELECTROCARDIOGRAM TRACING: CPT | Performed by: EMERGENCY MEDICINE

## 2023-03-13 PROCEDURE — 87804 INFLUENZA ASSAY W/OPTIC: CPT

## 2023-03-13 PROCEDURE — 87635 SARS-COV-2 COVID-19 AMP PRB: CPT

## 2023-03-13 RX ORDER — ONDANSETRON 2 MG/ML
4 INJECTION INTRAMUSCULAR; INTRAVENOUS ONCE
Status: COMPLETED | OUTPATIENT
Start: 2023-03-13 | End: 2023-03-13

## 2023-03-13 RX ORDER — ONDANSETRON 2 MG/ML
INJECTION INTRAMUSCULAR; INTRAVENOUS
Status: COMPLETED
Start: 2023-03-13 | End: 2023-03-13

## 2023-03-13 RX ORDER — AMPICILLIN TRIHYDRATE 500 MG
450 CAPSULE ORAL 2 TIMES DAILY
COMMUNITY

## 2023-03-13 RX ORDER — SULFAMETHOXAZOLE AND TRIMETHOPRIM 400; 80 MG/1; MG/1
1 TABLET ORAL DAILY
COMMUNITY

## 2023-03-13 RX ADMIN — AZITHROMYCIN DIHYDRATE 500 MG: 500 INJECTION, POWDER, LYOPHILIZED, FOR SOLUTION INTRAVENOUS at 22:15

## 2023-03-13 RX ADMIN — CEFEPIME 2000 MG: 2 INJECTION, POWDER, FOR SOLUTION INTRAVENOUS at 23:25

## 2023-03-13 RX ADMIN — IOPAMIDOL 75 ML: 755 INJECTION, SOLUTION INTRAVENOUS at 20:41

## 2023-03-13 RX ADMIN — CEFTRIAXONE SODIUM 1000 MG: 1 INJECTION, POWDER, FOR SOLUTION INTRAMUSCULAR; INTRAVENOUS at 21:43

## 2023-03-13 RX ADMIN — ONDANSETRON 4 MG: 2 INJECTION INTRAMUSCULAR; INTRAVENOUS at 19:42

## 2023-03-13 ASSESSMENT — PAIN - FUNCTIONAL ASSESSMENT: PAIN_FUNCTIONAL_ASSESSMENT: NONE - DENIES PAIN

## 2023-03-14 ENCOUNTER — APPOINTMENT (OUTPATIENT)
Dept: GENERAL RADIOLOGY | Age: 88
DRG: 177 | End: 2023-03-14
Payer: MEDICARE

## 2023-03-14 PROBLEM — J96.01 ACUTE RESPIRATORY FAILURE WITH HYPOXIA (HCC): Status: ACTIVE | Noted: 2023-03-14

## 2023-03-14 PROBLEM — J18.9 HCAP (HEALTHCARE-ASSOCIATED PNEUMONIA): Status: ACTIVE | Noted: 2023-03-14

## 2023-03-14 PROBLEM — J47.9 BRONCHIECTASIS WITHOUT COMPLICATION (HCC): Status: ACTIVE | Noted: 2023-03-14

## 2023-03-14 PROBLEM — R13.10 DYSPHAGIA: Status: ACTIVE | Noted: 2023-03-14

## 2023-03-14 LAB
EKG ATRIAL RATE: 85 BPM
EKG DIAGNOSIS: NORMAL
EKG P AXIS: 48 DEGREES
EKG P-R INTERVAL: 162 MS
EKG Q-T INTERVAL: 336 MS
EKG QRS DURATION: 88 MS
EKG QTC CALCULATION (BAZETT): 399 MS
EKG R AXIS: -24 DEGREES
EKG T AXIS: 40 DEGREES
EKG VENTRICULAR RATE: 85 BPM
PROCALCITONIN SERPL IA-MCNC: 0.09 NG/ML (ref 0–0.15)

## 2023-03-14 PROCEDURE — 1200000000 HC SEMI PRIVATE

## 2023-03-14 PROCEDURE — 74018 RADEX ABDOMEN 1 VIEW: CPT

## 2023-03-14 PROCEDURE — 2580000003 HC RX 258: Performed by: INTERNAL MEDICINE

## 2023-03-14 PROCEDURE — 94761 N-INVAS EAR/PLS OXIMETRY MLT: CPT

## 2023-03-14 PROCEDURE — 97166 OT EVAL MOD COMPLEX 45 MIN: CPT

## 2023-03-14 PROCEDURE — 99223 1ST HOSP IP/OBS HIGH 75: CPT | Performed by: INTERNAL MEDICINE

## 2023-03-14 PROCEDURE — 6360000002 HC RX W HCPCS: Performed by: INTERNAL MEDICINE

## 2023-03-14 PROCEDURE — 94640 AIRWAY INHALATION TREATMENT: CPT

## 2023-03-14 PROCEDURE — 92526 ORAL FUNCTION THERAPY: CPT

## 2023-03-14 PROCEDURE — 87086 URINE CULTURE/COLONY COUNT: CPT

## 2023-03-14 PROCEDURE — 97530 THERAPEUTIC ACTIVITIES: CPT

## 2023-03-14 PROCEDURE — 6370000000 HC RX 637 (ALT 250 FOR IP): Performed by: INTERNAL MEDICINE

## 2023-03-14 PROCEDURE — C9113 INJ PANTOPRAZOLE SODIUM, VIA: HCPCS | Performed by: INTERNAL MEDICINE

## 2023-03-14 PROCEDURE — 2700000000 HC OXYGEN THERAPY PER DAY

## 2023-03-14 PROCEDURE — 97110 THERAPEUTIC EXERCISES: CPT

## 2023-03-14 PROCEDURE — 93010 ELECTROCARDIOGRAM REPORT: CPT | Performed by: INTERNAL MEDICINE

## 2023-03-14 PROCEDURE — 92610 EVALUATE SWALLOWING FUNCTION: CPT

## 2023-03-14 RX ORDER — ENOXAPARIN SODIUM 100 MG/ML
40 INJECTION SUBCUTANEOUS DAILY
Status: DISCONTINUED | OUTPATIENT
Start: 2023-03-14 | End: 2023-03-16 | Stop reason: HOSPADM

## 2023-03-14 RX ORDER — SODIUM CHLORIDE 0.9 % (FLUSH) 0.9 %
10 SYRINGE (ML) INJECTION EVERY 12 HOURS SCHEDULED
Status: DISCONTINUED | OUTPATIENT
Start: 2023-03-14 | End: 2023-03-16 | Stop reason: HOSPADM

## 2023-03-14 RX ORDER — IPRATROPIUM BROMIDE AND ALBUTEROL SULFATE 2.5; .5 MG/3ML; MG/3ML
1 SOLUTION RESPIRATORY (INHALATION)
Status: DISCONTINUED | OUTPATIENT
Start: 2023-03-14 | End: 2023-03-14

## 2023-03-14 RX ORDER — ONDANSETRON 4 MG/1
4 TABLET, ORALLY DISINTEGRATING ORAL EVERY 8 HOURS PRN
Status: DISCONTINUED | OUTPATIENT
Start: 2023-03-14 | End: 2023-03-16 | Stop reason: HOSPADM

## 2023-03-14 RX ORDER — METHYLPREDNISOLONE SODIUM SUCCINATE 40 MG/ML
40 INJECTION, POWDER, LYOPHILIZED, FOR SOLUTION INTRAMUSCULAR; INTRAVENOUS EVERY 12 HOURS
Status: DISCONTINUED | OUTPATIENT
Start: 2023-03-14 | End: 2023-03-15

## 2023-03-14 RX ORDER — PREDNISONE 20 MG/1
40 TABLET ORAL DAILY
Status: DISCONTINUED | OUTPATIENT
Start: 2023-03-16 | End: 2023-03-15

## 2023-03-14 RX ORDER — IPRATROPIUM BROMIDE AND ALBUTEROL SULFATE 2.5; .5 MG/3ML; MG/3ML
1 SOLUTION RESPIRATORY (INHALATION) 2 TIMES DAILY
Status: DISCONTINUED | OUTPATIENT
Start: 2023-03-14 | End: 2023-03-16 | Stop reason: HOSPADM

## 2023-03-14 RX ORDER — SODIUM CHLORIDE 9 MG/ML
INJECTION, SOLUTION INTRAVENOUS CONTINUOUS
Status: DISCONTINUED | OUTPATIENT
Start: 2023-03-14 | End: 2023-03-16 | Stop reason: HOSPADM

## 2023-03-14 RX ORDER — SODIUM CHLORIDE 0.9 % (FLUSH) 0.9 %
10 SYRINGE (ML) INJECTION PRN
Status: DISCONTINUED | OUTPATIENT
Start: 2023-03-14 | End: 2023-03-16 | Stop reason: HOSPADM

## 2023-03-14 RX ORDER — POLYETHYLENE GLYCOL 3350 17 G/17G
17 POWDER, FOR SOLUTION ORAL DAILY PRN
Status: DISCONTINUED | OUTPATIENT
Start: 2023-03-14 | End: 2023-03-14

## 2023-03-14 RX ORDER — ALBUTEROL SULFATE 2.5 MG/3ML
2.5 SOLUTION RESPIRATORY (INHALATION)
Status: DISCONTINUED | OUTPATIENT
Start: 2023-03-14 | End: 2023-03-16 | Stop reason: HOSPADM

## 2023-03-14 RX ORDER — PANTOPRAZOLE SODIUM 40 MG/10ML
40 INJECTION, POWDER, LYOPHILIZED, FOR SOLUTION INTRAVENOUS DAILY
Status: DISCONTINUED | OUTPATIENT
Start: 2023-03-14 | End: 2023-03-16 | Stop reason: HOSPADM

## 2023-03-14 RX ORDER — IPRATROPIUM BROMIDE AND ALBUTEROL SULFATE 2.5; .5 MG/3ML; MG/3ML
1 SOLUTION RESPIRATORY (INHALATION) 3 TIMES DAILY
Status: DISCONTINUED | OUTPATIENT
Start: 2023-03-14 | End: 2023-03-14

## 2023-03-14 RX ORDER — SODIUM CHLORIDE 9 MG/ML
INJECTION, SOLUTION INTRAVENOUS PRN
Status: DISCONTINUED | OUTPATIENT
Start: 2023-03-14 | End: 2023-03-16 | Stop reason: HOSPADM

## 2023-03-14 RX ORDER — 0.9 % SODIUM CHLORIDE 0.9 %
500 INTRAVENOUS SOLUTION INTRAVENOUS ONCE
Status: COMPLETED | OUTPATIENT
Start: 2023-03-14 | End: 2023-03-14

## 2023-03-14 RX ORDER — 0.9 % SODIUM CHLORIDE 0.9 %
1000 INTRAVENOUS SOLUTION INTRAVENOUS ONCE
Status: COMPLETED | OUTPATIENT
Start: 2023-03-14 | End: 2023-03-14

## 2023-03-14 RX ORDER — ONDANSETRON 2 MG/ML
4 INJECTION INTRAMUSCULAR; INTRAVENOUS EVERY 6 HOURS PRN
Status: DISCONTINUED | OUTPATIENT
Start: 2023-03-14 | End: 2023-03-16 | Stop reason: HOSPADM

## 2023-03-14 RX ORDER — ACETAMINOPHEN 650 MG/1
650 SUPPOSITORY RECTAL EVERY 6 HOURS PRN
Status: DISCONTINUED | OUTPATIENT
Start: 2023-03-14 | End: 2023-03-16 | Stop reason: HOSPADM

## 2023-03-14 RX ORDER — POLYETHYLENE GLYCOL 3350 17 G/17G
17 POWDER, FOR SOLUTION ORAL DAILY
Status: DISCONTINUED | OUTPATIENT
Start: 2023-03-14 | End: 2023-03-16 | Stop reason: HOSPADM

## 2023-03-14 RX ORDER — ACETAMINOPHEN 325 MG/1
650 TABLET ORAL EVERY 6 HOURS PRN
Status: DISCONTINUED | OUTPATIENT
Start: 2023-03-14 | End: 2023-03-16 | Stop reason: HOSPADM

## 2023-03-14 RX ADMIN — Medication 10 ML: at 20:33

## 2023-03-14 RX ADMIN — SODIUM CHLORIDE: 9 INJECTION, SOLUTION INTRAVENOUS at 23:03

## 2023-03-14 RX ADMIN — SODIUM CHLORIDE 1000 ML: 9 INJECTION, SOLUTION INTRAVENOUS at 04:30

## 2023-03-14 RX ADMIN — VANCOMYCIN HYDROCHLORIDE 1000 MG: 1 INJECTION, POWDER, LYOPHILIZED, FOR SOLUTION INTRAVENOUS at 09:11

## 2023-03-14 RX ADMIN — CEFEPIME 2000 MG: 2 INJECTION, POWDER, FOR SOLUTION INTRAVENOUS at 23:04

## 2023-03-14 RX ADMIN — SODIUM CHLORIDE: 9 INJECTION, SOLUTION INTRAVENOUS at 03:02

## 2023-03-14 RX ADMIN — VANCOMYCIN HYDROCHLORIDE 1000 MG: 1 INJECTION, POWDER, LYOPHILIZED, FOR SOLUTION INTRAVENOUS at 00:45

## 2023-03-14 RX ADMIN — PANTOPRAZOLE SODIUM 40 MG: 40 INJECTION, POWDER, FOR SOLUTION INTRAVENOUS at 14:04

## 2023-03-14 RX ADMIN — SODIUM CHLORIDE 500 ML: 9 INJECTION, SOLUTION INTRAVENOUS at 10:11

## 2023-03-14 RX ADMIN — METHYLPREDNISOLONE SODIUM SUCCINATE 40 MG: 40 INJECTION, POWDER, FOR SOLUTION INTRAMUSCULAR; INTRAVENOUS at 04:32

## 2023-03-14 RX ADMIN — CEFEPIME 2000 MG: 2 INJECTION, POWDER, FOR SOLUTION INTRAVENOUS at 11:19

## 2023-03-14 RX ADMIN — ENOXAPARIN SODIUM 40 MG: 100 INJECTION SUBCUTANEOUS at 09:06

## 2023-03-14 RX ADMIN — METHYLPREDNISOLONE SODIUM SUCCINATE 40 MG: 40 INJECTION, POWDER, FOR SOLUTION INTRAMUSCULAR; INTRAVENOUS at 15:08

## 2023-03-14 RX ADMIN — IPRATROPIUM BROMIDE AND ALBUTEROL SULFATE 1 AMPULE: 2.5; .5 SOLUTION RESPIRATORY (INHALATION) at 07:11

## 2023-03-14 RX ADMIN — IPRATROPIUM BROMIDE AND ALBUTEROL SULFATE 1 AMPULE: .5; 2.5 SOLUTION RESPIRATORY (INHALATION) at 19:12

## 2023-03-14 RX ADMIN — SODIUM CHLORIDE 1000 ML: 9 INJECTION, SOLUTION INTRAVENOUS at 07:33

## 2023-03-14 NOTE — PROGRESS NOTES
Called Angel at [facility] regarding mentation at home. Kelsea Nieves states that he is only alert to self. Gets up w/c assist x1. Pt can stand and pivot to chair.

## 2023-03-14 NOTE — PROGRESS NOTES
RT Inhaler-Nebulizer Bronchodilator Protocol Note    There is a bronchodilator order in the chart from a provider indicating to follow the RT Bronchodilator Protocol and there is an Initiate RT Inhaler-Nebulizer Bronchodilator Protocol order as well (see protocol at bottom of note). CXR Findings:  XR CHEST PORTABLE    Result Date: 3/13/2023  Stable chronic interstitial changes in the lungs. The findings from the last RT Protocol Assessment were as follows:   History Pulmonary Disease: (P) Chronic pulmonary disease  Respiratory Pattern: (P) Regular pattern and RR 12-20 bpm  Breath Sounds: (P) Slightly diminished and/or crackles  Cough: (P) Strong, spontaneous, non-productive  Indication for Bronchodilator Therapy: (P) Decreased or absent breath sounds  Bronchodilator Assessment Score: (P) 4    Aerosolized bronchodilator medication orders have been revised according to the RT Inhaler-Nebulizer Bronchodilator Protocol below. Respiratory Therapist to perform RT Therapy Protocol Assessment initially then follow the protocol. Repeat RT Therapy Protocol Assessment PRN for score 0-3 or on second treatment, BID, and PRN for scores above 3. No Indications - adjust the frequency to every 6 hours PRN wheezing or bronchospasm, if no treatments needed after 48 hours then discontinue using Per Protocol order mode. If indication present, adjust the RT bronchodilator orders based on the Bronchodilator Assessment Score as indicated below. Use Inhaler orders unless patient has one or more of the following: on home nebulizer, not able to hold breath for 10 seconds, is not alert and oriented, cannot activate and use MDI correctly, or respiratory rate 25 breaths per minute or more, then use the equivalent nebulizer order(s) with same Frequency and PRN reasons based on the score. If a patient is on this medication at home then do not decrease Frequency below that used at home.     0-3 - enter or revise RT bronchodilator order(s) to equivalent RT Bronchodilator order with Frequency of every 4 hours PRN for wheezing or increased work of breathing using Per Protocol order mode. 4-6 - enter or revise RT Bronchodilator order(s) to two equivalent RT bronchodilator orders with one order with BID Frequency and one order with Frequency of every 4 hours PRN wheezing or increased work of breathing using Per Protocol order mode. 7-10 - enter or revise RT Bronchodilator order(s) to two equivalent RT bronchodilator orders with one order with TID Frequency and one order with Frequency of every 4 hours PRN wheezing or increased work of breathing using Per Protocol order mode. 11-13 - enter or revise RT Bronchodilator order(s) to one equivalent RT bronchodilator order with QID Frequency and an Albuterol order with Frequency of every 4 hours PRN wheezing or increased work of breathing using Per Protocol order mode. Greater than 13 - enter or revise RT Bronchodilator order(s) to one equivalent RT bronchodilator order with every 4 hours Frequency and an Albuterol order with Frequency of every 2 hours PRN wheezing or increased work of breathing using Per Protocol order mode.      Electronically signed by Suze Mendoza RCP on 3/14/2023 at 7:15 PM

## 2023-03-14 NOTE — PROGRESS NOTES
Inpatient Occupational Therapy Evaluation and Treatment    Unit: [address] [place]  Date:  3/14/2023  Patient Name:    Tristin Tanner  Admitting diagnosis:  Acute respiratory failure with hypoxia [facility]) [J96.01]  Acute respiratory failure with hypoxemia ([facility] [address] [J96.01]  Community acquired pneumonia, unspecified laterality [J18.9]  Admit Date:  3/13/2023  Precautions/Restrictions/WB Status/ Lines/ Wounds/ Oxygen: Fall risk, Bed/chair alarm, Lines (IV, Supplemental O2 (3L), and Purewick catheter), Confusion, Telemetry, and Continuous pulse oximetry    Treatment Time:  8983-4843  Treatment Number:  1  Timed Code Treatment Minutes: 40 minutes  Total Treatment Minutes:  50  minutes    Patient Goals for Therapy: \"not stated  \"          Discharge Recommendations: SNF  DME needs for discharge: Defer to facility         Therapy recommendations for staff:   Assist of 2 -1 for sitting EOB and standing in stedy with gait belt     History of Present Illness: per H&P    82 y.o. male with past medical history of CAD, hyperlipidemia, coronary artery disease with cardiomyopathy and underlying dementia here today with vomiting and reports of abdominal pain. Patient presents emergency department today for reported episodes of emesis at his care facility. He currently has dementia and is a very poor historian. He notes he has had a mild cough but otherwise states he feels fine and is in no pain. He provides no further meaningful insight to his current clinical condition  Home Health S4 Level Recommendation:  NA    AM-PAC Score: 12    Subjective:  Patient lying supine in bed with no family present. Pt agreeable to this OT session. Cognition:    A&O Person   Able to follow 1-2 step commands     Pain:   Yes  Location: left UE- pt states this is not new   Rating: moderate /10  Pain Medicine Status: No request made    Preadmission Environment:   Pt unable to state where he lives or state any other information.  Information from past OT note in 9/2020     Pt. Lives [facility] environment:    in [address] [facility] facility  Steps to enter first floor: No steps  Steps to second floor: N/A  Bathroom: walk in shower and comfort height toilet  Equipment owned: wc (manual), shower chair/bench and hospital bed     Preadmission Status:  Pt. Able to drive: No  Pt Fully independent with ADLs: No  Pt. Required assistance from staff for: Allean Courts, Cooking, Dressing and Laundry   Pt. required assist: mod A for transfers and gets from place to place with manual w/c-propels w/c with LEs  History of falls No  Objective:  Does this pt have an acute or acute on chronic diagnosis of CHF? No    Upper Extremity ROM:    WFL    Upper Extremity Strength:    WFL    Upper Extremity Sensation:    WFL    Upper Extremity Proprioception:  NT    Coordination and Tone  Diminished    Balance:  Sitting:    Min A  edge of bed   Standing: Mod assist in the stedy for 30-40 seconds with posterior lean       Bed Mobility:   Supine to Sit:    Min A  and Mod A   Sit to Supine: Mod A  and Max A   Rolling:   Min A  to the right   Scooting in sitting: Mod A   Scooting in supine:  Not Tested    Transfers:    Sit to stand: Mod A   Stand to sit:    Min A  to the paddles on the stedy and mod to the bed   Bed to chair:     Not Tested  Bed/ chair to standard toilet:  Not Tested  Bed/chair to [facility]:   Not Tested  Functional Mobility:   Not Tested    See PT note for gait analysis.     ADLs:  Dressing:      UE:   Not Tested  LE:    Max A     Bathing:    UE:  Not Tested  LE:  Max A     Eating:   SBA to take a drink     Toileting:  Not Tested    Grooming/hygiene: Not Tested    Activity Tolerance:   Pt completed therapy session with pain in the left UE      BP (mmHg) HR (bpm) SpO2 (%) on 3 liters Comments   Supine at rest 100/55  60 99    Seated at /58      Standing in the stedy  120/69      End of session         Positioning Needs:   Pt in bed, alarm set, call light provided and all needs within reach . Ther Ex / Activities Initiated:   Shoulder flex/ext 10x  Shoulder shrugs 10x  Shoulder rolls 10x     Patient/Family Education:   Pt educated on role of inpatient OT, plan of care, importance of continued activity, safety awareness and calling for assist with mobility. Assessment:    Pt seen for Occupational therapy evaluation in acute care setting. Pt demonstrated decreased Activity tolerance, ADLs, Balance , Bed mobility, Safety Awareness, Transfers, and Cognition. Pt functioning below baseline and will likely benefit from skilled occupational therapy services to maximize safety and independence. Goal(s) : To be met in 3 Visits:  Bed to toilet/BSC:       Mod A of two with appropriate AD   Pt will complete 3/3 CHF goals     N/A    To be met in 5 Visits:  Supine to/from Sit in preparation for ADL task:   CGA  Toileting        Max A  Grooming       Mod A  Upper Body Dressing: Mod A  Lower Body Dressing:      Max A  Pt to demonstrate UE therapeutic exs x 15 reps with minimal cues    Rehabilitation Potential: Fair  Strengths for achieving goals include: Pt cooperative   Barriers to achieving goals include:  Complexity of condition    Plan: To be seen 3-5 x/wk while in acute care setting for therapeutic exercises, bed mobility, transfers, family/patient education, ADL/IADL retraining, and energy conservation training.     Electronically signed by Fely Rodriguez OT on 3/14/2023 at 1:52 PM      If patient discharges from this facility prior to next visit, this note will serve as the Discharge Summary

## 2023-03-14 NOTE — PROGRESS NOTES
Transferred care to [facility]. Face to face bedside report given, no need voiced at this time.

## 2023-03-14 NOTE — PLAN OF CARE
SLP completed evaluation. Please refer to notes in EMR.     Dicky Landau CFY-SLP  Clinical Fellow  [id number]

## 2023-03-14 NOTE — CONSULTS
Pharmacy to Dose Vancomycin    Dx: HAP  Goal AUC = 400-600  Pt wt = 72 kg  Recent Labs     03/13/23 1930   CREATININE 1.3     Recent Labs     03/13/23 1930   WBC 9.5     Loading dose: 1000 mg at 00:00 03/14/2023. Regimen: 1000 mg IV every 24 hours.   Start time: 0800 on 03/14/2023  Exposure target: AUC24 (range)400-600 mg/L.hr   AUC24,ss: 484 mg/L.hr  Probability of AUC24 > 400: 72 %  Ctrough,ss: 15.3 mg/L  Probability of Ctrough,ss > 20: 24 %  Probability of nephrotoxicity (Lodise OLIVIA 2009): 11 %  Vancomycin trough: 3/15 0700  [facility], [address] [place] [address] 3/13/2023 11:38 PM

## 2023-03-14 NOTE — PROGRESS NOTES
Resting in bed awake. No complaints or needs at present time. AM assessment complete. Alert but confused. Bed alarm in place and turned on. Cleaned up from incont urine. Call light in reach. Patient is able to demonstrate the ability to move from a reclining position to an upright position within the recliner. Bedside Mobility Assessment Tool (BMAT):     Assessment Level 1- Sit and Shake    1. From a semi-reclined position, ask patient to sit up and rotate to a seated position at the side of the bed. Can use the bedrail. 2. Ask patient to reach out and grab your hand and shake making sure patient reaches across his/her midline. Pass- Patient is able to come to a seated position, maintain core strength. Maintains seated balance while reaching across midline. Move on to Assessment Level 2. Assessment Level 2- Stretch and Point   1. With patient in seated position at the side of the bed, have patient place both feet on the floor (or stool) with knees no higher than hips. 2. Ask patient to stretch one leg and straighten the knee, then bend the ankle/flex and point the toes. If appropriate, repeat with the other leg. Pass- Patient is able to demonstrate appropriate quad strength on intended weight bearing limb(s). Move onto Assessment Level 3. Assessment Level 3- Stand   1. Ask patient to elevate off the bed or chair (seated to standing) using an assistive device (cane, bedrail). 2. Patient should be able to raise buttocks off be and hold for a count of five. May repeat once. Pass- Patient maintains standing stability for at least 5 seconds, proceed to assessment level 4. Assessment Level 4- Walk   1. Ask patient to march in place at bedside. 2. Then ask patient to advance step and return each foot. Some medical conditions may render a patient from stepping backwards, use your best clinical judgement. Fail- Patient not able to complete tasks OR requires use of assistive device. Patient is MOBILITY LEVEL 3.        Mobility Level- 3 4y11m M pt presents to the ED with mother for persistent cough beginning one month ago.  Per mother, pt has been coughing and having difficulty breathing secondary to his nasal congestion.  Pt was taken to PMD for these symptoms, was given medications, but pt has seen no symptomatic relief.  Pt has been using Vicks vapor rub to help him breath.  Pt has been drinking plenty of water.  Denies fever, vomiting, diarrhea.  No further acute complaints at this time. 4y11m M pt presents to the ED with mother for persistent cough beginning one month ago.  Per mother, pt was last known well one month ago, then began coughing and having difficulty breathing secondary to his nasal congestion.  Pt has also had a fever, approx 100F.  Mother notes pt's difficulty breathing worsened this morning.  Pt was taken to PMD for these symptoms, was given medications, but pt has seen no symptomatic relief.  Pt has been using Vicks vapor rub to help him breath and taking Tylenol for his fever, last Tylenol dosage 11:30 today.  Pt has been drinking plenty of water.  Denies vomiting, diarrhea.  No further acute complaints at this time.

## 2023-03-14 NOTE — PROGRESS NOTES
RT Inhaler-Nebulizer Bronchodilator Protocol Note    There is a bronchodilator order in the chart from a provider indicating to follow the RT Bronchodilator Protocol and there is an Initiate RT Inhaler-Nebulizer Bronchodilator Protocol order as well (see protocol at bottom of note). CXR Findings:  XR CHEST PORTABLE    Result Date: 3/13/2023  Stable chronic interstitial changes in the lungs. The findings from the last RT Protocol Assessment were as follows:   History Pulmonary Disease: (P) Chronic pulmonary disease  Respiratory Pattern: (P) Regular pattern and RR 12-20 bpm  Breath Sounds: (P) Slightly diminished and/or crackles  Cough: (P) Strong, spontaneous, non-productive  Indication for Bronchodilator Therapy: (P) Decreased or absent breath sounds  Bronchodilator Assessment Score: (P) 4    Aerosolized bronchodilator medication orders have been revised according to the RT Inhaler-Nebulizer Bronchodilator Protocol below. Respiratory Therapist to perform RT Therapy Protocol Assessment initially then follow the protocol. Repeat RT Therapy Protocol Assessment PRN for score 0-3 or on second treatment, BID, and PRN for scores above 3. No Indications - adjust the frequency to every 6 hours PRN wheezing or bronchospasm, if no treatments needed after 48 hours then discontinue using Per Protocol order mode. If indication present, adjust the RT bronchodilator orders based on the Bronchodilator Assessment Score as indicated below. Use Inhaler orders unless patient has one or more of the following: on home nebulizer, not able to hold breath for 10 seconds, is not alert and oriented, cannot activate and use MDI correctly, or respiratory rate 25 breaths per minute or more, then use the equivalent nebulizer order(s) with same Frequency and PRN reasons based on the score. If a patient is on this medication at home then do not decrease Frequency below that used at home.     0-3 - enter or revise RT bronchodilator order(s) to equivalent RT Bronchodilator order with Frequency of every 4 hours PRN for wheezing or increased work of breathing using Per Protocol order mode. 4-6 - enter or revise RT Bronchodilator order(s) to two equivalent RT bronchodilator orders with one order with BID Frequency and one order with Frequency of every 4 hours PRN wheezing or increased work of breathing using Per Protocol order mode. 7-10 - enter or revise RT Bronchodilator order(s) to two equivalent RT bronchodilator orders with one order with TID Frequency and one order with Frequency of every 4 hours PRN wheezing or increased work of breathing using Per Protocol order mode. 11-13 - enter or revise RT Bronchodilator order(s) to one equivalent RT bronchodilator order with QID Frequency and an Albuterol order with Frequency of every 4 hours PRN wheezing or increased work of breathing using Per Protocol order mode. Greater than 13 - enter or revise RT Bronchodilator order(s) to one equivalent RT bronchodilator order with every 4 hours Frequency and an Albuterol order with Frequency of every 2 hours PRN wheezing or increased work of breathing using Per Protocol order mode.        Electronically signed by Nessa Pederson RCP on 3/14/2023 at 3:32 AM

## 2023-03-14 NOTE — ED NOTES
Transfer order sent for admit to [facility].      Jacklyn Taveras, EMT-P  03/13/23 2858

## 2023-03-14 NOTE — ED NOTES
Josafat notified of puree texture diet per [facility] [place] paperwork.       Sara Alcantara RN  03/13/23 9549

## 2023-03-14 NOTE — PROGRESS NOTES
RT Inhaler-Nebulizer Bronchodilator Protocol Note    There is a bronchodilator order in the chart from a provider indicating to follow the RT Bronchodilator Protocol and there is an Initiate RT Inhaler-Nebulizer Bronchodilator Protocol order as well (see protocol at bottom of note). CXR Findings:  XR CHEST PORTABLE    Result Date: 3/13/2023  Stable chronic interstitial changes in the lungs. The findings from the last RT Protocol Assessment were as follows:   History Pulmonary Disease: Chronic pulmonary disease  Respiratory Pattern: Regular pattern and RR 12-20 bpm  Breath Sounds: Slightly diminished and/or crackles  Cough: Strong, spontaneous, non-productive  Indication for Bronchodilator Therapy: Decreased or absent breath sounds  Bronchodilator Assessment Score: 4    Aerosolized bronchodilator medication orders have been revised according to the RT Inhaler-Nebulizer Bronchodilator Protocol below. Respiratory Therapist to perform RT Therapy Protocol Assessment initially then follow the protocol. Repeat RT Therapy Protocol Assessment PRN for score 0-3 or on second treatment, BID, and PRN for scores above 3. No Indications - adjust the frequency to every 6 hours PRN wheezing or bronchospasm, if no treatments needed after 48 hours then discontinue using Per Protocol order mode. If indication present, adjust the RT bronchodilator orders based on the Bronchodilator Assessment Score as indicated below. Use Inhaler orders unless patient has one or more of the following: on home nebulizer, not able to hold breath for 10 seconds, is not alert and oriented, cannot activate and use MDI correctly, or respiratory rate 25 breaths per minute or more, then use the equivalent nebulizer order(s) with same Frequency and PRN reasons based on the score. If a patient is on this medication at home then do not decrease Frequency below that used at home.     0-3 - enter or revise RT bronchodilator order(s) to equivalent RT Bronchodilator order with Frequency of every 4 hours PRN for wheezing or increased work of breathing using Per Protocol order mode. 4-6 - enter or revise RT Bronchodilator order(s) to two equivalent RT bronchodilator orders with one order with BID Frequency and one order with Frequency of every 4 hours PRN wheezing or increased work of breathing using Per Protocol order mode. 7-10 - enter or revise RT Bronchodilator order(s) to two equivalent RT bronchodilator orders with one order with TID Frequency and one order with Frequency of every 4 hours PRN wheezing or increased work of breathing using Per Protocol order mode. 11-13 - enter or revise RT Bronchodilator order(s) to one equivalent RT bronchodilator order with QID Frequency and an Albuterol order with Frequency of every 4 hours PRN wheezing or increased work of breathing using Per Protocol order mode. Greater than 13 - enter or revise RT Bronchodilator order(s) to one equivalent RT bronchodilator order with every 4 hours Frequency and an Albuterol order with Frequency of every 2 hours PRN wheezing or increased work of breathing using Per Protocol order mode.        Electronically signed by Charlotte Andres RCP on 3/14/2023 at 7:15 AM

## 2023-03-14 NOTE — PLAN OF CARE
82 yoM w/ H x of moderate dementia presents from SNF w/ c/o cough, N/V. Found to have PNA (HCAP coverage) and hypoxia on RA. Requiring 3L O2, not normally on O2. Acute resp fail  HCAP, aeCOPD  UTI? PILAR? Dehydration  Fecal impaction  Home med rec not complete at time of acceptance. On Pureed diet at SNF, SLP ordered.

## 2023-03-14 NOTE — PROGRESS NOTES
Speech Language Pathology  Clinical Bedside Swallow Assessment  Facility/Department: [address] [place] [address] [address] [place] [facility]    Instrumentation: Not clinically indicated at this time. Will continue to monitor  Diet recommendation: IDDSI 0 Thin Liquids (clear liquid diet per GI); Meds crushed in puree as able  Risk management: upright for all intake, stay upright for at least 30 mins after intake, small bites/sips, downgrade to mildly thick if s/s of aspiration develop, distant supervision with intake, oral care 2-3x/day to reduce adverse affects in the event of aspiration, increase physical mobility as able, slow rate of intake, general aspiration precautions, and hold PO and contact SLP if s/s of aspiration or worsening respiratory status develop. Kan Gunn  : 1935 (82 y.o.)   MRN: [id number]  ROOM: [address] [place], [place] [address]  ADMISSION DATE: 3/13/2023  PATIENT DIAGNOSIS(ES): Acute respiratory failure with hypoxia (HCC) [J96.01]  Acute respiratory failure with hypoxemia ([address] [J96.01]  Community acquired pneumonia, unspecified laterality [J18.9]  Chief Complaint   Patient presents with    Abdominal Pain     From the [address] [place] [address] rehab; given 4 mg zofran in squad; vomit dark brown per nursing home.       Patient Active Problem List    Diagnosis Date Noted    Acute respiratory failure with hypoxemia ([address] 2023    NSTEMI (non-ST elevated myocardial infarction) ([address] 2022    Cardiomyopathy ([address] 2022    Lactic acidosis 2022    Demand ischemia ([address] 2022    Hypoxia 2022    Thrombocytopenia ([address] 2022    Acute hypoxemic respiratory failure ([address]     Acute metabolic encephalopathy     Urinary tract infection with hematuria 2022    Septic shock ([address] 2022    Acute cystitis with hematuria 2022    Mass of pancreas 2022    Aspiration pneumonia ([address]     Moderate protein-calorie malnutrition ([address] 2020    Septicemia ([address] 2020 Pneumonia of right lower lobe due to infectious organism     PUD (peptic ulcer disease)     Mediastinal lymphadenopathy     Urinary tract infection without hematuria     Lactic acid acidosis     Abdominal pain, epigastric     Anemia     Abdominal pain 06/09/2019    Dementia ([address] 03/03/2018    Moderate malnutrition ([address] 03/03/2018    Falls frequently 03/02/2018    Hypokalemia 03/02/2018    CAD (coronary artery disease) 03/02/2018    HTN (hypertension) 03/02/2018    HLD (hyperlipidemia) 03/02/2018     Past Medical History:   Diagnosis Date    Atherosclerotic heart disease     CAD (coronary artery disease)     Cancer (HCC)     skin    COPD (chronic obstructive pulmonary disease) (HCC)     Dementia (HCC)     Depression     Duodenal ulcer without hemorrhage or perforation     Falls frequently     GERD without esophagitis     Hernia     Hyperlipidemia     Hypertension     Hypokalemia     Lack of coordination     falls    Melanoma ([address]     MI (myocardial infarction) ([address]     Muscle weakness (generalized)     Protein calorie malnutrition ([address]     Reflux     Sepsis ([address]     Spinal stenosis     UTI (urinary tract infection)      Past Surgical History:   Procedure Laterality Date    CHOLECYSTECTOMY      COLONOSCOPY  8/10/12    CORONARY ANGIOPLASTY WITH STENT PLACEMENT      PROSTATE BIOPSY      SKIN BIOPSY      UPPER GASTROINTESTINAL ENDOSCOPY  5/14/12    UPPER GASTROINTESTINAL ENDOSCOPY N/A 6/11/2019    EGD BIOPSY performed by Sheryl Erazo DO at [facility] N/A 8/8/2019    EGD performed by Ruthie Ibrahim MD at [facility]     Allergies   Allergen Reactions    Fluoxetine     Benazepril      Swollen tongue    Hydrocodone Other (See Comments)    Morphine     Penicillins Other (See Comments)    Simvastatin Other (See Comments)    Morphine And Related Nausea And Vomiting    Sucralfate Rash       DATE ONSET: 03/13/2023    Date of Evaluation: 3/14/2023 Evaluating Therapist: Sarthak Ochoa, SLP    Chart Reviewed: : [x] Yes [] No    Current Diet: ADULT DIET; Clear Liquid    Recent Chest Radiography: [x] Chest XR   [] CT of Chest  Date: 03/13/2023  Impressions  Impression   Stable chronic interstitial changes in the lungs. Pain: The patient does not complain of pain    Reason for Referral  Justen Nicholas was referred for a bedside swallow evaluation to assess the efficiency of their swallow function, identify signs and symptoms of aspiration and make recommendations regarding safe dietary consistencies, effective compensatory strategies, and safe eating environment. Assessment    Medical record review/interview: Per MD H&P: \"Zia Ye is a 82 y.o. male with past medical history of CAD, hyperlipidemia, coronary artery disease with cardiomyopathy and underlying dementia here today with vomiting and reports of abdominal pain. Patient presents emergency department today for reported episodes of emesis at his care facility. He currently has dementia and is a very poor historian. He notes he has had a mild cough but otherwise states he feels fine and is in no pain.   He provides no further meaningful insight to his current clinical condition\"    Patient Complaints:  Odynophagia: [] Yes [x] No  Globus Sensation: [] Yes [x] No  SOB with PO intake: [] Yes [x] No  Increased WOB with PO intake: [] Yes [x] No  Reflux Sx's: [] Yes [x] No  Weight loss: [] Yes [x] No  Coughing/Choking with PO intake: [] Yes [x] No  Reduced Appetite: [] Yes [x] No  Trouble with Mastication:  [] Yes [x] No  Avoidance of Certain Foods:  [] Yes [x] No  Premature Satiety:  [] Yes [x] No  Recent PNA:  [] Yes [x] No  Recurring PNA:  [] Yes [x] No  Changes in vocal quality: [] Yes [x] No    Baseline Method of Oral Meds:  [] Whole with liquid    [] Cut with liquid    [] Whole with puree    [] Cut in puree    [x] Crushed in puree    [] Crushed in liquid    [] Via TF    Additional Reported Symptoms/Complaints:   MBSS completed 11/07/2023:   \"Assessment: mild oropharyngeal dysphagia, likely chronic related to hx of dysphagia, reduced physical mobility, impaired cognition, and hx of neurological disease. Swallow safety is preserved ; swallow efficiency is impaired Pt appears to be at moderate risk for aspiration PNA, and low risk for malnutrition/dehydration. Diet modification is warranted indicated. Swallow prognosis is fair given progressive nature of condition/disease. Patient appears to be a fair candidate for behavioral swallow rehabilitation. Diet Recommendation: IDDSI 5 Minced and moist Solids ; IDDSI 0 Thin Liquids - NO straws ; Meds crushed in puree as able  Risk Management: upright for all intake, stay upright for at least 30 mins after intake, small bites/sips, downgrade to mildly thick if s/s of aspiration develop, distant supervision with intake, oral care 2-3x/day to reduce adverse affects in the event of aspiration, increase physical mobility as able, slow rate of intake, general aspiration precautions, and hold PO and contact SLP if s/s of aspiration or worsening respiratory status develop. \"    Predisposing dysphagia risk factors: Hx of neurological disease , COPD, Other chronic respiratory illness, Age, GERD, and Hx of dysphagia  Clinical signs of possible chronic dysphagia: hx of dysphagia  Precipitating dysphagia risk factors: reduced physical mobility and increased O2 demands    Vitals/labs:     Vitals:    03/14/23 0711 03/14/23 0722 03/14/23 0725 03/14/23 0900   BP:  (!) 79/45  (!) 96/42   Pulse:  71 71 70   Resp:  14     Temp:  99.1 °F (37.3 °C)     TempSrc:  Oral     SpO2: 92% 96%     Weight:       Height:            CBC:   Recent Labs     03/13/23  1930   WBC 9.5   HGB 13.9         BMP:  Recent Labs     03/13/23 1930      K 4.9      CO2 21   BUN 29*   CREATININE 1.3   GLUCOSE 130*          Cranial nerve exam:   CN V (trigeminal): ophthalmic, maxillary, and mandibular facial sensation- WNL b/l  CN VII (facial): WNL b/l  CN IX/X (glossopharyngeal/vagus): MPT: DNT; pitch range: DNT; vocal quality: Adequate; cough: Weak- perceptually, Non-Productive, and Dry  CN XII (hypoglossal): WNL b/l    Laryngeal function exam:   Secretions: oral mucosa pink and moist   Vocal quality: See CN exam above  MPT: See CN exam above  S/Z ratio: See CN exam above  Pitch range: See CN exam above  Cough: See CN exam above    Oral Care Status:    [x] Oral Care [facility]  [] Poor oral care status  [x] Edentulous  [] Upper Dentures  [] Lower Dentures  [] Missing/Broken Teeth  [] Evidence of dental cavities/carries    PO trials:     IDDSI 0 (thin):   - 5cc bolus (tsp), cup, straw and Jello: no anterior bolus loss , suspect functional A-P bolus transit, swallow timing subjectively appears timely, no clinical s/s of aspiration, and vitals stable    No further PO trials d/t pt being on clear liquid diet per GI.     3 oz water: DNT    Impressions:  No clinical signs of oropharyngeal dysphagia. Swallow prognosis is good. Instrumental swallow study is not indicated. Given lack of clinical s/s of aspiration at bedside, pt is safe for oral diet at this time. Will complete further solid assessment when GI gives ok. Instrumentation: Not clinically indicated at this time. Will continue to monitor  Diet recommendation: IDDSI 0 Thin Liquids (clear liquid diet per GI); Meds crushed in puree as able  Risk management: upright for all intake, stay upright for at least 30 mins after intake, small bites/sips, downgrade to mildly thick if s/s of aspiration develop, distant supervision with intake, oral care 2-3x/day to reduce adverse affects in the event of aspiration, increase physical mobility as able, slow rate of intake, general aspiration precautions, and hold PO and contact SLP if s/s of aspiration or worsening respiratory status develop.     Prognosis: Good    Recommended Intervention:  [x] Dysphagia tx  [] Videostroboscopy                      [] NPO   [] MBS       [] Speech/Cog Eval  [x] Therapeutic PO Trials     [] Ice Chips   [] Other:  [] FEES                                                 Dysphagia Therapeutic Intervention:  []  Bolus control Exercises  []  Oral Motor Exercises  []  Landa Water Protocol  []  Thermal Stimulation  []  Oral Care    []  Vital Stim/NMES  []  Laryngeal Exercises  [x]  Patient/Family Education  []  Pharyngeal Exercises  []  Therapeutic PO trials with SLP  [x]  Diet tolerance monitoring  []  Other:     Referrals:  [] ENT    [] PT  [] Pulmonology [] GI  [] Neurology  [] RD  [] OT   []     Goals:  Short Term Goals:  Timeframe for Short Term Goals: (5 days 03/19/2023)  Goal 1: The patient will tolerate recommended diet with no clinical s/s of aspiration 5/5  Goal 2: The patient will tolerate repeat BSE when able for ongoing assessment for solid PO diet advancement   Goal 3: The patient/caregiver will demonstrate understanding of compensatory swallow strategies, for improved swallow safety    Long Term Goals:   Timeframe for Long Term Goals: (7 days 03/21/2023)  Goal 1: The patient will tolerate least restrictive diet with no clinical s/s of aspiration or worsening respiratory/pulmonary status    Treatment:  Skilled instruction completed with patient re: evidenced based practice regarding recommendations and POC, importance of oral care to reduce adverse affects in the event of aspiration, and instruction of recommended compensatory strategies developed based upon clinical exam. Pt able to recall/demonstrate compensatory strategies with mod cues.       Pt Education: SLP educated the patient re: Role of SLP, rationale for completion of assessment, anatomical components of swallow structures as they pertain to airway protection, results of assessment, recommendations, and POC  Pt Education Response: verbalized understanding, would benefit from ongoing education, and RN aware    Duration/Frequency of Tx: 2-4x/wk    Individuals Consulted:   [x]  Patient     []  NP         [x]  RN   []  RD                   []  MD      []  Family Member                        []  PA    []  Other:      Safety Devices / Report:  [x]  All fall risk precautions in place [x]  Safety handoff completed with RN  [x]  Bed alarm in place  [x]  Left in bed     []  Chair alarm in place  []  Left in chair   [x]  Call light in reach   []  Other:                              Total Treatment Time / Charges       Time in Time out Total Time / units   Swallow Eval/Tx Time  0810 0833 23 min/ 2 units      Signature:  April WALDRON-SLP  Clinical Fellow  [id number]

## 2023-03-14 NOTE — ED NOTES
PT had episode of vomiting while in CT. Mouth suctioned and PT wiped off.        Merry Nick, EMT-P  03/13/23 2050

## 2023-03-14 NOTE — CARE COORDINATION
Case Management Assessment  Initial Evaluation    Date/Time of Evaluation: 3/14/2023 3:36 PM  Assessment Completed by: CELINE Lees  Case Management Department  Phone: [phone] Fax: [phone]      If patient is discharged prior to next notation, then this note serves as note for discharge by case management. Patient Name: Sandor Lee                   YOB: 1935  Diagnosis: Acute respiratory failure with hypoxia ([address] [J96.01]  Acute respiratory failure with hypoxemia ([address] [J96.01]  Community acquired pneumonia, unspecified laterality [J18.9]                   Date / Time: 3/13/2023  6:54 PM    Patient Admission Status: Inpatient   Readmission Risk (Low < 19, Mod (19-27), High > 27): Readmission Risk Score: 14.5    Current PCP: Angeli Valencia MD  PCP verified by CM? Yes    Chart Reviewed: Yes      History Provided by: Child/Family (daughter/legal guardian Jared Sinclair)  Patient Orientation: Other (see comment) (pt has a legal guardian)    Patient Cognition: Dementia / Early Alzheimer's    Hospitalization in the last 30 days (Readmission):  No    If yes, Readmission Assessment in  Navigator will be completed. Advance Directives:      Code Status: Full Code   Patient's Primary Decision Maker is: Named in [address] [place] [address]    Primary Decision Maker: [address] - [phone]    Discharge Planning:    Patient lives with: Other (Comment) (nida leon (NITO)) Type of Home: Long-Term Care (nida leon (NITO))  Primary Care Giver:  Other (Comment) (staff at [facility] ([address] [place] [address]))  Patient Support Systems include: Family Members, Other (Comment) (nida leon (NITO))   Current Financial resources: Medicare, Medicaid  Current community resources: None  Current services prior to admission: None (currently provided by [facility] ([facility]))            Current DME:              Type of Home Care services:  None    ADLS  Prior functional level: Assistance with the following:, Bathing, Dressing, Toileting, Mobility  Current functional level: Assistance with the following:, Bathing, Dressing, Toileting, Mobility    PT AM-PAC:   /24  OT AM-PAC: 12 /24    Family can provide assistance at DC: Yes  Would you like Case Management to discuss the discharge plan with any other family members/significant others, and if so, who? Yes  Plans to Return to Present Housing: Yes  Other Identified Issues/Barriers to RETURNING to current housing: n/a  Potential Assistance needed at discharge: [facility] at [facility] ([facility]) stated pt can return skilled)            Potential DME:    Patient expects to discharge to: Long-term care  Plan for transportation at discharge:      Financial    Payor: Jaleesa Fees / Plan: MEDICARE PART A AND B / Product Type: *No Product type* /     Does insurance require precert for SNF: No    Potential assistance Purchasing Medications: No  Meds-to-Beds request:        [facility] [employer], [address] [place] [address] [phone] - f [phone] [address] [facility] [address]  Phone: [phone] Fax: [phone] [facility] of [address], [address] [place] [address]  Phone: [phone] Fax: [phone]      Notes:    Factors facilitating achievement of predicted outcomes: Family support, Caregiver support, Has needed Durable Medical Equipment at home, and lives at [facility] ([facility])    Barriers to discharge: n/a    Additional Case Management Notes: Chart review completed. Called and spoke with pt's daughter/legal guardian Juan Landeros who completed the assessment. She stated pt will return to [facility] ([facility]) when discharged and stated first opening on transportation. She is aware pt is not currently discharged.     Spoke with Marielos Noyola at [address] [facility] who states pt is long term care and is a bed hold; Marielos Noyola states pt can return skilled as OT recommended SNF.     CM will follow    The Plan for Transition of Care is related to the following treatment goals of Acute respiratory failure with hypoxia ([address] [J96.01]  Acute respiratory failure with hypoxemia ([address] [J96.01]  Community acquired pneumonia, unspecified laterality [J18.9]    Dottie January CELINE PÉREZ  Case Management Department  Phone: [phone] Fax: [phone]

## 2023-03-14 NOTE — PROGRESS NOTES
Pt alert and oriented. SR up x2,  Call light and bedside table within easy reach. Denies any needs at this time. Bedside report given to [facility] , RN. Care transferred.

## 2023-03-14 NOTE — CONSULTS
Pulmonary & Critical Care Consultation Note    Patient is being seen at the request of Lacho Ignacio MD  for a consultation for respiratory failure    HISTORY OF PRESENT ILLNESS:   82years old with history of CAD, COPD presented from SNF with cough. Discharged with hypoxemia requiring 3 L O2. No home O2. Chest imaging revealed infiltrates. Patient is nonverbal poor historian unable to obtain for further HPI    PAST MEDICAL HISTORY:  Past Medical History:   Diagnosis Date    Atherosclerotic heart disease     CAD (coronary artery disease)     Cancer ([address]     skin    COPD (chronic obstructive pulmonary disease) ([address]     Dementia (HCC)     Depression     Duodenal ulcer without hemorrhage or perforation     Falls frequently     GERD without esophagitis     Hernia     Hyperlipidemia     Hypertension     Hypokalemia     Lack of coordination     falls    Melanoma ([address]     MI (myocardial infarction) ([address]     Muscle weakness (generalized)     Protein calorie malnutrition ([address]     Reflux     Sepsis ([address]     Spinal stenosis     UTI (urinary tract infection)      PAST SURGICAL HISTORY:  Past Surgical History:   Procedure Laterality Date    CHOLECYSTECTOMY      COLONOSCOPY  8/10/12    CORONARY ANGIOPLASTY WITH STENT PLACEMENT      PROSTATE BIOPSY      SKIN BIOPSY      UPPER GASTROINTESTINAL ENDOSCOPY  5/14/12    UPPER GASTROINTESTINAL ENDOSCOPY N/A 6/11/2019    EGD BIOPSY performed by Patricia Mendoza DO at [facility] N/A 8/8/2019    EGD performed by Vamsi Weiss MD at [address]:  Patient is nonverbal not able to obtain    SOCIAL HISTORY:   reports that he has quit smoking. His smoking use included cigarettes. He has a 2.50 pack-year smoking history.  He has never used smokeless tobacco.    Scheduled Meds:   sodium chloride flush  10 mL IntraVENous 2 times per day    enoxaparin  40 mg SubCUTAneous Daily    methylPREDNISolone 40 mg IntraVENous Q12H    Followed by    Porter Bernard ON 3/16/2023] predniSONE  40 mg Oral Daily    vancomycin  1,000 mg IntraVENous Q24H    ipratropium-albuterol  1 ampule Inhalation TID    cefepime  2,000 mg IntraVENous Q12H     Continuous Infusions:   sodium chloride 75 mL/hr at 03/14/23 0957    sodium chloride       PRN Meds:  sodium chloride flush, sodium chloride, ondansetron **OR** ondansetron, polyethylene glycol, acetaminophen **OR** acetaminophen, albuterol    ALLERGIES:  Patient is allergic to fluoxetine, benazepril, hydrocodone, morphine, penicillins, simvastatin, morphine and related, and sucralfate. REVIEW OF SYSTEMS: Patient is nonverbal not able to obtain      PHYSICAL EXAM:  Blood pressure (!) 104/59, pulse 93, temperature 97.9 °F (36.6 °C), temperature source Oral, resp. rate 14, height 6' (1.829 m), weight 175 lb 3.2 oz (79.5 kg), SpO2 96 %.' on 3 L  Gen: No distress. Cachectic ill-appearing. Eyes: PERRL. No sclera icterus. No conjunctival injection. ENT: No discharge. Pharynx clear. Neck: Trachea midline. No obvious mass. Resp: No accessory muscle use. Few crackles. No wheezes. No rhonchi. No dullness on percussion. CV: Regular rate. Regular rhythm. No murmur or rub. No edema. GI: Non-tender. Non-distended. No hernia. Skin: Warm and dry. No nodule on exposed extremities. Lymph: No cervical LAD. No supraclavicular LAD. M/S: No cyanosis. No joint deformity. No clubbing. Neuro: Awake. Alert. Moves all four extremities. Psych: Disoriented. Nonverbal.  No anxiety. LABS:  CBC:   Recent Labs     03/13/23 1930   WBC 9.5   HGB 13.9   HCT 42.2   MCV 86.6        BMP:   Recent Labs     03/13/23 1930      K 4.9      CO2 21   BUN 29*   CREATININE 1.3     LIVER PROFILE:   Recent Labs     03/13/23 1930   AST 20   ALT 9*   LIPASE 10.0*   BILITOT 0.5   ALKPHOS 113     PT/INR: No results for input(s): PROTIME, INR in the last 72 hours.   APTT: No results for input(s): APTT in the last 72 hours. UA:  Recent Labs     03/13/23  2100   COLORU Yellow   PHUR 6.0   WBCUA 0-2   RBCUA >100*   BACTERIA 1+*   CLARITYU SL CLOUDY*   SPECGRAV 1.025   LEUKOCYTESUR Negative   UROBILINOGEN 0.2   BILIRUBINUR Negative   BLOODU LARGE*   GLUCOSEU Negative     No results for input(s): PHART, KIE0JCF, PO2ART in the last 72 hours. Microbiology:  3/13 respiratory  3/13 BC  3/13 COVID-19 not detected    Imaging:  CT chest 3/13 imaging was reviewed by me and showed   Mild bibasilar atelectasis or early infiltrates posteriorly with associated   mild bronchiectasis. Mildly atherosclerotic thoracic aorta with no aneurysm or dissection and   unremarkable mediastinum. Multiple old rib fractures on the left with no acute fracture seen.          ASSESSMENT:  Acute hypoxemic respiratory failure  Healthcare associated pneumonia - probable gram negative, risk for methicillin resistant Staph aureus as well  Lower lobe bronchiectasis  Dysphagia  Fecal impaction  CAD and diastolic CHF      PLAN:  Supplemental oxygen to maintain SaO2 >92%; wean as tolerated  Inhaled bronchodilators  IV antibiotics to include vancomycin and cefepime  Blood culture and Sputum GS&C  Speech pathology evaluation  PT OT  I recommend CXR in 4-6 week to documents resolution of pulmonary infitrates

## 2023-03-14 NOTE — PROGRESS NOTES
Pt arrived at room 209 at this time. Pt baseline dementia, A&O to self only. Telemetry applied; [address] notified. HS assessment completed. No complaints of pain or discomfort voiced. No signs of symptoms of distress noted. Patient tolerated night medications well. Respirations easy and even. Bed in lowest position, bed alarm in place and functioning properly, bed rails x2 up,  Call light within reach. BP 85/55, HR 75; MD notified. Bedside Mobility Assessment Tool (BMAT):     Assessment Level 1- Sit and Shake    1. From a semi-reclined position, ask patient to sit up and rotate to a seated position at the side of the bed. Can use the bedrail. 2. Ask patient to reach out and grab your hand and shake making sure patient reaches across his/her midline. Pass- Patient is able to come to a seated position, maintain core strength. Maintains seated balance while reaching across midline. Move on to Assessment Level 2. Assessment Level 2- Stretch and Point   1. With patient in seated position at the side of the bed, have patient place both feet on the floor (or stool) with knees no higher than hips. 2. Ask patient to stretch one leg and straighten the knee, then bend the ankle/flex and point the toes. If appropriate, repeat with the other leg. Fail- Patient is unable to complete task. Patient is MOBILITY LEVEL 2. Assessment Level 3- Stand   1. Ask patient to elevate off the bed or chair (seated to standing) using an assistive device (cane, bedrail). 2. Patient should be able to raise buttocks off be and hold for a count of five. May repeat once. Fail- Patient unable to demonstrate standing stability. Patient is MOBILITY LEVEL 3. Assessment Level 4- Walk   1. Ask patient to march in place at bedside. 2. Then ask patient to advance step and return each foot. Some medical conditions may render a patient from stepping backwards, use your best clinical judgement.    Fail- Patient not able to complete tasks OR requires use of assistive device. Patient is MOBILITY LEVEL 3.        Mobility Level- 2

## 2023-03-14 NOTE — PROGRESS NOTES
Vancomycin Day: 2/7  Current Regimen: 1000 mg IV every 24 hours    Patient's labs, cultures, vitals, and vancomycin regimen reviewed.    SCr stable  U/O not measured/well documented  [employer] updated    Plan:   Order level for 3/15 at [address] D 3/14/723298:17 PM  .

## 2023-03-14 NOTE — CONSULTS
Gastroenterology Consult Note    Patient:   Joe Park   :    1935   Facility:   [address]  Referring/PCP: Leda Villarreal MD  Date:     3/14/2023  Consultant:   Jodi Huizar MD, MD      Chief Complaint   Patient presents with    Abdominal Pain     From the [address] [place] [address] rehab; given 4 mg zofran in squad; vomit dark brown per nursing home. History of Present illness   82 y.o. male with pmx of CAD, COPD, Depression, Duodenal ulcer, cholecystectomy, HTN, HLD, MI, and melanoma who presented to ED via SNF 3/13/23 with nausea, vomiting, abdominal tenderness, and cough. He was also found to have pneumonia, requiring supplemental oxygen. He is a poor historian due to underlying advanced dementia. Of note, he had multiple bouts of emesis in the nursing facility prior to admission including one with questionably dark output. There were no signs of hematochezia. He has diffuse abdominal tenderness. He has had long hx of UGIB with most recent EGD in  showing healing duodenal ulcer and esophagitis. His last colonoscopy was in . He does not take NSAIDs.       Past Medical History:   Diagnosis Date    Atherosclerotic heart disease     CAD (coronary artery disease)     Cancer (HCC)     skin    COPD (chronic obstructive pulmonary disease) (HCC)     Dementia (HCC)     Depression     Duodenal ulcer without hemorrhage or perforation     Falls frequently     GERD without esophagitis     Hernia     Hyperlipidemia     Hypertension     Hypokalemia     Lack of coordination     falls    Melanoma ([address]     MI (myocardial infarction) ([address]     Muscle weakness (generalized)     Protein calorie malnutrition ([address]     Reflux     Sepsis ([address]     Spinal stenosis     UTI (urinary tract infection)      Past Surgical History:   Procedure Laterality Date    CHOLECYSTECTOMY      COLONOSCOPY  8/10/12    CORONARY ANGIOPLASTY WITH STENT PLACEMENT      PROSTATE BIOPSY      SKIN BIOPSY UPPER GASTROINTESTINAL ENDOSCOPY  5/14/12    UPPER GASTROINTESTINAL ENDOSCOPY N/A 6/11/2019    EGD BIOPSY performed by Lionel Castellon DO at [facility] N/A 8/8/2019    EGD performed by Yessi Angeles MD at [facility]. [address]:   Social History     Tobacco Use    Smoking status: Former     Packs/day: 0.25     Years: 10.00     Pack years: 2.50     Types: Cigarettes    Smokeless tobacco: Never   Substance Use Topics    Alcohol use: No     Family: History reviewed. No pertinent family history. No current facility-administered medications on file prior to encounter. Current Outpatient Medications on File Prior to Encounter   Medication Sig Dispense Refill    sulfamethoxazole-trimethoprim (BACTRIM;SEPTRA) 400-80 MG per tablet Take 1 tablet by mouth daily Prophylactic x 3 months 2/13/23-5/18/23      Cranberry 450 MG CAPS Take 450 mg by mouth in the morning and at bedtime UTI prevention      aspirin EC 81 MG EC tablet Take 1 tablet by mouth daily 90 tablet 0    valsartan (DIOVAN) 40 MG tablet Take 0.5 tablets by mouth at bedtime 30 tablet 0    carvedilol (COREG) 6.25 MG tablet Take 1 tablet by mouth 2 times daily (with meals) 60 tablet 1    spironolactone (ALDACTONE) 25 MG tablet Take 0.5 tablets by mouth daily 30 tablet 0    melatonin 3 MG TABS tablet Take 3 mg by mouth in the morning. sertraline (ZOLOFT) 50 MG tablet Take 100 mg by mouth in the morning. pregabalin (LYRICA) 25 MG capsule Take 75 mg by mouth in the morning and 75 mg at noon and 75 mg before bedtime.       omeprazole (PRILOSEC) 20 MG delayed release capsule Take 20 mg by mouth daily      [DISCONTINUED] amLODIPine (NORVASC) 10 MG tablet Take 10 mg by mouth daily      magnesium hydroxide (MILK OF MAGNESIA) 400 MG/5ML suspension Take 30 mLs by mouth daily as needed for Constipation      bisacodyl (DULCOLAX) 10 MG suppository Place 10 mg rectally daily as needed for Constipation If MOM ineffective. acetaminophen (TYLENOL) 325 MG tablet Take 650 mg by mouth every 6 hours as needed for Pain        Infusions:    sodium chloride 75 mL/hr at 03/14/23 0957    sodium chloride       PRN Medications: sodium chloride flush, sodium chloride, ondansetron **OR** ondansetron, polyethylene glycol, acetaminophen **OR** acetaminophen, albuterol  Allergies: Allergies   Allergen Reactions    Fluoxetine     Benazepril      Swollen tongue    Hydrocodone Other (See Comments)    Morphine     Penicillins Other (See Comments)    Simvastatin Other (See Comments)    Morphine And Related Nausea And Vomiting    Sucralfate Rash       ROS: Unable to obtain    Physical Exam   BP (!) 96/42   Pulse 70   Temp 99.1 °F (37.3 °C) (Oral)   Resp 14   Ht 6' (1.829 m)   Wt 175 lb 3.2 oz (79.5 kg)   SpO2 96%   BMI 23.76 kg/m²     Generalized: no acute distress  HEENT: sclera clear, anicteric  Neck: trachea midline  Heart: NSR  Abdomen: diffuse tenderness, ND, soft  Extremities: no edema    Lab and Imaging Review   Labs:  CBC:   Recent Labs     03/13/23 1930   WBC 9.5   HGB 13.9   HCT 42.2   MCV 86.6        BMP:   Recent Labs     03/13/23 1930      K 4.9      CO2 21   BUN 29*   CREATININE 1.3     LIVER PROFILE:   Recent Labs     03/13/23 1930   AST 20   ALT 9*   LIPASE 10.0*   PROT 7.5   BILITOT 0.5   ALKPHOS 113     Imaging:  CT chest/abd/pelvis 3/13    Mild bibasilar atelectasis or early infiltrates posteriorly with associated   mild bronchiectasis. Recommend follow-up with serial chest x-rays. Mildly atherosclerotic thoracic aorta with no aneurysm or dissection and   unremarkable mediastinum. Multiple old rib fractures on the left with no acute fracture seen. Previous cholecystectomy which is unchanged with no acute abnormality seen       Mild fecal impaction in the rectum with no obstruction.        Extensive diverticula throughout the colon with no pericolonic inflammation. Mild prostatic enlargement with no pelvic mass or active inflammation and a   normal appendix. Assessment:    82 y.o. male with pmx of CAD, COPD, Depression, Duodenal ulcer, cholecystectomy, HTN, HLD, MI, and melanoma admitted with hospital acquired pneumonia. Reported dark emesis and abd pain concerning for reflux esophagitis vs PUD.  Fecal impaction appears to have resolved    Plan:   Continue supportive care  Continue PPI daily   Monitor for signs of bleeding  Monitor H&H  Will check Abd X-ray to assess constipation  Continue antibiotics and steroids for Pna  Will order bowel regimen including enema if remains constipated  Will possibly need EGD to further assess when respiratory status improves if continues to bleed   PT/OT    Steve Ramos MD, MD  9:58 AM 3/14/2023

## 2023-03-14 NOTE — ED PROVIDER NOTES
[facility]      Pt Name: Dontae Macias  MRN: [id number]  [place] 1935  Date of evaluation: 3/13/2023  Provider: Herber Valenzuela MD    CHIEF COMPLAINT       Chief Complaint   Patient presents with    Abdominal Pain     From the [address] [place] [address] rehab; given 4 mg zofran in squad; vomit dark brown per nursing home. HISTORY OF PRESENT ILLNESS   (Location/Symptom, Timing/Onset, Context/Setting, Quality, Duration, Modifying Factors, Severity)  Note limiting factors. Dontae Macias is a 82 y.o. male with past medical history of CAD, hyperlipidemia, coronary artery disease with cardiomyopathy and underlying dementia here today with vomiting and reports of abdominal pain. Patient presents emergency department today for reported episodes of emesis at his care facility. He currently has dementia and is a very poor historian. He notes he has had a mild cough but otherwise states he feels fine and is in no pain. He provides no further meaningful insight to his current clinical condition    HPI    Nursing Notes were reviewed. REVIEW OF SYSTEMS    (2-9 systems for level 4, 10 or more for level 5)     Review of Systems    Please see HPI for pertinent positive and negative review of system findings. A full 10 system ROS was performed and otherwise negative.         PAST MEDICAL HISTORY     Past Medical History:   Diagnosis Date    Atherosclerotic heart disease     CAD (coronary artery disease)     Cancer (HCC)     skin    COPD (chronic obstructive pulmonary disease) (HCC)     Dementia (HCC)     Depression     Duodenal ulcer without hemorrhage or perforation     Falls frequently     GERD without esophagitis     Hernia     Hyperlipidemia     Hypertension     Hypokalemia     Lack of coordination     falls    Melanoma ([address]     MI (myocardial infarction) ([address]     Muscle weakness (generalized)     Protein calorie malnutrition ([address]     Reflux     Sepsis ([address] Spinal stenosis     UTI (urinary tract infection)          SURGICAL HISTORY       Past Surgical History:   Procedure Laterality Date    CHOLECYSTECTOMY      COLONOSCOPY  8/10/12    CORONARY ANGIOPLASTY WITH STENT PLACEMENT      PROSTATE BIOPSY      SKIN BIOPSY      UPPER GASTROINTESTINAL ENDOSCOPY  5/14/12    UPPER GASTROINTESTINAL ENDOSCOPY N/A 6/11/2019    EGD BIOPSY performed by Becky Chun DO at [facility] N/A 8/8/2019    EGD performed by Renald Cogan, MD at [address] [place] [address]       Previous Medications    ACETAMINOPHEN (TYLENOL) 325 MG TABLET    Take 650 mg by mouth every 6 hours as needed for Pain    ASPIRIN EC 81 MG EC TABLET    Take 1 tablet by mouth daily    BISACODYL (DULCOLAX) 10 MG SUPPOSITORY    Place 10 mg rectally daily as needed for Constipation If MOM ineffective. CARVEDILOL (COREG) 6.25 MG TABLET    Take 1 tablet by mouth 2 times daily (with meals)    MAGNESIUM HYDROXIDE (MILK OF MAGNESIA) 400 MG/5ML SUSPENSION    Take 30 mLs by mouth daily as needed for Constipation    MELATONIN 3 MG TABS TABLET    Take 3 mg by mouth in the morning. OMEPRAZOLE (PRILOSEC) 20 MG DELAYED RELEASE CAPSULE    Take 20 mg by mouth daily    PREGABALIN (LYRICA) 25 MG CAPSULE    Take 75 mg by mouth in the morning and 75 mg at noon and 75 mg before bedtime. SERTRALINE (ZOLOFT) 50 MG TABLET    Take 100 mg by mouth in the morning. SPIRONOLACTONE (ALDACTONE) 25 MG TABLET    Take 0.5 tablets by mouth daily    VALSARTAN (DIOVAN) 40 MG TABLET    Take 0.5 tablets by mouth at bedtime       ALLERGIES     Fluoxetine, Benazepril, Hydrocodone, Morphine, Penicillins, Simvastatin, Morphine and related, and Sucralfate    FAMILY HISTORY     History reviewed. No pertinent family history. SOCIAL HISTORY       Social History     Socioeconomic History    Marital status:       Spouse name: None    Number of children: None Years of education: None    Highest education level: None   Tobacco Use    Smoking status: Former     Packs/day: 0.25     Years: 10.00     Pack years: 2.50     Types: Cigarettes    Smokeless tobacco: Never   Vaping Use    Vaping Use: Never used   Substance and Sexual Activity    Alcohol use: No    Drug use: No    Sexual activity: Not Currently       SCREENINGS    [place] Coma Scale  Eye Opening: Spontaneous  Best Verbal Response: Oriented  Best Motor Response: Obeys commands  [place] Coma Scale Score: 15          PHYSICAL EXAM    (up to 7 for level 4, 8 or more for level 5)     ED Triage Vitals [03/13/23 1903]   BP Temp Temp Source Heart Rate Resp SpO2 Height Weight   (!) 141/99 98.9 °F (37.2 °C) Oral 94 18 (!) 86 % -- --       Physical Exam    General appearance:  Cooperative. Chronically ill and elderly appearing male s. Skin:  Warm. Dry. Eye:  Extraocular movements intact. Ears, nose, mouth and throat:  Oral mucosa, dry  Neck:  Trachea midline. Heart:  Regular rate and rhythm  Perfusion:  intact  Respiratory: Cough. Crackles in the bilateral lower lung fields. Abdominal:   Non distended. Generalized tenderness with no rebound or guarding  Neurological: Awake and alert but oriented only to person. Does follow basic commands throughout the bilateral upper and lower extremities.   Moves all extremities spontaneously  Musculoskeletal:   Normal ROM, no deformities          Psychiatric:  Normal mood      DIAGNOSTIC RESULTS       Labs Reviewed   CBC WITH AUTO DIFFERENTIAL - Abnormal; Notable for the following components:       Result Value    RDW 17.8 (*)     Neutrophils Absolute 8.2 (*)     Lymphocytes Absolute 0.4 (*)     All other components within normal limits   COMPREHENSIVE METABOLIC PANEL W/ REFLEX TO MG FOR LOW K - Abnormal; Notable for the following components:    Glucose 130 (*)     BUN 29 (*)     Est, Glom Filt Rate 54 (*)     Albumin/Globulin Ratio 1.0 (*)     ALT 9 (*)     All other components within normal limits   LIPASE - Abnormal; Notable for the following components:    Lipase 10.0 (*)     All other components within normal limits   BLOOD GAS, VENOUS - Abnormal; Notable for the following components:    pCO2, Agustin 33.5 (*)     pO2, Agustin 45.3 (*)     HCO3, Venous 21.1 (*)     Carboxyhemoglobin 5.2 (*)     All other components within normal limits   URINALYSIS WITH REFLEX TO CULTURE - Abnormal; Notable for the following components:    Clarity, UA SL CLOUDY (*)     Ketones, Urine TRACE (*)     Blood, Urine LARGE (*)     Protein, UA TRACE (*)     All other components within normal limits   MICROSCOPIC URINALYSIS - Abnormal; Notable for the following components:    RBC, UA >100 (*)     Bacteria, UA 1+ (*)     All other components within normal limits   COVID-19, RAPID   LACTATE, SEPSIS   TROPONIN       Interpretation per the Radiologist below, if obtained/available at the time of this note:    CT CHEST ABDOMEN PELVIS W CONTRAST Additional Contrast? None   Final Result   Mild bibasilar atelectasis or early infiltrates posteriorly with associated   mild bronchiectasis. Recommend follow-up with serial chest x-rays. Mildly atherosclerotic thoracic aorta with no aneurysm or dissection and   unremarkable mediastinum. Multiple old rib fractures on the left with no acute fracture seen. Previous cholecystectomy which is unchanged with no acute abnormality seen      Mild fecal impaction in the rectum with no obstruction. Extensive diverticula throughout the colon with no pericolonic inflammation. Mild prostatic enlargement with no pelvic mass or active inflammation and a   normal appendix. CT Head W/O Contrast   Final Result   No acute intracranial abnormality. XR CHEST PORTABLE   Final Result   Stable chronic interstitial changes in the lungs. All other labs/imaging were within normal range or not returned as of this dictation.     EMERGENCY DEPARTMENT COURSE and DIFFERENTIAL DIAGNOSIS/MDM:   Vitals:    Vitals:    03/13/23 1956 03/13/23 2015 03/13/23 2103 03/13/23 2112   BP: 109/74 (!) 119/57 (!) 109/90 (!) 117/55   Pulse: 97 97 90 94   Resp: 14 16 16 15   Temp:       TempSrc:       SpO2: 98% 97% 95% 94%       EKG *Interpreted by me*: Sinus rhythm rate of 85 bpm.  No ST elevation or arrhythmia. Differential Diagnosis: Pneumonia, PE, bowel obstruction, UTI    Patient sent to the emergency department today for nausea vomiting was found to have a cough. On exam was coughing here. Found to have acute hypoxic respiratory failure with a 2 to 3 L nasal cannula oxygen requirement. Abdomen soft with mild generalized tenderness. Laboratory studies initiated and were overall fairly unremarkable. He was noted to have hematuria on his urinalysis but no signs of infection and his hematuria appears old. No significant leukocytosis or lactic acidosis. Given his hypoxia with both respiratory and GI complaints CT scans were performed which do show a likely infiltrate. Suspect early pneumonia as the likely etiology to his symptoms. Was given Rocephin and azithromycin and plan to admit to the hospital given the associated hypoxic respiratory failure    Medications and Route:   Medications   cefTRIAXone (ROCEPHIN) 1,000 mg in sodium chloride 0.9 % 50 mL IVPB (mini-bag) (has no administration in time range)   azithromycin (ZITHROMAX) 500 mg in 250 mL addavial (has no administration in time range)   ondansetron (ZOFRAN) injection 4 mg (4 mg IntraVENous Given 3/13/23 1942)   iopamidol (ISOVUE-370) 76 % injection 75 mL (75 mLs IntraVENous Given 3/13/23 2041)       History From: Patient, EMS and nursing home report    Limitations to history :  Altered Mental Status    Chronic Conditions: Noted in HPI    CONSULTS: (Who and What was discussed)  IP CONSULT TO HOSPITALIST        Records Reviewed : Inpatient Notes prior discharge summary from November of last year    Erna Burrows ROSLYN Kelley, am the primary clinician of record. MDM      CONSULTS     IP CONSULT TO HOSPITALIST    Critical Care:     CRITICAL CARE TIME    I personally saw the patient and independently provided 30 minutes of non-concurrent critical care out of the total shared critical care time provided, excluding separately reportable procedures. There was a high probability of clinically significant/life threatening deterioration in the patient's condition which required my urgent intervention. This time was spent reviewing the patient chart, interpreting diagnostic/laboratory data, administration of supplemental oxygen for hypoxic respiratory failure      REASSESSMENT          PROCEDURE     Unless otherwise noted below, none     Procedures      FINAL IMPRESSION      1. Acute respiratory failure with hypoxia ([facility]    2. Community acquired pneumonia, unspecified laterality            DISPOSITION/PLAN   DISPOSITION Decision To Admit 03/13/2023 09:26:27 PM        PATIENT REFERRED TO:  No follow-up provider specified. DISCHARGE MEDICATIONS:  New Prescriptions    No medications on file     Controlled Substances Monitoring:     No flowsheet data found.     (Please note that portions of this note were completed with a voice recognition program.  Efforts were made to edit the dictations but occasionally words are mis-transcribed.)    Migue Don MD (electronically signed)  Attending Emergency Physician            Lauro Phelps MD  03/13/23 8893

## 2023-03-14 NOTE — PROGRESS NOTES
Patient is not able to demonstrated the ability to move from a reclining position to an upright position within the recliner. Patient is confused, demented and /or unable to follow instruction. 4 Eyes Skin Assessment     The patient is being assess for   Admission    I agree that 2 RN's have performed a thorough Head to Toe Skin Assessment on the patient. ALL assessment sites listed below have been assessed. Areas assessed for pressure by both nurses:   [x]   Head, Face, and Ears   [x]   Shoulders, Back, and Chest, Abdomen  [x]   Arms, Elbows, and Hands   [x]   Coccyx, Sacrum, and Ischium  [x]   Legs, Feet, and Heels        Skin Assessed Under all Medical Devices by both nurses:  O2 device tubing              All Mepilex Borders were peeled back and area peeked at by both nurses:  No: na  Please list where Mepilex Borders are located:  na             **SHARE this note so that the co-signing nurse is able to place an eSignature**    Scattered scabs on BLE. Blanchable redness to sacrum; mepilex applied. Blanchable redness to bilateral heels; mepilex applied. Co-signer eSignature: Electronically signed by Jamaal Flores RN on 3/14/23 at 6:06 AM EDT    Does the Patient have Skin Breakdown related to pressure?   No     (Insert Photo here na)         Bridger Prevention initiated:  Yes   Wound Care Orders initiated:  NA      [facility] nurse consulted for Pressure Injury (Stage 3,4, Unstageable, DTI, NWPT, Complex wounds)and New or Established Ostomies:  NA      Primary Nurse eSignature: Electronically signed by Uzair Arrieta RN on 3/14/23 at 3:06 AM EDT

## 2023-03-14 NOTE — PROGRESS NOTES
Informed Dr. Rubén Rios about Bp this am before and after bolus. Informed to call nursing home to get baseline bp. [address] to get information.

## 2023-03-14 NOTE — PROGRESS NOTES
Called and talked to Angel and normal range of BP for pt at [facility] is 100-120/60-68. Informed Dr. Lindo Freeze and ordered for another 500 cc normal saline bolus.

## 2023-03-14 NOTE — ACP (ADVANCE CARE PLANNING)
Advance Care Planning     General Advance Care Planning (ACP) Conversation    Date of Conversation: 3/13/2023  Conducted with:  Healthcare Decision Maker: Court-Appointed Legal Guardian (name) pt's  daughter/legal guardian Chloe Arredondo via phone call     Healthcare Decision Maker:    Primary Decision Maker: [address], Legal Guardian - [phone]  Click here to [address] including selection of the Healthcare Decision Maker Relationship (ie \"Primary\"). Today we documented Decision Maker(s) consistent with ACP documents on file. See guardianship papers under media tab on 7/27/2022    Content/Action Overview:    Reviewed DNR/DNI and patient elects Full Code (Attempt Resuscitation). Chloe Maria Elena stated at this time, pt will remain a full code and will notify staff if this changes in the future.      Length of Voluntary ACP Conversation in minutes:  <16 minutes (Non-Billable)    CELINE Griffiths  Case Management Department  Phone: [phone] Fax: [phone]

## 2023-03-14 NOTE — CONSULTS
Pharmacy to Dose Vancomycin    Dx: HAP  Goal AUC = 400-600  Pt wt = 72 kg  Recent Labs     03/13/23 1930   CREATININE 1.3     Recent Labs     03/13/23 1930   WBC 9.5     Loading dose: 1000 mg at 00:00 03/14/2023. Regimen: 1000 mg IV every 24 hours.   Start time: 0800 on 03/14/2023  Exposure target: AUC24 (range)400-600 mg/L.hr   AUC24,ss: 484 mg/L.hr  Probability of AUC24 > 400: 72 %  Ctrough,ss: 15.3 mg/L  Probability of Ctrough,ss > 20: 24 %  Probability of nephrotoxicity (Lodise OLIVIA 2009): 11 %  Vancomycin trough: 3/15 0700  JASWINDER [facility] 3/13/2023 11:38 PM

## 2023-03-15 ENCOUNTER — APPOINTMENT (OUTPATIENT)
Dept: GENERAL RADIOLOGY | Age: 88
DRG: 177 | End: 2023-03-15
Payer: MEDICARE

## 2023-03-15 PROBLEM — J18.9 HCAP (HEALTHCARE-ASSOCIATED PNEUMONIA): Status: ACTIVE | Noted: 2023-03-15

## 2023-03-15 LAB
ALBUMIN SERPL-MCNC: 3.7 G/DL (ref 3.4–5)
ALBUMIN/GLOB SERPL: 1.4 {RATIO} (ref 1.1–2.2)
ALP SERPL-CCNC: 82 U/L (ref 40–129)
ALT SERPL-CCNC: 9 U/L (ref 10–40)
ANION GAP SERPL CALCULATED.3IONS-SCNC: 10 MMOL/L (ref 3–16)
AST SERPL-CCNC: 11 U/L (ref 15–37)
BACTERIA UR CULT: NORMAL
BASOPHILS # BLD: 0 K/UL (ref 0–0.2)
BASOPHILS NFR BLD: 0.1 %
BILIRUB SERPL-MCNC: 0.4 MG/DL (ref 0–1)
BUN SERPL-MCNC: 20 MG/DL (ref 7–20)
CALCIUM SERPL-MCNC: 8.8 MG/DL (ref 8.3–10.6)
CHLORIDE SERPL-SCNC: 104 MMOL/L (ref 99–110)
CO2 SERPL-SCNC: 19 MMOL/L (ref 21–32)
CREAT SERPL-MCNC: 0.8 MG/DL (ref 0.8–1.3)
DEPRECATED RDW RBC AUTO: 17.7 % (ref 12.4–15.4)
EOSINOPHIL # BLD: 0 K/UL (ref 0–0.6)
EOSINOPHIL NFR BLD: 0.3 %
GFR SERPLBLD CREATININE-BSD FMLA CKD-EPI: >60 ML/MIN/{1.73_M2}
GLUCOSE SERPL-MCNC: 124 MG/DL (ref 70–99)
HCT VFR BLD AUTO: 35.8 % (ref 40.5–52.5)
HGB BLD-MCNC: 11.5 G/DL (ref 13.5–17.5)
LYMPHOCYTES # BLD: 0.5 K/UL (ref 1–5.1)
LYMPHOCYTES NFR BLD: 5.9 %
MCH RBC QN AUTO: 28.5 PG (ref 26–34)
MCHC RBC AUTO-ENTMCNC: 32.1 G/DL (ref 31–36)
MCV RBC AUTO: 88.9 FL (ref 80–100)
MONOCYTES # BLD: 0.3 K/UL (ref 0–1.3)
MONOCYTES NFR BLD: 3 %
NEUTROPHILS # BLD: 8.3 K/UL (ref 1.7–7.7)
NEUTROPHILS NFR BLD: 90.7 %
PLATELET # BLD AUTO: 136 K/UL (ref 135–450)
PMV BLD AUTO: 8.4 FL (ref 5–10.5)
POTASSIUM SERPL-SCNC: 3.9 MMOL/L (ref 3.5–5.1)
PROT SERPL-MCNC: 6.3 G/DL (ref 6.4–8.2)
RBC # BLD AUTO: 4.02 M/UL (ref 4.2–5.9)
SODIUM SERPL-SCNC: 133 MMOL/L (ref 136–145)
VANCOMYCIN TROUGH SERPL-MCNC: 9 UG/ML (ref 10–20)
WBC # BLD AUTO: 9.1 K/UL (ref 4–11)

## 2023-03-15 PROCEDURE — 2580000003 HC RX 258: Performed by: INTERNAL MEDICINE

## 2023-03-15 PROCEDURE — 36415 COLL VENOUS BLD VENIPUNCTURE: CPT

## 2023-03-15 PROCEDURE — 6360000002 HC RX W HCPCS: Performed by: INTERNAL MEDICINE

## 2023-03-15 PROCEDURE — 74018 RADEX ABDOMEN 1 VIEW: CPT

## 2023-03-15 PROCEDURE — 85025 COMPLETE CBC W/AUTO DIFF WBC: CPT

## 2023-03-15 PROCEDURE — C9113 INJ PANTOPRAZOLE SODIUM, VIA: HCPCS | Performed by: INTERNAL MEDICINE

## 2023-03-15 PROCEDURE — 1200000000 HC SEMI PRIVATE

## 2023-03-15 PROCEDURE — 99233 SBSQ HOSP IP/OBS HIGH 50: CPT | Performed by: INTERNAL MEDICINE

## 2023-03-15 PROCEDURE — 6370000000 HC RX 637 (ALT 250 FOR IP): Performed by: INTERNAL MEDICINE

## 2023-03-15 PROCEDURE — 94640 AIRWAY INHALATION TREATMENT: CPT

## 2023-03-15 PROCEDURE — 80053 COMPREHEN METABOLIC PANEL: CPT

## 2023-03-15 PROCEDURE — 94761 N-INVAS EAR/PLS OXIMETRY MLT: CPT

## 2023-03-15 PROCEDURE — 80202 ASSAY OF VANCOMYCIN: CPT

## 2023-03-15 RX ORDER — SENNA PLUS 8.6 MG/1
1 TABLET ORAL NIGHTLY
Status: DISCONTINUED | OUTPATIENT
Start: 2023-03-15 | End: 2023-03-16 | Stop reason: HOSPADM

## 2023-03-15 RX ORDER — PROCHLORPERAZINE EDISYLATE 5 MG/ML
10 INJECTION INTRAMUSCULAR; INTRAVENOUS EVERY 6 HOURS PRN
Status: DISCONTINUED | OUTPATIENT
Start: 2023-03-15 | End: 2023-03-16 | Stop reason: HOSPADM

## 2023-03-15 RX ADMIN — Medication 10 ML: at 19:26

## 2023-03-15 RX ADMIN — ENOXAPARIN SODIUM 40 MG: 100 INJECTION SUBCUTANEOUS at 09:12

## 2023-03-15 RX ADMIN — PROCHLORPERAZINE EDISYLATE 10 MG: 5 INJECTION INTRAMUSCULAR; INTRAVENOUS at 21:25

## 2023-03-15 RX ADMIN — ONDANSETRON HYDROCHLORIDE 4 MG: 2 INJECTION, SOLUTION INTRAMUSCULAR; INTRAVENOUS at 05:14

## 2023-03-15 RX ADMIN — ONDANSETRON HYDROCHLORIDE 4 MG: 2 INJECTION, SOLUTION INTRAMUSCULAR; INTRAVENOUS at 18:21

## 2023-03-15 RX ADMIN — Medication 10 ML: at 09:12

## 2023-03-15 RX ADMIN — CEFEPIME 2000 MG: 2 INJECTION, POWDER, FOR SOLUTION INTRAVENOUS at 12:21

## 2023-03-15 RX ADMIN — PANTOPRAZOLE SODIUM 40 MG: 40 INJECTION, POWDER, FOR SOLUTION INTRAVENOUS at 09:12

## 2023-03-15 RX ADMIN — Medication 1500 MG: at 09:11

## 2023-03-15 RX ADMIN — METHYLPREDNISOLONE SODIUM SUCCINATE 40 MG: 40 INJECTION, POWDER, FOR SOLUTION INTRAMUSCULAR; INTRAVENOUS at 03:43

## 2023-03-15 RX ADMIN — CEFEPIME 2000 MG: 2 INJECTION, POWDER, FOR SOLUTION INTRAVENOUS at 19:26

## 2023-03-15 RX ADMIN — IPRATROPIUM BROMIDE AND ALBUTEROL SULFATE 1 AMPULE: .5; 2.5 SOLUTION RESPIRATORY (INHALATION) at 06:58

## 2023-03-15 NOTE — CARE COORDINATION
INTERDISCIPLINARY PLAN OF CARE CONFERENCE    Date/Time: 3/15/2023 4:02 PM  Completed by: Nilson Gaona RN, Case Management      Patient Name:  Katty Brennan  YOB: 1935  Admitting Diagnosis: Acute respiratory failure with hypoxia ([facility] [J96.01]  Acute respiratory failure with hypoxemia ([facility] [J96.01]  Community acquired pneumonia, unspecified laterality [J18.9]     Admit Date/Time:  3/13/2023  6:54 PM    Chart reviewed. Interdisciplinary team contacted or reviewed plan related to patient progress and discharge plans. Disciplines included Case Management, Nursing, and Dietitian. Current Status:Stable  PT/OT recommendation for discharge plan of care: N/A    Expected D/C Disposition:  Nursing Home  Confirmed plan with patient and/or family Yes confirmed with: (name) Bello Button. Met with:patient  Discharge Plan Comments: Reviewed chart and met with pt at bedside. Pt agreeable and plan is return to [facility]. Spoke with Nguyen Mulligan at [address] who confirms + bed hold. Spoke with Melvin WILKINSON who also confirms plan is return to [facility] at [place]. Will cont to follow.      Home O2 in place on admit: Per facility if needed

## 2023-03-15 NOTE — PROGRESS NOTES
Chart accessed to assist Primary RN. Upon entering room pt vomiting. PRN zofran give, se MAR. Gown changed and pt assisted back into bed. Primary nurse updated on above.

## 2023-03-15 NOTE — PLAN OF CARE
Problem: Discharge Planning  Goal: Discharge to home or other facility with appropriate resources  Outcome: Progressing     Problem: Safety - Adult  Goal: Free from fall injury  Outcome: Progressing     Problem: Skin/Tissue Integrity  Goal: Absence of new skin breakdown  Description: 1. Monitor for areas of redness and/or skin breakdown  2. Assess vascular access sites hourly  3. Every 4-6 hours minimum:  Change oxygen saturation probe site  4. Every 4-6 hours:  If on nasal continuous positive airway pressure, respiratory therapy assess nares and determine need for appliance change or resting period.   Outcome: Progressing     Problem: ABCDS Injury Assessment  Goal: Absence of physical injury  Outcome: Progressing     Problem: Neurosensory - Adult  Goal: Achieves maximal functionality and self care  Outcome: Progressing     Problem: Respiratory - Adult  Goal: Achieves optimal ventilation and oxygenation  Outcome: Progressing     Problem: Cardiovascular - Adult  Goal: Maintains optimal cardiac output and hemodynamic stability  Outcome: Progressing     Problem: Skin/Tissue Integrity - Adult  Goal: Skin integrity remains intact  Outcome: Progressing     Problem: Musculoskeletal - Adult  Goal: Return mobility to safest level of function  Outcome: Progressing  Goal: Return ADL status to a safe level of function  Outcome: Progressing     Problem: Gastrointestinal - Adult  Goal: Minimal or absence of nausea and vomiting  Outcome: Progressing  Goal: Maintains or returns to baseline bowel function  Outcome: Progressing     Problem: Genitourinary - Adult  Goal: Absence of urinary retention  Outcome: Progressing

## 2023-03-15 NOTE — PROGRESS NOTES
Progress Note    Admit Date:  3/13/2023    Subjective:  Mr. Adriana Farrar is off oxygen. On clear liquid diet. He is feeling better. GI will do an EGD in am. No fever. HR is low but stable. Objective:   /70   Pulse 53   Temp 97.6 °F (36.4 °C) (Oral)   Resp 18   Ht 6' (1.829 m)   Wt 175 lb (79.4 kg)   SpO2 96%   BMI 23.73 kg/m²        Intake/Output Summary (Last 24 hours) at 3/15/2023 1200  Last data filed at 3/15/2023 1130  Gross per 24 hour   Intake 3884.77 ml   Output 700 ml   Net 3184.77 ml       Physical Exam:    General appearance: alert, appears stated age and cooperative  Head: Normocephalic, without obvious abnormality, atraumatic  Eyes: conjunctivae/corneas clear. PERRL, EOM's intact. Neck: no adenopathy, no carotid bruit, no JVD, supple, symmetrical, trachea midline and thyroid not enlarged, symmetric, no tenderness/mass/nodules  Lungs: Bilateral crackles. No wheeze or rhonchi.   Heart: regular rate and rhythm, S1, S2 normal, no murmur, click, rub or gallop  Abdomen: soft, non-tender; bowel sounds normal; no masses,  no organomegaly  Extremities: extremities normal, atraumatic, no cyanosis or edema  Pulses: 2+ and symmetric  Skin: Skin color, texture, turgor normal. No rashes or lesions  Neurologic: Grossly normal    Scheduled Meds:   vancomycin  1,500 mg IntraVENous Q24H    sodium chloride flush  10 mL IntraVENous 2 times per day    enoxaparin  40 mg SubCUTAneous Daily    pantoprazole  40 mg IntraVENous Daily    ipratropium-albuterol  1 ampule Inhalation BID    polyethylene glycol  17 g Oral Daily    cefepime  2,000 mg IntraVENous Q12H       Continuous Infusions:   sodium chloride 75 mL/hr at 03/15/23 1130    sodium chloride         PRN Meds:  sodium chloride flush, sodium chloride, ondansetron **OR** ondansetron, acetaminophen **OR** acetaminophen, albuterol      Data:  CBC:   Recent Labs     03/13/23  1930 03/15/23  0717   WBC 9.5 9.1   HGB 13.9 11.5*   HCT 42.2 35.8*   MCV 86.6 88.9   PLT 167 136     BMP:   Recent Labs     03/13/23 1930 03/15/23  0717    133*   K 4.9 3.9    104   CO2 21 19*   BUN 29* 20   CREATININE 1.3 0.8     LIVER PROFILE:   Recent Labs     03/13/23  1930 03/15/23  0717   AST 20 11*   ALT 9* 9*   LIPASE 10.0*  --    BILITOT 0.5 0.4   ALKPHOS 113 82     PT/INR: No results for input(s): PROTIME, INR in the last 72 hours. Cultures:   COVID negative  Flu a and B not detected    XR ABDOMEN (KUB) (SINGLE AP VIEW)   Final Result   Mild stool within the rectum. No evidence of bowel obstruction         CT CHEST ABDOMEN PELVIS W CONTRAST Additional Contrast? None   Final Result   Mild bibasilar atelectasis or early infiltrates posteriorly with associated   mild bronchiectasis. Recommend follow-up with serial chest x-rays. Mildly atherosclerotic thoracic aorta with no aneurysm or dissection and   unremarkable mediastinum. Multiple old rib fractures on the left with no acute fracture seen. Previous cholecystectomy which is unchanged with no acute abnormality seen      Mild fecal impaction in the rectum with no obstruction. Extensive diverticula throughout the colon with no pericolonic inflammation. Mild prostatic enlargement with no pelvic mass or active inflammation and a   normal appendix. CT Head W/O Contrast   Final Result   No acute intracranial abnormality. XR CHEST PORTABLE   Final Result   Stable chronic interstitial changes in the lungs.          XR ABDOMEN (KUB) (SINGLE AP VIEW)    (Results Pending)       Assessment:  Principal Problem:    Acute respiratory failure with hypoxemia (HCC)  Active Problems:    Acute hypoxemic respiratory failure (HCC)    Acute metabolic encephalopathy    Cardiomyopathy ([address]    Bronchiectasis without complication ([address]    Dysphagia    Community acquired pneumonia    Acute respiratory failure with hypoxia ([address]    HCAP (healthcare-associated pneumonia)    CAD (coronary artery disease)    HTN (hypertension)    Dementia (HCC)    Abdominal pain  Resolved Problems:    * No resolved hospital problems. *      Plan:       #Hospital Acquired Pneumonia - due to gram neg organism or MRSA  #Acute hypoxic respiratory failure   #COPD with AE   -no WBC, afebrile, procalc neg, LA neg   -no baseline home oxygen, requiring 3 L O2  -continue to wean as tolerated    -IV cefepime and vancomycin   -IV solumedrol   -duonebs   -blood cultures pending  -sputum culture pending   -pulmonology consulted      #Hypotension resolved. -IVF boluses with continuous IVF   -holding BP meds   -monitor BP      #Metabolic encephalopathy   -likely 2/2 to above. patient does have hx of dementia      #Reported vomiting   -has not had any since admission   -prn zofran   -IVF   -CT abdomen as above   -GI consulted, EGD in am.     #Hematuria   -per UA  -none has been witness by staff today      #Fecal impaction   -enema and bowel regimen   -GI consulted         #Dysphagia   -speech eval pending   -on pureed diet at SNF     #CAD   -on ASA, BB, ARB   -holding BB and ARB 2/2 to hypotension   - possible resume in am. BP is better. #Diastolic HF  #Stress induced cardiomyopathy    -last echo as above with EF 35%   -holding BB ARB and aldactone 2/2 to low Bps   -per last admission, not candidate for intervention      #HTN   -holding valsartan, coreg, aldactone  - possibly resume in am     #HLD   -not on statin      #Old rib fractures   -non-acute      #Dementia   -supportive care      DVT Prophylaxis: Lovenox   Diet: ADULT DIET;  Clear Liquid  Code Status: Full Code         Kristian Joaquin MD, MD 3/15/2023 12:00 PM

## 2023-03-15 NOTE — FLOWSHEET NOTE
03/14/23 2024   Vital Signs   Temp 97.5 °F (36.4 °C)   Temp Source Axillary   Heart Rate 65   Heart Rate Source Monitor   Resp 18   /60   MAP (Calculated) 77   BP Location Left upper arm   BP Method Automatic   Patient Position Semi fowlers   Level of Consciousness 0   MEWS Score 1   Opioid-Induced Sedation   POSS Score 1   RASS   [place]iz Agitation Sedation Scale (RASS) 0   Oxygen Therapy   SpO2 98 %   Pulse Oximetry Type Intermittent   Pulse Oximeter Device Mode Intermittent   Pulse Oximeter Device Location Left;Finger   O2 Device None (Room air)     Patient Alert, vitals and assessments done at this time. Bed in lowest position, call light within reach.

## 2023-03-15 NOTE — PROGRESS NOTES
PROGRESS NOTE  S:87 yrs Patient  admitted on 3/13/2023 with Acute respiratory failure with hypoxia (HCC) [J96.01]  Acute respiratory failure with hypoxemia ([address] [J96.01]  Community acquired pneumonia, unspecified laterality [J18.9] . Today, he is having BMs. No more hematemesis. Exam:   Vitals:    03/15/23 0757   BP: 137/70   Pulse: 53   Resp: 18   Temp: 97.6 °F (36.4 °C)   SpO2: 96%   Generalized: no acute distress  HEENT: sclera clear, anicteric  Neck: trachea midline  Heart: NSR  Abdomen: diffuse tenderness, ND, soft  Extremities: no edema    Medications: Reviewed    Labs:  CBC:   Recent Labs     03/13/23  1930 03/15/23  0717   WBC 9.5 9.1   HGB 13.9 11.5*   HCT 42.2 35.8*   MCV 86.6 88.9    136     BMP:   Recent Labs     03/13/23  1930 03/15/23  0717    133*   K 4.9 3.9    104   CO2 21 19*   BUN 29* 20   CREATININE 1.3 0.8     LIVER PROFILE:   Recent Labs     03/13/23  1930 03/15/23  0717   AST 20 11*   ALT 9* 9*   LIPASE 10.0*  --    PROT 7.5 6.3*   BILITOT 0.5 0.4   ALKPHOS 113 82     Imaging:  Abd X-ray 3/14  Mild stool within the rectum. No evidence of bowel obstruction    Assessment: 82 y.o. male with pmx of CAD, COPD, Depression, Duodenal ulcer, cholecystectomy, HTN, HLD, MI, and melanoma admitted with hospital acquired pneumonia. Reported dark emesis and abd pain concerning for reflux esophagitis vs PUD.  Fecal impaction appears to have resolved     Plan:  Continue supportive care  Continue PPI daily   Monitor for signs of bleeding  Monitor H&H  Continue antibiotics and steroids for pneumonia  Up to chair as tolerated  Clear liquid diet today  Miralax and senna BID  Will plan on EGD tomorrow     Corin Orozco MD, MD  9:38 AM 3/15/2023

## 2023-03-15 NOTE — FLOWSHEET NOTE
03/15/23 1922   Vital Signs   Temp 97 °F (36.1 °C)   Temp Source Oral   Heart Rate 86   Heart Rate Source Monitor   Resp 18   /71   MAP (Calculated) 91   BP Location Left upper arm   BP Method Automatic   Patient Position Semi fowlers   Level of Consciousness 0   MEWS Score 1   Opioid-Induced Sedation   POSS Score 1   RASS   [place] Agitation Sedation Scale (RASS) 0   Oxygen Therapy   SpO2 93 %   Pulse Oximetry Type Intermittent   Pulse Oximeter Device Mode Intermittent   Pulse Oximeter Device Location Left;Finger   O2 Device None (Room air)     Patient A+Ox4, vitals and assessments done at this time. Bed in lowest position, call light within reach.

## 2023-03-15 NOTE — PROGRESS NOTES
Pulmonary Progress Note    CC: Respiratory failure    Subjective:   Appears comfortable  Room air        Intake/Output Summary (Last 24 hours) at 3/15/2023 0707  Last data filed at 3/14/2023 2041  Gross per 24 hour   Intake 2812.29 ml   Output 700 ml   Net 2112.29 ml       Exam:   BP (!) 129/98   Pulse 66   Temp 97.3 °F (36.3 °C) (Oral)   Resp 18   Ht 6' (1.829 m)   Wt 175 lb (79.4 kg)   SpO2 96%   BMI 23.73 kg/m²  on room air  Gen: No distress. Cachectic ill-appearing. Eyes: PERRL. No sclera icterus. No conjunctival injection. ENT: No discharge. Pharynx clear. Neck: Trachea midline. No obvious mass. Resp: No accessory muscle use. Few crackles. No wheezes. No rhonchi. No dullness on percussion. CV: Regular rate. Regular rhythm. No murmur or rub. No edema. GI: Non-tender. Non-distended. No hernia. Skin: Warm and dry. No nodule on exposed extremities. Lymph: No cervical LAD. No supraclavicular LAD. M/S: No cyanosis. No joint deformity. No clubbing. Neuro: Awake. Alert. Moves all four extremities. Psych: Disoriented.   Nonverbal.  No anxiety    Scheduled Meds:   sodium chloride flush  10 mL IntraVENous 2 times per day    enoxaparin  40 mg SubCUTAneous Daily    methylPREDNISolone  40 mg IntraVENous Q12H    Followed by    Raj Ceballos ON 3/16/2023] predniSONE  40 mg Oral Daily    vancomycin  1,000 mg IntraVENous Q24H    pantoprazole  40 mg IntraVENous Daily    ipratropium-albuterol  1 ampule Inhalation BID    polyethylene glycol  17 g Oral Daily    cefepime  2,000 mg IntraVENous Q12H     Continuous Infusions:   sodium chloride 75 mL/hr at 03/14/23 2303    sodium chloride       PRN Meds:  sodium chloride flush, sodium chloride, ondansetron **OR** ondansetron, acetaminophen **OR** acetaminophen, albuterol    Labs:  CBC:   Recent Labs     03/13/23 1930   WBC 9.5   HGB 13.9   HCT 42.2   MCV 86.6        BMP:   Recent Labs     03/13/23 1930      K 4.9      CO2 21   BUN 29* CREATININE 1.3     LIVER PROFILE:   Recent Labs     03/13/23  1930   AST 20   ALT 9*   LIPASE 10.0*   BILITOT 0.5   ALKPHOS 113     PT/INR: No results for input(s): PROTIME, INR in the last 72 hours. APTT: No results for input(s): APTT in the last 72 hours. UA:  Recent Labs     03/13/23  2100   COLORU Yellow   PHUR 6.0   WBCUA 0-2   RBCUA >100*   BACTERIA 1+*   CLARITYU SL CLOUDY*   SPECGRAV 1.025   LEUKOCYTESUR Negative   UROBILINOGEN 0.2   BILIRUBINUR Negative   BLOODU LARGE*   GLUCOSEU Negative     No results for input(s): PHART, XHR2XVQ, PO2ART in the last 72 hours. Microbiology:  3/13 respiratory  3/13 BC  3/13 COVID-19 not detected     Imaging:  CT chest 3/13 imaging was reviewed by me and showed   Mild bibasilar atelectasis or early infiltrates posteriorly with associated   mild bronchiectasis. Mildly atherosclerotic thoracic aorta with no aneurysm or dissection and   unremarkable mediastinum. Multiple old rib fractures on the left with no acute fracture seen.             ASSESSMENT:  Acute hypoxemic respiratory failure  Healthcare associated pneumonia - probable gram negative, risk for methicillin resistant Staph aureus as well  Lower lobe bronchiectasis  Dysphagia  Fecal impaction  CAD and diastolic CHF        PLAN:  Supplemental oxygen to maintain SaO2 >92%; wean as tolerated  Inhaled bronchodilators  IV vancomycin and cefepime day #2  Monitor vancomycin level for toxicity  Follow-up cultures  GI following planning for EGD  Speech pathology evaluation  PT OT  I recommend CXR in 4-6 week to documents resolution of pulmonary infitrates   Discussed with internal medicine

## 2023-03-16 ENCOUNTER — HOSPITAL ENCOUNTER (EMERGENCY)
Age: 88
Discharge: HOME | End: 2023-03-17
Attending: EMERGENCY MEDICINE
Payer: MEDICARE

## 2023-03-16 VITALS
HEART RATE: 65 BPM | SYSTOLIC BLOOD PRESSURE: 147 MMHG | WEIGHT: 175 LBS | OXYGEN SATURATION: 95 % | RESPIRATION RATE: 16 BRPM | BODY MASS INDEX: 23.7 KG/M2 | DIASTOLIC BLOOD PRESSURE: 70 MMHG | TEMPERATURE: 96.8 F | HEIGHT: 72 IN

## 2023-03-16 DIAGNOSIS — R10.9 ABDOMINAL PAIN, UNSPECIFIED ABDOMINAL LOCATION: Primary | ICD-10-CM

## 2023-03-16 DIAGNOSIS — K59.00 CONSTIPATION, UNSPECIFIED CONSTIPATION TYPE: ICD-10-CM

## 2023-03-16 DIAGNOSIS — E83.42 HYPOMAGNESEMIA: ICD-10-CM

## 2023-03-16 LAB
ALBUMIN SERPL-MCNC: 3.5 G/DL (ref 3.4–5)
ALBUMIN/GLOB SERPL: 1.1 {RATIO} (ref 1.1–2.2)
ALP SERPL-CCNC: 83 U/L (ref 40–129)
ALT SERPL-CCNC: <5 U/L (ref 10–40)
ANION GAP SERPL CALCULATED.3IONS-SCNC: 10 MMOL/L (ref 3–16)
AST SERPL-CCNC: 13 U/L (ref 15–37)
BILIRUB SERPL-MCNC: 0.9 MG/DL (ref 0–1)
BUN SERPL-MCNC: 15 MG/DL (ref 7–20)
CALCIUM SERPL-MCNC: 9.1 MG/DL (ref 8.3–10.6)
CHLORIDE SERPL-SCNC: 104 MMOL/L (ref 99–110)
CO2 SERPL-SCNC: 24 MMOL/L (ref 21–32)
CREAT SERPL-MCNC: 0.9 MG/DL (ref 0.8–1.3)
GFR SERPLBLD CREATININE-BSD FMLA CKD-EPI: >60 ML/MIN/{1.73_M2}
GLUCOSE SERPL-MCNC: 96 MG/DL (ref 70–99)
LACTATE BLDV-SCNC: 1.1 MMOL/L (ref 0.4–2)
LIPASE SERPL-CCNC: 8 U/L (ref 13–60)
POTASSIUM SERPL-SCNC: 3.5 MMOL/L (ref 3.5–5.1)
PROT SERPL-MCNC: 6.8 G/DL (ref 6.4–8.2)
SODIUM SERPL-SCNC: 138 MMOL/L (ref 136–145)

## 2023-03-16 PROCEDURE — 83735 ASSAY OF MAGNESIUM: CPT

## 2023-03-16 PROCEDURE — 94761 N-INVAS EAR/PLS OXIMETRY MLT: CPT

## 2023-03-16 PROCEDURE — 99239 HOSP IP/OBS DSCHRG MGMT >30: CPT | Performed by: INTERNAL MEDICINE

## 2023-03-16 PROCEDURE — 94640 AIRWAY INHALATION TREATMENT: CPT

## 2023-03-16 PROCEDURE — 85025 COMPLETE CBC W/AUTO DIFF WBC: CPT

## 2023-03-16 PROCEDURE — C9113 INJ PANTOPRAZOLE SODIUM, VIA: HCPCS | Performed by: INTERNAL MEDICINE

## 2023-03-16 PROCEDURE — 0DB78ZX EXCISION OF STOMACH, PYLORUS, VIA NATURAL OR ARTIFICIAL OPENING ENDOSCOPIC, DIAGNOSTIC: ICD-10-PCS | Performed by: INTERNAL MEDICINE

## 2023-03-16 PROCEDURE — 2580000003 HC RX 258: Performed by: INTERNAL MEDICINE

## 2023-03-16 PROCEDURE — 83605 ASSAY OF LACTIC ACID: CPT

## 2023-03-16 PROCEDURE — 7100000010 HC PHASE II RECOVERY - FIRST 15 MIN: Performed by: INTERNAL MEDICINE

## 2023-03-16 PROCEDURE — 80053 COMPREHEN METABOLIC PANEL: CPT

## 2023-03-16 PROCEDURE — 83690 ASSAY OF LIPASE: CPT

## 2023-03-16 PROCEDURE — 99232 SBSQ HOSP IP/OBS MODERATE 35: CPT | Performed by: INTERNAL MEDICINE

## 2023-03-16 PROCEDURE — 6360000002 HC RX W HCPCS: Performed by: INTERNAL MEDICINE

## 2023-03-16 PROCEDURE — 2709999900 HC NON-CHARGEABLE SUPPLY: Performed by: INTERNAL MEDICINE

## 2023-03-16 PROCEDURE — 88305 TISSUE EXAM BY PATHOLOGIST: CPT

## 2023-03-16 PROCEDURE — 99152 MOD SED SAME PHYS/QHP 5/>YRS: CPT | Performed by: INTERNAL MEDICINE

## 2023-03-16 PROCEDURE — 99285 EMERGENCY DEPT VISIT HI MDM: CPT

## 2023-03-16 PROCEDURE — 88342 IMHCHEM/IMCYTCHM 1ST ANTB: CPT

## 2023-03-16 PROCEDURE — 36415 COLL VENOUS BLD VENIPUNCTURE: CPT

## 2023-03-16 PROCEDURE — 6370000000 HC RX 637 (ALT 250 FOR IP): Performed by: INTERNAL MEDICINE

## 2023-03-16 PROCEDURE — 7100000011 HC PHASE II RECOVERY - ADDTL 15 MIN: Performed by: INTERNAL MEDICINE

## 2023-03-16 PROCEDURE — 3609012400 HC EGD TRANSORAL BIOPSY SINGLE/MULTIPLE: Performed by: INTERNAL MEDICINE

## 2023-03-16 RX ORDER — MIDAZOLAM HYDROCHLORIDE 5 MG/ML
INJECTION INTRAMUSCULAR; INTRAVENOUS PRN
Status: DISCONTINUED | OUTPATIENT
Start: 2023-03-16 | End: 2023-03-16 | Stop reason: ALTCHOICE

## 2023-03-16 RX ORDER — PANTOPRAZOLE SODIUM 40 MG/1
40 TABLET, DELAYED RELEASE ORAL
Qty: 30 TABLET | Refills: 2
Start: 2023-03-16

## 2023-03-16 RX ORDER — FENTANYL CITRATE 50 UG/ML
INJECTION, SOLUTION INTRAMUSCULAR; INTRAVENOUS PRN
Status: DISCONTINUED | OUTPATIENT
Start: 2023-03-16 | End: 2023-03-16 | Stop reason: ALTCHOICE

## 2023-03-16 RX ORDER — LEVOFLOXACIN 500 MG/1
500 TABLET, FILM COATED ORAL DAILY
Qty: 5 TABLET | Refills: 0
Start: 2023-03-16 | End: 2023-03-21

## 2023-03-16 RX ADMIN — IPRATROPIUM BROMIDE AND ALBUTEROL SULFATE 1 AMPULE: .5; 2.5 SOLUTION RESPIRATORY (INHALATION) at 07:49

## 2023-03-16 RX ADMIN — PANTOPRAZOLE SODIUM 40 MG: 40 INJECTION, POWDER, FOR SOLUTION INTRAVENOUS at 09:59

## 2023-03-16 RX ADMIN — CEFEPIME 2000 MG: 2 INJECTION, POWDER, FOR SOLUTION INTRAVENOUS at 14:40

## 2023-03-16 RX ADMIN — CEFEPIME 2000 MG: 2 INJECTION, POWDER, FOR SOLUTION INTRAVENOUS at 04:20

## 2023-03-16 RX ADMIN — Medication 1500 MG: at 10:04

## 2023-03-16 RX ADMIN — SODIUM CHLORIDE: 9 INJECTION, SOLUTION INTRAVENOUS at 04:20

## 2023-03-16 RX ADMIN — ENOXAPARIN SODIUM 40 MG: 100 INJECTION SUBCUTANEOUS at 09:59

## 2023-03-16 ASSESSMENT — PAIN DESCRIPTION - LOCATION: LOCATION: ABDOMEN

## 2023-03-16 ASSESSMENT — PAIN DESCRIPTION - PAIN TYPE: TYPE: ACUTE PAIN

## 2023-03-16 ASSESSMENT — PAIN - FUNCTIONAL ASSESSMENT: PAIN_FUNCTIONAL_ASSESSMENT: NONE - DENIES PAIN

## 2023-03-16 ASSESSMENT — PAIN SCALES - GENERAL: PAINLEVEL_OUTOF10: 9

## 2023-03-16 ASSESSMENT — PAIN DESCRIPTION - FREQUENCY: FREQUENCY: CONTINUOUS

## 2023-03-16 NOTE — PROGRESS NOTES
RT Inhaler-Nebulizer Bronchodilator Protocol Note    There is a bronchodilator order in the chart from a provider indicating to follow the RT Bronchodilator Protocol and there is an Initiate RT Inhaler-Nebulizer Bronchodilator Protocol order as well (see protocol at bottom of note). CXR Findings:  No results found. The findings from the last RT Protocol Assessment were as follows:   History Pulmonary Disease: Chronic pulmonary disease  Respiratory Pattern: Regular pattern and RR 12-20 bpm  Breath Sounds: Slightly diminished and/or crackles  Cough: Strong, spontaneous, non-productive  Indication for Bronchodilator Therapy: Decreased or absent breath sounds  Bronchodilator Assessment Score: 4    Aerosolized bronchodilator medication orders have been revised according to the RT Inhaler-Nebulizer Bronchodilator Protocol below. Respiratory Therapist to perform RT Therapy Protocol Assessment initially then follow the protocol. Repeat RT Therapy Protocol Assessment PRN for score 0-3 or on second treatment, BID, and PRN for scores above 3. No Indications - adjust the frequency to every 6 hours PRN wheezing or bronchospasm, if no treatments needed after 48 hours then discontinue using Per Protocol order mode. If indication present, adjust the RT bronchodilator orders based on the Bronchodilator Assessment Score as indicated below. Use Inhaler orders unless patient has one or more of the following: on home nebulizer, not able to hold breath for 10 seconds, is not alert and oriented, cannot activate and use MDI correctly, or respiratory rate 25 breaths per minute or more, then use the equivalent nebulizer order(s) with same Frequency and PRN reasons based on the score. If a patient is on this medication at home then do not decrease Frequency below that used at home.     0-3 - enter or revise RT bronchodilator order(s) to equivalent RT Bronchodilator order with Frequency of every 4 hours PRN for wheezing or increased work of breathing using Per Protocol order mode. 4-6 - enter or revise RT Bronchodilator order(s) to two equivalent RT bronchodilator orders with one order with BID Frequency and one order with Frequency of every 4 hours PRN wheezing or increased work of breathing using Per Protocol order mode. 7-10 - enter or revise RT Bronchodilator order(s) to two equivalent RT bronchodilator orders with one order with TID Frequency and one order with Frequency of every 4 hours PRN wheezing or increased work of breathing using Per Protocol order mode. 11-13 - enter or revise RT Bronchodilator order(s) to one equivalent RT bronchodilator order with QID Frequency and an Albuterol order with Frequency of every 4 hours PRN wheezing or increased work of breathing using Per Protocol order mode. Greater than 13 - enter or revise RT Bronchodilator order(s) to one equivalent RT bronchodilator order with every 4 hours Frequency and an Albuterol order with Frequency of every 2 hours PRN wheezing or increased work of breathing using Per Protocol order mode. RT to enter RT Home Evaluation for COPD & MDI Assessment order using Per Protocol order mode.     Electronically signed by Austin Rodas RCP on 3/16/2023 at 7:50 AM

## 2023-03-16 NOTE — DISCHARGE INSTR - COC
Continuity of Care Form    Patient Name: Evaristo Almeida   :  1935  MRN:  [id number]    Admit date:  3/13/2023  Discharge date:  2023      Code Status Order: Full Code   Advance Directives:   Advance Care Flowsheet Documentation       Date/Time Healthcare Directive Type of Healthcare Directive Copy in [address] Agent's Name Healthcare Agent's Phone Number    [phone] Yes, patient has an advance directive for healthcare treatment Durable power of  for health care Yes, copy in chart Legal [address] --            Admitting Physician:  Yuval Patricia MD  PCP: Evelyn Vines MD    Discharging Nurse: [facility] Unit/Room#: 0209/0209-02  Discharging Unit Phone Number: [phone]    Emergency Contact:   Extended Emergency Contact Information  Primary Emergency Contact: Gabi Still  [place] Phone: [phone]  Mobile Phone: [phone]  Relation: Legal Guardian  Preferred language: English   needed? No  Secondary Emergency Contact: [address] Phone: [phone]  Relation: Child   needed?  No    Past Surgical History:  Past Surgical History:   Procedure Laterality Date    CHOLECYSTECTOMY      COLONOSCOPY  8/10/12    CORONARY ANGIOPLASTY WITH STENT PLACEMENT      PROSTATE BIOPSY      SKIN BIOPSY      UPPER GASTROINTESTINAL ENDOSCOPY  12    UPPER GASTROINTESTINAL ENDOSCOPY N/A 2019    EGD BIOPSY performed by Maycol La DO at [facility] N/A 2019    EGD performed by Ang Henriquez MD at [facility] 3/16/2023    EGD BIOPSY performed by Ang Henriquez MD at [address] [place] [address]       Immunization History:   Immunization History   Administered Date(s) Administered    COVID-19, PFIZER PURPLE top, DILUTE for use, (age 15 y+), 30mcg/0.3mL 2021, 2021, 02/15/2022 Influenza A ([id number]) Vaccine PF IM 11/30/2009    Influenza Virus Vaccine 11/01/2009, 09/17/2010    Influenza, High Dose (Fluzone 65 yrs and older) 08/01/2011, 09/19/2013    Pneumococcal Conjugate 13-valent ([id number]) 06/02/2016    Pneumococcal Polysaccharide ([id number]) 09/01/2009    Td vaccine (adult) 06/03/2004    Tdap (Boostrix, Adacel) 10/18/2011, 10/28/2018       Active Problems:  Patient Active Problem List   Diagnosis Code    Falls frequently R29.6    Hypokalemia E87.6    CAD (coronary artery disease) I25.10    HTN (hypertension) I10    HLD (hyperlipidemia) E78.5    Dementia (HCC) F03.90    Moderate malnutrition (HCC) E44.0    Abdominal pain R10.9    Abdominal pain, epigastric R10.13    Anemia D64.9    Urinary tract infection without hematuria N39.0    Lactic acid acidosis E87.20    Mediastinal lymphadenopathy R59.0    Septicemia (HCC) A41.9    Pneumonia of right lower lobe due to infectious organism J18.9    PUD (peptic ulcer disease) K27.9    Moderate protein-calorie malnutrition (HCC) E44.0    Aspiration pneumonia (HCC) J69.0    Septic shock (HCC) A41.9, R65.21    Acute cystitis with hematuria N30.01    Mass of pancreas K86.89    Acute hypoxemic respiratory failure (HCC) E70.05    Acute metabolic encephalopathy O05.78    Urinary tract infection with hematuria N39.0, R31.9    Thrombocytopenia (HCC) D69.6    Hypoxia R09.02    Demand ischemia (HCC) I24.8    NSTEMI (non-ST elevated myocardial infarction) ([facility] I21.4    Cardiomyopathy (HCC) I42.9    Lactic acidosis E87.20    Acute respiratory failure with hypoxemia (HCC) J96.01    Bronchiectasis without complication ([facility]) B67.3    Dysphagia R13.10    Community acquired pneumonia J18.9    Acute respiratory failure with hypoxia ([facility] J96.01    HCAP (healthcare-associated pneumonia) J18.9       Isolation/Infection:   Isolation            No Isolation          Patient Infection Status       Infection Onset Added Last Indicated Last Indicated By Review Planned Expiration Resolved Resolved By    None active    Resolved    COVID-19 (Rule Out) 03/13/23 03/13/23 03/13/23 COVID-19, Rapid (Ordered)   03/13/23 Rule-Out Test Resulted    COVID-19 (Rule Out) 11/03/22 11/03/22 11/03/22 COVID-19, Rapid (Ordered)   11/03/22 Rule-Out Test Resulted    COVID-19 (Rule Out) 07/26/22 07/26/22 07/26/22 COVID-19, Rapid (Ordered)   07/26/22 Rule-Out Test Resulted    COVID-19 (Rule Out) 07/21/22 07/21/22 07/21/22 COVID-19, Rapid (Ordered)   07/21/22 Rule-Out Test Resulted    COVID-19 (Rule Out) 09/16/20 09/16/20 09/16/20 COVID-19 (Ordered)   09/17/20 Rule-Out Test Resulted            Nurse Assessment:  Last Vital Signs: BP (!) 147/70   Pulse 65   Temp 96.8 °F (36 °C) (Oral)   Resp 16   Ht 6' (1.829 m)   Wt 175 lb (79.4 kg)   SpO2 95%   BMI 23.73 kg/m²     Last documented pain score (0-10 scale):    Last Weight:   Wt Readings from Last 1 Encounters:   03/16/23 175 lb (79.4 kg)     Mental Status:  disoriented, alert, and able to concentrate and follow conversation    IV Access:  - None    Nursing Mobility/ADLs:  Walking   Assisted  Transfer  Assisted  Bathing  Assisted  Dressing  Assisted  Toileting  Assisted  Feeding  Assisted  Med Admin  Assisted  Med Delivery   crushed    Wound Care Documentation and Therapy:        Elimination:  Continence: Bowel: No  Bladder: No  Urinary Catheter: None   Colostomy/Ileostomy/Ileal Conduit: No       Date of Last BM: 3/16/23    Intake/Output Summary (Last 24 hours) at 3/16/2023 1441  Last data filed at 3/16/2023 0743  Gross per 24 hour   Intake 382.85 ml   Output 3200 ml   Net -2817.15 ml     I/O last 3 completed shifts: In: 2947.6 [I.V.:1607.9; IV Piggyback:1339.7]  Out: 2900 [Urine:2900]    Safety Concerns:      At Risk for Falls and Aspiration Risk    Impairments/Disabilities:      None    Nutrition Therapy:  Current Nutrition Therapy:   - Oral Diet:  Carb Control 4 carbs/meal (1800kcals/day)    Routes of Feeding: Oral  Liquids: No Restrictions  Daily Fluid Restriction: no  Last Modified Barium Swallow with Video (Video Swallowing Test): not done    Treatments at the Time of Hospital Discharge:   Respiratory Treatments: none  Oxygen Therapy:  is not on home oxygen therapy. Ventilator:    - No ventilator support    Rehab Therapies: Physical Therapy and Occupational Therapy  Weight Bearing Status/Restrictions: No weight bearing restrictions  Other Medical Equipment (for information only, NOT a DME order):  walker, bath bench, and bedside commode  Other Treatments: none    Patient's personal belongings (please select all that are sent with patient):  None    RN SIGNATURE:  Electronically signed by Josué Wen RN on 3/16/23 at 3:26 PM EDT    CASE MANAGEMENT/SOCIAL WORK SECTION    Inpatient Status Date: 03/13/2023    Readmission Risk Assessment Score:  Readmission Risk              Risk of Unplanned Readmission:  25           Discharging to Facility/ Agency   Name: [facility]  Address:  Phone: [phone]  Fax:    Dialysis Facility (if applicable)   Name:  Address:  Dialysis Schedule:  Phone:  Fax:    / signature: Electronically signed by Olvin Valverde RN on 3/16/23 at 2:59 PM EDT    PHYSICIAN SECTION    Prognosis: Fair    Condition at Discharge: Stable    Rehab Potential (if transferring to Rehab): Fair    Recommended Labs or Other Treatments After Discharge: Long term care    Physician Certification: I certify the above information and transfer of Iman Worthington  is necessary for the continuing treatment of the diagnosis listed and that he requires [facility] for greater 30 days.      Update Admission H&P: No change in H&P    PHYSICIAN SIGNATURE:  Electronically signed by Kristian Joaquin MD on 3/16/23 at 2:42 PM EDT

## 2023-03-16 NOTE — PLAN OF CARE
Problem: Discharge Planning  Goal: Discharge to home or other facility with appropriate resources  Outcome: Completed     Problem: Safety - Adult  Goal: Free from fall injury  3/16/2023 1522 by Jose Laura RN  Outcome: Completed  3/16/2023 1054 by Jose Laura RN  Outcome: Progressing     Problem: Skin/Tissue Integrity  Goal: Absence of new skin breakdown  Description: 1. Monitor for areas of redness and/or skin breakdown  2. Assess vascular access sites hourly  3. Every 4-6 hours minimum:  Change oxygen saturation probe site  4. Every 4-6 hours:  If on nasal continuous positive airway pressure, respiratory therapy assess nares and determine need for appliance change or resting period.   3/16/2023 1522 by Jose Laura RN  Outcome: Completed  3/16/2023 1054 by Jose Laura RN  Outcome: Progressing     Problem: ABCDS Injury Assessment  Goal: Absence of physical injury  Outcome: Completed     Problem: Neurosensory - Adult  Goal: Achieves maximal functionality and self care  Outcome: Completed     Problem: Respiratory - Adult  Goal: Achieves optimal ventilation and oxygenation  Outcome: Completed     Problem: Cardiovascular - Adult  Goal: Maintains optimal cardiac output and hemodynamic stability  Outcome: Completed     Problem: Skin/Tissue Integrity - Adult  Goal: Skin integrity remains intact  3/16/2023 1522 by Jose Laura RN  Outcome: Completed  Flowsheets (Taken 3/16/2023 1056)  Skin Integrity Remains Intact: Monitor for areas of redness and/or skin breakdown  3/16/2023 1054 by Jose Laura RN  Outcome: Progressing  Flowsheets (Taken 3/15/2023 2317 by Justine Alexandre RN)  Skin Integrity Remains Intact:   Monitor for areas of redness and/or skin breakdown   Assess vascular access sites hourly     Problem: Musculoskeletal - Adult  Goal: Return mobility to safest level of function  Outcome: Completed  Goal: Return ADL status to a safe level of function  Outcome: Completed     Problem: Gastrointestinal - Adult  Goal: Minimal or absence of nausea and vomiting  Outcome: Completed  Goal: Maintains or returns to baseline bowel function  Outcome: Completed     Problem: Genitourinary - Adult  Goal: Absence of urinary retention  Outcome: Completed     Problem: Chronic Conditions and Co-morbidities  Goal: Patient's chronic conditions and co-morbidity symptoms are monitored and maintained or improved  Outcome: Completed

## 2023-03-16 NOTE — DISCHARGE SUMMARY
Name:  Patricia Warren  Room:  0209/0209-02  MRN:    [id number]    Discharge Summary      This discharge summary is in conjunction with a complete physical exam done on the day of discharge. Discharging Physician: Ruba Gray MD      Admit: 3/13/2023  Discharge:  3/16/2023     Diagnoses this Admission    Principal Problem:    Acute respiratory failure with hypoxemia ([address]  Active Problems:    Acute hypoxemic respiratory failure ([address]    Acute metabolic encephalopathy    Cardiomyopathy ([address]    Bronchiectasis without complication ([address]    Dysphagia    Community acquired pneumonia    Acute respiratory failure with hypoxia ([address]    HCAP (healthcare-associated pneumonia)    CAD (coronary artery disease)    HTN (hypertension)    Dementia ([address]    Abdominal pain  Resolved Problems:    * No resolved hospital problems. *        Procedures (Please Review Full Report for Details)    Date of Procedure: 3/16/2023     Pre-Op Diagnosis: Gastrointestinal hemorrhage associated with other gastritis [K29.61]     Post-Op Diagnosis:  mild gastritis, otherwise normal EGD       Procedure(s):  EGD BIOPSY     Surgeon(s):  Purnima Lang MD     Assistant:  * No surgical staff found *     Anesthesia: IV Sedation     Estimated Blood Loss (mL): Minimal    Consults    IP CONSULT TO HOSPITALIST  PHARMACY TO DOSE VANCOMYCIN  PHARMACY TO DOSE VANCOMYCIN  IP CONSULT TO GI  IP CONSULT TO PULMONOLOGY      HPI:    The patient is a 82 y.o. male with pmhx of CAD, COPD, dementia, depression, HTN, HLD, GERD who presented to [facility] ED with concern for abdominal pain. History obtained per EMR. Patient has dementia and is not very good historian, unsure why he is here and has no complaints. Oleary endorse cough. He does say that sometimes when he eats at SNF, he chokes and has difficulty swallowing at times.      Physical Exam at Discharge:  BP (!) 147/70   Pulse 65   Temp 96.8 °F (36 °C) (Oral)   Resp 16   Ht 6' (1.829 m) Wt 175 lb (79.4 kg)   SpO2 95%   BMI 23.73 kg/m²   General appearance: alert, appears stated age and cooperative  Head: Normocephalic, without obvious abnormality, atraumatic  Eyes: conjunctivae/corneas clear. PERRL, EOM's intact. Neck: no adenopathy, no carotid bruit, no JVD, supple, symmetrical, trachea midline and thyroid not enlarged, symmetric, no tenderness/mass/nodules  Lungs: Bilateral crackles. No wheeze or rhonchi. Heart: regular rate and rhythm, S1, S2 normal, no murmur, click, rub or gallop  Abdomen: soft, non-tender; bowel sounds normal; no masses,  no organomegaly  Extremities: extremities normal, atraumatic, no cyanosis or edema  Pulses: 2+ and symmetric  Skin: Skin color, texture, turgor normal. No rashes or lesions  Neurologic: Grossly normal      Hospital Course    #Hospital Acquired Pneumonia - due to gram neg organism or MRSA  #Acute hypoxic respiratory failure   #COPD with AE   -no WBC, afebrile, procalc neg, LA neg   -no baseline home oxygen, requiring 3 L O2  -continue to wean as tolerated    -IV cefepime and vancomycin during in patient stay change to Levaquin for 5 days. -IV solumedrol -no wheezing. Stop.  -duonebs   -blood cultures NGTD  -sputum culture no growth  -pulmonology consulted      #Hypotension resolved. -IVF boluses with continuous IVF   -holding BP meds -resume at discharge  -monitor BP      #Metabolic encephalopathy   -likely 2/2 to above. patient does have hx of dementia      #Reported vomiting   -has not had any since admission   -prn zofran   -IVF   -CT abdomen as above   -GI consulted, EGD done. It showed gastritis. Start Protonix. Eating well. #Hematuria   -per UA  -none has been witness by staff today      #Fecal impaction   -enema and bowel regimen   -GI consulted         #Dysphagia -EGD done  -speech eval pending   -on pureed diet at [facility]     #CAD   -on ASA, BB, ARB   -holding BB and ARB 2/2 to hypotension   - possible resume in am. BP is better. #Diastolic HF  #Stress induced cardiomyopathy    -last echo as above with EF 35%   -holding BB ARB and aldactone 2/2 to low Bps -resume at discharge.  -per last admission, not candidate for intervention      #HTN   -holding valsartan, coreg, aldactone  - resume at discharge. #HLD   -not on statin      #Old rib fractures   -non-acute      #Dementia   -supportive care        CBC:   Recent Labs     03/13/23  1930 03/15/23  0717   WBC 9.5 9.1   HGB 13.9 11.5*   HCT 42.2 35.8*   MCV 86.6 88.9    136     BMP:   Recent Labs     03/13/23  1930 03/15/23  0717    133*   K 4.9 3.9    104   CO2 21 19*   BUN 29* 20   CREATININE 1.3 0.8     LIVER PROFILE:   Recent Labs     03/13/23  1930 03/15/23  0717   AST 20 11*   ALT 9* 9*   LIPASE 10.0*  --    BILITOT 0.5 0.4   ALKPHOS 113 82     PT/INR: No results for input(s): PROTIME, INR in the last 72 hours. APTT: No results for input(s): APTT in the last 72 hours. UA:  Recent Labs     03/13/23  2100   COLORU Yellow   PHUR 6.0   WBCUA 0-2   RBCUA >100*   BACTERIA 1+*   CLARITYU SL CLOUDY*   SPECGRAV 1.025   LEUKOCYTESUR Negative   UROBILINOGEN 0.2   BILIRUBINUR Negative   BLOODU LARGE*   GLUCOSEU Negative     COVID negative  Flu a and B not detected       XR ABDOMEN (KUB) (SINGLE AP VIEW)   Final Result   Moderate stool in the rectum without evidence of bowel obstruction. XR ABDOMEN (KUB) (SINGLE AP VIEW)   Final Result   Mild stool within the rectum. No evidence of bowel obstruction         CT CHEST ABDOMEN PELVIS W CONTRAST Additional Contrast? None   Final Result   Mild bibasilar atelectasis or early infiltrates posteriorly with associated   mild bronchiectasis. Recommend follow-up with serial chest x-rays. Mildly atherosclerotic thoracic aorta with no aneurysm or dissection and   unremarkable mediastinum. Multiple old rib fractures on the left with no acute fracture seen.       Previous cholecystectomy which is unchanged with no acute abnormality seen      Mild fecal impaction in the rectum with no obstruction. Extensive diverticula throughout the colon with no pericolonic inflammation. Mild prostatic enlargement with no pelvic mass or active inflammation and a   normal appendix. CT Head W/O Contrast   Final Result   No acute intracranial abnormality. XR CHEST PORTABLE   Final Result   Stable chronic interstitial changes in the lungs.                 Discharge Medications     Medication List        START taking these medications      levoFLOXacin 500 MG tablet  Commonly known as: LEVAQUIN  Take 1 tablet by mouth daily for 5 days     pantoprazole 40 MG tablet  Commonly known as: PROTONIX  Take 1 tablet by mouth every morning (before breakfast)            CONTINUE taking these medications      acetaminophen 325 MG tablet  Commonly known as: TYLENOL     aspirin EC 81 MG EC tablet  Take 1 tablet by mouth daily     bisacodyl 10 MG suppository  Commonly known as: DULCOLAX     carvedilol 6.25 MG tablet  Commonly known as: COREG  Take 1 tablet by mouth 2 times daily (with meals)     Cranberry 450 MG Caps     magnesium hydroxide 400 MG/5ML suspension  Commonly known as: MILK OF MAGNESIA     melatonin 3 MG Tabs tablet     pregabalin 25 MG capsule  Commonly known as: LYRICA     sertraline 50 MG tablet  Commonly known as: ZOLOFT     spironolactone 25 MG tablet  Commonly known as: ALDACTONE  Take 0.5 tablets by mouth daily     sulfamethoxazole-trimethoprim 400-80 MG per tablet  Commonly known as: BACTRIM;SEPTRA     valsartan 40 MG tablet  Commonly known as: DIOVAN  Take 0.5 tablets by mouth at bedtime            STOP taking these medications      amLODIPine 10 MG tablet  Commonly known as: NORVASC     omeprazole 20 MG delayed release capsule  Commonly known as: PRILOSEC               Where to Get Your Medications        Information about where to get these medications is not yet available    Ask your nurse or doctor about these medications  levoFLOXacin 500 MG tablet  pantoprazole 40 MG tablet           Discharge Condition/Location: Stable    Follow Up: Follow up with PCP.     More than 30 mts spent    [facility], MD 3/16/2023 2:42 PM

## 2023-03-16 NOTE — FLOWSHEET NOTE
03/15/23 1815   Unmeasured Output   Emesis Occurrence 1     Pt vomited, unmeasured emesis. Another RN gave PRN IV Zofran for this writer at [phone]. See MAR.

## 2023-03-16 NOTE — PROGRESS NOTES
Pulmonary Progress Note    CC: Respiratory failure    Subjective:   Appears comfortable  Room air        Intake/Output Summary (Last 24 hours) at 3/16/2023 0715  Last data filed at 3/16/2023 0424  Gross per 24 hour   Intake 2937.62 ml   Output 2300 ml   Net 637.62 ml       Exam:   /74   Pulse 69   Temp 96.9 °F (36.1 °C) (Oral)   Resp 18   Ht 6' (1.829 m)   Wt 175 lb (79.4 kg)   SpO2 93%   BMI 23.73 kg/m²  on room air  Constitutional:  No acute distress. [place] HEENT: no scleral icterus  Neck: No tracheal deviation present. Cardiovascular: Normal heart sounds. Pulmonary/Chest: No wheezes. No rhonchi. Few basilar rales. No decreased breath sounds. No accessory muscle usage or stridor. Abdominal: Soft. Musculoskeletal: No cyanosis. No clubbing. Skin: Skin is warm and dry. Scheduled Meds:   vancomycin  1,500 mg IntraVENous Q24H    cefepime  2,000 mg IntraVENous Q8H    senna  1 tablet Oral Nightly    sodium chloride flush  10 mL IntraVENous 2 times per day    enoxaparin  40 mg SubCUTAneous Daily    pantoprazole  40 mg IntraVENous Daily    ipratropium-albuterol  1 ampule Inhalation BID    polyethylene glycol  17 g Oral Daily     Continuous Infusions:   sodium chloride 75 mL/hr at 03/16/23 0420    sodium chloride       PRN Meds:  prochlorperazine, sodium chloride flush, sodium chloride, ondansetron **OR** ondansetron, acetaminophen **OR** acetaminophen, albuterol    Labs:  CBC:   Recent Labs     03/13/23  1930 03/15/23  0717   WBC 9.5 9.1   HGB 13.9 11.5*   HCT 42.2 35.8*   MCV 86.6 88.9    136     BMP:   Recent Labs     03/13/23  1930 03/15/23  0717    133*   K 4.9 3.9    104   CO2 21 19*   BUN 29* 20   CREATININE 1.3 0.8     LIVER PROFILE:   Recent Labs     03/13/23  1930 03/15/23  0717   AST 20 11*   ALT 9* 9*   LIPASE 10.0*  --    BILITOT 0.5 0.4   ALKPHOS 113 82     PT/INR: No results for input(s): PROTIME, INR in the last 72 hours. APTT: No results for input(s):  APTT in the last 72 hours. UA:  Recent Labs     03/13/23  2100   COLORU Yellow   PHUR 6.0   WBCUA 0-2   RBCUA >100*   BACTERIA 1+*   CLARITYU SL CLOUDY*   SPECGRAV 1.025   LEUKOCYTESUR Negative   UROBILINOGEN 0.2   BILIRUBINUR Negative   BLOODU LARGE*   GLUCOSEU Negative     No results for input(s): PHART, PBY5KOH, PO2ART in the last 72 hours. Microbiology:  3/13 respiratory NGTD  3/13 BC NGTD  3/13 COVID-19 not detected     Imaging:  CT chest 3/13 imaging was reviewed by me and showed   Mild bibasilar atelectasis or early infiltrates posteriorly with associated   mild bronchiectasis. Mildly atherosclerotic thoracic aorta with no aneurysm or dissection and   unremarkable mediastinum. Multiple old rib fractures on the left with no acute fracture seen.             ASSESSMENT:  Acute hypoxemic respiratory failure  Healthcare associated pneumonia - probable gram negative, risk for methicillin resistant Staph aureus as well  Lower lobe bronchiectasis  Dysphagia  Fecal impaction  CAD and diastolic CHF        PLAN:  Supplemental oxygen to maintain SaO2 >92%; wean as tolerated  Inhaled bronchodilators  IV vancomycin and cefepime day #3-Levaquin at discharge for 5 days  GI following planning for EGD  Speech pathology evaluation  I recommend CXR in 4-6 week to documents resolution of pulmonary infitrate  DC plan is okay with pulmonary

## 2023-03-16 NOTE — FLOWSHEET NOTE
03/15/23 2006   Handoff   Communication Given Shift Handoff   Handoff Given To DIXIE   Handoff Received From [facility] Communication At bedside; Face to Face   Time Handoff Given 1938   End of Shift Check Performed Yes     Bedside report given to [facility], RN. Oncoming RN aware tap water enema still needs to be completed.

## 2023-03-16 NOTE — OP NOTE
[place] [address]                                OPERATIVE REPORT    PATIENT NAME: Alejandra Castaneda                     :        1935  MED REC NO:   [id number]                          ROOM:       64  ACCOUNT NO:   [de-identified]                           ADMIT DATE: 2023  PROVIDER:     aJmes Wright MD    DATE OF PROCEDURE:  2023    PREPROCEDURE DIAGNOSIS:  1.  Nausea, vomiting. 2.  Coffee-ground emesis. POSTPROCEDURE DIAGNOSIS:  1.  Mild gastritis. 2.  Otherwise normal EGD. 3.  No active bleeding. PROCEDURE:  EGD with biopsy. SURGEON:  James Wright MD    PROCEDURE INDICATIONS:  An 82-year-old male with history of  hypertension, hyperlipidemia, diabetes, coronary artery disease, COPD,  depression, duodenal ulcer, and melanoma admitted with hospital-acquired  pneumonia. He also was noted to have nausea, vomiting, and dark emesis  suggestive of coffee-ground emesis. EGD is being performed to rule out  high-risk pathology such as peptic ulcer disease. MEDICATIONS:  Versed 2 mg, fentanyl 50 mcg. PROCEDURE DETAILS:  Informed consent obtained after discussing risks,  benefits, alternatives. Full history and physical was performed. The  patient was classified as ASA class III. Medications were given by  Anesthesia. Cardiopulmonary status was continuously monitored  throughout the procedure. The patient was placed in left lateral  decubitus position. Once adequately sedated, a standard upper  gastroscope was inserted in the mouth and advanced under direct  visualization to second portion of the duodenum. The entire mucosa of  esophagus, stomach (retroflex and forward views), duodenum (bulb, sleep,  and second portion) were examined carefully during withdrawal.  The  patient tolerated the procedure well without any difficulties.     FINDINGS:  Esophagus:  Entire esophagus appeared normal.  There was no evidence of  inflammation, ulcers, strictures, Carvajal's. Stomach: There was evidence of mild diffuse gastritis characterized by  erythema and granularity. Biopsies were taken to rule out H. pylori. Retroflex view of the stomach was normal.    Duodenum:  Examined portion of the duodenum appeared normal.  No ulcers  or bleeding lesions identified. SUMMARY:  1.  Mild diffuse gastritis. 2.  Otherwise normal EGD. RECOMMENDATIONS:  1. Return the patient to floor for continued medical care. 2.  Continue PPI daily. 3.  Await pathology results. 4.  Monitor for any further signs of hematemesis or GI bleed. 5.  Can consider aggressive bowel regimen with MiraLax and senna for  fecal impaction. 6.  Follow up as needed.     EBL: <5mL    Siri Zuleta MD    D: 03/16/2023 17:24:54       T: 03/16/2023 17:27:23     [id number]  Job#: [id number]     Doc#: [id number]    CC:  MD Ana Drummond MD

## 2023-03-16 NOTE — PLAN OF CARE
Problem: Discharge Planning  Goal: Discharge to home or other facility with appropriate resources  3/15/2023 2316 by King Virginia RN  Outcome: Progressing  3/15/2023 1725 by Marquis Mcfadden RN  Outcome: Progressing     Problem: Safety - Adult  Goal: Free from fall injury  3/15/2023 2316 by King Virginia RN  Outcome: Progressing  3/15/2023 1725 by Marquis Mcfadden RN  Outcome: Progressing     Problem: Skin/Tissue Integrity  Goal: Absence of new skin breakdown  Description: 1. Monitor for areas of redness and/or skin breakdown  2. Assess vascular access sites hourly  3. Every 4-6 hours minimum:  Change oxygen saturation probe site  4. Every 4-6 hours:  If on nasal continuous positive airway pressure, respiratory therapy assess nares and determine need for appliance change or resting period.   3/15/2023 2316 by King Virginia RN  Outcome: Progressing  3/15/2023 1725 by Marquis Mcfadden RN  Outcome: Progressing     Problem: ABCDS Injury Assessment  Goal: Absence of physical injury  3/15/2023 2316 by King Virginia RN  Outcome: Progressing  3/15/2023 1725 by Marquis Mcfadden RN  Outcome: Progressing     Problem: Neurosensory - Adult  Goal: Achieves maximal functionality and self care  3/15/2023 2316 by King Virginia RN  Outcome: Progressing  3/15/2023 1725 by Marquis Mcfadden RN  Outcome: Progressing     Problem: Respiratory - Adult  Goal: Achieves optimal ventilation and oxygenation  3/15/2023 2316 by King Virginia RN  Outcome: Progressing  3/15/2023 1725 by Marquis Mcfadden RN  Outcome: Progressing     Problem: Cardiovascular - Adult  Goal: Maintains optimal cardiac output and hemodynamic stability  3/15/2023 2316 by King Virginia RN  Outcome: Progressing  3/15/2023 1725 by Marquis Mcfadden RN  Outcome: Progressing     Problem: Skin/Tissue Integrity - Adult  Goal: Skin integrity remains intact  3/15/2023 2316 by King Virginia RN  Outcome: Progressing  Flowsheets (Taken 3/15/2023 1726 by Suly Ibrahim RN)  Skin Integrity Remains Intact: Monitor for areas of redness and/or skin breakdown  3/15/2023 1725 by Suly Ibrahim RN  Outcome: Progressing     Problem: Musculoskeletal - Adult  Goal: Return mobility to safest level of function  3/15/2023 2316 by Scarlett Duncan RN  Outcome: Progressing  3/15/2023 1725 by Suly Ibrahim RN  Outcome: Progressing  Goal: Return ADL status to a safe level of function  3/15/2023 2316 by Scarlett Duncan RN  Outcome: Progressing  3/15/2023 1725 by Suly Ibrahim RN  Outcome: Progressing     Problem: Gastrointestinal - Adult  Goal: Minimal or absence of nausea and vomiting  3/15/2023 2316 by Scarlett Duncan RN  Outcome: Progressing  3/15/2023 1725 by Suly Ibrahim RN  Outcome: Progressing  Goal: Maintains or returns to baseline bowel function  3/15/2023 2316 by Scarlett Duncan RN  Outcome: Progressing  3/15/2023 1725 by Suly Ibrahim RN  Outcome: Progressing     Problem: Genitourinary - Adult  Goal: Absence of urinary retention  3/15/2023 2316 by Scarlett Duncan RN  Outcome: Progressing  3/15/2023 1725 by Suly Ibrahim RN  Outcome: Progressing

## 2023-03-16 NOTE — PROGRESS NOTES
Vancomycin Day: 3/7  Current Regimen: 1500 mg IV every 24 hours    Patient's labs, cultures, vitals, and vancomycin regimen reviewed.    SCr stable  U/O adequate (> 0.5 to 1.0 mL/kg/hr)  [employer] updated    Plan:   No change today, level in am  Cathi WELSH 3/16/94264:57 PM  .

## 2023-03-16 NOTE — H&P
History and Physical / Pre-Sedation Assessment    Patient:  Rafaela Arambula   :   1935     Intended Procedure:  EGD    HPI: 82 y.o. male with pmx of CAD, COPD, Depression, Duodenal ulcer, cholecystectomy, HTN, HLD, MI, and melanoma admitted with hospital acquired pneumonia. Reported dark emesis and abd pain concerning for reflux esophagitis vs PUD. Past Medical History:   Diagnosis Date    Atherosclerotic heart disease     CAD (coronary artery disease)     Cancer (HCC)     skin    CHF (congestive heart failure) (HCC)     COPD (chronic obstructive pulmonary disease) (HCC)     Dementia (HCC)     Depression     Duodenal ulcer without hemorrhage or perforation     Falls frequently     GERD without esophagitis     Hernia     Hyperlipidemia     Hypertension     Hypokalemia     Lack of coordination     falls    Melanoma ([address]     MI (myocardial infarction) ([address]     Muscle weakness (generalized)     Protein calorie malnutrition ([address]     Reflux     Sepsis ([address]     Spinal stenosis     UTI (urinary tract infection)      Past Surgical History:   Procedure Laterality Date    CHOLECYSTECTOMY      COLONOSCOPY  8/10/12    CORONARY ANGIOPLASTY WITH STENT PLACEMENT      PROSTATE BIOPSY      SKIN BIOPSY      UPPER GASTROINTESTINAL ENDOSCOPY  12    UPPER GASTROINTESTINAL ENDOSCOPY N/A 2019    EGD BIOPSY performed by Aristides Chairez DO at [facility] N/A 2019    EGD performed by Pierre Irene MD at [facility]       Medications reviewed  Prior to Admission medications    Medication Sig Start Date End Date Taking?  Authorizing Provider   sulfamethoxazole-trimethoprim (BACTRIM;SEPTRA) 400-80 MG per tablet Take 1 tablet by mouth daily Prophylactic x 3 months 23-23   Yes Historical Provider, MD   Cranberry 450 MG CAPS Take 450 mg by mouth in the morning and at bedtime UTI prevention   Yes Historical Provider, MD   aspirin EC 81 MG EC tablet Take 1 tablet by mouth daily 11/9/22   Hero Santos MD   valsartan (DIOVAN) 40 MG tablet Take 0.5 tablets by mouth at bedtime 11/9/22   Hero Santos MD   carvedilol (COREG) 6.25 MG tablet Take 1 tablet by mouth 2 times daily (with meals) 11/9/22   Hero Santos MD   spironolactone (ALDACTONE) 25 MG tablet Take 0.5 tablets by mouth daily 11/9/22   Hero Santos MD   melatonin 3 MG TABS tablet Take 3 mg by mouth in the morning. Historical Provider, MD   sertraline (ZOLOFT) 50 MG tablet Take 100 mg by mouth in the morning. Historical Provider, MD   pregabalin (LYRICA) 25 MG capsule Take 75 mg by mouth in the morning and 75 mg at noon and 75 mg before bedtime. Historical Provider, MD   omeprazole (PRILOSEC) 20 MG delayed release capsule Take 20 mg by mouth daily    Historical Provider, MD   amLODIPine (NORVASC) 10 MG tablet Take 10 mg by mouth daily  7/26/22  Historical Provider, MD   magnesium hydroxide (MILK OF MAGNESIA) 400 MG/5ML suspension Take 30 mLs by mouth daily as needed for Constipation    Historical Provider, MD   bisacodyl (DULCOLAX) 10 MG suppository Place 10 mg rectally daily as needed for Constipation If MOM ineffective. Historical Provider, MD   acetaminophen (TYLENOL) 325 MG tablet Take 650 mg by mouth every 6 hours as needed for Pain    Historical Provider, MD        Allergies: Allergies   Allergen Reactions    Fluoxetine     Benazepril      Swollen tongue    Hydrocodone Other (See Comments)    Morphine     Penicillins Other (See Comments)    Simvastatin Other (See Comments)    Morphine And Related Nausea And Vomiting    Sucralfate Rash       Nurses notes reviewed and agreed.     Physical Exam:  Vital Signs: BP (!) 143/77   Pulse 61   Temp 97.1 °F (36.2 °C) (Temporal)   Resp 18   Ht 6' (1.829 m)   Wt 175 lb (79.4 kg)   SpO2 97%   BMI 23.73 kg/m²    Airway: Mallampati: II (soft palate, uvula, fauces visible)  Pulmonary:Normal  Cardiac:Normal  Abdomen:Normal    Pre-Procedure Assessment / Plan:  ASA: Class 3 - A patient with severe systemic disease that limits activity but is not incapacitating  Level of Sedation Plan: Moderate sedation  Post Procedure plan: Return to same level of care    I assessed the patient and find that the patient is in satisfactory condition to proceed with the planned procedure and sedation plan. I have explained the risk, benefits, and alternatives to the procedure; the patient understands and agrees to proceed.        Reji Sol MD  3/16/2023

## 2023-03-16 NOTE — FLOWSHEET NOTE
03/15/23 2058   Enema   Enema Type Tap water   Amount Instilled 1000 mL   Preparation Positions left lat recumb; Instruct patient on proper administration   Result Good   How tolerated?  Tolerated well

## 2023-03-16 NOTE — PROGRESS NOTES
Report called to [facility] AND [facility].

## 2023-03-16 NOTE — PROGRESS NOTES
Pt leaving via EMS to [facility]. Discharge instructions given. Pt voiced understanding. Copy of discharge and scripts with patient. Iv removed. CP and PE completed. No further needs at discharge. Pt leaving with all personal belonging.

## 2023-03-16 NOTE — CARE COORDINATION
DISCHARGE ORDER  Date/Time 3/16/2023 2:50 PM  Completed by: Valeria Weston, FÉLIX, Case Management    Patient Name: Justen Nicholas    : 1935      Admit order Date and Status: Inpatient 3/13/2023  Noted discharge order. (verify MD's last order for status of admission/Traditional Medicare 3 [place] Inpatient qualifying stay required for SNF)    Confirmed discharge plan with:              Patient:  No; Has legal guardian              When pt confirms DC plan does any support person need to be contacted by CM Yes if yes who__Legal guardian, Gene Liriano Fox____                      Discharge to Facility: [address] phone number for staff giving report: [phone]   Pre-certification completed: [facility] Exemption Notification (HENS) completed: n/a    Discharge orders and Continuity of Care faxed to facility:  Facility will pull information from [employer]      Transportation:               Medical Transport explained with choice list offered to pt/family. Choice:Yes(no preference)  Agency used: [employer]   time:   [id number]      Pt/family/Nursing/Facility aware of  time:  Yes Names: Legal guardian, Marcos Eugene RN, Tamar Horotn at [facility]  Ambulance form completed:  yes:      Date Last IMM Given: 2023    Comments:Discharge order noted. Reviewed chart and spoke to legal guardianGene, via phone who confirmed plan to return to [facility]. Spoke to Tamar Horton at [facility] who states no barriers to patients return. Will pull LIANE from Epic. Pt is being d/c'd to [facility] today. Pt's O2 sats are 95% on RA. Discharge timeout done with Tamar Horton RN. All discharge needs and concerns addressed. Discharging nurse to complete LIANE, reconcile AVS, and place final copy with patient's discharge packet.  Discharging RN to ensure that written prescriptions for  Level II medications are sent with patient to the facility as per protocol.

## 2023-03-16 NOTE — PROGRESS NOTES
Called daughter Edwina Zavala (legal guardian) for EGD consent. Daughter unaware of procedure. MD still needs to explain to daughter. Informed consent not obtained at this time. Daughter will visit with father tomorrow.

## 2023-03-16 NOTE — BRIEF OP NOTE
Brief Postoperative Note      Patient: Karan Flores  YOB: 1935  MRN: [id number]    Date of Procedure: 3/16/2023    Pre-Op Diagnosis: Gastrointestinal hemorrhage associated with other gastritis [K29.61]    Post-Op Diagnosis:  mild gastritis, otherwise normal EGD       Procedure(s):  EGD BIOPSY    Surgeon(s):  Reji Sol MD    Assistant:  * No surgical staff found *    Anesthesia: IV Sedation    Estimated Blood Loss (mL): Minimal    Complications: None    Specimens:   ID Type Source Tests Collected by Time Destination   A :  Tissue Biopsy SURGICAL PATHOLOGY Reji Sol MD 3/16/2023 1205        Implants:  * No implants in log *      Drains:   External Urinary Catheter (Active)   Site Assessment Clean,dry & intact 03/16/23 0800   Placement Replaced 03/15/23 1708   Catheter Care Catheter/Wick replaced 03/15/23 1708   Perineal Care Yes 03/15/23 1708   Suction 40 mmgHg continuous 03/15/23 1708   Urine Color Yellow 03/16/23 0743   Urine Appearance Clear 03/16/23 0743   Urine Odor Malodorous 03/16/23 0743   Output (mL) 900 mL 03/16/23 0743       Findings: mild gastritis, otherwise normal EGD    Recommendation  Continue supportive care  PPI daily  Await pathology  Advance diet as tolerated  Tap water enema x 1  Miralax and senna BID    Electronically signed by Reji Sol MD on 3/16/2023 at 2:13 PM

## 2023-03-16 NOTE — PROGRESS NOTES
eTena Altagracia, daughter and legal guardian, to update her that patient will be discharging back to [facility] today.

## 2023-03-16 NOTE — PLAN OF CARE
Problem: Discharge Planning  Goal: Discharge to home or other facility with appropriate resources  3/15/2023 2316 by Soniya Jimenez RN  Outcome: Progressing     Problem: Safety - Adult  Goal: Free from fall injury  3/16/2023 1054 by Brenton Coe RN  Outcome: Progressing  3/15/2023 2316 by Soniya Jimenez RN  Outcome: Progressing     Problem: Skin/Tissue Integrity  Goal: Absence of new skin breakdown  Description: 1. Monitor for areas of redness and/or skin breakdown  2. Assess vascular access sites hourly  3. Every 4-6 hours minimum:  Change oxygen saturation probe site  4. Every 4-6 hours:  If on nasal continuous positive airway pressure, respiratory therapy assess nares and determine need for appliance change or resting period.   3/16/2023 1054 by Brenton Coe RN  Outcome: Progressing  3/15/2023 2316 by Soniya Jimenez RN  Outcome: Progressing     Problem: ABCDS Injury Assessment  Goal: Absence of physical injury  3/15/2023 2316 by Soniya Jimenez RN  Outcome: Progressing     Problem: Neurosensory - Adult  Goal: Achieves maximal functionality and self care  3/15/2023 2316 by Soniya Jimenez RN  Outcome: Progressing     Problem: Respiratory - Adult  Goal: Achieves optimal ventilation and oxygenation  3/15/2023 2316 by Soniya Jimenez RN  Outcome: Progressing     Problem: Cardiovascular - Adult  Goal: Maintains optimal cardiac output and hemodynamic stability  3/15/2023 2316 by Soniya Jimenez RN  Outcome: Progressing     Problem: Skin/Tissue Integrity - Adult  Goal: Skin integrity remains intact  3/16/2023 1054 by Brenton Coe RN  Outcome: Progressing  Flowsheets (Taken 3/15/2023 2317 by Soniya Jimenez RN)  Skin Integrity Remains Intact:   Monitor for areas of redness and/or skin breakdown   Assess vascular access sites hourly  3/15/2023 2316 by Soniya Jimenez RN  Outcome: Progressing  Flowsheets (Taken 3/15/2023 1726 by Daren Hercules RN)  Skin Integrity Remains Intact: Monitor for areas of redness and/or skin breakdown     Problem: Musculoskeletal - Adult  Goal: Return mobility to safest level of function  3/15/2023 2316 by Loreto Shipley RN  Outcome: Progressing  Goal: Return ADL status to a safe level of function  3/15/2023 2316 by Loreto Shipley RN  Outcome: Progressing     Problem: Gastrointestinal - Adult  Goal: Minimal or absence of nausea and vomiting  3/15/2023 2316 by Loreto Shipley RN  Outcome: Progressing  Goal: Maintains or returns to baseline bowel function  3/15/2023 2316 by Loreto Shipley RN  Outcome: Progressing     Problem: Genitourinary - Adult  Goal: Absence of urinary retention  3/15/2023 2316 by Loreto Shipley RN  Outcome: Progressing

## 2023-03-17 ENCOUNTER — APPOINTMENT (OUTPATIENT)
Dept: CT IMAGING | Age: 88
End: 2023-03-17
Payer: MEDICARE

## 2023-03-17 ENCOUNTER — TELEPHONE (OUTPATIENT)
Dept: OTHER | Facility: CLINIC | Age: 88
End: 2023-03-17

## 2023-03-17 VITALS
HEIGHT: 72 IN | OXYGEN SATURATION: 95 % | RESPIRATION RATE: 16 BRPM | HEART RATE: 69 BPM | SYSTOLIC BLOOD PRESSURE: 143 MMHG | TEMPERATURE: 98.1 F | WEIGHT: 175 LBS | BODY MASS INDEX: 23.7 KG/M2 | DIASTOLIC BLOOD PRESSURE: 70 MMHG

## 2023-03-17 LAB
BACTERIA URNS QL MICRO: ABNORMAL /HPF
BASOPHILS # BLD: 0 K/UL (ref 0–0.2)
BASOPHILS NFR BLD: 0.4 %
BILIRUB UR QL STRIP.AUTO: NEGATIVE
CLARITY UR: CLEAR
COLOR UR: YELLOW
DEPRECATED RDW RBC AUTO: 16.7 % (ref 12.4–15.4)
EOSINOPHIL # BLD: 0.3 K/UL (ref 0–0.6)
EOSINOPHIL NFR BLD: 3.6 %
EPI CELLS #/AREA URNS HPF: ABNORMAL /HPF (ref 0–5)
GLUCOSE UR STRIP.AUTO-MCNC: NEGATIVE MG/DL
HCT VFR BLD AUTO: 39.4 % (ref 40.5–52.5)
HGB BLD-MCNC: 13.1 G/DL (ref 13.5–17.5)
HGB UR QL STRIP.AUTO: ABNORMAL
HYALINE CASTS #/AREA URNS LPF: ABNORMAL /LPF (ref 0–2)
KETONES UR STRIP.AUTO-MCNC: 15 MG/DL
LEUKOCYTE ESTERASE UR QL STRIP.AUTO: NEGATIVE
LYMPHOCYTES # BLD: 0.7 K/UL (ref 1–5.1)
LYMPHOCYTES NFR BLD: 7.7 %
MAGNESIUM SERPL-MCNC: 1.5 MG/DL (ref 1.8–2.4)
MCH RBC QN AUTO: 28.5 PG (ref 26–34)
MCHC RBC AUTO-ENTMCNC: 33.3 G/DL (ref 31–36)
MCV RBC AUTO: 85.5 FL (ref 80–100)
MONOCYTES # BLD: 0.9 K/UL (ref 0–1.3)
MONOCYTES NFR BLD: 9.5 %
NEUTROPHILS # BLD: 7.3 K/UL (ref 1.7–7.7)
NEUTROPHILS NFR BLD: 78.8 %
NITRITE UR QL STRIP.AUTO: NEGATIVE
PH UR STRIP.AUTO: 6 [PH] (ref 5–8)
PLATELET # BLD AUTO: 150 K/UL (ref 135–450)
PMV BLD AUTO: 7.8 FL (ref 5–10.5)
PROT UR STRIP.AUTO-MCNC: NEGATIVE MG/DL
RBC # BLD AUTO: 4.61 M/UL (ref 4.2–5.9)
RBC #/AREA URNS HPF: ABNORMAL /HPF (ref 0–4)
SP GR UR STRIP.AUTO: 1.01 (ref 1–1.03)
UA COMPLETE W REFLEX CULTURE PNL UR: ABNORMAL
UA DIPSTICK W REFLEX MICRO PNL UR: YES
URN SPEC COLLECT METH UR: ABNORMAL
UROBILINOGEN UR STRIP-ACNC: 0.2 E.U./DL
WAXY CASTS #/AREA URNS LPF: ABNORMAL /LPF
WBC # BLD AUTO: 9.3 K/UL (ref 4–11)
WBC #/AREA URNS HPF: ABNORMAL /HPF (ref 0–5)
YEAST URNS QL MICRO: PRESENT /HPF

## 2023-03-17 PROCEDURE — 6360000002 HC RX W HCPCS: Performed by: EMERGENCY MEDICINE

## 2023-03-17 PROCEDURE — 74177 CT ABD & PELVIS W/CONTRAST: CPT

## 2023-03-17 PROCEDURE — 81001 URINALYSIS AUTO W/SCOPE: CPT

## 2023-03-17 PROCEDURE — 6360000004 HC RX CONTRAST MEDICATION: Performed by: EMERGENCY MEDICINE

## 2023-03-17 RX ORDER — MAGNESIUM SULFATE IN WATER 40 MG/ML
2000 INJECTION, SOLUTION INTRAVENOUS ONCE
Status: COMPLETED | OUTPATIENT
Start: 2023-03-17 | End: 2023-03-17

## 2023-03-17 RX ADMIN — IOPAMIDOL 75 ML: 755 INJECTION, SOLUTION INTRAVENOUS at 04:18

## 2023-03-17 RX ADMIN — MAGNESIUM SULFATE HEPTAHYDRATE 2000 MG: 40 INJECTION, SOLUTION INTRAVENOUS at 01:26

## 2023-03-17 NOTE — ED NOTES
Nurse Tre Gill of [place] notified about pt's status and disposition.       Danita Kemp RN  03/17/23 [id number]

## 2023-03-17 NOTE — ED PROVIDER NOTES
This patient was transferred from [address] [place] [address] due to them having a nonfunctional CT scanner. The patient presented there from his nursing home with some complaints of abdominal pain. He was just discharged in the hospital earlier today after an admission for pneumonia. He was found to have hypomagnesemia there and this was replaced. Otherwise his lab work was reassuring. On my exam he is pleasantly demented. He has a benign abdomen with no tenderness. No rebound or guarding, no distention. CT scan is showing some bladder distention concerning for possible outlet obstruction due to a prostamegaly. We did perform a bladder scan and he had 290 cc of urine in his bladder but he was able to urinate here about 220 cc I do not think he is having an obstruction. CT is also showing some constipation which may be the source of his pain. Urinalysis is pending. As long as this is normal I think he is appropriate for discharge back to his nursing home.     Results for orders placed or performed during the hospital encounter of 03/16/23   CBC with Diff   Result Value Ref Range    WBC 9.3 4.0 - 11.0 K/uL    RBC 4.61 4.20 - 5.90 M/uL    Hemoglobin 13.1 (L) 13.5 - 17.5 g/dL    Hematocrit 39.4 (L) 40.5 - 52.5 %    MCV 85.5 80.0 - 100.0 fL    MCH 28.5 26.0 - 34.0 pg    MCHC 33.3 31.0 - 36.0 g/dL    RDW 16.7 (H) 12.4 - 15.4 %    Platelets 024 529 - 461 K/uL    MPV 7.8 5.0 - 10.5 fL    Neutrophils % 78.8 %    Lymphocytes % 7.7 %    Monocytes % 9.5 %    Eosinophils % 3.6 %    Basophils % 0.4 %    Neutrophils Absolute 7.3 1.7 - 7.7 K/uL    Lymphocytes Absolute 0.7 (L) 1.0 - 5.1 K/uL    Monocytes Absolute 0.9 0.0 - 1.3 K/uL    Eosinophils Absolute 0.3 0.0 - 0.6 K/uL    Basophils Absolute 0.0 0.0 - 0.2 K/uL   CMP w/ Reflex to MG   Result Value Ref Range    Sodium 138 136 - 145 mmol/L    Potassium reflex Magnesium 3.5 3.5 - 5.1 mmol/L    Chloride 104 99 - 110 mmol/L    CO2 24 21 - 32 mmol/L    Anion Gap 10 3 - 16 Glucose 96 70 - 99 mg/dL    BUN 15 7 - 20 mg/dL    Creatinine 0.9 0.8 - 1.3 mg/dL    Est, Glom Filt Rate >60 >60    Calcium 9.1 8.3 - 10.6 mg/dL    Total Protein 6.8 6.4 - 8.2 g/dL    Albumin 3.5 3.4 - 5.0 g/dL    Albumin/Globulin Ratio 1.1 1.1 - 2.2    Total Bilirubin 0.9 0.0 - 1.0 mg/dL    Alkaline Phosphatase 83 40 - 129 U/L    ALT <5 (L) 10 - 40 U/L    AST 13 (L) 15 - 37 U/L   Lipase   Result Value Ref Range    Lipase 8.0 (L) 13.0 - 60.0 U/L   Lactic Acid (Select if patient is over 65 to rule out mesenteric ischemia)   Result Value Ref Range    Lactic Acid 1.1 0.4 - 2.0 mmol/L   Magnesium   Result Value Ref Range    Magnesium 1.50 (L) 1.80 - 2.40 mg/dL         CT ABDOMEN PELVIS W IV CONTRAST Additional Contrast? None   Preliminary Result   1. New small right and trace left pleural effusions. 2. Trace ascites in the left upper quadrant with no clear source identified. 3. Bladder distension in the setting of prostatomegaly. Correlate for   bladder outlet obstruction. 4. Moderate amount of stool in the rectal vault. Correlate for constipation.                 Fady Velasco MD  03/17/23 [id number]

## 2023-03-17 NOTE — TELEPHONE ENCOUNTER
Writer contacted ED provider, to inform of 30-day readmission risk via phone . No Decision on disposition at this time. Callback: If you need to callback to inform of disposition, please call [phone].

## 2023-03-17 NOTE — DISCHARGE INSTRUCTIONS
Your CT scan is showing constipation. I recommend you start MiraLAX daily. The remainder of your work-up here was reassuring. Your magnesium level was low and this was replaced. Please follow-up with your primary doctor within the next several days.

## 2023-03-17 NOTE — ED PROVIDER NOTES
[facility]        Patient Name: Dhara Redd  MRN: [id number]  [place] 1935  Date of evaluation: 3/16/2023  Provider: Honey Rosario MD  PCP: Arleen Welch MD  Note Started: 1:50 AM EDT 3/17/23    CHIEF COMPLAINT       Abdominal Pain and Nausea      HISTORY OF PRESENT ILLNESS: 1 or more Elements     History from : Patient    Limitations to history : dementia    Dhara Redd is a 82 y.o. male who presents to the emergency department from his care facility for evaluation of abdominal pain. Patient was just discharged from [facility] earlier today. He was diagnosed with pneumonia and started on antibiotics as well as gastritis. He had endoscopy performed that showed gastritis. He does have history of dementia and is a poor historian. He has had an episode of vomiting. Nursing Notes were all reviewed and agreed with or any disagreements were addressed in the HPI. REVIEW OF SYSTEMS :      Review of Systems    Positives and Pertinent negatives as per HPI.      SURGICAL HISTORY     Past Surgical History:   Procedure Laterality Date    CHOLECYSTECTOMY      COLONOSCOPY  8/10/12    CORONARY ANGIOPLASTY WITH STENT PLACEMENT      PROSTATE BIOPSY      SKIN BIOPSY      UPPER GASTROINTESTINAL ENDOSCOPY  5/14/12    UPPER GASTROINTESTINAL ENDOSCOPY N/A 6/11/2019    EGD BIOPSY performed by Cecy Huggins DO at [address] [place] [address] N/A 8/8/2019    EGD performed by Arnaldo Murillo MD at [address] [place] [address] 3/16/2023    EGD BIOPSY performed by Arnaldo Murillo MD at [address] [place] [address]       Previous Medications    ACETAMINOPHEN (TYLENOL) 325 MG TABLET    Take 650 mg by mouth every 6 hours as needed for Pain    ASPIRIN EC 81 MG EC TABLET    Take 1 tablet by mouth daily    BISACODYL (DULCOLAX) 10 MG SUPPOSITORY    Place 10 mg rectally daily as needed for Constipation If MOM ineffective. CARVEDILOL (COREG) 6.25 MG TABLET    Take 1 tablet by mouth 2 times daily (with meals)    CRANBERRY 450 MG CAPS    Take 450 mg by mouth in the morning and at bedtime UTI prevention    LEVOFLOXACIN (LEVAQUIN) 500 MG TABLET    Take 1 tablet by mouth daily for 5 days    MAGNESIUM HYDROXIDE (MILK OF MAGNESIA) 400 MG/5ML SUSPENSION    Take 30 mLs by mouth daily as needed for Constipation    MELATONIN 3 MG TABS TABLET    Take 3 mg by mouth in the morning. PANTOPRAZOLE (PROTONIX) 40 MG TABLET    Take 1 tablet by mouth every morning (before breakfast)    PREGABALIN (LYRICA) 25 MG CAPSULE    Take 75 mg by mouth in the morning and 75 mg at noon and 75 mg before bedtime. SERTRALINE (ZOLOFT) 50 MG TABLET    Take 100 mg by mouth in the morning. SPIRONOLACTONE (ALDACTONE) 25 MG TABLET    Take 0.5 tablets by mouth daily    SULFAMETHOXAZOLE-TRIMETHOPRIM (BACTRIM;SEPTRA) 400-80 MG PER TABLET    Take 1 tablet by mouth daily Prophylactic x 3 months 2/13/23-5/18/23    VALSARTAN (DIOVAN) 40 MG TABLET    Take 0.5 tablets by mouth at bedtime       ALLERGIES     Fluoxetine, Benazepril, Hydrocodone, Morphine, Penicillins, Simvastatin, Morphine and related, and Sucralfate    FAMILYHISTORY     No family history on file.      SOCIAL HISTORY       Social History     Tobacco Use    Smoking status: Former     Packs/day: 0.25     Years: 10.00     Pack years: 2.50     Types: Cigarettes    Smokeless tobacco: Never   Vaping Use    Vaping Use: Never used   Substance Use Topics    Alcohol use: No    Drug use: No       SCREENINGS        [place] Coma Scale  Eye Opening: Spontaneous  Best Verbal Response: Oriented  Best Motor Response: Obeys commands  [place] Coma Scale Score: 15                CIWA Assessment  BP: (!) 153/98  Heart Rate: 76           PHYSICAL EXAM  1 or more Elements     ED Triage Vitals   BP Temp Temp Source Heart Rate Resp SpO2 Height Weight   03/16/23 2302 03/16/23 2302 03/16/23 2302 03/16/23 2302 03/16/23 2302 03/16/23 2302 03/16/23 2301 03/16/23 2301   (!) 153/98 98.1 °F (36.7 °C) Oral 76 18 95 % 6' (1.829 m) 175 lb (79.4 kg)       General: No acute distress. Alert and Oriented. Appears stated age. HEENT:   No difficulty tolerating oral secretions. Cardiac: Regular rate and rhythm. Radial pulses are intact bilaterally. Chest: No respiratory distress. Clear breath sounds bilaterally. No increased work of breathing. No use of accessory muscles for respiration. Abdomen: Soft, there is mild tenderness in the periumbilical and left lower quadrant region nondistended, non-peritonitic. Extremities:No significant lower extremity edema. Lower extremities are symmetric. Neuro: Moving all extremities. No focal deficits. Speech is clear. Skin:No rash, no erythema  Psych: Calm and cooperative. DIAGNOSTIC RESULTS   LABS:    Labs Reviewed   CBC WITH AUTO DIFFERENTIAL - Abnormal; Notable for the following components:       Result Value    Hemoglobin 13.1 (*)     Hematocrit 39.4 (*)     RDW 16.7 (*)     Lymphocytes Absolute 0.7 (*)     All other components within normal limits   COMPREHENSIVE METABOLIC PANEL W/ REFLEX TO MG FOR LOW K - Abnormal; Notable for the following components:    ALT <5 (*)     AST 13 (*)     All other components within normal limits   LIPASE - Abnormal; Notable for the following components:    Lipase 8.0 (*)     All other components within normal limits   MAGNESIUM - Abnormal; Notable for the following components:    Magnesium 1.50 (*)     All other components within normal limits   LACTIC ACID   URINALYSIS WITH REFLEX TO CULTURE       When ordered only abnormal lab results are displayed. All other labs were within normal range or not returned as of this dictation.           RADIOLOGY:   Non-plain film images such as CT, Ultrasound and MRI are read by the radiologist. Plain radiographic images are visualized and preliminarily interpreted by the ED Provider with the below findings:      Interpretation per the Radiologist below, if available at the time of this note:    No orders to display     XR ABDOMEN (KUB) (SINGLE AP VIEW)    Result Date: 3/15/2023  EXAMINATION: ONE SUPINE XRAY VIEW(S) OF THE ABDOMEN 3/15/2023 12:47 pm COMPARISON: Abdomen/pelvis x-ray dated 14 March 2023 HISTORY: ORDERING SYSTEM PROVIDED HISTORY: fecal impaction TECHNOLOGIST PROVIDED HISTORY: Reason for exam:->fecal impaction Reason for Exam: fecal impaction FINDINGS: Moderate stool is seen within the rectum.  No evidence of bowel obstruction.     Moderate stool in the rectum without evidence of bowel obstruction.     XR ABDOMEN (KUB) (SINGLE AP VIEW)    Result Date: 3/14/2023  EXAMINATION: ONE SUPINE XRAY VIEW(S) OF THE ABDOMEN 3/14/2023 1:46 pm COMPARISON: CT chest/abdomen/pelvis dated March 13, 2023 HISTORY: ORDERING SYSTEM PROVIDED HISTORY: fecal impaction TECHNOLOGIST PROVIDED HISTORY: Reason for exam:->fecal impaction Reason for Exam: fecal impaction FINDINGS: Multiple air-filled loops of small bowel which are nondilated.  No evidence of bowel obstruction.  Contrast is seen within the urinary bladder.  Mild stool is seen within the rectum.     Mild stool within the rectum.  No evidence of bowel obstruction     CT Head W/O Contrast    Result Date: 3/13/2023  EXAMINATION: CT OF THE HEAD WITHOUT CONTRAST  3/13/2023 8:28 pm TECHNIQUE: CT of the head was performed without the administration of intravenous contrast. Automated exposure control, iterative reconstruction, and/or weight based adjustment of the mA/kV was utilized to reduce the radiation dose to as low as reasonably achievable. COMPARISON: None. HISTORY: ORDERING SYSTEM PROVIDED HISTORY: ams TECHNOLOGIST PROVIDED HISTORY: Reason for exam:->ams Has a \"code stroke\" or \"stroke alert\" been called?->No Decision Support Exception - unselect if not a suspected or confirmed emergency medical condition->Emergency Medical Condition  (MA) Reason for Exam: ams FINDINGS: BRAIN/VENTRICLES: There is generalized volume loss with mild-to-moderate chronic white matter microvascular ischemic change. Encephalomalacia of the anterior, inferior frontal lobes and the left anterior temporal pole likely reflect the sequela of previous traumatic brain injury. ORBITS: The visualized portion of the orbits demonstrate no acute abnormality. SINUSES: The visualized paranasal sinuses and mastoid air cells demonstrate no acute abnormality. SOFT TISSUES/SKULL:  No acute abnormality of the visualized skull or soft tissues. No acute intracranial abnormality. XR CHEST PORTABLE    Result Date: 3/13/2023  EXAMINATION: ONE XRAY VIEW OF THE CHEST 3/13/2023 7:54 pm COMPARISON: 12/06/2022 radiograph HISTORY: ORDERING SYSTEM PROVIDED HISTORY: cough, hypoxia TECHNOLOGIST PROVIDED HISTORY: Reason for exam:->cough, hypoxia Reason for Exam: cough,ams, abd pain FINDINGS: The heart and mediastinum are normal.  Chronic pattern of interstitial changes in the lungs that are stable from prior imaging. No new consolidation or significant pleural fluid. No skeletal finding. Stable chronic interstitial changes in the lungs. CT CHEST ABDOMEN PELVIS W CONTRAST Additional Contrast? None    Result Date: 3/13/2023  EXAMINATION: CT OF THE CHEST, ABDOMEN, AND PELVIS WITH CONTRAST 3/13/2023 8:29 pm TECHNIQUE: CT of the chest, abdomen and pelvis was performed with the administration of intravenous contrast. Multiplanar reformatted images are provided for review. Automated exposure control, iterative reconstruction, and/or weight based adjustment of the mA/kV was utilized to reduce the radiation dose to as low as reasonably achievable.  COMPARISON: 11/03/2022 HISTORY: ORDERING SYSTEM PROVIDED HISTORY: cough, hypoxia, vomiting, abd pain TECHNOLOGIST PROVIDED HISTORY: Reason for exam:->cough, hypoxia, vomiting, abd pain Additional Contrast?->None Decision Support Exception - unselect if not a suspected or confirmed emergency medical condition->Emergency Medical Condition (MA) Reason for Exam: cough, hypoxia, vomiting, abd pain FINDINGS: Chest: Mediastinum: There is mild calcified plaque throughout the aorta which is mildly dilated with no aneurysm or dissection. The pulmonary arteries are prominent centrally with no central filling defects. There is small lymph nodes in the mediastinum measuring less than 1 cm. Lungs/pleura: The lungs are mildly hyperinflated with some hazy subpleural ground-glass opacities along the lung bases posteriorly. There is mild bibasilar bronchiectasis posteriorly with no obvious mucous plugging seen. There is no pulmonary nodule or mass. There are tiny bibasilar pleural effusions posteriorly. Soft Tissues/Bones: There are multiple old rib fractures on the left with no acute fracture seen. There are moderate degenerative changes throughout the spine with no aggressive osseous lesion. Abdomen/Pelvis: Organs: The liver and spleen are normal size and density. The gallbladder has been removed with clips in the gallbladder fossa. The bile ducts and pancreas are normal.  The adrenals are normal.  The kidneys are normal size and function normally mild cortical scarring throughout both kidneys which is unchanged. There is a small 1.6 cm cyst lower pole left kidney which is unchanged with no hydronephrosis or renal stones. The ureters are normal caliber. GI/Bowel: The appendix is normal.  There extensive diverticula throughout the colon with no pericolonic inflammation. The small bowel is normal caliber. There is mild feces in the rectum which is more prominent. Pelvis: The prostate gland is mildly enlarged which is unchanged. The bladder is unremarkable. There is no adenopathy or ascites.  Peritoneum/Retroperitoneum:   There is mild calcified plaque throughout the aorta which is normal caliber with no aneurysm or dissection and no retroperitoneal mass or adenopathy is seen. Bones/Soft Tissues: There are moderate degenerative changes throughout the spine with no aggressive osseous lesion     Mild bibasilar atelectasis or early infiltrates posteriorly with associated mild bronchiectasis. Recommend follow-up with serial chest x-rays. Mildly atherosclerotic thoracic aorta with no aneurysm or dissection and unremarkable mediastinum. Multiple old rib fractures on the left with no acute fracture seen. Previous cholecystectomy which is unchanged with no acute abnormality seen Mild fecal impaction in the rectum with no obstruction. Extensive diverticula throughout the colon with no pericolonic inflammation. Mild prostatic enlargement with no pelvic mass or active inflammation and a normal appendix. Bedside Ultrasound, as interpreted by me, if performed:    No results found. PROCEDURES     Unless otherwise noted below, none     Procedures    CRITICAL CARE TIME     I personally spent a total of 0 minutes of critical care time in obtaining history, performing a physical exam, bedside monitoring of interventions, collecting and interpreting tests and discussion with consultants but excluding time spent performing procedures, treating other patients and teaching time.                                                                                                            PAST MEDICAL HISTORY      has a past medical history of Atherosclerotic heart disease, CAD (coronary artery disease), Cancer ([address] CHF (congestive heart failure) ([address] COPD (chronic obstructive pulmonary disease) ([address] Dementia ([address] Depression, Duodenal ulcer without hemorrhage or perforation, Falls frequently, GERD without esophagitis, Hernia, Hyperlipidemia, Hypertension, Hypokalemia, Lack of coordination, Melanoma ([address] MI (myocardial infarction) ([address] Muscle weakness (generalized), Protein calorie malnutrition ([address] Reflux, Sepsis ([address] Spinal stenosis, and UTI (urinary tract infection). EMERGENCY DEPARTMENT COURSE and DIFFERENTIAL DIAGNOSIS/MDM:     Vitals:    Vitals:    03/16/23 2301 03/16/23 2302   BP:  (!) 153/98   Pulse:  76   Resp:  18   Temp:  98.1 °F (36.7 °C)   TempSrc:  Oral   SpO2:  95%   Weight: 175 lb (79.4 kg)    Height: 6' (1.829 m)        Patient was treated with and given the following medications:  Medications   magnesium sulfate 2000 mg in 50 mL IVPB premix (2,000 mg IntraVENous New Bag 3/17/23 0126)             Is this patient to be included in the SEP-1 Core Measure due to severe sepsis or septic shock? No   Exclusion criteria - the patient is NOT to be included for SEP-1 Core Measure due to: Infection is not suspected    CC/HPI Summary, DDx, ED Course, and Reassessment:     68-year-old male presenting for evaluation of abdominal pain, 1 episode of emesis, nausea from his nursing facility. He was just discharged from [facility] after management for pneumonia, gastritis. ED evaluation clued basic labs. The differential diagnosis associated with the patient's presentation includes, but is not limited to: Gastritis, diverticulitis, electrolyte abnormality    CONSULTS: (Who and What was discussed)  None    Discussion with Other Professionals : None        Social Determinants : None    Patient's care impacted by chronic condition(s): Dementia, hypertension, hyperlipidemia    Records Reviewed : Inpatient Notes discharge summary     Clinical information obtained from an independent historian. Disposition Considerations (include 1 Tests not done, Shared Decision Making, Pt Expectation of Test or Tx.):     Laboratory evaluation overall is unremarkable. Patient has remained hemodynamically stable. Patient with his abdominal pain unfortunately will require CT imaging to rule out acute intra-abdominal process.   The CAT scan is still nonfunctional here at this facility and he will require transfer to neighboring facility at [facility] for facilitation of CT scan and ultimate disposition. I feel that if his CT abdomen pelvis reveals no acute intra-abdominal process, he can be discharged home back to his nursing facility. I did discuss the patient care with [facility] ED attending, Dr. Leora Bourne who has accepted the patient for transfer for facilitation of CT abdomen pelvis and ultimate disposition. I am the Primary Clinician of Record. FINAL IMPRESSION      1. Abdominal pain, unspecified abdominal location          DISPOSITION/PLAN     DISPOSITION Decision To Transfer 03/17/2023 02:05:33 AM      PATIENT REFERRED TO:  Mayra Claire, [place] [address] [place] [address] [phone]          DISCHARGE MEDICATIONS:  Patient was given scripts for the following medications. I counseled patient how to take these medications:  New Prescriptions    No medications on file       DISCONTINUED MEDICATIONS:  Discontinued Medications    No medications on file              (This chart was generated in part by using Dragon Dictation system and may contain errors related to that system including errors in grammar, punctuation, and spelling, as well as words and phrases that may be inappropriate.  If there are any questions or concerns please feel free to contact the dictating provider for clarification.)    MD Honey Alvarenga MD  03/17/23 0889

## 2023-03-17 NOTE — ED NOTES
Pt cleaned up and bed alarm placed with the assistance of Gilma Cummings ED tech.       Veronica Murillo RN  03/17/23 2896

## 2023-03-17 NOTE — ED NOTES
[address], Nothing available  [address], Nothing available   [facility], Nothing available   0822- [facility] 1400  0825- [facility], Nothing available   8982- [address] , [facility] Scheduled transport with Yuri Townsend.       Jad High  03/17/23 0900

## 2023-03-18 LAB
BACTERIA BLD CULT ORG #2: NORMAL
BACTERIA BLD CULT: NORMAL

## 2023-03-26 ENCOUNTER — TELEPHONE (OUTPATIENT)
Dept: CASE MANAGEMENT | Age: 88
End: 2023-03-26

## 2023-03-26 NOTE — TELEPHONE ENCOUNTER
Imaging report CT Chest 3/13/23 with f/u imaging recommendations sent to Shwetha Mane  Lung Navigation [email or web address]

## 2023-07-06 NOTE — PROGRESS NOTES
Diagnosis:   Primary osteoarthritis of left hip (X25.38         Referring Provider: Linda Kenny  Date of Evaluation:    6/19/2023    Precautions:  None Next MD visit:   none scheduled  Date of Surgery: n/a     Insurance Primary/Secondary: [employer] [place] / N/A     # Auth Visits: no limit            Subjective: Pt reports no  L hip pain  now. Reports slight improvement with a slight decrease in frequency of pain. Also reports slight increased mobility with sitting in a fig. 4 position. Continues to walk most evenings for 30-40 minutes without producing symptoms. Pain: L hip pain:  0/10 pre and post Rx today     IE  Pt describes pain level current 0/10, at best 0/10, at worst 7/10. Pain present about 60-70% of the time. Average pain level 4/10;    Current functional limitations include: doing grocery shopping, (only 1x recently), doing car transfers, flexing L LE up, sidestepping especially with flexed knees, figure 4 position to don shoes/socks. Misc. Pain better with: meloxicam   Night pain: was waking up with pain 4-5x/night;  not now since taking meloxicam.   Sleep position: L side with L leg flexed up; Reports this feels good. Day pattern of pain: pain starts around noon, and is worse by evening. Occupation: desk job,   Living situation: house with stairs, no problem doing stairs  Activity Level: doing stairs at home, walking 40 min  5x/wk during lunch at work and then walking again at night for 30-40 minutes; Walks on weekends walks for a couple hours and is able to walk up/down hill without increasing symptoms. Objective:     7/6/23  Posture:  kyphotic-lordotic posture  Gait: pt ambulates on level ground with thania hip AGMR and lumbar ext /rot syndrome       AROM: (* denotes performed with pain)  Hip   Flexion: R wnl; L 105 inc 112 post Rx   Abduction: R wnl; L 30 deg erp; inc 35 deg erp still   ER: R wnl; L 30 (no erp); inc 35 deg post Rx. IR: R wnl; L 10 erp; inc 20 deg and no erp post Rx. 06/09/19  1216 06/10/19  0612 06/11/19  0643    139 142   K 4.6 4.7 4.5    102 108   CO2 28 28 28   BUN 52* 45* 33*   CREATININE 1.0 1.2 1.0     BNP: No results for input(s): BNP in the last 72 hours. PT/INR:   Recent Labs     06/09/19  1216   PROTIME 16.3*   INR 1.43*     APTT:No results for input(s): APTT in the last 72 hours. CARDIAC ENZYMES:   Recent Labs     06/09/19  1216 06/10/19  1333 06/10/19  1838   TROPONINI <0.01 <0.01 <0.01     FASTING LIPID PANEL:  Lab Results   Component Value Date    CHOL 126 02/20/2011    HDL 34 02/20/2011    TRIG 75 02/20/2011     LIVER PROFILE:   Recent Labs     06/09/19  1216   AST 16   ALT 13   BILITOT 0.9   ALKPHOS 132*            CT ABDOMEN PELVIS W IV CONTRAST Additional Contrast? None   Final Result   Mural thickening and marked inflammatory stranding is seen about the proximal   duodenum, concerning for duodenitis or duodenal ulcer. Enhancement of the   endothelium of the common bile duct, likely inflammatory. Adjacent lymph   nodes, likely reactive.       Diverticulosis.       Prostatomegaly. Correlate with urologic history.       Moderate stool at the rectum. Correlate for fecal impaction.           XR CHEST PORTABLE   Final Result   No acute cardiopulmonary disease. Assessment & Plan:     Patient Active Problem List    Diagnosis Date Noted    Abdominal pain, epigastric     Anemia     Abdominal pain 06/09/2019    Hypomagnesemia 03/03/2018    Weakness 03/03/2018    Dementia 03/03/2018    Moderate malnutrition ([address] 03/03/2018    Hypokalemia 03/02/2018    Generalized weakness 03/02/2018    Falls frequently 03/02/2018    Hypokalemia 03/02/2018    CAD (coronary artery disease) 03/02/2018    HTN (hypertension) 03/02/2018    HLD (hyperlipidemia) 03/02/2018       1) possible Duodenal Ulcer per abd CT  - GI consulted, placed on continuous PPI. Hold NSAIDS   Planned EGD 6/10. He c/o chest pain right before the procedure.  Procedure Capsular pattern of the hip noted. Accessory motion:  Hip jt restricted long axis distraction, lateral distraction, inferior glide, post glide      Strength/MMT: (* denotes performed with pain)  Hip   Flexion: R 5/5; L 4/5 (+) pain   Abduction (GMP)  R 3-/5; L 2+/5  ER: R 4+/5; L 4/5  IR: R 5/5; L 5/5          Assessment: Good response to session  with increased ROM L hip flex, IR, and  ER  noted post Rx today. Noted post session pt flexed L hip in sitting to doff t band from ankles without c/o pain. Goals: (to be met in 8-10 visits)    Pt will improve L hip flexion by 5-10 deg to promote ease of car/tub transfers. Progressing  Pt will demo improved ease of donning/doffing shoes. Progressing  Pt will be able to walk 30-40 minutes for grocery shopping without  discomfort. MET  Pt will improve muscle performance of the L iliopsoas for functional gait and stairs. Pt will improve muscle performance of B PGM, (L>R) to promote improved ease of walking, doing stairs, and side stepping for work outs. Progressing  Pt will improve strength of Glut max to aid with sit to stand transfers and push off phase of gait. Pt will report less pain at night with sleeping. Pt will be indep with HEP for self management. Progressing    Plan: Plan to continue skilled PT to address jt restrictions, restore ROM, and progress with strengthening to achieve goals as outlined and to promote functional ADL. Next session add LSU with hinged hips. Date: 6/22/2023  TX#: 2/8-10 Date:6/26/23                 TX#: 3/8-10 Date: 7/6/23               TX#: 4/8-10 Date:                 TX#: 5/ Date:    Tx#: 6/   MT  -Hip harmonics hip long axis oscillations  -jt mob: long axis traction;    -hip distraction mob: lateral/inf/post  -hip lateral distraction manip  -femoral long axis traction manip  -S/L MWM hip ext with anterior glides  -S/L medial glide of femur  -L SIJ manip    -MET to correct L posterior iliac rot 10 sec x3; -FMP jt technique supine into hip flexion with MET holds  TE  -clam 10x slowly   -S/L hip Abd 15x  -Supine B LE ball roll outs with feet on ball to promote hip flexion ROM 20 rep  -self MET correction for L post ilium 10 sec x3;   -BKFO 5x ea:  pain on L; Pain improved with abdominal contraction and stable pelvis  -MET holds with resistance against B ASIS \"resist my compression\" 5 sec x6;   -BKFO with abdominal contraction R/L 5x: painless.    -Quadruped rocking  cues for small rom with slight hip ER 10x;   MT  -Hip harmonics hip long axis oscillations  -hip distraction mob: lateral/inf/post mob/manip  -hip lateral distraction manip  -femoral long axis traction manip  -Prone Prabhakar technique for sacral correction   TE  -clam 20x slowly   -S/L hip Abd 15x  -self MET correction for R Anterior  ilium 10 sec x3;   -BKFO 5x ea:  pain on L; Pain improved with abdominal contraction and stable pelvis  -MET holds with resistance against B ASIS \"resist my compression\" 5 sec x6;   -BKFO with abdominal contraction R/L 5x: painless.    -Quadruped rocking  cues for small rom with slight hip ER 10x;  -2-way femoral glide quadruped rocking with T band and towel in the groin: 20x ea way  -1 way femoral anterior glide 1/2 kneel 20x:  cues for small rom.    -hip hikes for PGM up-trainin sec x3 ea leg  MT  -Hip harmonics hip long axis oscillations  -hip distraction mob: lateral/inf/post mob/  manip  -hip lateral distraction manip  -femoral long axis traction manip  -Mob with carrington meyers therapist L hip distraction  -MWM  L hip ER/IR using belt around therapist L hip distraction  TE  -B hip ER with gliders  10 sec /10x   -shuttle in R S/L 25# Pushing thru L heel 25x;     -MET holds in fig 4 position doing C/R and increasing Er ea time;   -Quadruped rocking  cues for small rom with slight hip ER 10x;  -2-way femoral glide quadruped rocking with T band and towel in the groin: 20x ea way  -hip hikes for PGM up-trainin sec x3 on abandoned. Check EKG and follow troponins. all negative. D/c iv fluids     2) leucocytosis. no fever. unclear cause      3) CAD  C/o chest pain today. Check EKG and troponins.  All negative     4) Anemia  - follow up cbc, currently hemodynamically stable     DVT Prophylaxis: scd  Diet: DIET CLEAR LIQUID;  Diet NPO, After Midnight  Code Status: Full Code          Cortney Real Left (pt fatigued)   -lateral stepping 5ft x2 R/L   -lateral stepping with yellow t band at ankles 5 ft x5 R/L   -shuttle in R S/L with L foot only on foot board: pushing thru L heel:25#/ 25x;             HEP:  -quadruped rocking   -clam  -S/L hip Abd 15x  -MET to correct L posterior iliac rotation HEP:  2-way femoral glide quadruped rocking with T band and towel in the groin: 20x ea way  -hip hikes for PGM up-trainin sec x3 ea leg (forgot to give picture)    -MET to correct L posterior iliac rotation:stop this ex due to hip pain HEP:  -lateral stepping with yellow t band at ankles 5 ft x5 R/L     -quadruped rocking     -2-way femoral glide quadruped rocking with T band and towel in the groin: 20x ea way    -hip hikes for PGM up-trainin sec x3 ea leg (forgot to give picture)      -clam  -S/L hip Abd 15x HEP: HEP:            Charges: MT x2 (25 min); TE x2 (30 min)        Total Timed Treatment: 55 min  Total Treatment Time: 60 min

## 2023-07-16 ENCOUNTER — APPOINTMENT (OUTPATIENT)
Dept: CT IMAGING | Age: 88
DRG: 871 | End: 2023-07-16
Payer: MEDICARE

## 2023-07-16 ENCOUNTER — APPOINTMENT (OUTPATIENT)
Dept: GENERAL RADIOLOGY | Age: 88
DRG: 871 | End: 2023-07-16
Payer: MEDICARE

## 2023-07-16 ENCOUNTER — HOSPITAL ENCOUNTER (INPATIENT)
Age: 88
LOS: 3 days | Discharge: NURSING, REHAB OR LONG-TERM CARE | DRG: 871 | End: 2023-07-19
Attending: EMERGENCY MEDICINE | Admitting: INTERNAL MEDICINE
Payer: MEDICARE

## 2023-07-16 DIAGNOSIS — R65.21 SEPTIC SHOCK DUE TO URINARY TRACT INFECTION (HCC): ICD-10-CM

## 2023-07-16 DIAGNOSIS — N39.0 SEPTIC SHOCK DUE TO URINARY TRACT INFECTION (HCC): ICD-10-CM

## 2023-07-16 DIAGNOSIS — A41.9 SEPTIC SHOCK DUE TO URINARY TRACT INFECTION (HCC): ICD-10-CM

## 2023-07-16 DIAGNOSIS — N39.0 URINARY TRACT INFECTION IN MALE: Primary | ICD-10-CM

## 2023-07-16 DIAGNOSIS — F01.50 VASCULAR DEMENTIA WITHOUT BEHAVIORAL DISTURBANCE, PSYCHOTIC DISTURBANCE, MOOD DISTURBANCE, OR ANXIETY, UNSPECIFIED DEMENTIA SEVERITY (HCC): ICD-10-CM

## 2023-07-16 DIAGNOSIS — G93.41 ACUTE METABOLIC ENCEPHALOPATHY: ICD-10-CM

## 2023-07-16 DIAGNOSIS — N17.9 AKI (ACUTE KIDNEY INJURY) (HCC): ICD-10-CM

## 2023-07-16 LAB
ALBUMIN SERPL-MCNC: 3.1 G/DL (ref 3.4–5)
ALBUMIN/GLOB SERPL: 1 {RATIO} (ref 1.1–2.2)
ALP SERPL-CCNC: 93 U/L (ref 40–129)
ALT SERPL-CCNC: 8 U/L (ref 10–40)
ANION GAP SERPL CALCULATED.3IONS-SCNC: 12 MMOL/L (ref 3–16)
AST SERPL-CCNC: 14 U/L (ref 15–37)
BASE EXCESS BLDV CALC-SCNC: -2.7 MMOL/L (ref -3–3)
BASOPHILS # BLD: 0 K/UL (ref 0–0.2)
BASOPHILS NFR BLD: 0.2 %
BILIRUB SERPL-MCNC: 0.6 MG/DL (ref 0–1)
BILIRUB UR QL STRIP.AUTO: NEGATIVE
BUN SERPL-MCNC: 30 MG/DL (ref 7–20)
CALCIUM SERPL-MCNC: 8.4 MG/DL (ref 8.3–10.6)
CHARACTER UR: ABNORMAL
CHLORIDE SERPL-SCNC: 107 MMOL/L (ref 99–110)
CLARITY UR: ABNORMAL
CO2 BLDV-SCNC: 24 MMOL/L
CO2 SERPL-SCNC: 22 MMOL/L (ref 21–32)
COHGB MFR BLDV: 3.5 % (ref 0–1.5)
COLOR UR: YELLOW
CREAT SERPL-MCNC: 2.5 MG/DL (ref 0.8–1.3)
DEPRECATED RDW RBC AUTO: 15.8 % (ref 12.4–15.4)
EOSINOPHIL # BLD: 0.1 K/UL (ref 0–0.6)
EOSINOPHIL NFR BLD: 1.2 %
GFR SERPLBLD CREATININE-BSD FMLA CKD-EPI: 24 ML/MIN/{1.73_M2}
GLUCOSE SERPL-MCNC: 114 MG/DL (ref 70–99)
GLUCOSE UR STRIP.AUTO-MCNC: NEGATIVE MG/DL
HCO3 BLDV-SCNC: 22.9 MMOL/L (ref 23–29)
HCT VFR BLD AUTO: 36.7 % (ref 40.5–52.5)
HGB BLD-MCNC: 11.9 G/DL (ref 13.5–17.5)
HGB UR QL STRIP.AUTO: ABNORMAL
INR PPP: 1.19 (ref 0.84–1.16)
KETONES UR STRIP.AUTO-MCNC: NEGATIVE MG/DL
LACTATE BLDV-SCNC: 1.6 MMOL/L (ref 0.4–1.9)
LEUKOCYTE ESTERASE UR QL STRIP.AUTO: ABNORMAL
LIPASE SERPL-CCNC: 6 U/L (ref 13–60)
LYMPHOCYTES # BLD: 1.1 K/UL (ref 1–5.1)
LYMPHOCYTES NFR BLD: 11.3 %
MAGNESIUM SERPL-MCNC: 1.8 MG/DL (ref 1.8–2.4)
MCH RBC QN AUTO: 29 PG (ref 26–34)
MCHC RBC AUTO-ENTMCNC: 32.5 G/DL (ref 31–36)
MCV RBC AUTO: 89.3 FL (ref 80–100)
METHGB MFR BLDV: 0.3 %
MONOCYTES # BLD: 1.1 K/UL (ref 0–1.3)
MONOCYTES NFR BLD: 11.5 %
NEUTROPHILS # BLD: 7.3 K/UL (ref 1.7–7.7)
NEUTROPHILS NFR BLD: 75.8 %
NITRITE UR QL STRIP.AUTO: NEGATIVE
NT-PROBNP SERPL-MCNC: 2113 PG/ML (ref 0–449)
O2 CT VFR BLDV CALC: 11 VOL %
O2 THERAPY: ABNORMAL
PCO2 BLDV: 42.6 MMHG (ref 40–50)
PH BLDV: 7.35 [PH] (ref 7.35–7.45)
PH UR STRIP.AUTO: 6 [PH] (ref 5–8)
PLATELET # BLD AUTO: 131 K/UL (ref 135–450)
PMV BLD AUTO: 8.3 FL (ref 5–10.5)
PO2 BLDV: 32.5 MMHG (ref 25–40)
POTASSIUM SERPL-SCNC: 4.8 MMOL/L (ref 3.5–5.1)
PROT SERPL-MCNC: 6.1 G/DL (ref 6.4–8.2)
PROT UR STRIP.AUTO-MCNC: 100 MG/DL
PROTHROMBIN TIME: 15.1 SEC (ref 11.5–14.8)
RBC # BLD AUTO: 4.11 M/UL (ref 4.2–5.9)
RBC #/AREA URNS HPF: ABNORMAL /HPF (ref 0–4)
SAO2 % BLDV: 59 %
SODIUM SERPL-SCNC: 141 MMOL/L (ref 136–145)
SP GR UR STRIP.AUTO: 1.01 (ref 1–1.03)
TROPONIN, HIGH SENSITIVITY: 49 NG/L (ref 0–22)
UA COMPLETE W REFLEX CULTURE PNL UR: YES
UA DIPSTICK W REFLEX MICRO PNL UR: YES
URN SPEC COLLECT METH UR: ABNORMAL
UROBILINOGEN UR STRIP-ACNC: 0.2 E.U./DL
WBC # BLD AUTO: 9.6 K/UL (ref 4–11)
WBC #/AREA URNS HPF: >100 /HPF (ref 0–5)

## 2023-07-16 PROCEDURE — 36415 COLL VENOUS BLD VENIPUNCTURE: CPT

## 2023-07-16 PROCEDURE — 74176 CT ABD & PELVIS W/O CONTRAST: CPT

## 2023-07-16 PROCEDURE — 36556 INSERT NON-TUNNEL CV CATH: CPT

## 2023-07-16 PROCEDURE — 81001 URINALYSIS AUTO W/SCOPE: CPT

## 2023-07-16 PROCEDURE — 87088 URINE BACTERIA CULTURE: CPT

## 2023-07-16 PROCEDURE — 2580000003 HC RX 258: Performed by: NURSE PRACTITIONER

## 2023-07-16 PROCEDURE — 96375 TX/PRO/DX INJ NEW DRUG ADDON: CPT

## 2023-07-16 PROCEDURE — 94761 N-INVAS EAR/PLS OXIMETRY MLT: CPT

## 2023-07-16 PROCEDURE — 99285 EMERGENCY DEPT VISIT HI MDM: CPT

## 2023-07-16 PROCEDURE — 6360000002 HC RX W HCPCS: Performed by: NURSE PRACTITIONER

## 2023-07-16 PROCEDURE — 83605 ASSAY OF LACTIC ACID: CPT

## 2023-07-16 PROCEDURE — 2580000003 HC RX 258: Performed by: EMERGENCY MEDICINE

## 2023-07-16 PROCEDURE — 71045 X-RAY EXAM CHEST 1 VIEW: CPT

## 2023-07-16 PROCEDURE — 84484 ASSAY OF TROPONIN QUANT: CPT

## 2023-07-16 PROCEDURE — 2000000000 HC ICU R&B

## 2023-07-16 PROCEDURE — 83880 ASSAY OF NATRIURETIC PEPTIDE: CPT

## 2023-07-16 PROCEDURE — 87186 SC STD MICRODIL/AGAR DIL: CPT

## 2023-07-16 PROCEDURE — 6370000000 HC RX 637 (ALT 250 FOR IP): Performed by: EMERGENCY MEDICINE

## 2023-07-16 PROCEDURE — 87086 URINE CULTURE/COLONY COUNT: CPT

## 2023-07-16 PROCEDURE — 83735 ASSAY OF MAGNESIUM: CPT

## 2023-07-16 PROCEDURE — 85025 COMPLETE CBC W/AUTO DIFF WBC: CPT

## 2023-07-16 PROCEDURE — 51702 INSERT TEMP BLADDER CATH: CPT

## 2023-07-16 PROCEDURE — 80053 COMPREHEN METABOLIC PANEL: CPT

## 2023-07-16 PROCEDURE — 2500000003 HC RX 250 WO HCPCS: Performed by: EMERGENCY MEDICINE

## 2023-07-16 PROCEDURE — 96367 TX/PROPH/DG ADDL SEQ IV INF: CPT

## 2023-07-16 PROCEDURE — 83690 ASSAY OF LIPASE: CPT

## 2023-07-16 PROCEDURE — 96360 HYDRATION IV INFUSION INIT: CPT

## 2023-07-16 PROCEDURE — 02HV33Z INSERTION OF INFUSION DEVICE INTO SUPERIOR VENA CAVA, PERCUTANEOUS APPROACH: ICD-10-PCS | Performed by: EMERGENCY MEDICINE

## 2023-07-16 PROCEDURE — 82803 BLOOD GASES ANY COMBINATION: CPT

## 2023-07-16 PROCEDURE — 96365 THER/PROPH/DIAG IV INF INIT: CPT

## 2023-07-16 PROCEDURE — 6360000002 HC RX W HCPCS: Performed by: EMERGENCY MEDICINE

## 2023-07-16 PROCEDURE — 85610 PROTHROMBIN TIME: CPT

## 2023-07-16 PROCEDURE — 87040 BLOOD CULTURE FOR BACTERIA: CPT

## 2023-07-16 PROCEDURE — 93005 ELECTROCARDIOGRAM TRACING: CPT | Performed by: EMERGENCY MEDICINE

## 2023-07-16 RX ORDER — ONDANSETRON 2 MG/ML
4 INJECTION INTRAMUSCULAR; INTRAVENOUS ONCE
Status: COMPLETED | OUTPATIENT
Start: 2023-07-16 | End: 2023-07-16

## 2023-07-16 RX ORDER — ACETAMINOPHEN 650 MG/1
650 SUPPOSITORY RECTAL EVERY 6 HOURS PRN
Status: DISCONTINUED | OUTPATIENT
Start: 2023-07-16 | End: 2023-07-19 | Stop reason: HOSPADM

## 2023-07-16 RX ORDER — 0.9 % SODIUM CHLORIDE 0.9 %
1000 INTRAVENOUS SOLUTION INTRAVENOUS ONCE
Status: COMPLETED | OUTPATIENT
Start: 2023-07-16 | End: 2023-07-16

## 2023-07-16 RX ORDER — SODIUM CHLORIDE, SODIUM LACTATE, POTASSIUM CHLORIDE, AND CALCIUM CHLORIDE .6; .31; .03; .02 G/100ML; G/100ML; G/100ML; G/100ML
30 INJECTION, SOLUTION INTRAVENOUS ONCE
Status: COMPLETED | OUTPATIENT
Start: 2023-07-16 | End: 2023-07-16

## 2023-07-16 RX ORDER — POLYETHYLENE GLYCOL 3350 17 G/17G
17 POWDER, FOR SOLUTION ORAL DAILY PRN
Status: DISCONTINUED | OUTPATIENT
Start: 2023-07-16 | End: 2023-07-19 | Stop reason: HOSPADM

## 2023-07-16 RX ORDER — SODIUM CHLORIDE 0.9 % (FLUSH) 0.9 %
5-40 SYRINGE (ML) INJECTION PRN
Status: DISCONTINUED | OUTPATIENT
Start: 2023-07-16 | End: 2023-07-19 | Stop reason: HOSPADM

## 2023-07-16 RX ORDER — SODIUM CHLORIDE 0.9 % (FLUSH) 0.9 %
5-40 SYRINGE (ML) INJECTION EVERY 12 HOURS SCHEDULED
Status: DISCONTINUED | OUTPATIENT
Start: 2023-07-16 | End: 2023-07-19 | Stop reason: HOSPADM

## 2023-07-16 RX ORDER — IBUPROFEN 400 MG/1
800 TABLET ORAL ONCE
Status: COMPLETED | OUTPATIENT
Start: 2023-07-16 | End: 2023-07-16

## 2023-07-16 RX ORDER — ACETAMINOPHEN 500 MG
1000 TABLET ORAL ONCE
Status: COMPLETED | OUTPATIENT
Start: 2023-07-16 | End: 2023-07-16

## 2023-07-16 RX ORDER — ACETAMINOPHEN 325 MG/1
650 TABLET ORAL EVERY 6 HOURS PRN
Status: DISCONTINUED | OUTPATIENT
Start: 2023-07-16 | End: 2023-07-19 | Stop reason: HOSPADM

## 2023-07-16 RX ORDER — SODIUM CHLORIDE 9 MG/ML
INJECTION, SOLUTION INTRAVENOUS CONTINUOUS
Status: DISCONTINUED | OUTPATIENT
Start: 2023-07-16 | End: 2023-07-16

## 2023-07-16 RX ORDER — ONDANSETRON 2 MG/ML
4 INJECTION INTRAMUSCULAR; INTRAVENOUS EVERY 6 HOURS PRN
Status: DISCONTINUED | OUTPATIENT
Start: 2023-07-16 | End: 2023-07-19 | Stop reason: HOSPADM

## 2023-07-16 RX ORDER — ASPIRIN 81 MG/1
81 TABLET ORAL DAILY
Status: DISCONTINUED | OUTPATIENT
Start: 2023-07-17 | End: 2023-07-19 | Stop reason: HOSPADM

## 2023-07-16 RX ORDER — PANTOPRAZOLE SODIUM 40 MG/1
40 TABLET, DELAYED RELEASE ORAL
Status: DISCONTINUED | OUTPATIENT
Start: 2023-07-17 | End: 2023-07-19 | Stop reason: HOSPADM

## 2023-07-16 RX ORDER — ONDANSETRON 4 MG/1
4 TABLET, ORALLY DISINTEGRATING ORAL EVERY 8 HOURS PRN
Status: DISCONTINUED | OUTPATIENT
Start: 2023-07-16 | End: 2023-07-19 | Stop reason: HOSPADM

## 2023-07-16 RX ORDER — SODIUM CHLORIDE 9 MG/ML
INJECTION, SOLUTION INTRAVENOUS PRN
Status: DISCONTINUED | OUTPATIENT
Start: 2023-07-16 | End: 2023-07-19 | Stop reason: HOSPADM

## 2023-07-16 RX ORDER — HEPARIN SODIUM 5000 [USP'U]/ML
5000 INJECTION, SOLUTION INTRAVENOUS; SUBCUTANEOUS EVERY 8 HOURS SCHEDULED
Status: DISCONTINUED | OUTPATIENT
Start: 2023-07-16 | End: 2023-07-17

## 2023-07-16 RX ORDER — LANOLIN ALCOHOL/MO/W.PET/CERES
3 CREAM (GRAM) TOPICAL NIGHTLY PRN
Status: DISCONTINUED | OUTPATIENT
Start: 2023-07-16 | End: 2023-07-19 | Stop reason: HOSPADM

## 2023-07-16 RX ORDER — OMEPRAZOLE 20 MG/1
20 CAPSULE, DELAYED RELEASE ORAL DAILY
COMMUNITY

## 2023-07-16 RX ORDER — FENTANYL CITRATE 50 UG/ML
100 INJECTION, SOLUTION INTRAMUSCULAR; INTRAVENOUS ONCE
Status: COMPLETED | OUTPATIENT
Start: 2023-07-16 | End: 2023-07-16

## 2023-07-16 RX ADMIN — SODIUM CHLORIDE, POTASSIUM CHLORIDE, SODIUM LACTATE AND CALCIUM CHLORIDE 2382 ML: 600; 310; 30; 20 INJECTION, SOLUTION INTRAVENOUS at 13:33

## 2023-07-16 RX ADMIN — SODIUM CHLORIDE: 9 INJECTION, SOLUTION INTRAVENOUS at 20:47

## 2023-07-16 RX ADMIN — NOREPINEPHRINE BITARTRATE 10 MCG/MIN: 1 SOLUTION INTRAVENOUS at 17:25

## 2023-07-16 RX ADMIN — ACETAMINOPHEN 1000 MG: 500 TABLET ORAL at 17:43

## 2023-07-16 RX ADMIN — SODIUM CHLORIDE 1000 ML: 9 INJECTION, SOLUTION INTRAVENOUS at 13:33

## 2023-07-16 RX ADMIN — IBUPROFEN 800 MG: 400 TABLET, FILM COATED ORAL at 18:44

## 2023-07-16 RX ADMIN — FENTANYL CITRATE 75 MCG: 50 INJECTION INTRAMUSCULAR; INTRAVENOUS at 16:17

## 2023-07-16 RX ADMIN — NOREPINEPHRINE BITARTRATE 10 MCG/MIN: 1 SOLUTION INTRAVENOUS at 15:34

## 2023-07-16 RX ADMIN — HEPARIN SODIUM 5000 UNITS: 5000 INJECTION INTRAVENOUS; SUBCUTANEOUS at 21:28

## 2023-07-16 RX ADMIN — SODIUM CHLORIDE, PRESERVATIVE FREE 10 ML: 5 INJECTION INTRAVENOUS at 20:10

## 2023-07-16 RX ADMIN — Medication 10 ML: at 20:48

## 2023-07-16 RX ADMIN — CEFTRIAXONE SODIUM 1000 MG: 1 INJECTION, POWDER, FOR SOLUTION INTRAMUSCULAR; INTRAVENOUS at 13:38

## 2023-07-16 RX ADMIN — ONDANSETRON 4 MG: 2 INJECTION INTRAMUSCULAR; INTRAVENOUS at 17:39

## 2023-07-17 PROBLEM — A41.9 SEPTIC SHOCK DUE TO URINARY TRACT INFECTION (HCC): Status: ACTIVE | Noted: 2023-07-17

## 2023-07-17 PROBLEM — N17.9 AKI (ACUTE KIDNEY INJURY) (HCC): Status: ACTIVE | Noted: 2023-07-17

## 2023-07-17 PROBLEM — R65.21 SEPTIC SHOCK DUE TO URINARY TRACT INFECTION (HCC): Status: ACTIVE | Noted: 2023-07-17

## 2023-07-17 PROBLEM — N39.0 SEPTIC SHOCK DUE TO URINARY TRACT INFECTION (HCC): Status: ACTIVE | Noted: 2023-07-17

## 2023-07-17 PROBLEM — N39.0 URINARY TRACT INFECTION IN MALE: Status: ACTIVE | Noted: 2023-07-17

## 2023-07-17 LAB
ALBUMIN SERPL-MCNC: 2.9 G/DL (ref 3.4–5)
ALBUMIN/GLOB SERPL: 1.1 {RATIO} (ref 1.1–2.2)
ALP SERPL-CCNC: 80 U/L (ref 40–129)
ALT SERPL-CCNC: 13 U/L (ref 10–40)
ANION GAP SERPL CALCULATED.3IONS-SCNC: 10 MMOL/L (ref 3–16)
AST SERPL-CCNC: 18 U/L (ref 15–37)
BASOPHILS # BLD: 0 K/UL (ref 0–0.2)
BASOPHILS NFR BLD: 0.3 %
BILIRUB SERPL-MCNC: 0.6 MG/DL (ref 0–1)
BUN SERPL-MCNC: 27 MG/DL (ref 7–20)
CALCIUM SERPL-MCNC: 8.4 MG/DL (ref 8.3–10.6)
CHLORIDE SERPL-SCNC: 108 MMOL/L (ref 99–110)
CO2 SERPL-SCNC: 23 MMOL/L (ref 21–32)
CREAT SERPL-MCNC: 1.7 MG/DL (ref 0.8–1.3)
DEPRECATED RDW RBC AUTO: 15.6 % (ref 12.4–15.4)
EKG ATRIAL RATE: 52 BPM
EKG DIAGNOSIS: NORMAL
EKG P AXIS: 80 DEGREES
EKG P-R INTERVAL: 174 MS
EKG Q-T INTERVAL: 456 MS
EKG QRS DURATION: 96 MS
EKG QTC CALCULATION (BAZETT): 424 MS
EKG R AXIS: 38 DEGREES
EKG T AXIS: 53 DEGREES
EKG VENTRICULAR RATE: 52 BPM
EOSINOPHIL # BLD: 0.1 K/UL (ref 0–0.6)
EOSINOPHIL NFR BLD: 1 %
GFR SERPLBLD CREATININE-BSD FMLA CKD-EPI: 39 ML/MIN/{1.73_M2}
GLUCOSE BLD-MCNC: 132 MG/DL (ref 70–99)
GLUCOSE SERPL-MCNC: 133 MG/DL (ref 70–99)
HCT VFR BLD AUTO: 32.2 % (ref 40.5–52.5)
HGB BLD-MCNC: 10.9 G/DL (ref 13.5–17.5)
LYMPHOCYTES # BLD: 0.9 K/UL (ref 1–5.1)
LYMPHOCYTES NFR BLD: 7.1 %
MCH RBC QN AUTO: 29.8 PG (ref 26–34)
MCHC RBC AUTO-ENTMCNC: 33.8 G/DL (ref 31–36)
MCV RBC AUTO: 88.1 FL (ref 80–100)
MONOCYTES # BLD: 1 K/UL (ref 0–1.3)
MONOCYTES NFR BLD: 8.5 %
NEUTROPHILS # BLD: 10.2 K/UL (ref 1.7–7.7)
NEUTROPHILS NFR BLD: 83.1 %
PERFORMED ON: ABNORMAL
PLATELET # BLD AUTO: 199 K/UL (ref 135–450)
PMV BLD AUTO: 9.4 FL (ref 5–10.5)
POTASSIUM SERPL-SCNC: 4.5 MMOL/L (ref 3.5–5.1)
PROT SERPL-MCNC: 5.5 G/DL (ref 6.4–8.2)
RBC # BLD AUTO: 3.65 M/UL (ref 4.2–5.9)
SODIUM SERPL-SCNC: 141 MMOL/L (ref 136–145)
WBC # BLD AUTO: 12.2 K/UL (ref 4–11)

## 2023-07-17 PROCEDURE — 2580000003 HC RX 258: Performed by: INTERNAL MEDICINE

## 2023-07-17 PROCEDURE — 6370000000 HC RX 637 (ALT 250 FOR IP): Performed by: STUDENT IN AN ORGANIZED HEALTH CARE EDUCATION/TRAINING PROGRAM

## 2023-07-17 PROCEDURE — 97165 OT EVAL LOW COMPLEX 30 MIN: CPT

## 2023-07-17 PROCEDURE — 99291 CRITICAL CARE FIRST HOUR: CPT | Performed by: INTERNAL MEDICINE

## 2023-07-17 PROCEDURE — 85025 COMPLETE CBC W/AUTO DIFF WBC: CPT

## 2023-07-17 PROCEDURE — 36415 COLL VENOUS BLD VENIPUNCTURE: CPT

## 2023-07-17 PROCEDURE — 2700000000 HC OXYGEN THERAPY PER DAY

## 2023-07-17 PROCEDURE — 80053 COMPREHEN METABOLIC PANEL: CPT

## 2023-07-17 PROCEDURE — 6370000000 HC RX 637 (ALT 250 FOR IP): Performed by: NURSE PRACTITIONER

## 2023-07-17 PROCEDURE — 92526 ORAL FUNCTION THERAPY: CPT

## 2023-07-17 PROCEDURE — 6370000000 HC RX 637 (ALT 250 FOR IP): Performed by: INTERNAL MEDICINE

## 2023-07-17 PROCEDURE — 2580000003 HC RX 258: Performed by: NURSE PRACTITIONER

## 2023-07-17 PROCEDURE — 6360000002 HC RX W HCPCS: Performed by: STUDENT IN AN ORGANIZED HEALTH CARE EDUCATION/TRAINING PROGRAM

## 2023-07-17 PROCEDURE — 93010 ELECTROCARDIOGRAM REPORT: CPT | Performed by: INTERNAL MEDICINE

## 2023-07-17 PROCEDURE — 2580000003 HC RX 258: Performed by: STUDENT IN AN ORGANIZED HEALTH CARE EDUCATION/TRAINING PROGRAM

## 2023-07-17 PROCEDURE — 97110 THERAPEUTIC EXERCISES: CPT

## 2023-07-17 PROCEDURE — 2000000000 HC ICU R&B

## 2023-07-17 PROCEDURE — 6360000002 HC RX W HCPCS: Performed by: NURSE PRACTITIONER

## 2023-07-17 PROCEDURE — 99233 SBSQ HOSP IP/OBS HIGH 50: CPT | Performed by: INTERNAL MEDICINE

## 2023-07-17 PROCEDURE — 94640 AIRWAY INHALATION TREATMENT: CPT

## 2023-07-17 PROCEDURE — 6360000002 HC RX W HCPCS: Performed by: INTERNAL MEDICINE

## 2023-07-17 PROCEDURE — 97161 PT EVAL LOW COMPLEX 20 MIN: CPT

## 2023-07-17 PROCEDURE — 97530 THERAPEUTIC ACTIVITIES: CPT

## 2023-07-17 PROCEDURE — 94761 N-INVAS EAR/PLS OXIMETRY MLT: CPT

## 2023-07-17 PROCEDURE — 92610 EVALUATE SWALLOWING FUNCTION: CPT

## 2023-07-17 RX ORDER — BUDESONIDE AND FORMOTEROL FUMARATE DIHYDRATE 160; 4.5 UG/1; UG/1
2 AEROSOL RESPIRATORY (INHALATION) 2 TIMES DAILY
Status: DISCONTINUED | OUTPATIENT
Start: 2023-07-17 | End: 2023-07-19 | Stop reason: HOSPADM

## 2023-07-17 RX ORDER — ALBUTEROL SULFATE 90 UG/1
2 AEROSOL, METERED RESPIRATORY (INHALATION) 2 TIMES DAILY
Status: DISCONTINUED | OUTPATIENT
Start: 2023-07-17 | End: 2023-07-19 | Stop reason: HOSPADM

## 2023-07-17 RX ORDER — PREGABALIN 25 MG/1
50 CAPSULE ORAL 3 TIMES DAILY
Status: DISCONTINUED | OUTPATIENT
Start: 2023-07-17 | End: 2023-07-19 | Stop reason: HOSPADM

## 2023-07-17 RX ORDER — ALBUTEROL SULFATE 90 UG/1
2 AEROSOL, METERED RESPIRATORY (INHALATION) EVERY 6 HOURS PRN
Status: DISCONTINUED | OUTPATIENT
Start: 2023-07-17 | End: 2023-07-19 | Stop reason: HOSPADM

## 2023-07-17 RX ORDER — PREGABALIN 25 MG/1
75 CAPSULE ORAL DAILY
Status: DISCONTINUED | OUTPATIENT
Start: 2023-07-17 | End: 2023-07-17

## 2023-07-17 RX ORDER — SODIUM CHLORIDE 9 MG/ML
INJECTION, SOLUTION INTRAVENOUS CONTINUOUS
Status: DISCONTINUED | OUTPATIENT
Start: 2023-07-17 | End: 2023-07-18

## 2023-07-17 RX ORDER — SERTRALINE HYDROCHLORIDE 100 MG/1
100 TABLET, FILM COATED ORAL DAILY
Status: DISCONTINUED | OUTPATIENT
Start: 2023-07-17 | End: 2023-07-19 | Stop reason: HOSPADM

## 2023-07-17 RX ORDER — ENOXAPARIN SODIUM 100 MG/ML
40 INJECTION SUBCUTANEOUS DAILY
Status: DISCONTINUED | OUTPATIENT
Start: 2023-07-17 | End: 2023-07-19 | Stop reason: HOSPADM

## 2023-07-17 RX ADMIN — HEPARIN SODIUM 5000 UNITS: 5000 INJECTION INTRAVENOUS; SUBCUTANEOUS at 06:29

## 2023-07-17 RX ADMIN — Medication 2 PUFF: at 07:16

## 2023-07-17 RX ADMIN — SODIUM CHLORIDE, PRESERVATIVE FREE 10 ML: 5 INJECTION INTRAVENOUS at 20:47

## 2023-07-17 RX ADMIN — Medication 10 ML: at 08:21

## 2023-07-17 RX ADMIN — Medication 2 PUFF: at 19:45

## 2023-07-17 RX ADMIN — MUPIROCIN: 20 OINTMENT TOPICAL at 20:47

## 2023-07-17 RX ADMIN — PANTOPRAZOLE SODIUM 40 MG: 40 TABLET, DELAYED RELEASE ORAL at 06:29

## 2023-07-17 RX ADMIN — Medication 2 PUFF: at 19:46

## 2023-07-17 RX ADMIN — SODIUM CHLORIDE: 9 INJECTION, SOLUTION INTRAVENOUS at 18:12

## 2023-07-17 RX ADMIN — SERTRALINE 100 MG: 100 TABLET, FILM COATED ORAL at 08:19

## 2023-07-17 RX ADMIN — Medication 10 ML: at 20:41

## 2023-07-17 RX ADMIN — PREGABALIN 50 MG: 25 CAPSULE ORAL at 20:41

## 2023-07-17 RX ADMIN — CEFTRIAXONE SODIUM 2000 MG: 2 INJECTION, POWDER, FOR SOLUTION INTRAMUSCULAR; INTRAVENOUS at 08:16

## 2023-07-17 RX ADMIN — Medication 2 PUFF: at 07:15

## 2023-07-17 RX ADMIN — ENOXAPARIN SODIUM 40 MG: 100 INJECTION SUBCUTANEOUS at 09:02

## 2023-07-17 RX ADMIN — ASPIRIN 81 MG: 81 TABLET, COATED ORAL at 08:19

## 2023-07-17 RX ADMIN — MUPIROCIN: 20 OINTMENT TOPICAL at 08:22

## 2023-07-17 RX ADMIN — SODIUM CHLORIDE, PRESERVATIVE FREE 10 ML: 5 INJECTION INTRAVENOUS at 08:22

## 2023-07-17 RX ADMIN — PREGABALIN 75 MG: 25 CAPSULE ORAL at 08:19

## 2023-07-17 RX ADMIN — PREGABALIN 50 MG: 25 CAPSULE ORAL at 13:50

## 2023-07-17 ASSESSMENT — PAIN DESCRIPTION - ORIENTATION: ORIENTATION: RIGHT;LEFT

## 2023-07-17 ASSESSMENT — PAIN DESCRIPTION - LOCATION: LOCATION: SHOULDER

## 2023-07-17 ASSESSMENT — PAIN DESCRIPTION - DESCRIPTORS: DESCRIPTORS: ACHING

## 2023-07-17 ASSESSMENT — PAIN SCALES - GENERAL
PAINLEVEL_OUTOF10: 2
PAINLEVEL_OUTOF10: 1

## 2023-07-17 ASSESSMENT — PAIN DESCRIPTION - PAIN TYPE: TYPE: CHRONIC PAIN

## 2023-07-17 NOTE — PROGRESS NOTES
4 Eyes Skin Assessment     NAME:  Inga Shook  YOB: 1935  MEDICAL RECORD NUMBER:  [id number]    The patient is being assessed for  Admission    I agree that at least one RN has performed a thorough Head to Toe Skin Assessment on the patient. ALL assessment sites listed below have been assessed. Areas assessed by both nurses:    Head, Face, Ears, Shoulders, Back, Chest, Arms, Elbows, Hands, Sacrum. Buttock, Coccyx, Ischium, Legs. Feet and Heels, and Under Medical Devices         Does the Patient have a Wound?  No noted wound(s)       Bridger Prevention initiated by RN: Yes  Wound Care Orders initiated by RN: No    Pressure Injury (Stage 3,4, Unstageable, DTI, NWPT, and Complex wounds) if present, place Wound referral order by RN under : No    New Ostomies, if present place, Ostomy referral order under : No     Nurse 1 eSignature: Electronically signed by Burton Guerrier RN on 7/16/23 at 8:46 PM EDT    **SHARE this note so that the co-signing nurse can place an eSignature**    Nurse 2 eSignature: Electronically signed by Fifi Miranda RN on 7/16/23 at 9:37 PM EDT

## 2023-07-17 NOTE — CARE COORDINATION
Case Management Assessment  Initial Evaluation    Date/Time of Evaluation: 7/17/2023 10:12 AM  Assessment Completed by: Jeison Cho RN    If patient is discharged prior to next notation, then this note serves as note for discharge by case management. Patient Name: Fredis Ornelas                   YOB: 1935  Diagnosis: PILAR (acute kidney injury) ([address]) [N17.9]  Sepsis ([address]) [A41.9]  Septic shock due to urinary tract infection ([address]) [A41.9, R65.21, N39.0]  Urinary tract infection in male [H38.7]  Acute metabolic encephalopathy [F82.71]  Vascular dementia without behavioral disturbance, psychotic disturbance, mood disturbance, or anxiety, unspecified dementia severity ([address]) [F01.50]                   Date / Time: 7/16/2023 12:49 PM    Patient Admission Status: Inpatient   Readmission Risk (Low < 19, Mod (19-27), High > 27): Readmission Risk Score: 19.5    Current PCP: Godwin Brennan MD  PCP verified by CM? (P) Yes       07/17/23 1008   Service Assessment   Patient Orientation Person;Place; Self   Cognition Dementia / Early Alzheimer's   History Provided By Patient;Medical Record   Primary Caregiver Other (Comment)  (LTC at [facility])   Accompanied By/Relationship alone   Support Systems Other (Comment)   Patient's Healthcare Decision Maker is: Named in [address]   PCP Verified by CM Yes   Last Visit to PCP Within last 3 months   Prior Functional Level Assistance with the following:;Bathing;Dressing   Current Functional Level Assistance with the following:;Bathing;Dressing   Can patient return to prior living arrangement Yes   Ability to make needs known: Poor   Family able to assist with home care needs: No   Would you like for me to discuss the discharge plan with any other family members/significant others, and if so, who?  Yes  (legal Chyrel Malena)   Financial Resources Medicare;Medicaid   Community Resources ECF/Home Care  (Return to [facility])   CM/JM Referral Other (see comment)  (NA)

## 2023-07-17 NOTE — PROGRESS NOTES
Inpatient Physical Therapy Evaluation & Treatment    Unit: ICU  Date:  7/17/2023  Patient Name:    Amador Bess  Admitting diagnosis:  PILAR (acute kidney injury) ([address]) [N17.9]  Sepsis ([address]) [A41.9]  Septic shock due to urinary tract infection ([address]) [A41.9, R65.21, N39.0]  Urinary tract infection in male [M86.8]  Acute metabolic encephalopathy [B09.39]  Vascular dementia without behavioral disturbance, psychotic disturbance, mood disturbance, or anxiety, unspecified dementia severity ([address]) [F01.50]  Admit Date:  7/16/2023  Precautions/Restrictions/WB Status/ Lines/ Wounds/ Oxygen: Fall risk, Bed/chair alarm, Lines (IV, internal catheter, and femoral line R), Confusion, Telemetry, and Continuous pulse oximetry    Treatment Time:  9:35 - 10:02  Treatment Number:  1   Timed Code Treatment Minutes: 17 minutes  Total Treatment Minutes:  27  minutes    Patient Stated Goals for Therapy:  none stated          Discharge Recommendations: Return to ECF with skilled therapy  DME needs for discharge: Defer to facility       Therapy recommendation for EMS Transport: requires transport by cot due to pt needs A x 2 for safe transfers    Therapy recommendations for staff:   Assist of 1 for sitting EOB    History of Present Illness:   Septic shock present on admission  Secondary to R.pyelonephritis  CT Abdomen and pelvis Infiltration of the fat surrounding the right kidney and portions of the  right ureter with mild dilatation of the right ureter in the pelvis. Some bladder inflammation noted.   Prior urine cultures reviewed, no history of MDRO/ESBL  IV Rocephin  F/u Urine and blood culture     PILAR  Likely due to ATN  Secondary to septic shock and concomitant ARB use  NS bolus given in ED and some while inpatient   Hold further hydration given reduced EF  Repeat BMP in am   Renally dose medications     Thrombocytopenia  Appears chronic, probably MDS  No active bleeding noted  Monitor CBC     Chronic HFrEF   Appears euvolemic   Pro BNP

## 2023-07-17 NOTE — PROGRESS NOTES
Pt C/O bilateral shoulder pain. Warm blanket applied to shoulders. Pt stated his shoulders felt better. Shift assessment complete.

## 2023-07-17 NOTE — PROGRESS NOTES
Inpatient Occupational Therapy Evaluation and Treatment    Unit: ICU  Date:  7/17/2023  Patient Name:    Osmin Burch  Admitting diagnosis:  PILAR (acute kidney injury) ([address]) [N17.9]  Sepsis ([address]) [A41.9]  Septic shock due to urinary tract infection ([address]) [A41.9, R65.21, N39.0]  Urinary tract infection in male [T75.9]  Acute metabolic encephalopathy [S26.73]  Vascular dementia without behavioral disturbance, psychotic disturbance, mood disturbance, or anxiety, unspecified dementia severity ([address]) [F01.50]  Admit Date:  7/16/2023  Precautions/Restrictions/WB Status/ Lines/ Wounds/ Oxygen: Fall risk, Bed/chair alarm, Lines (IV, internal catheter, and femoral line R), Confusion, Telemetry, and Continuous pulse oximetry    Treatment Time:  9:35-10:02  Treatment Number:  1  Timed Code Treatment Minutes: 17 minutes  Total Treatment Minutes:  27  minutes    Patient Goals for Therapy: none stated          Discharge Recommendations: LTC/ECF  DME needs for discharge: Defer to facility         Therapy recommendations for staff:   Assist of 2 for sitting EOB    History of Present Illness: Septic shock present on admission  Secondary to R.pyelonephritis  CT Abdomen and pelvis Infiltration of the fat surrounding the right kidney and portions of the  right ureter with mild dilatation of the right ureter in the pelvis. Some bladder inflammation noted.   Prior urine cultures reviewed, no history of MDRO/ESBL  IV Rocephin  F/u Urine and blood culture     PILAR  Likely due to ATN  Secondary to septic shock and concomitant ARB use  NS bolus given in ED and some while inpatient   Hold further hydration given reduced EF  Repeat BMP in am   Renally dose medications     Thrombocytopenia  Appears chronic, probably MDS  No active bleeding noted  Monitor CBC     Chronic HFrEF   Appears euvolemic   Pro BNP 2113, could be due to renal failure   Hold valsartan and BB     COPD  Not in exacerbation  Symbicort and as needed      CAD  Continue aspirin

## 2023-07-17 NOTE — PROGRESS NOTES
HEART FAILURE CARE PLAN:    Comorbidities Reviewed: Yes   Patient has a past medical history of Atherosclerotic heart disease, CAD (coronary artery disease), Cancer ([address]), CHF (congestive heart failure) ([address]), COPD (chronic obstructive pulmonary disease) ([address]), Dementia ([address]), Depression, Duodenal ulcer without hemorrhage or perforation, Falls frequently, GERD without esophagitis, Hernia, Hyperlipidemia, Hypertension, Hypokalemia, Lack of coordination, Melanoma ([address]), MI (myocardial infarction) ([address]), Muscle weakness (generalized), Protein calorie malnutrition ([address]), Reflux, Sepsis ([address]), Spinal stenosis, and UTI (urinary tract infection). ECHOCARDIOGRAM Reviewed: Yes   Patient's Ejection Fraction (EF) is less than 40%    Weights Reviewed: Yes   Admission weight: 175 lb (79.4 kg)   Wt Readings from Last 3 Encounters:   07/17/23 171 lb 11.8 oz (77.9 kg)   03/16/23 175 lb (79.4 kg)   03/16/23 175 lb (79.4 kg)     Intake & Output Reviewed: Yes     Intake/Output Summary (Last 24 hours) at 7/17/2023 1455  Last data filed at 7/17/2023 1236  Gross per 24 hour   Intake 1250.55 ml   Output 1995 ml   Net -744.45 ml     Medications Reviewed: Yes   SCHEDULED HOSPITAL MEDICATIONS:   sertraline  100 mg Oral Daily    budesonide-formoterol  2 puff Inhalation BID    albuterol sulfate HFA  2 puff Inhalation BID    mupirocin   Each Nostril BID    enoxaparin  40 mg SubCUTAneous Daily    pregabalin  50 mg Oral TID    sodium chloride flush  5-40 mL IntraVENous 2 times per day    aspirin EC  81 mg Oral Daily    pantoprazole  40 mg Oral QAM AC    sodium chloride flush  5-40 mL IntraVENous 2 times per day    cefTRIAXone (ROCEPHIN) IV  2,000 mg IntraVENous Q24H     ACE/ARB/ARNI is REQUIRED for EF </= 87% SYSTOLIC FAILURE:   ACE[de-identified] None  ARB[de-identified] None  ARNI[de-identified] None    Evidenced-Based Beta Blocker is REQUIRED for EF </= 76% SYSTOLIC FAILURE:   [de-identified] None    Diuretics:  [de-identified] None    Diet Reviewed: Yes   ADULT DIET;  Dysphagia - Soft and Bite

## 2023-07-17 NOTE — PROGRESS NOTES
AM assessment completed, see flow sheet. Pt is alert and oriented to self only, with Hx Dementia. Vital signs are WNL, although, pt continues on levophed. Respirations are even & easy on RA. No complaints of pain voiced. Pt denies needs at this time. Pt able to feed self independently after set up. SR up x 2, and bed in low position. Call light is within reach.

## 2023-07-17 NOTE — PROGRESS NOTES
4 Eyes Skin Assessment     NAME:  Zahra Schulz  YOB: 1935  MEDICAL RECORD NUMBER:  [id number]    The patient is being assessed for  Other Daily shift assessment     I agree that at least one RN has performed a thorough Head to Toe Skin Assessment on the patient. ALL assessment sites listed below have been assessed. Areas assessed by both nurses:    Head, Face, Ears, Shoulders, Back, Chest, Arms, Elbows, Hands, Sacrum. Buttock, Coccyx, Ischium, and Legs. Feet and Heels      Pink and blanchable coccyx. Pink/red testicles. Scattered bruising. Does the Patient have a Wound?  No noted wound(s)       Bridger Prevention initiated by RN: Yes  Wound Care Orders initiated by RN: No    Pressure Injury (Stage 3,4, Unstageable, DTI, NWPT, and Complex wounds) if present, place Wound referral order by RN under : No    New Ostomies, if present place, Ostomy referral order under : No     Nurse 1 eSignature: Electronically signed by Angela Trevino RN on 7/17/23 at 6:56 PM EDT    **SHARE this note so that the co-signing nurse can place an eSignature**    Nurse 2 eSignature: Electronically signed by Latanya Hills RN on 7/17/23 at 7:03 PM EDT

## 2023-07-17 NOTE — PROGRESS NOTES
Speech Language Pathology  Clinical Bedside Swallow Assessment  Facility/Department: [facility] ICU    Instrumentation: Not clinically indicated at this time  Diet recommendation: IDDSI 6 Soft and Bite Sized Solids; IDDSI 0 Thin Liquids; Meds whole with thin liquids  Risk management: upright for all intake, small bites/sips, assist feed, oral care 2-3x/day to reduce adverse affects in the event of aspiration, alternate bites/sips, slow rate of intake, general GERD precautions, general aspiration precautions, and hold PO and contact SLP if s/s of aspiration or worsening respiratory status develop. Roman Mt  : 1935 (82 y.o.)   MRN: [id number]  ROOM: [facility]-  ADMISSION DATE: 2023  PATIENT DIAGNOSIS(ES): PILAR (acute kidney injury) ([address]) [N17.9]  Sepsis ([address]) [A41.9]  Septic shock due to urinary tract infection ([address]) [A41.9, R65.21, N39.0]  Urinary tract infection in male [V36.5]  Acute metabolic encephalopathy [M95.67]  Vascular dementia without behavioral disturbance, psychotic disturbance, mood disturbance, or anxiety, unspecified dementia severity ([address]) [F01.50]  Chief Complaint   Patient presents with    Shortness of Breath     Pt having SOB(88% RA) and hypotension (60/30 with EMS). Pt also having fever and AMS.       Patient Active Problem List    Diagnosis Date Noted    HCAP (healthcare-associated pneumonia) 03/15/2023    Bronchiectasis without complication ([address])     Dysphagia 2023    Community acquired pneumonia 2023    Acute respiratory failure with hypoxia ([address]) 2023    Acute respiratory failure with hypoxemia ([address]) 2023    NSTEMI (non-ST elevated myocardial infarction) ([address]) 2022    Cardiomyopathy ([address]) 2022    Lactic acidosis 2022    Demand ischemia ([address]) 2022    Hypoxia 2022    Thrombocytopenia ([address]) 2022    Acute hypoxemic respiratory failure ([address])     Acute metabolic encephalopathy     Urinary

## 2023-07-17 NOTE — H&P
V2.0  History and Physical      Name:  Luis Alfredo Alvarez /Age/Sex: 1935  (82 y.o. male)   MRN & CSN:  [id number] & [id number] Encounter Date/Time: 2023 9:08 PM EDT   Location:  [facility]- PCP: Inocencia Wilkerson MD       Assessment and Plan:   Luis Alfredo Alvarez is a 82 y.o. male with past medical history of CAD, HFrEF, COPD, dementia, hypertension, hyperlipidemia, depression/anxiety who presents with Sepsis ([address]) as a transfer from [address] [place] [address]. Hospital Problems             Last Modified POA    * (Principal) Sepsis ([address]) 2023 Yes     Septic shock present on admission  Secondary to R.pyelonephritis  CT Abdomen and pelvis Infiltration of the fat surrounding the right kidney and portions of the  right ureter with mild dilatation of the right ureter in the pelvis. Some bladder inflammation noted. Prior urine cultures reviewed, no history of MDRO/ESBL  IV Rocephin  F/u Urine and blood culture    PILAR  Likely due to ATN  Secondary to septic shock and concomitant ARB use  NS bolus given in ED and some while inpatient   Hold further hydration given reduced EF  Repeat BMP in am   Renally dose medications    Thrombocytopenia  Appears chronic, probably MDS  No active bleeding noted  Monitor CBC    Chronic HFrEF   Appears euvolemic   Pro BNP , could be due to renal failure   Hold valsartan and BB    COPD  Not in exacerbation  Symbicort and as needed     CAD  Continue aspirin    Dementia  AAO x1 at baseline per reports  Delirium precautions    Depression/anxiety  Zoloft    ICU  Full code    Disposition:   Current Living situation: NH  Expected Disposition: NH  Estimated D/C: 3-4 days    Diet ADULT DIET;  Regular   DVT Prophylaxis [] Lovenox, [x]  Heparin, [] SCDs, [] Ambulation,  [] Eliquis, [] Xarelto, [] Coumadin   Code Status Full Code   Surrogate Decision Maker/ POA Legal guardian Nga Aylin     Personally reviewed Lab Studies and Imaging     History from:     patient, Quality of history:  poor

## 2023-07-17 NOTE — CONSULTS
Pulmonary & Critical Care Consultation     Patient is being seen at the request of Dr. Norma Ayala for a consultation for sepsis    HISTORY OF PRESENT ILLNESS: 83 yo with a history of CAD, CHF, COPD and dementia who presented to the emergency department on 7/16/2023 with fever, hypotension and altered mental status. PAST MEDICAL HISTORY:  Past Medical History:   Diagnosis Date    Atherosclerotic heart disease     CAD (coronary artery disease)     Cancer (HCC)     skin    CHF (congestive heart failure) (HCC)     COPD (chronic obstructive pulmonary disease) ([address])     Dementia (HCC)     Depression     Duodenal ulcer without hemorrhage or perforation     Falls frequently     GERD without esophagitis     Hernia     Hyperlipidemia     Hypertension     Hypokalemia     Lack of coordination     falls    Melanoma ([address])     MI (myocardial infarction) ([address])     Muscle weakness (generalized)     Protein calorie malnutrition ([address])     Reflux     Sepsis ([address])     Spinal stenosis     UTI (urinary tract infection)      PAST SURGICAL HISTORY:  Past Surgical History:   Procedure Laterality Date    CHOLECYSTECTOMY      COLONOSCOPY  8/10/12    CORONARY ANGIOPLASTY WITH STENT PLACEMENT      PROSTATE BIOPSY      SKIN BIOPSY      UPPER GASTROINTESTINAL ENDOSCOPY  5/14/12    UPPER GASTROINTESTINAL ENDOSCOPY N/A 6/11/2019    EGD BIOPSY performed by La Arreaga DO at [address] [place] [address] N/A 8/8/2019    EGD performed by Lupe Roche MD at [address] [place] [address] N/A 3/16/2023    EGD BIOPSY performed by Lupe Roche MD at [facility]:  family history is not on file. SOCIAL HISTORY:   reports that he has quit smoking. His smoking use included cigarettes. He has a 2.50 pack-year smoking history.  He has never used smokeless tobacco.    Scheduled Meds:   sertraline  100 mg Oral Daily    pregabalin  75 mg Oral Daily

## 2023-07-18 LAB
ALBUMIN SERPL-MCNC: 2.6 G/DL (ref 3.4–5)
ALBUMIN/GLOB SERPL: 1 {RATIO} (ref 1.1–2.2)
ALP SERPL-CCNC: 72 U/L (ref 40–129)
ALT SERPL-CCNC: 13 U/L (ref 10–40)
ANION GAP SERPL CALCULATED.3IONS-SCNC: 9 MMOL/L (ref 3–16)
AST SERPL-CCNC: 18 U/L (ref 15–37)
BASOPHILS # BLD: 0.1 K/UL (ref 0–0.2)
BASOPHILS NFR BLD: 1 %
BILIRUB SERPL-MCNC: 0.3 MG/DL (ref 0–1)
BUN SERPL-MCNC: 22 MG/DL (ref 7–20)
CALCIUM SERPL-MCNC: 8.1 MG/DL (ref 8.3–10.6)
CHLORIDE SERPL-SCNC: 109 MMOL/L (ref 99–110)
CO2 SERPL-SCNC: 21 MMOL/L (ref 21–32)
CREAT SERPL-MCNC: 1.5 MG/DL (ref 0.8–1.3)
DEPRECATED RDW RBC AUTO: 15.4 % (ref 12.4–15.4)
EOSINOPHIL # BLD: 0.2 K/UL (ref 0–0.6)
EOSINOPHIL NFR BLD: 3 %
GFR SERPLBLD CREATININE-BSD FMLA CKD-EPI: 45 ML/MIN/{1.73_M2}
GLUCOSE SERPL-MCNC: 106 MG/DL (ref 70–99)
HCT VFR BLD AUTO: 28.9 % (ref 40.5–52.5)
HGB BLD-MCNC: 9.9 G/DL (ref 13.5–17.5)
LYMPHOCYTES # BLD: 0.7 K/UL (ref 1–5.1)
LYMPHOCYTES NFR BLD: 12 %
MCH RBC QN AUTO: 30.1 PG (ref 26–34)
MCHC RBC AUTO-ENTMCNC: 34.3 G/DL (ref 31–36)
MCV RBC AUTO: 87.7 FL (ref 80–100)
MONOCYTES # BLD: 0.5 K/UL (ref 0–1.3)
MONOCYTES NFR BLD: 9 %
NEUTROPHILS # BLD: 4.3 K/UL (ref 1.7–7.7)
NEUTROPHILS NFR BLD: 75 %
NEUTS BAND NFR BLD MANUAL: 0 % (ref 0–7)
PLATELET # BLD AUTO: 94 K/UL (ref 135–450)
PLATELET BLD QL SMEAR: ABNORMAL
PMV BLD AUTO: 8.5 FL (ref 5–10.5)
POTASSIUM SERPL-SCNC: 3.8 MMOL/L (ref 3.5–5.1)
PROT SERPL-MCNC: 5.1 G/DL (ref 6.4–8.2)
RBC # BLD AUTO: 3.3 M/UL (ref 4.2–5.9)
SLIDE REVIEW: ABNORMAL
SODIUM SERPL-SCNC: 139 MMOL/L (ref 136–145)
WBC # BLD AUTO: 5.7 K/UL (ref 4–11)

## 2023-07-18 PROCEDURE — 94640 AIRWAY INHALATION TREATMENT: CPT

## 2023-07-18 PROCEDURE — 99233 SBSQ HOSP IP/OBS HIGH 50: CPT | Performed by: INTERNAL MEDICINE

## 2023-07-18 PROCEDURE — 80053 COMPREHEN METABOLIC PANEL: CPT

## 2023-07-18 PROCEDURE — 1200000000 HC SEMI PRIVATE

## 2023-07-18 PROCEDURE — 6370000000 HC RX 637 (ALT 250 FOR IP): Performed by: STUDENT IN AN ORGANIZED HEALTH CARE EDUCATION/TRAINING PROGRAM

## 2023-07-18 PROCEDURE — 2580000003 HC RX 258: Performed by: NURSE PRACTITIONER

## 2023-07-18 PROCEDURE — 6360000002 HC RX W HCPCS: Performed by: INTERNAL MEDICINE

## 2023-07-18 PROCEDURE — 2580000003 HC RX 258: Performed by: STUDENT IN AN ORGANIZED HEALTH CARE EDUCATION/TRAINING PROGRAM

## 2023-07-18 PROCEDURE — 6370000000 HC RX 637 (ALT 250 FOR IP): Performed by: NURSE PRACTITIONER

## 2023-07-18 PROCEDURE — 6370000000 HC RX 637 (ALT 250 FOR IP): Performed by: INTERNAL MEDICINE

## 2023-07-18 PROCEDURE — 85025 COMPLETE CBC W/AUTO DIFF WBC: CPT

## 2023-07-18 PROCEDURE — 6360000002 HC RX W HCPCS: Performed by: STUDENT IN AN ORGANIZED HEALTH CARE EDUCATION/TRAINING PROGRAM

## 2023-07-18 PROCEDURE — 94761 N-INVAS EAR/PLS OXIMETRY MLT: CPT

## 2023-07-18 PROCEDURE — 2580000003 HC RX 258: Performed by: INTERNAL MEDICINE

## 2023-07-18 RX ORDER — POTASSIUM CHLORIDE 29.8 MG/ML
20 INJECTION INTRAVENOUS ONCE
Status: COMPLETED | OUTPATIENT
Start: 2023-07-18 | End: 2023-07-18

## 2023-07-18 RX ADMIN — Medication 10 ML: at 21:04

## 2023-07-18 RX ADMIN — ACETAMINOPHEN 650 MG: 325 TABLET ORAL at 02:33

## 2023-07-18 RX ADMIN — ENOXAPARIN SODIUM 40 MG: 100 INJECTION SUBCUTANEOUS at 07:57

## 2023-07-18 RX ADMIN — Medication 2 PUFF: at 08:01

## 2023-07-18 RX ADMIN — POTASSIUM CHLORIDE 20 MEQ: 400 INJECTION, SOLUTION INTRAVENOUS at 12:15

## 2023-07-18 RX ADMIN — Medication 2 PUFF: at 19:27

## 2023-07-18 RX ADMIN — SODIUM CHLORIDE: 9 INJECTION, SOLUTION INTRAVENOUS at 08:06

## 2023-07-18 RX ADMIN — ASPIRIN 81 MG: 81 TABLET, COATED ORAL at 07:57

## 2023-07-18 RX ADMIN — SERTRALINE 100 MG: 100 TABLET, FILM COATED ORAL at 07:57

## 2023-07-18 RX ADMIN — CEFTRIAXONE SODIUM 2000 MG: 2 INJECTION, POWDER, FOR SOLUTION INTRAMUSCULAR; INTRAVENOUS at 08:09

## 2023-07-18 RX ADMIN — PREGABALIN 50 MG: 25 CAPSULE ORAL at 13:59

## 2023-07-18 RX ADMIN — PANTOPRAZOLE SODIUM 40 MG: 40 TABLET, DELAYED RELEASE ORAL at 07:57

## 2023-07-18 RX ADMIN — ACETAMINOPHEN 650 MG: 325 TABLET ORAL at 16:46

## 2023-07-18 RX ADMIN — PREGABALIN 50 MG: 25 CAPSULE ORAL at 21:04

## 2023-07-18 RX ADMIN — SODIUM CHLORIDE, PRESERVATIVE FREE 10 ML: 5 INJECTION INTRAVENOUS at 21:04

## 2023-07-18 RX ADMIN — Medication 10 ML: at 09:17

## 2023-07-18 RX ADMIN — MUPIROCIN: 20 OINTMENT TOPICAL at 07:58

## 2023-07-18 RX ADMIN — PREGABALIN 50 MG: 25 CAPSULE ORAL at 07:57

## 2023-07-18 RX ADMIN — MUPIROCIN: 20 OINTMENT TOPICAL at 21:04

## 2023-07-18 ASSESSMENT — PAIN SCALES - GENERAL
PAINLEVEL_OUTOF10: 0
PAINLEVEL_OUTOF10: 0

## 2023-07-18 NOTE — PROGRESS NOTES
Central line Dc'd from right groin. Tip remains intact. Pressure held forX5 mins. No bleeding noted @ the site.

## 2023-07-18 NOTE — PROGRESS NOTES
AM assessment done. Pt is alert & oriented to self & place. Pt is w/out evidence of distress @ this time.

## 2023-07-18 NOTE — PROGRESS NOTES
CM-SR, VSS, pt febrile @ 101.0. Tylenol given. UO WNL. Pt is taking PO well. Pt is alert and oriented to person only. He remains w/out evidence of distress @ this time.

## 2023-07-18 NOTE — PROGRESS NOTES
Spoke with Dr. Jessie Kohler re: increased temp. Order received to obtain blood cultures if temp gets > 101. 1.

## 2023-07-18 NOTE — PROGRESS NOTES
4 Eyes Skin Assessment     NAME:  Jeane Mejia  YOB: 1935  MEDICAL RECORD NUMBER:  [id number]    The patient is being assessed for  Shift Handoff    I agree that at least one RN has performed a thorough Head to Toe Skin Assessment on the patient. ALL assessment sites listed below have been assessed. Areas assessed by both nurses:    Head, Face, Ears, Shoulders, Back, Chest, Arms, Elbows, Hands, Sacrum. Buttock, Coccyx, Ischium, Legs. Feet and Heels, and Under Medical Devices   Hells red but blanchable. Does the Patient have a Wound?  No noted wound(s)       Bridger Prevention initiated by RN: Yes  Wound Care Orders initiated by RN: No    Pressure Injury (Stage 3,4, Unstageable, DTI, NWPT, and Complex wounds) if present, place Wound referral order by RN under : No    New Ostomies, if present place, Ostomy referral order under : No     Nurse 1 eSignature: Electronically signed by Vadim Sullivan RN on 7/18/23 at 6:33 AM EDT    **SHARE this note so that the co-signing nurse can place an eSignature**    Nurse 2 eSignature: Electronically signed by Chandrika Palmer RN on 7/18/23 at 6:34 AM EDT

## 2023-07-19 ENCOUNTER — APPOINTMENT (OUTPATIENT)
Dept: INTERVENTIONAL RADIOLOGY/VASCULAR | Age: 88
DRG: 871 | End: 2023-07-19
Payer: MEDICARE

## 2023-07-19 VITALS
HEIGHT: 72 IN | SYSTOLIC BLOOD PRESSURE: 130 MMHG | HEART RATE: 58 BPM | RESPIRATION RATE: 18 BRPM | DIASTOLIC BLOOD PRESSURE: 54 MMHG | OXYGEN SATURATION: 96 % | WEIGHT: 178.79 LBS | TEMPERATURE: 98.2 F | BODY MASS INDEX: 24.22 KG/M2

## 2023-07-19 LAB
ALBUMIN SERPL-MCNC: 2.8 G/DL (ref 3.4–5)
ALBUMIN/GLOB SERPL: 1 {RATIO} (ref 1.1–2.2)
ALP SERPL-CCNC: 83 U/L (ref 40–129)
ALT SERPL-CCNC: 13 U/L (ref 10–40)
ANION GAP SERPL CALCULATED.3IONS-SCNC: 11 MMOL/L (ref 3–16)
AST SERPL-CCNC: 14 U/L (ref 15–37)
BASOPHILS # BLD: 0 K/UL (ref 0–0.2)
BASOPHILS NFR BLD: 0.4 %
BILIRUB SERPL-MCNC: <0.2 MG/DL (ref 0–1)
BUN SERPL-MCNC: 17 MG/DL (ref 7–20)
CALCIUM SERPL-MCNC: 8.6 MG/DL (ref 8.3–10.6)
CHLORIDE SERPL-SCNC: 110 MMOL/L (ref 99–110)
CO2 SERPL-SCNC: 23 MMOL/L (ref 21–32)
CREAT SERPL-MCNC: 1.4 MG/DL (ref 0.8–1.3)
DEPRECATED RDW RBC AUTO: 15.5 % (ref 12.4–15.4)
EOSINOPHIL # BLD: 0.4 K/UL (ref 0–0.6)
EOSINOPHIL NFR BLD: 8.3 %
GFR SERPLBLD CREATININE-BSD FMLA CKD-EPI: 49 ML/MIN/{1.73_M2}
GLUCOSE SERPL-MCNC: 89 MG/DL (ref 70–99)
HCT VFR BLD AUTO: 32.8 % (ref 40.5–52.5)
HGB BLD-MCNC: 11.1 G/DL (ref 13.5–17.5)
LYMPHOCYTES # BLD: 0.8 K/UL (ref 1–5.1)
LYMPHOCYTES NFR BLD: 14.6 %
MCH RBC QN AUTO: 29.8 PG (ref 26–34)
MCHC RBC AUTO-ENTMCNC: 33.7 G/DL (ref 31–36)
MCV RBC AUTO: 88.3 FL (ref 80–100)
MONOCYTES # BLD: 0.8 K/UL (ref 0–1.3)
MONOCYTES NFR BLD: 14.8 %
NEUTROPHILS # BLD: 3.3 K/UL (ref 1.7–7.7)
NEUTROPHILS NFR BLD: 61.9 %
PLATELET # BLD AUTO: 95 K/UL (ref 135–450)
PMV BLD AUTO: 8.1 FL (ref 5–10.5)
POTASSIUM SERPL-SCNC: 3.9 MMOL/L (ref 3.5–5.1)
PROT SERPL-MCNC: 5.6 G/DL (ref 6.4–8.2)
RBC # BLD AUTO: 3.71 M/UL (ref 4.2–5.9)
SODIUM SERPL-SCNC: 144 MMOL/L (ref 136–145)
WBC # BLD AUTO: 5.4 K/UL (ref 4–11)

## 2023-07-19 PROCEDURE — 6360000002 HC RX W HCPCS: Performed by: INTERNAL MEDICINE

## 2023-07-19 PROCEDURE — 94761 N-INVAS EAR/PLS OXIMETRY MLT: CPT

## 2023-07-19 PROCEDURE — C1751 CATH, INF, PER/CENT/MIDLINE: HCPCS

## 2023-07-19 PROCEDURE — 6370000000 HC RX 637 (ALT 250 FOR IP): Performed by: STUDENT IN AN ORGANIZED HEALTH CARE EDUCATION/TRAINING PROGRAM

## 2023-07-19 PROCEDURE — 2580000003 HC RX 258: Performed by: INTERNAL MEDICINE

## 2023-07-19 PROCEDURE — 80053 COMPREHEN METABOLIC PANEL: CPT

## 2023-07-19 PROCEDURE — 36410 VNPNXR 3YR/> PHY/QHP DX/THER: CPT

## 2023-07-19 PROCEDURE — 6370000000 HC RX 637 (ALT 250 FOR IP): Performed by: NURSE PRACTITIONER

## 2023-07-19 PROCEDURE — 05HB33Z INSERTION OF INFUSION DEVICE INTO RIGHT BASILIC VEIN, PERCUTANEOUS APPROACH: ICD-10-PCS | Performed by: INTERNAL MEDICINE

## 2023-07-19 PROCEDURE — 85025 COMPLETE CBC W/AUTO DIFF WBC: CPT

## 2023-07-19 PROCEDURE — 2500000003 HC RX 250 WO HCPCS: Performed by: INTERNAL MEDICINE

## 2023-07-19 PROCEDURE — 99232 SBSQ HOSP IP/OBS MODERATE 35: CPT | Performed by: INTERNAL MEDICINE

## 2023-07-19 PROCEDURE — 2580000003 HC RX 258: Performed by: NURSE PRACTITIONER

## 2023-07-19 PROCEDURE — 6370000000 HC RX 637 (ALT 250 FOR IP): Performed by: INTERNAL MEDICINE

## 2023-07-19 PROCEDURE — 76937 US GUIDE VASCULAR ACCESS: CPT

## 2023-07-19 PROCEDURE — 94640 AIRWAY INHALATION TREATMENT: CPT

## 2023-07-19 RX ORDER — SODIUM CHLORIDE 0.9 % (FLUSH) 0.9 %
5-40 SYRINGE (ML) INJECTION EVERY 12 HOURS SCHEDULED
Status: DISCONTINUED | OUTPATIENT
Start: 2023-07-19 | End: 2023-07-19 | Stop reason: HOSPADM

## 2023-07-19 RX ORDER — DEXTROSE, SODIUM CHLORIDE, AND POTASSIUM CHLORIDE 5; .45; .15 G/100ML; G/100ML; G/100ML
INJECTION INTRAVENOUS ONCE
Status: COMPLETED | OUTPATIENT
Start: 2023-07-19 | End: 2023-07-19

## 2023-07-19 RX ORDER — LIDOCAINE HYDROCHLORIDE 10 MG/ML
5 INJECTION, SOLUTION INFILTRATION; PERINEURAL ONCE
Status: COMPLETED | OUTPATIENT
Start: 2023-07-19 | End: 2023-07-19

## 2023-07-19 RX ORDER — SODIUM CHLORIDE 0.9 % (FLUSH) 0.9 %
5-40 SYRINGE (ML) INJECTION PRN
Status: DISCONTINUED | OUTPATIENT
Start: 2023-07-19 | End: 2023-07-19 | Stop reason: HOSPADM

## 2023-07-19 RX ORDER — SODIUM CHLORIDE 9 MG/ML
25 INJECTION, SOLUTION INTRAVENOUS PRN
Status: DISCONTINUED | OUTPATIENT
Start: 2023-07-19 | End: 2023-07-19 | Stop reason: SDUPTHER

## 2023-07-19 RX ADMIN — POTASSIUM CHLORIDE, DEXTROSE MONOHYDRATE AND SODIUM CHLORIDE: 150; 5; 450 INJECTION, SOLUTION INTRAVENOUS at 10:05

## 2023-07-19 RX ADMIN — SERTRALINE 100 MG: 100 TABLET, FILM COATED ORAL at 09:56

## 2023-07-19 RX ADMIN — PREGABALIN 50 MG: 25 CAPSULE ORAL at 14:35

## 2023-07-19 RX ADMIN — ASPIRIN 81 MG: 81 TABLET, COATED ORAL at 09:55

## 2023-07-19 RX ADMIN — Medication 2 PUFF: at 07:49

## 2023-07-19 RX ADMIN — PANTOPRAZOLE SODIUM 40 MG: 40 TABLET, DELAYED RELEASE ORAL at 09:56

## 2023-07-19 RX ADMIN — Medication 2 PUFF: at 07:48

## 2023-07-19 RX ADMIN — MUPIROCIN: 20 OINTMENT TOPICAL at 09:55

## 2023-07-19 RX ADMIN — PREGABALIN 50 MG: 25 CAPSULE ORAL at 09:55

## 2023-07-19 RX ADMIN — SODIUM CHLORIDE, PRESERVATIVE FREE 10 ML: 5 INJECTION INTRAVENOUS at 10:06

## 2023-07-19 RX ADMIN — MEROPENEM 1000 MG: 1 INJECTION, POWDER, FOR SOLUTION INTRAVENOUS at 10:01

## 2023-07-19 RX ADMIN — LIDOCAINE HYDROCHLORIDE 5 ML: 10 INJECTION, SOLUTION INFILTRATION; PERINEURAL at 14:30

## 2023-07-19 RX ADMIN — Medication 10 ML: at 09:56

## 2023-07-19 RX ADMIN — ENOXAPARIN SODIUM 40 MG: 100 INJECTION SUBCUTANEOUS at 09:55

## 2023-07-19 NOTE — CARE COORDINATION
DISCHARGE ORDER  Date/Time 2023 1:03 PM  Completed by: Davi Garcia RN, Case Management    Patient Name: Dione Meza    : 1935      Admit order Date and Status: IP 2023  Noted discharge order. (verify MD's last order for status of admission/Traditional Medicare 3 [place] Inpatient qualifying stay required for SNF)    Confirmed discharge plan with:              Patient:  patient (dementia)  Notificatio call to pt's APRIL Parker Estrada went to  brief VM left requesting call back to . Discharge to Facility:   Name: [facility]  Address: [address] [place] [address], [address]  Phone: [phone]  Fax:  [phone]    Facility phone number for staff giving report: see above   Pre-certification completed: [address] [facility] [address] Notification (HENS) completed: NA   Discharge orders and Continuity of Care faxed to facility:  [facility]      Transportation:               Medical Transport explained with choice list offered to pt/family. Choice: (no preference)  Agency used: [address] time:   18:00 PM      Pt/family/Nursing/Facility aware of  time:   Patient  Call placed to [address] (legal Amy Rolling- no answer. CM left  requesting call back to  at [phone]  Ambulance form completed:  YES     Date Last IMM Given: physically pt unable to sign. CM attempted to reach guardian but call went to . Comments:    Pt is being d/c'd to [facility] today. Pt's O2 sats are 95% on RA. Discharge timeout done with [facility]. All discharge needs and concerns addressed. Discharging nurse to complete LIANE, reconcile AVS, and place final copy with patient's discharge packet. Discharging RN to ensure that written prescriptions for  Level II medications are sent with patient to the facility as per protocol.

## 2023-07-19 NOTE — PROGRESS NOTES
Patient awake alert, VSS, remains confused, follows commands, set up with dinner tray, patient able to feed himself presently. Repositioned up in bed.

## 2023-07-19 NOTE — PLAN OF CARE
Problem: Discharge Planning  Goal: Discharge to home or other facility with appropriate resources  Outcome: Progressing     Problem: Safety - Adult  Goal: Free from fall injury  Outcome: Progressing     Problem: Neurosensory - Adult  Goal: Achieves stable or improved neurological status  Outcome: Progressing     Problem: Genitourinary - Adult  Goal: Urinary catheter remains patent  Outcome: Progressing     Problem: Metabolic/Fluid and Electrolytes - Adult  Goal: Hemodynamic stability and optimal renal function maintained  Outcome: Progressing
Problem: SLP Adult - Impaired Swallowing  Goal: By Discharge: Advance to least restrictive diet without signs or symptoms of aspiration for planned discharge setting. See evaluation for individualized goals. Note: SLP completed evaluation. Please refer to notes in EMR.     Neftali Flynn M.A., [address] [facility] [id number]  Speech-Language Pathologist  Phone: Shaziazmoo- [phone], Desk- [phone]  Hours: [phone]
Pt admitted from [facility] via stretcher, transferred to bed without difficulty, no belongings brought with pt. Pt A&O to self only, passed nurse bedside eval. Pt SpO2 95% on 2L NC, denies cough and SOB. Right femoral TLC patent and infusing Levo at 10 mcg/min. Peripheral IV's patent and NSL. Corrales patent with yellow cloudy urine. Reviewed with pt plan of care for shift and medications, all questions answered, pt voices no concerns.     Problem: Discharge Planning  Goal: Discharge to home or other facility with appropriate resources  Outcome: Progressing  Flowsheets (Taken 7/16/2023 2004)  Discharge to home or other facility with appropriate resources:   Identify barriers to discharge with patient and caregiver   Arrange for needed discharge resources and transportation as appropriate     Problem: Safety - Adult  Goal: Free from fall injury  Outcome: Progressing     Problem: Neurosensory - Adult  Goal: Achieves stable or improved neurological status  Outcome: Progressing     Problem: Genitourinary - Adult  Goal: Absence of urinary retention  Outcome: Progressing  Goal: Urinary catheter remains patent  Outcome: Progressing     Problem: Metabolic/Fluid and Electrolytes - Adult  Goal: Hemodynamic stability and optimal renal function maintained  Outcome: Progressing
determine need for appliance change or resting period.   Outcome: Progressing     Problem: Chronic Conditions and Co-morbidities  Goal: Patient's chronic conditions and co-morbidity symptoms are monitored and maintained or improved  Outcome: Progressing

## 2023-07-19 NOTE — PROGRESS NOTES
4 Eyes Skin Assessment     NAME:  Jens Nguyen  YOB: 1935  MEDICAL RECORD NUMBER:  [id number]    The patient is being assessed for  Other kathleen scale    I agree that at least one RN has performed a thorough Head to Toe Skin Assessment on the patient. ALL assessment sites listed below have been assessed. Areas assessed by both nurses:    Head, Face, Ears, Shoulders, Back, Chest, Arms, Elbows, Hands, Sacrum. Buttock, Coccyx, Ischium, Legs. Feet and Heels, and Under Medical Devices         Does the Patient have a Wound?  No noted wound(s) Excoriation on sacral area       Kathleen Prevention initiated by RN: Yes  Wound Care Orders initiated by RN: No    Pressure Injury (Stage 3,4, Unstageable, DTI, NWPT, and Complex wounds) if present, place Wound referral order by RN under : No    New Ostomies, if present place, Ostomy referral order under : No     Nurse 1 eSignature: Electronically signed by Alice Chamorro RN on 7/19/23 at 1:40 AM EDT    **SHARE this note so that the co-signing nurse can place an eSignature**    Nurse 2 eSignature: Electronically signed by Nickie Chaidez RN on 7/19/23 at 6:19 AM EDT

## 2023-07-19 NOTE — PROGRESS NOTES
Bedside report and Pt care transferred to [facility]. Pt denies any assistance at this time.

## 2023-07-19 NOTE — PROGRESS NOTES
Patient picked up by squad for transport to [facility], patient incontinent of urine prior, diaper changed, skin cleansed with baby wipes, loaded on stretcher for transport, report then called to staff at [address] [place] [address], Melly Cardoso). Patient discharged.

## 2023-07-19 NOTE — PROGRESS NOTES
Reassessment completed, see flow sheets. No major changes.      Electronically signed by Bhumi Hager RN on 7/18/2023 at 11:55 PM

## 2023-07-19 NOTE — PROGRESS NOTES
Shift assessment completed, see flow sheets. VSS. Pt a/o to self. Pleasantly confused. Pt has no c/o pain or discomfort at this time. No needs expressed now. Corrales catheter remains in place, patent and draining dark yellow/ana laura malodorous urine with sediment. Corrales care provided. Rt fem site unremarkable, dx is clean, dry and intact. Bed in lowest position, brakes locked and alarm on. Pt unable to use call light. Door and curtain open to visualize pt and fx rounding. Monitoring closely.      Electronically signed by Sd Jurado RN on 7/18/2023 at 10:38 PM

## 2023-07-20 LAB
BACTERIA UR CULT: ABNORMAL
ORGANISM: ABNORMAL

## 2023-07-21 LAB
BACTERIA BLD CULT ORG #2: NORMAL
BACTERIA BLD CULT: NORMAL

## 2023-10-16 ENCOUNTER — APPOINTMENT (OUTPATIENT)
Dept: CT IMAGING | Age: 88
DRG: 689 | End: 2023-10-16
Payer: MEDICARE

## 2023-10-16 ENCOUNTER — APPOINTMENT (OUTPATIENT)
Dept: GENERAL RADIOLOGY | Age: 88
DRG: 689 | End: 2023-10-16
Payer: MEDICARE

## 2023-10-16 ENCOUNTER — HOSPITAL ENCOUNTER (INPATIENT)
Age: 88
LOS: 2 days | Discharge: NURSING, REHAB OR LONG-TERM CARE | DRG: 689 | End: 2023-10-18
Attending: EMERGENCY MEDICINE | Admitting: INTERNAL MEDICINE
Payer: MEDICARE

## 2023-10-16 DIAGNOSIS — R41.82 ALTERED MENTAL STATUS, UNSPECIFIED ALTERED MENTAL STATUS TYPE: Primary | ICD-10-CM

## 2023-10-16 DIAGNOSIS — N30.00 ACUTE CYSTITIS WITHOUT HEMATURIA: ICD-10-CM

## 2023-10-16 DIAGNOSIS — G61.82 MULTIFOCAL MOTOR NEUROPATHY (HCC): ICD-10-CM

## 2023-10-16 PROBLEM — N39.0 UTI (URINARY TRACT INFECTION): Status: ACTIVE | Noted: 2023-10-16

## 2023-10-16 PROBLEM — I50.42 CHRONIC COMBINED SYSTOLIC AND DIASTOLIC CONGESTIVE HEART FAILURE (HCC): Status: ACTIVE | Noted: 2023-10-16

## 2023-10-16 LAB
ALBUMIN SERPL-MCNC: 3.5 G/DL (ref 3.4–5)
ALBUMIN/GLOB SERPL: 1.1 {RATIO} (ref 1.1–2.2)
ALP SERPL-CCNC: 136 U/L (ref 40–129)
ALT SERPL-CCNC: 20 U/L (ref 10–40)
ANION GAP SERPL CALCULATED.3IONS-SCNC: 8 MMOL/L (ref 3–16)
AST SERPL-CCNC: 27 U/L (ref 15–37)
BACTERIA URNS QL MICRO: ABNORMAL /HPF
BASOPHILS # BLD: 0 K/UL (ref 0–0.2)
BASOPHILS NFR BLD: 0.1 %
BILIRUB SERPL-MCNC: 0.5 MG/DL (ref 0–1)
BILIRUB UR QL STRIP.AUTO: NEGATIVE
BUN SERPL-MCNC: 29 MG/DL (ref 7–20)
CALCIUM SERPL-MCNC: 8.8 MG/DL (ref 8.3–10.6)
CHLORIDE SERPL-SCNC: 107 MMOL/L (ref 99–110)
CLARITY UR: ABNORMAL
CO2 SERPL-SCNC: 23 MMOL/L (ref 21–32)
COLOR UR: YELLOW
CREAT SERPL-MCNC: 1.7 MG/DL (ref 0.8–1.3)
DEPRECATED RDW RBC AUTO: 15.5 % (ref 12.4–15.4)
EKG ATRIAL RATE: 67 BPM
EKG DIAGNOSIS: NORMAL
EKG Q-T INTERVAL: 366 MS
EKG QRS DURATION: 78 MS
EKG QTC CALCULATION (BAZETT): 440 MS
EKG R AXIS: -20 DEGREES
EKG T AXIS: 107 DEGREES
EKG VENTRICULAR RATE: 87 BPM
EOSINOPHIL # BLD: 1.1 K/UL (ref 0–0.6)
EOSINOPHIL NFR BLD: 9.6 %
GFR SERPLBLD CREATININE-BSD FMLA CKD-EPI: 39 ML/MIN/{1.73_M2}
GLUCOSE SERPL-MCNC: 96 MG/DL (ref 70–99)
GLUCOSE UR STRIP.AUTO-MCNC: NEGATIVE MG/DL
HCT VFR BLD AUTO: 35.9 % (ref 40.5–52.5)
HGB BLD-MCNC: 11.8 G/DL (ref 13.5–17.5)
HGB UR QL STRIP.AUTO: ABNORMAL
KETONES UR STRIP.AUTO-MCNC: NEGATIVE MG/DL
LACTATE BLDV-SCNC: 0.9 MMOL/L (ref 0.4–1.9)
LEUKOCYTE ESTERASE UR QL STRIP.AUTO: ABNORMAL
LYMPHOCYTES # BLD: 1.1 K/UL (ref 1–5.1)
LYMPHOCYTES NFR BLD: 10.2 %
MAGNESIUM SERPL-MCNC: 2 MG/DL (ref 1.8–2.4)
MCH RBC QN AUTO: 29.8 PG (ref 26–34)
MCHC RBC AUTO-ENTMCNC: 32.9 G/DL (ref 31–36)
MCV RBC AUTO: 90.7 FL (ref 80–100)
MONOCYTES # BLD: 0.7 K/UL (ref 0–1.3)
MONOCYTES NFR BLD: 6.4 %
NEUTROPHILS # BLD: 8.2 K/UL (ref 1.7–7.7)
NEUTROPHILS NFR BLD: 73.7 %
NITRITE UR QL STRIP.AUTO: POSITIVE
PH UR STRIP.AUTO: 6 [PH] (ref 5–8)
PLATELET # BLD AUTO: 162 K/UL (ref 135–450)
PMV BLD AUTO: 7.4 FL (ref 5–10.5)
POTASSIUM SERPL-SCNC: 5.2 MMOL/L (ref 3.5–5.1)
PROCALCITONIN SERPL IA-MCNC: 0.59 NG/ML (ref 0–0.15)
PROT SERPL-MCNC: 6.7 G/DL (ref 6.4–8.2)
PROT UR STRIP.AUTO-MCNC: 30 MG/DL
RBC # BLD AUTO: 3.96 M/UL (ref 4.2–5.9)
RBC #/AREA URNS HPF: ABNORMAL /HPF (ref 0–4)
RENAL EPI CELLS #/AREA UR COMP ASSIST: ABNORMAL /HPF (ref 0–1)
SODIUM SERPL-SCNC: 138 MMOL/L (ref 136–145)
SP GR UR STRIP.AUTO: 1.02 (ref 1–1.03)
TROPONIN, HIGH SENSITIVITY: 35 NG/L (ref 0–22)
TROPONIN, HIGH SENSITIVITY: 35 NG/L (ref 0–22)
TROPONIN, HIGH SENSITIVITY: 37 NG/L (ref 0–22)
UA COMPLETE W REFLEX CULTURE PNL UR: YES
UA DIPSTICK W REFLEX MICRO PNL UR: YES
URN SPEC COLLECT METH UR: ABNORMAL
UROBILINOGEN UR STRIP-ACNC: 0.2 E.U./DL
WBC # BLD AUTO: 11.1 K/UL (ref 4–11)
WBC #/AREA URNS HPF: >100 /HPF (ref 0–5)

## 2023-10-16 PROCEDURE — 6360000002 HC RX W HCPCS: Performed by: PHYSICIAN ASSISTANT

## 2023-10-16 PROCEDURE — 2580000003 HC RX 258

## 2023-10-16 PROCEDURE — 71045 X-RAY EXAM CHEST 1 VIEW: CPT

## 2023-10-16 PROCEDURE — 36415 COLL VENOUS BLD VENIPUNCTURE: CPT

## 2023-10-16 PROCEDURE — 85025 COMPLETE CBC W/AUTO DIFF WBC: CPT

## 2023-10-16 PROCEDURE — 99285 EMERGENCY DEPT VISIT HI MDM: CPT

## 2023-10-16 PROCEDURE — 87086 URINE CULTURE/COLONY COUNT: CPT

## 2023-10-16 PROCEDURE — 80053 COMPREHEN METABOLIC PANEL: CPT

## 2023-10-16 PROCEDURE — 83735 ASSAY OF MAGNESIUM: CPT

## 2023-10-16 PROCEDURE — 6360000002 HC RX W HCPCS

## 2023-10-16 PROCEDURE — 93005 ELECTROCARDIOGRAM TRACING: CPT | Performed by: PHYSICIAN ASSISTANT

## 2023-10-16 PROCEDURE — 93010 ELECTROCARDIOGRAM REPORT: CPT | Performed by: INTERNAL MEDICINE

## 2023-10-16 PROCEDURE — 81001 URINALYSIS AUTO W/SCOPE: CPT

## 2023-10-16 PROCEDURE — 2580000003 HC RX 258: Performed by: PHYSICIAN ASSISTANT

## 2023-10-16 PROCEDURE — 70450 CT HEAD/BRAIN W/O DYE: CPT

## 2023-10-16 PROCEDURE — 6370000000 HC RX 637 (ALT 250 FOR IP)

## 2023-10-16 PROCEDURE — 84484 ASSAY OF TROPONIN QUANT: CPT

## 2023-10-16 PROCEDURE — 87186 SC STD MICRODIL/AGAR DIL: CPT

## 2023-10-16 PROCEDURE — 99223 1ST HOSP IP/OBS HIGH 75: CPT

## 2023-10-16 PROCEDURE — 83605 ASSAY OF LACTIC ACID: CPT

## 2023-10-16 PROCEDURE — 87088 URINE BACTERIA CULTURE: CPT

## 2023-10-16 PROCEDURE — 84145 PROCALCITONIN (PCT): CPT

## 2023-10-16 PROCEDURE — 1200000000 HC SEMI PRIVATE

## 2023-10-16 RX ORDER — ONDANSETRON 4 MG/1
4 TABLET, ORALLY DISINTEGRATING ORAL EVERY 8 HOURS PRN
Status: DISCONTINUED | OUTPATIENT
Start: 2023-10-16 | End: 2023-10-18 | Stop reason: HOSPADM

## 2023-10-16 RX ORDER — HYDROXYZINE HYDROCHLORIDE 10 MG/1
10 TABLET, FILM COATED ORAL EVERY 6 HOURS PRN
COMMUNITY

## 2023-10-16 RX ORDER — POLYETHYLENE GLYCOL 3350 17 G/17G
17 POWDER, FOR SOLUTION ORAL DAILY PRN
Status: DISCONTINUED | OUTPATIENT
Start: 2023-10-16 | End: 2023-10-18 | Stop reason: HOSPADM

## 2023-10-16 RX ORDER — ENOXAPARIN SODIUM 100 MG/ML
40 INJECTION SUBCUTANEOUS DAILY
Status: DISCONTINUED | OUTPATIENT
Start: 2023-10-16 | End: 2023-10-18 | Stop reason: HOSPADM

## 2023-10-16 RX ORDER — LANOLIN ALCOHOL/MO/W.PET/CERES
3 CREAM (GRAM) TOPICAL NIGHTLY
Status: DISCONTINUED | OUTPATIENT
Start: 2023-10-16 | End: 2023-10-18 | Stop reason: HOSPADM

## 2023-10-16 RX ORDER — SODIUM CHLORIDE 9 MG/ML
INJECTION, SOLUTION INTRAVENOUS CONTINUOUS
Status: DISCONTINUED | OUTPATIENT
Start: 2023-10-16 | End: 2023-10-18

## 2023-10-16 RX ORDER — CARVEDILOL 6.25 MG/1
6.25 TABLET ORAL 2 TIMES DAILY WITH MEALS
Status: DISCONTINUED | OUTPATIENT
Start: 2023-10-16 | End: 2023-10-18 | Stop reason: HOSPADM

## 2023-10-16 RX ORDER — METHENAMINE HIPPURATE 1000 MG/1
1 TABLET ORAL 2 TIMES DAILY WITH MEALS
COMMUNITY

## 2023-10-16 RX ORDER — SERTRALINE HYDROCHLORIDE 100 MG/1
100 TABLET, FILM COATED ORAL DAILY
Status: DISCONTINUED | OUTPATIENT
Start: 2023-10-17 | End: 2023-10-17

## 2023-10-16 RX ORDER — ACETAMINOPHEN 650 MG/1
650 SUPPOSITORY RECTAL EVERY 6 HOURS PRN
Status: DISCONTINUED | OUTPATIENT
Start: 2023-10-16 | End: 2023-10-18 | Stop reason: HOSPADM

## 2023-10-16 RX ORDER — SODIUM CHLORIDE 0.9 % (FLUSH) 0.9 %
5-40 SYRINGE (ML) INJECTION EVERY 12 HOURS SCHEDULED
Status: DISCONTINUED | OUTPATIENT
Start: 2023-10-16 | End: 2023-10-18 | Stop reason: HOSPADM

## 2023-10-16 RX ORDER — AMPICILLIN TRIHYDRATE 500 MG
450 CAPSULE ORAL DAILY
Status: DISCONTINUED | OUTPATIENT
Start: 2023-10-16 | End: 2023-10-16 | Stop reason: RX

## 2023-10-16 RX ORDER — SODIUM CHLORIDE 0.9 % (FLUSH) 0.9 %
10 SYRINGE (ML) INJECTION PRN
Status: DISCONTINUED | OUTPATIENT
Start: 2023-10-16 | End: 2023-10-18 | Stop reason: HOSPADM

## 2023-10-16 RX ORDER — 0.9 % SODIUM CHLORIDE 0.9 %
500 INTRAVENOUS SOLUTION INTRAVENOUS ONCE
Status: COMPLETED | OUTPATIENT
Start: 2023-10-16 | End: 2023-10-16

## 2023-10-16 RX ORDER — SODIUM CHLORIDE 9 MG/ML
INJECTION, SOLUTION INTRAVENOUS PRN
Status: DISCONTINUED | OUTPATIENT
Start: 2023-10-16 | End: 2023-10-18 | Stop reason: HOSPADM

## 2023-10-16 RX ORDER — ONDANSETRON 2 MG/ML
4 INJECTION INTRAMUSCULAR; INTRAVENOUS EVERY 6 HOURS PRN
Status: DISCONTINUED | OUTPATIENT
Start: 2023-10-16 | End: 2023-10-18 | Stop reason: HOSPADM

## 2023-10-16 RX ORDER — PREGABALIN 25 MG/1
75 CAPSULE ORAL 3 TIMES DAILY
Status: DISCONTINUED | OUTPATIENT
Start: 2023-10-16 | End: 2023-10-17

## 2023-10-16 RX ORDER — POTASSIUM CHLORIDE 20 MEQ/1
40 TABLET, EXTENDED RELEASE ORAL PRN
Status: DISCONTINUED | OUTPATIENT
Start: 2023-10-16 | End: 2023-10-18 | Stop reason: HOSPADM

## 2023-10-16 RX ORDER — BISACODYL 10 MG
10 SUPPOSITORY, RECTAL RECTAL DAILY PRN
Status: DISCONTINUED | OUTPATIENT
Start: 2023-10-16 | End: 2023-10-18 | Stop reason: HOSPADM

## 2023-10-16 RX ORDER — ASPIRIN 81 MG/1
81 TABLET ORAL DAILY
Status: DISCONTINUED | OUTPATIENT
Start: 2023-10-17 | End: 2023-10-18 | Stop reason: HOSPADM

## 2023-10-16 RX ORDER — MAGNESIUM SULFATE 1 G/100ML
1000 INJECTION INTRAVENOUS PRN
Status: DISCONTINUED | OUTPATIENT
Start: 2023-10-16 | End: 2023-10-18 | Stop reason: HOSPADM

## 2023-10-16 RX ORDER — PANTOPRAZOLE SODIUM 40 MG/1
40 TABLET, DELAYED RELEASE ORAL
Status: DISCONTINUED | OUTPATIENT
Start: 2023-10-17 | End: 2023-10-18 | Stop reason: HOSPADM

## 2023-10-16 RX ORDER — HYDROXYZINE HYDROCHLORIDE 10 MG/1
10 TABLET, FILM COATED ORAL EVERY 6 HOURS PRN
Status: DISCONTINUED | OUTPATIENT
Start: 2023-10-16 | End: 2023-10-18 | Stop reason: HOSPADM

## 2023-10-16 RX ORDER — POTASSIUM CHLORIDE 7.45 MG/ML
10 INJECTION INTRAVENOUS PRN
Status: DISCONTINUED | OUTPATIENT
Start: 2023-10-16 | End: 2023-10-18 | Stop reason: HOSPADM

## 2023-10-16 RX ORDER — ACETAMINOPHEN 325 MG/1
650 TABLET ORAL EVERY 6 HOURS PRN
Status: DISCONTINUED | OUTPATIENT
Start: 2023-10-16 | End: 2023-10-18 | Stop reason: HOSPADM

## 2023-10-16 RX ADMIN — MEROPENEM 1000 MG: 1 INJECTION, POWDER, FOR SOLUTION INTRAVENOUS at 16:20

## 2023-10-16 RX ADMIN — PREGABALIN 75 MG: 25 CAPSULE ORAL at 20:22

## 2023-10-16 RX ADMIN — Medication 10 ML: at 20:24

## 2023-10-16 RX ADMIN — SODIUM CHLORIDE: 9 INJECTION, SOLUTION INTRAVENOUS at 18:11

## 2023-10-16 RX ADMIN — SODIUM CHLORIDE 500 ML: 9 INJECTION, SOLUTION INTRAVENOUS at 16:19

## 2023-10-16 RX ADMIN — SODIUM ZIRCONIUM CYCLOSILICATE 5 G: 5 POWDER, FOR SUSPENSION ORAL at 18:11

## 2023-10-16 RX ADMIN — Medication 3 MG: at 20:22

## 2023-10-16 RX ADMIN — CARVEDILOL 6.25 MG: 6.25 TABLET, FILM COATED ORAL at 18:27

## 2023-10-16 RX ADMIN — HYDROXYZINE HYDROCHLORIDE 10 MG: 10 TABLET ORAL at 20:22

## 2023-10-16 RX ADMIN — ENOXAPARIN SODIUM 40 MG: 100 INJECTION SUBCUTANEOUS at 18:10

## 2023-10-16 ASSESSMENT — PAIN - FUNCTIONAL ASSESSMENT: PAIN_FUNCTIONAL_ASSESSMENT: NONE - DENIES PAIN

## 2023-10-16 NOTE — H&P
Hospital Medicine History & Physical      PCP: Jl Koch MD    Date of Admission: 10/16/2023    Date of Service: Pt seen/examined on 10/16/23     Chief Complaint:    Chief Complaint   Patient presents with    Altered Mental Status     PT HAS hX OF DEMENTIA ACCORDING TO FACILITY PT WAS BEING MORE COMBATIVE THAN NORMAL TODAY AND HAS A WBC OF 13. History Of Present Illness: The patient is a 82 y.o. male with PMH of CAD, CHF, COPD, dementia, depression, GERD, HTN, and skin cancer who presented to [facility] ED with complaint of AMS. History is limited 2/2 AMS. Per the ED note, pt was brought to the ED for aggression and leukocytosis. He was not acting himself and was being more combative at his facility. He has been seen for numerous UTIs with the most recent cx in July positive for ESBL E. coli.     Past Medical History:        Diagnosis Date    Atherosclerotic heart disease     CAD (coronary artery disease)     Cancer (HCC)     skin    CHF (congestive heart failure) (HCC)     COPD (chronic obstructive pulmonary disease) ([address])     Dementia (HCC)     Depression     Duodenal ulcer without hemorrhage or perforation     Falls frequently     GERD without esophagitis     Hernia     Hyperlipidemia     Hypertension     Hypokalemia     Lack of coordination     falls    Melanoma ([address])     MI (myocardial infarction) ([address])     Muscle weakness (generalized)     Protein calorie malnutrition ([address])     Reflux     Sepsis ([address])     Spinal stenosis     UTI (urinary tract infection)        Past Surgical History:        Procedure Laterality Date    CHOLECYSTECTOMY      COLONOSCOPY  8/10/12    CORONARY ANGIOPLASTY WITH STENT PLACEMENT      IR MIDLINE CATH  7/19/2023    IR MIDLINE CATH 7/19/2023 [facility] SPECIAL PROCEDURES    PROSTATE BIOPSY      SKIN BIOPSY      UPPER GASTROINTESTINAL ENDOSCOPY  5/14/12    UPPER GASTROINTESTINAL ENDOSCOPY N/A 6/11/2019    EGD BIOPSY performed by Petros Hyman DO at [address] [address]

## 2023-10-16 NOTE — ED NOTES
2500-Consult completed to Hospitalist admission orders placed by Grant REYES.       Soumya Stokes  10/16/23 0642

## 2023-10-16 NOTE — PROGRESS NOTES
4 Eyes Skin Assessment     NAME:  Keira Hensley  YOB: 1935  MEDICAL RECORD NUMBER:  [id number]    The patient is being assessed for  Admission    I agree that at least one RN has performed a thorough Head to Toe Skin Assessment on the patient. ALL assessment sites listed below have been assessed. Areas assessed by both nurses:    Head, Face, Ears, Shoulders, Back, Chest, Arms, Elbows, Hands, Sacrum. Buttock, Coccyx, Ischium, Legs. Feet and Heels, and Under Medical Devices         Does the Patient have a Wound? Yes wound(s) were present on assessment.  LDA wound assessment was Initiated and completed by RN       Bridger Prevention initiated by RN: Yes  Wound Care Orders initiated by RN: Yes    Pressure Injury (Stage 3,4, Unstageable, DTI, NWPT, and Complex wounds) if present, place Wound referral order by RN under : No    New Ostomies, if present place, Ostomy referral order under : No     Nurse 1 eSignature: Electronically signed by Kostas Martínez RN on 10/16/23 at 6:35 PM EDT    **SHARE this note so that the co-signing nurse can place an eSignature**    Nurse 2 eSignature: {Esignature:[id number]}

## 2023-10-17 LAB
ANION GAP SERPL CALCULATED.3IONS-SCNC: 10 MMOL/L (ref 3–16)
BASOPHILS # BLD: 0 K/UL (ref 0–0.2)
BASOPHILS NFR BLD: 0.4 %
BUN SERPL-MCNC: 25 MG/DL (ref 7–20)
CALCIUM SERPL-MCNC: 8.9 MG/DL (ref 8.3–10.6)
CHLORIDE SERPL-SCNC: 108 MMOL/L (ref 99–110)
CO2 SERPL-SCNC: 23 MMOL/L (ref 21–32)
CREAT SERPL-MCNC: 1.4 MG/DL (ref 0.8–1.3)
DEPRECATED RDW RBC AUTO: 15.7 % (ref 12.4–15.4)
EOSINOPHIL # BLD: 0.9 K/UL (ref 0–0.6)
EOSINOPHIL NFR BLD: 9.3 %
GFR SERPLBLD CREATININE-BSD FMLA CKD-EPI: 49 ML/MIN/{1.73_M2}
GLUCOSE SERPL-MCNC: 98 MG/DL (ref 70–99)
HCT VFR BLD AUTO: 34.8 % (ref 40.5–52.5)
HGB BLD-MCNC: 11.7 G/DL (ref 13.5–17.5)
LYMPHOCYTES # BLD: 0.9 K/UL (ref 1–5.1)
LYMPHOCYTES NFR BLD: 9.2 %
MCH RBC QN AUTO: 29.8 PG (ref 26–34)
MCHC RBC AUTO-ENTMCNC: 33.5 G/DL (ref 31–36)
MCV RBC AUTO: 88.9 FL (ref 80–100)
MONOCYTES # BLD: 0.6 K/UL (ref 0–1.3)
MONOCYTES NFR BLD: 6.3 %
NEUTROPHILS # BLD: 7.1 K/UL (ref 1.7–7.7)
NEUTROPHILS NFR BLD: 74.8 %
PLATELET # BLD AUTO: 171 K/UL (ref 135–450)
PMV BLD AUTO: 7.7 FL (ref 5–10.5)
POTASSIUM SERPL-SCNC: 4.9 MMOL/L (ref 3.5–5.1)
RBC # BLD AUTO: 3.92 M/UL (ref 4.2–5.9)
SODIUM SERPL-SCNC: 141 MMOL/L (ref 136–145)
WBC # BLD AUTO: 9.5 K/UL (ref 4–11)

## 2023-10-17 PROCEDURE — 85025 COMPLETE CBC W/AUTO DIFF WBC: CPT

## 2023-10-17 PROCEDURE — 6360000002 HC RX W HCPCS

## 2023-10-17 PROCEDURE — 36415 COLL VENOUS BLD VENIPUNCTURE: CPT

## 2023-10-17 PROCEDURE — 6360000002 HC RX W HCPCS: Performed by: INTERNAL MEDICINE

## 2023-10-17 PROCEDURE — 2580000003 HC RX 258

## 2023-10-17 PROCEDURE — 2580000003 HC RX 258: Performed by: INTERNAL MEDICINE

## 2023-10-17 PROCEDURE — 6370000000 HC RX 637 (ALT 250 FOR IP)

## 2023-10-17 PROCEDURE — 1200000000 HC SEMI PRIVATE

## 2023-10-17 PROCEDURE — 99232 SBSQ HOSP IP/OBS MODERATE 35: CPT | Performed by: INTERNAL MEDICINE

## 2023-10-17 PROCEDURE — 80048 BASIC METABOLIC PNL TOTAL CA: CPT

## 2023-10-17 RX ORDER — PREGABALIN 25 MG/1
75 CAPSULE ORAL 3 TIMES DAILY
Status: DISCONTINUED | OUTPATIENT
Start: 2023-10-18 | End: 2023-10-18

## 2023-10-17 RX ORDER — SERTRALINE HYDROCHLORIDE 100 MG/1
100 TABLET, FILM COATED ORAL DAILY
Status: DISCONTINUED | OUTPATIENT
Start: 2023-10-18 | End: 2023-10-18 | Stop reason: HOSPADM

## 2023-10-17 RX ADMIN — CARVEDILOL 6.25 MG: 6.25 TABLET, FILM COATED ORAL at 09:38

## 2023-10-17 RX ADMIN — HYDROXYZINE HYDROCHLORIDE 10 MG: 10 TABLET ORAL at 06:19

## 2023-10-17 RX ADMIN — Medication 10 ML: at 21:59

## 2023-10-17 RX ADMIN — HYDROXYZINE HYDROCHLORIDE 10 MG: 10 TABLET ORAL at 21:59

## 2023-10-17 RX ADMIN — Medication 3 MG: at 21:58

## 2023-10-17 RX ADMIN — MEROPENEM 1000 MG: 1 INJECTION, POWDER, FOR SOLUTION INTRAVENOUS at 12:25

## 2023-10-17 RX ADMIN — HYDROXYZINE HYDROCHLORIDE 10 MG: 10 TABLET ORAL at 12:24

## 2023-10-17 RX ADMIN — ENOXAPARIN SODIUM 40 MG: 100 INJECTION SUBCUTANEOUS at 09:38

## 2023-10-17 RX ADMIN — ASPIRIN 81 MG: 81 TABLET, COATED ORAL at 09:38

## 2023-10-17 RX ADMIN — CARVEDILOL 6.25 MG: 6.25 TABLET, FILM COATED ORAL at 18:11

## 2023-10-17 RX ADMIN — PANTOPRAZOLE SODIUM 40 MG: 40 TABLET, DELAYED RELEASE ORAL at 06:18

## 2023-10-17 RX ADMIN — MEROPENEM 1000 MG: 1 INJECTION, POWDER, FOR SOLUTION INTRAVENOUS at 00:34

## 2023-10-17 NOTE — PLAN OF CARE
Problem: Discharge Planning  Goal: Discharge to home or other facility with appropriate resources  10/17/2023 0945 by Myranda Alvarez RN  Outcome: Progressing     Problem: Skin/Tissue Integrity  Goal: Absence of new skin breakdown  Description: 1. Monitor for areas of redness and/or skin breakdown  2. Assess vascular access sites hourly  3. Every 4-6 hours minimum:  Change oxygen saturation probe site  4. Every 4-6 hours:  If on nasal continuous positive airway pressure, respiratory therapy assess nares and determine need for appliance change or resting period.   10/17/2023 0945 by Myranda Alvarez RN  Outcome: Progressing     Problem: Safety - Adult  Goal: Free from fall injury  10/17/2023 0945 by Myranda Alvarez RN  Outcome: Progressing     Problem: Chronic Conditions and Co-morbidities  Goal: Patient's chronic conditions and co-morbidity symptoms are monitored and maintained or improved  Outcome: Progressing     Problem: ABCDS Injury Assessment  Goal: Absence of physical injury  Outcome: Progressing

## 2023-10-17 NOTE — DISCHARGE INSTR - COC
Continuity of Care Form    Patient Name: Scott Valentin   :  1935  MRN:  [id number]    Admit date:  10/16/2023  Discharge date:  10/18/23    Code Status Order: Full Code   Advance Directives:     Admitting Physician:  Rachelle Rosenberg MD  PCP: Tania Arreguin MD    Discharging Nurse: MELLO BAZAN [facility] Unit/Room#: 0212/0212-01  Discharging Unit Phone Number: [phone]    Emergency Contact:   Extended Emergency Contact Information  Primary Emergency Contact: Gabi Still  Home Phone: [phone]  Mobile Phone: [phone]  Relation: Legal Guardian  Preferred language: English   needed? No  Secondary Emergency Contact: Daisy Bayley [facility] Phone: [phone]  Mobile Phone: [phone]  Relation: Child   needed?  No    Past Surgical History:  Past Surgical History:   Procedure Laterality Date    CHOLECYSTECTOMY      COLONOSCOPY  8/10/12    CORONARY ANGIOPLASTY WITH STENT PLACEMENT      IR MIDLINE CATH  2023    IR MIDLINE CATH 2023 [facility] SPECIAL PROCEDURES    PROSTATE BIOPSY      SKIN BIOPSY      UPPER GASTROINTESTINAL ENDOSCOPY  12    UPPER GASTROINTESTINAL ENDOSCOPY N/A 2019    EGD BIOPSY performed by Godwin Israel DO at [address] [facility] [facility] N/A 2019    EGD performed by Glynn Zheng MD at [address] [facility] N/A 3/16/2023    EGD BIOPSY performed by Glynn Zheng MD at [address] [place] [address]       Immunization History:   Immunization History   Administered Date(s) Administered    COVID-19, PFIZER PURPLE top, DILUTE for use, (age 15 y+), 30mcg/0.3mL 2021, 2021, 02/15/2022    Influenza A (N3E7-31) Vaccine PF IM 2009    Influenza Virus Vaccine 2009, 2010    Influenza, High Dose (Fluzone 65 yrs and older) 2011, 2013    Pneumococcal, PCV-13, PREVNAR 13, (age 6w+), IM, 0.5mL 2016    Pneumococcal, PPSV23,

## 2023-10-17 NOTE — PLAN OF CARE
Problem: Discharge Planning  Goal: Discharge to home or other facility with appropriate resources  Outcome: Progressing  Flowsheets (Taken 10/16/2023 1816 by Brenda Arana RN)  Discharge to home or other facility with appropriate resources:   Identify barriers to discharge with patient and caregiver   Identify discharge learning needs (meds, wound care, etc)     Problem: Skin/Tissue Integrity  Goal: Absence of new skin breakdown  Description: 1. Monitor for areas of redness and/or skin breakdown  2. Assess vascular access sites hourly  3. Every 4-6 hours minimum:  Change oxygen saturation probe site  4. Every 4-6 hours:  If on nasal continuous positive airway pressure, respiratory therapy assess nares and determine need for appliance change or resting period.   Outcome: Progressing     Problem: Safety - Adult  Goal: Free from fall injury  Outcome: Progressing

## 2023-10-17 NOTE — ACP (ADVANCE CARE PLANNING)
Advance Care Planning     General Advance Care Planning (ACP) Conversation    Date of Conversation: 10/16/2023  Conducted with: Patient with Decision Making Capacity    Healthcare Decision Maker:    Primary Decision Maker: Jaden Jamison, Legal Guardian - [phone]  Click here to complete [address] including selection of the Healthcare Decision Maker Relationship (ie \"Primary\"). Today we documented Decision Maker(s) consistent with ACP documents on file. Content/Action Overview:   Has ACP document(s) on file - reflects the patient's care preferences  Reviewed DNR/DNI and patient elects Full Code (Attempt Resuscitation)    Length of Voluntary ACP Conversation in minutes:  <16 minutes (Non-Billable)    Ashley Mirza RN

## 2023-10-17 NOTE — CARE COORDINATION
Case Management Assessment  Initial Evaluation    Date/Time of Evaluation: 10/17/2023 3:15 PM  Assessment Completed by: Sugey Acevedo RN    If patient is discharged prior to next notation, then this note serves as note for discharge by case management. Patient Name: Jens Nguyen                   YOB: 1935  Diagnosis: UTI (urinary tract infection) [N39.0]  Acute cystitis without hematuria [N30.00]  Altered mental status, unspecified altered mental status type [R41.82]                   Date / Time: 10/16/2023 12:46 PM    Patient Admission Status: Inpatient   Readmission Risk (Low < 19, Mod (19-27), High > 27): Readmission Risk Score: 18.5    Current PCP: Charmayne Mano, MD  PCP verified by CM? (P) Yes     10/17/23 1510   Service Assessment   Patient Orientation Alert and Oriented;Person   Cognition Severely Impaired   History Provided By Medical Record   Primary Caregiver Other (Comment)  (LTC at [facility])   Accompanied By/Relationship alone   Support Systems Family Members; /  (LTC facility)   [address] is: Named in [address]   PCP Verified by CM Yes   Last Visit to PCP Within last 3 months   Prior Functional Level Assistance with the following:;Bathing;Dressing; Toileting;Feeding;Cooking;Housework; Shopping;Mobility   Current Functional Level Assistance with the following:;Bathing;Dressing; Toileting;Feeding;Cooking;Housework; Shopping;Mobility   Can patient return to prior living arrangement Yes   Ability to make needs known: Unable   Family able to assist with home care needs: No   Would you like for me to discuss the discharge plan with any other family members/significant others, and if so, who? Yes  (Legal Guardian)   Financial Resources Medicaid; Medicare   Community Resources ECF/Home Care  (return to [facility])   CM/SW Referral ADLs/IADLs   Discharge Planning   Type of Residence Long-Term Care   Living Arrangements Other

## 2023-10-18 ENCOUNTER — APPOINTMENT (OUTPATIENT)
Dept: INTERVENTIONAL RADIOLOGY/VASCULAR | Age: 88
DRG: 689 | End: 2023-10-18
Payer: MEDICARE

## 2023-10-18 VITALS
OXYGEN SATURATION: 96 % | WEIGHT: 160.9 LBS | RESPIRATION RATE: 18 BRPM | SYSTOLIC BLOOD PRESSURE: 132 MMHG | DIASTOLIC BLOOD PRESSURE: 72 MMHG | TEMPERATURE: 97.7 F | BODY MASS INDEX: 21.79 KG/M2 | HEIGHT: 72 IN | HEART RATE: 62 BPM

## 2023-10-18 PROBLEM — B96.29 UTI DUE TO EXTENDED-SPECTRUM BETA LACTAMASE (ESBL) PRODUCING ESCHERICHIA COLI: Status: ACTIVE | Noted: 2023-10-16

## 2023-10-18 PROBLEM — Z16.12 UTI DUE TO EXTENDED-SPECTRUM BETA LACTAMASE (ESBL) PRODUCING ESCHERICHIA COLI: Status: ACTIVE | Noted: 2023-10-16

## 2023-10-18 LAB
ANION GAP SERPL CALCULATED.3IONS-SCNC: 10 MMOL/L (ref 3–16)
BASOPHILS # BLD: 0 K/UL (ref 0–0.2)
BASOPHILS NFR BLD: 0.5 %
BUN SERPL-MCNC: 24 MG/DL (ref 7–20)
CALCIUM SERPL-MCNC: 9.2 MG/DL (ref 8.3–10.6)
CHLORIDE SERPL-SCNC: 110 MMOL/L (ref 99–110)
CO2 SERPL-SCNC: 23 MMOL/L (ref 21–32)
CREAT SERPL-MCNC: 1.1 MG/DL (ref 0.8–1.3)
DEPRECATED RDW RBC AUTO: 15.4 % (ref 12.4–15.4)
EOSINOPHIL # BLD: 0.7 K/UL (ref 0–0.6)
EOSINOPHIL NFR BLD: 9 %
GFR SERPLBLD CREATININE-BSD FMLA CKD-EPI: >60 ML/MIN/{1.73_M2}
GLUCOSE SERPL-MCNC: 99 MG/DL (ref 70–99)
HCT VFR BLD AUTO: 33 % (ref 40.5–52.5)
HGB BLD-MCNC: 11.1 G/DL (ref 13.5–17.5)
LYMPHOCYTES # BLD: 1.2 K/UL (ref 1–5.1)
LYMPHOCYTES NFR BLD: 15.2 %
MCH RBC QN AUTO: 30.4 PG (ref 26–34)
MCHC RBC AUTO-ENTMCNC: 33.6 G/DL (ref 31–36)
MCV RBC AUTO: 90.4 FL (ref 80–100)
MONOCYTES # BLD: 0.6 K/UL (ref 0–1.3)
MONOCYTES NFR BLD: 7.3 %
NEUTROPHILS # BLD: 5.2 K/UL (ref 1.7–7.7)
NEUTROPHILS NFR BLD: 68 %
PLATELET # BLD AUTO: 175 K/UL (ref 135–450)
PMV BLD AUTO: 7.5 FL (ref 5–10.5)
POTASSIUM SERPL-SCNC: 4.3 MMOL/L (ref 3.5–5.1)
RBC # BLD AUTO: 3.64 M/UL (ref 4.2–5.9)
SODIUM SERPL-SCNC: 143 MMOL/L (ref 136–145)
WBC # BLD AUTO: 7.7 K/UL (ref 4–11)

## 2023-10-18 PROCEDURE — 6370000000 HC RX 637 (ALT 250 FOR IP): Performed by: INTERNAL MEDICINE

## 2023-10-18 PROCEDURE — 85025 COMPLETE CBC W/AUTO DIFF WBC: CPT

## 2023-10-18 PROCEDURE — 36415 COLL VENOUS BLD VENIPUNCTURE: CPT

## 2023-10-18 PROCEDURE — 05HF33Z INSERTION OF INFUSION DEVICE INTO LEFT CEPHALIC VEIN, PERCUTANEOUS APPROACH: ICD-10-PCS | Performed by: INTERNAL MEDICINE

## 2023-10-18 PROCEDURE — 76937 US GUIDE VASCULAR ACCESS: CPT

## 2023-10-18 PROCEDURE — 80048 BASIC METABOLIC PNL TOTAL CA: CPT

## 2023-10-18 PROCEDURE — 6360000002 HC RX W HCPCS: Performed by: INTERNAL MEDICINE

## 2023-10-18 PROCEDURE — C1751 CATH, INF, PER/CENT/MIDLINE: HCPCS

## 2023-10-18 PROCEDURE — 2580000003 HC RX 258: Performed by: INTERNAL MEDICINE

## 2023-10-18 PROCEDURE — 99238 HOSP IP/OBS DSCHRG MGMT 30/<: CPT | Performed by: INTERNAL MEDICINE

## 2023-10-18 PROCEDURE — 6370000000 HC RX 637 (ALT 250 FOR IP)

## 2023-10-18 PROCEDURE — 2580000003 HC RX 258

## 2023-10-18 PROCEDURE — 36410 VNPNXR 3YR/> PHY/QHP DX/THER: CPT

## 2023-10-18 PROCEDURE — 6360000002 HC RX W HCPCS

## 2023-10-18 RX ORDER — PREGABALIN 25 MG/1
75 CAPSULE ORAL 2 TIMES DAILY
Status: DISCONTINUED | OUTPATIENT
Start: 2023-10-18 | End: 2023-10-18 | Stop reason: HOSPADM

## 2023-10-18 RX ORDER — PREGABALIN 25 MG/1
75 CAPSULE ORAL 2 TIMES DAILY
Qty: 180 CAPSULE | Refills: 0
Start: 2023-10-18 | End: 2023-11-17

## 2023-10-18 RX ADMIN — Medication 10 ML: at 10:21

## 2023-10-18 RX ADMIN — SERTRALINE 100 MG: 100 TABLET, FILM COATED ORAL at 10:21

## 2023-10-18 RX ADMIN — CARVEDILOL 6.25 MG: 6.25 TABLET, FILM COATED ORAL at 10:20

## 2023-10-18 RX ADMIN — PANTOPRAZOLE SODIUM 40 MG: 40 TABLET, DELAYED RELEASE ORAL at 05:25

## 2023-10-18 RX ADMIN — ERTAPENEM SODIUM 1000 MG: 1 INJECTION INTRAMUSCULAR; INTRAVENOUS at 14:10

## 2023-10-18 RX ADMIN — ENOXAPARIN SODIUM 40 MG: 100 INJECTION SUBCUTANEOUS at 10:21

## 2023-10-18 RX ADMIN — MEROPENEM 1000 MG: 1 INJECTION, POWDER, FOR SOLUTION INTRAVENOUS at 01:18

## 2023-10-18 RX ADMIN — PREGABALIN 75 MG: 25 CAPSULE ORAL at 10:24

## 2023-10-18 RX ADMIN — HYDROXYZINE HYDROCHLORIDE 10 MG: 10 TABLET ORAL at 05:25

## 2023-10-18 RX ADMIN — ASPIRIN 81 MG: 81 TABLET, COATED ORAL at 10:21

## 2023-10-18 NOTE — PROGRESS NOTES
Report received from [facility] at this time. Bedside report given. Pt resting in bed with eyes closed, respirations e/e. No needs voiced.

## 2023-10-18 NOTE — PLAN OF CARE
Problem: Discharge Planning  Goal: Discharge to home or other facility with appropriate resources  Outcome: Progressing     Problem: Skin/Tissue Integrity  Goal: Absence of new skin breakdown  Description: 1. Monitor for areas of redness and/or skin breakdown  2. Assess vascular access sites hourly  3. Every 4-6 hours minimum:  Change oxygen saturation probe site  4. Every 4-6 hours:  If on nasal continuous positive airway pressure, respiratory therapy assess nares and determine need for appliance change or resting period.   Outcome: Progressing     Problem: Safety - Adult  Goal: Free from fall injury  Outcome: Progressing     Problem: Chronic Conditions and Co-morbidities  Goal: Patient's chronic conditions and co-morbidity symptoms are monitored and maintained or improved  Outcome: Progressing     Problem: ABCDS Injury Assessment  Goal: Absence of physical injury  Outcome: Progressing

## 2023-10-18 NOTE — DISCHARGE SUMMARY
Name:  Alda Hsieh  Room:  0212/0212-01  MRN:    [id number]    Discharge Summary      This discharge summary is in conjunction with a complete physical exam done on the day of discharge. Discharging Physician: Dr. Vira Strniger: 10/16/2023  Discharge:  10/18/23      HPI taken from admission H&P:       The patient is a 82 y.o. male with PMH of CAD, CHF, COPD, dementia, depression, GERD, HTN, and skin cancer who presented to [facility] ED with complaint of AMS. History is limited 2/2 AMS. Per the ED note, pt was brought to the ED for aggression and leukocytosis. He was not acting himself and was being more combative at his facility. He has been seen for numerous UTIs with the most recent cx in July positive for ESBL E. coli.      Diagnoses this Admission and Hospital Course     UTI - ESBL ecoli  Hx recurrent UTIs   - repeated admissions for UTIs  - would benefit from outpt urology  consult  - no fevers or high wbc, mentation remains calm and cooperative since admission. -Given  Merrem D#3 , change to IV invanz at dc     PILAR - resolved   - Baseline creatinine ~0.9   - Creatinine on admission 1.7 -> 1.4 -> 1.1   - improved with IVF     Acute metabolic encephalopathy   Hx dementia   - reported increased aggression at NH, A/O x1 at baseline   - 2/2 above   - CT head w/o acute abnormality  - telesitter  - improving appears to be at baseline      Elevated troponin  - 2/2 PILAR   - down trending on repeat  - EKG as above  - no CP     Mild hyperkalemia-> resolved   - lokelma x1  - monitor , resolved      CAD  - continue asa and BB     Chronic combined CHF  - last echo on 11/3/22 with EF of 35-40% and grade I diastolic dysfunction  - dc aldactone 2/2 PILAR and hyperkalemia  - monitor closely with IVFs, stable      HTN  - continue coreg  -resume valsartan  - BPs stable      COPD  - no AE  - not on any home inhalers     Depression   - continue Zoloft      Dementia   - supportive care      dc back to ECF       Procedures (Please

## 2023-10-18 NOTE — CARE COORDINATION
DISCHARGE ORDER  Date/Time 10/18/2023 1:02 PM  Completed by: Clara Ring RN, Case Management    Patient Name: Elizabet West    : 1935      Admit order Date and Status:10/16/23 inpt    Noted discharge order. (verify MD's last order for status of admission/Traditional Medicare 3 [place] Inpatient qualifying stay required for SNF)    Confirmed discharge plan with:              Patient:  Yes              When pt confirms DC plan does any support person need to be contacted by CM Yes if yes who LElsaG. Discharge to Facility: [address] [facility] phone number for staff giving report: [phone]   Pre-certification completed: [facility] [address] Exemption Notification (HENS) completed: N/A LTC   Discharge orders and Continuity of Care faxed to facility:  facility will pull from [facility]      Transportation:               Medical Transport explained with choice list offered to pt/family. Choice:(no preference)  Agency used: [employer]   time:   16:00      Pt/family/Nursing/Facility aware of  time:   Yes Names: Mich Marshall charge, Anju Santana nurse, pt, pt MAYELA and Bashir Franklin at [facility]. Ambulance form completed:  Yes:      Date Last IMM Given: 10/16/23    Comments:Order for dc noted. Spoke with LIZZY and plan is return to T. Spoke with Bashir Franklin at [facility] re: dc today and need for IVABx and [facility] states can re accept LTC today and do IVABx. Chart reviewed and no other dc needs identified. Pt is being d/c'd to [facility] (SNF)  today. Pt's O2 sats are 96% on RA. Discharge timeout done with nsg, CM and pt. All discharge needs and concerns addressed. Discharging nurse to complete LIANE, reconcile AVS, and place final copy with patient's discharge packet.  Discharging RN to ensure that written prescriptions for  Level II medications are sent with patient to the facility as per

## 2023-10-18 NOTE — PROGRESS NOTES
4 Eyes Skin Assessment     NAME:  Alexa Trevizo  YOB: 1935  MEDICAL RECORD NUMBER:  [id number]    The patient is being assessed for  Transfer to New Unit    I agree that at least one RN has performed a thorough Head to Toe Skin Assessment on the patient. ALL assessment sites listed below have been assessed. Areas assessed by both nurses:    Head, Face, Ears, Shoulders, Back, Chest, Arms, Elbows, Hands, Sacrum. Buttock, Coccyx, Ischium, Legs. Feet and Heels, and Under Medical Devices  Scratches generalized, stage 2 coccyx on admission. Scattered bruising and scabs, not open or draining. Hematoma at midline insertion site. Does the Patient have a Wound? Yes wound(s) were present on assessment.  LDA wound assessment was Initiated and completed by RN       Bridger Prevention initiated by RN: Yes  Wound Care Orders initiated by RN: Yes    Pressure Injury (Stage 3,4, Unstageable, DTI, NWPT, and Complex wounds) if present, place Wound referral order by RN under : No    New Ostomies, if present place, Ostomy referral order under : No     Nurse 1 eSignature: Electronically signed by Zhen Valenzuela RN on 10/18/23 at 3:33 PM EDT    **SHARE this note so that the co-signing nurse can place an eSignature**    Nurse 2 eSignature: Electronically signed by Wu Haney RN on 10/18/23 at 3:51 PM EDT

## 2023-10-18 NOTE — PROGRESS NOTES
Called report to Darrick Michael at the [facility] SERVICES at this time. All questions answered.

## 2023-10-18 NOTE — PROGRESS NOTES
Transfer center arrived to take pt to [facility] SERVICES at this time. Bedside report given to transfer team. All questions answered. No needs voiced by pt at this time.

## 2023-10-18 NOTE — PLAN OF CARE
Problem: Discharge Planning  Goal: Discharge to home or other facility with appropriate resources  10/18/2023 1014 by Jose Stephens RN  Outcome: Adequate for Discharge     Problem: Skin/Tissue Integrity  Goal: Absence of new skin breakdown  Description: 1. Monitor for areas of redness and/or skin breakdown  2. Assess vascular access sites hourly  3. Every 4-6 hours minimum:  Change oxygen saturation probe site  4. Every 4-6 hours:  If on nasal continuous positive airway pressure, respiratory therapy assess nares and determine need for appliance change or resting period.   10/18/2023 1014 by Jose Stephens RN  Outcome: Adequate for Discharge     Problem: Safety - Adult  Goal: Free from fall injury  10/18/2023 1014 by Jose Stephens RN  Outcome: Adequate for Discharge     Problem: Chronic Conditions and Co-morbidities  Goal: Patient's chronic conditions and co-morbidity symptoms are monitored and maintained or improved  10/18/2023 1014 by Jose Stephens RN  Outcome: Adequate for Discharge     Problem: ABCDS Injury Assessment  Goal: Absence of physical injury  10/18/2023 1014 by Jose Stephens RN  Outcome: Adequate for Discharge

## 2023-10-18 NOTE — CONSULTS
Spoke with staff RN, pt has stage 2 pressure injury, present on admission. Using zinc currently. Pt being discharged today back to snf today.

## 2023-10-19 LAB
BACTERIA UR CULT: ABNORMAL
ORGANISM: ABNORMAL

## 2024-01-25 ENCOUNTER — HOSPITAL ENCOUNTER (INPATIENT)
Age: 89
LOS: 1 days | Discharge: ANOTHER HOSPITAL | End: 2024-01-25
Attending: STUDENT IN AN ORGANIZED HEALTH CARE EDUCATION/TRAINING PROGRAM | Admitting: INTERNAL MEDICINE
Payer: MEDICARE

## 2024-01-25 ENCOUNTER — HOSPITAL ENCOUNTER (INPATIENT)
Age: 89
LOS: 4 days | Discharge: HOME | DRG: 689 | End: 2024-01-29
Attending: INTERNAL MEDICINE | Admitting: INTERNAL MEDICINE
Payer: MEDICARE

## 2024-01-25 ENCOUNTER — APPOINTMENT (OUTPATIENT)
Dept: CT IMAGING | Age: 89
End: 2024-01-25
Payer: MEDICARE

## 2024-01-25 ENCOUNTER — APPOINTMENT (OUTPATIENT)
Dept: GENERAL RADIOLOGY | Age: 89
End: 2024-01-25
Payer: MEDICARE

## 2024-01-25 VITALS
TEMPERATURE: 98 F | SYSTOLIC BLOOD PRESSURE: 121 MMHG | DIASTOLIC BLOOD PRESSURE: 52 MMHG | HEIGHT: 69 IN | RESPIRATION RATE: 18 BRPM | HEART RATE: 66 BPM | OXYGEN SATURATION: 99 % | WEIGHT: 165.34 LBS | BODY MASS INDEX: 24.49 KG/M2

## 2024-01-25 DIAGNOSIS — R11.2 NAUSEA AND VOMITING, UNSPECIFIED VOMITING TYPE: ICD-10-CM

## 2024-01-25 DIAGNOSIS — D72.829 LEUKOCYTOSIS, UNSPECIFIED TYPE: ICD-10-CM

## 2024-01-25 DIAGNOSIS — N30.00 ACUTE CYSTITIS WITHOUT HEMATURIA: Primary | ICD-10-CM

## 2024-01-25 PROBLEM — N39.0 UTI (URINARY TRACT INFECTION): Status: ACTIVE | Noted: 2024-01-25

## 2024-01-25 LAB
ALBUMIN SERPL-MCNC: 3.7 G/DL (ref 3.4–5)
ALBUMIN/GLOB SERPL: 1.1 {RATIO} (ref 1.1–2.2)
ALP SERPL-CCNC: 125 U/L (ref 40–129)
ALT SERPL-CCNC: 7 U/L (ref 10–40)
ANION GAP SERPL CALCULATED.3IONS-SCNC: 11 MMOL/L (ref 3–16)
AST SERPL-CCNC: 16 U/L (ref 15–37)
BACTERIA URNS QL MICRO: ABNORMAL /HPF
BASOPHILS # BLD: 0.1 K/UL (ref 0–0.2)
BASOPHILS NFR BLD: 0.3 %
BILIRUB SERPL-MCNC: 0.3 MG/DL (ref 0–1)
BILIRUB UR QL STRIP.AUTO: NEGATIVE
BUN SERPL-MCNC: 36 MG/DL (ref 7–20)
CALCIUM SERPL-MCNC: 9.1 MG/DL (ref 8.3–10.6)
CHLORIDE SERPL-SCNC: 109 MMOL/L (ref 99–110)
CLARITY UR: ABNORMAL
CO2 SERPL-SCNC: 22 MMOL/L (ref 21–32)
COLOR UR: YELLOW
CREAT SERPL-MCNC: 1.5 MG/DL (ref 0.8–1.3)
DEPRECATED RDW RBC AUTO: 15 % (ref 12.4–15.4)
EKG ATRIAL RATE: 192 BPM
EKG DIAGNOSIS: NORMAL
EKG Q-T INTERVAL: 376 MS
EKG QRS DURATION: 82 MS
EKG QTC CALCULATION (BAZETT): 436 MS
EKG R AXIS: -12 DEGREES
EKG T AXIS: 48 DEGREES
EKG VENTRICULAR RATE: 81 BPM
EOSINOPHIL # BLD: 1.4 K/UL (ref 0–0.6)
EOSINOPHIL NFR BLD: 8.5 %
FLUAV RNA UPPER RESP QL NAA+PROBE: NEGATIVE
FLUBV AG NPH QL: NEGATIVE
GFR SERPLBLD CREATININE-BSD FMLA CKD-EPI: 45 ML/MIN/{1.73_M2}
GLUCOSE SERPL-MCNC: 133 MG/DL (ref 70–99)
GLUCOSE UR STRIP.AUTO-MCNC: NEGATIVE MG/DL
HCT VFR BLD AUTO: 36.4 % (ref 40.5–52.5)
HGB BLD-MCNC: 11.7 G/DL (ref 13.5–17.5)
HGB UR QL STRIP.AUTO: ABNORMAL
KETONES UR STRIP.AUTO-MCNC: NEGATIVE MG/DL
LACTATE BLDV-SCNC: 1.2 MMOL/L (ref 0.4–2)
LEUKOCYTE ESTERASE UR QL STRIP.AUTO: ABNORMAL
LIPASE SERPL-CCNC: 11 U/L (ref 13–60)
LYMPHOCYTES # BLD: 0.9 K/UL (ref 1–5.1)
LYMPHOCYTES NFR BLD: 5.7 %
MCH RBC QN AUTO: 27.7 PG (ref 26–34)
MCHC RBC AUTO-ENTMCNC: 32.1 G/DL (ref 31–36)
MCV RBC AUTO: 86.2 FL (ref 80–100)
MONOCYTES # BLD: 0.9 K/UL (ref 0–1.3)
MONOCYTES NFR BLD: 5.6 %
NEUTROPHILS # BLD: 13.3 K/UL (ref 1.7–7.7)
NEUTROPHILS NFR BLD: 79.9 %
NITRITE UR QL STRIP.AUTO: POSITIVE
PH UR STRIP.AUTO: 6 [PH] (ref 5–8)
PLATELET # BLD AUTO: 213 K/UL (ref 135–450)
PMV BLD AUTO: 7.6 FL (ref 5–10.5)
POTASSIUM SERPL-SCNC: 5.1 MMOL/L (ref 3.5–5.1)
PROT SERPL-MCNC: 7 G/DL (ref 6.4–8.2)
PROT UR STRIP.AUTO-MCNC: ABNORMAL MG/DL
RBC # BLD AUTO: 4.22 M/UL (ref 4.2–5.9)
RBC #/AREA URNS HPF: ABNORMAL /HPF (ref 0–4)
SARS-COV-2 RDRP RESP QL NAA+PROBE: NOT DETECTED
SODIUM SERPL-SCNC: 142 MMOL/L (ref 136–145)
SP GR UR STRIP.AUTO: 1.02 (ref 1–1.03)
TROPONIN, HIGH SENSITIVITY: 24 NG/L (ref 0–22)
TROPONIN, HIGH SENSITIVITY: 25 NG/L (ref 0–22)
UA DIPSTICK W REFLEX MICRO PNL UR: YES
URN SPEC COLLECT METH UR: ABNORMAL
UROBILINOGEN UR STRIP-ACNC: 0.2 E.U./DL
WBC # BLD AUTO: 16.6 K/UL (ref 4–11)
WBC #/AREA URNS HPF: >100 /HPF (ref 0–5)

## 2024-01-25 PROCEDURE — 80053 COMPREHEN METABOLIC PANEL: CPT

## 2024-01-25 PROCEDURE — 2580000003 HC RX 258: Performed by: INTERNAL MEDICINE

## 2024-01-25 PROCEDURE — 71045 X-RAY EXAM CHEST 1 VIEW: CPT

## 2024-01-25 PROCEDURE — 36415 COLL VENOUS BLD VENIPUNCTURE: CPT

## 2024-01-25 PROCEDURE — 97165 OT EVAL LOW COMPLEX 30 MIN: CPT

## 2024-01-25 PROCEDURE — 97110 THERAPEUTIC EXERCISES: CPT

## 2024-01-25 PROCEDURE — 99285 EMERGENCY DEPT VISIT HI MDM: CPT

## 2024-01-25 PROCEDURE — 1200000000 HC SEMI PRIVATE

## 2024-01-25 PROCEDURE — 84484 ASSAY OF TROPONIN QUANT: CPT

## 2024-01-25 PROCEDURE — 74176 CT ABD & PELVIS W/O CONTRAST: CPT

## 2024-01-25 PROCEDURE — 81001 URINALYSIS AUTO W/SCOPE: CPT

## 2024-01-25 PROCEDURE — 6360000002 HC RX W HCPCS: Performed by: STUDENT IN AN ORGANIZED HEALTH CARE EDUCATION/TRAINING PROGRAM

## 2024-01-25 PROCEDURE — 93005 ELECTROCARDIOGRAM TRACING: CPT | Performed by: STUDENT IN AN ORGANIZED HEALTH CARE EDUCATION/TRAINING PROGRAM

## 2024-01-25 PROCEDURE — 97530 THERAPEUTIC ACTIVITIES: CPT

## 2024-01-25 PROCEDURE — 97161 PT EVAL LOW COMPLEX 20 MIN: CPT

## 2024-01-25 PROCEDURE — 83605 ASSAY OF LACTIC ACID: CPT

## 2024-01-25 PROCEDURE — 87186 SC STD MICRODIL/AGAR DIL: CPT

## 2024-01-25 PROCEDURE — 6360000002 HC RX W HCPCS

## 2024-01-25 PROCEDURE — 87088 URINE BACTERIA CULTURE: CPT

## 2024-01-25 PROCEDURE — 83690 ASSAY OF LIPASE: CPT

## 2024-01-25 PROCEDURE — 96375 TX/PRO/DX INJ NEW DRUG ADDON: CPT

## 2024-01-25 PROCEDURE — 87804 INFLUENZA ASSAY W/OPTIC: CPT

## 2024-01-25 PROCEDURE — 6360000002 HC RX W HCPCS: Performed by: INTERNAL MEDICINE

## 2024-01-25 PROCEDURE — 87635 SARS-COV-2 COVID-19 AMP PRB: CPT

## 2024-01-25 PROCEDURE — 85025 COMPLETE CBC W/AUTO DIFF WBC: CPT

## 2024-01-25 PROCEDURE — 6370000000 HC RX 637 (ALT 250 FOR IP): Performed by: INTERNAL MEDICINE

## 2024-01-25 PROCEDURE — 87086 URINE CULTURE/COLONY COUNT: CPT

## 2024-01-25 PROCEDURE — 93010 ELECTROCARDIOGRAM REPORT: CPT | Performed by: INTERNAL MEDICINE

## 2024-01-25 PROCEDURE — 96365 THER/PROPH/DIAG IV INF INIT: CPT

## 2024-01-25 PROCEDURE — 2580000003 HC RX 258: Performed by: STUDENT IN AN ORGANIZED HEALTH CARE EDUCATION/TRAINING PROGRAM

## 2024-01-25 RX ORDER — ONDANSETRON 2 MG/ML
INJECTION INTRAMUSCULAR; INTRAVENOUS
Status: COMPLETED
Start: 2024-01-25 | End: 2024-01-25

## 2024-01-25 RX ORDER — POTASSIUM CHLORIDE 7.45 MG/ML
10 INJECTION INTRAVENOUS PRN
Status: DISCONTINUED | OUTPATIENT
Start: 2024-01-25 | End: 2024-01-29 | Stop reason: HOSPADM

## 2024-01-25 RX ORDER — SODIUM CHLORIDE 9 MG/ML
INJECTION, SOLUTION INTRAVENOUS PRN
Status: DISCONTINUED | OUTPATIENT
Start: 2024-01-25 | End: 2024-01-29 | Stop reason: HOSPADM

## 2024-01-25 RX ORDER — SODIUM CHLORIDE 0.9 % (FLUSH) 0.9 %
5-40 SYRINGE (ML) INJECTION EVERY 12 HOURS SCHEDULED
Status: DISCONTINUED | OUTPATIENT
Start: 2024-01-25 | End: 2024-01-29 | Stop reason: HOSPADM

## 2024-01-25 RX ORDER — BISACODYL 10 MG
10 SUPPOSITORY, RECTAL RECTAL DAILY PRN
Status: DISCONTINUED | OUTPATIENT
Start: 2024-01-25 | End: 2024-01-29 | Stop reason: HOSPADM

## 2024-01-25 RX ORDER — SODIUM CHLORIDE 0.9 % (FLUSH) 0.9 %
5-40 SYRINGE (ML) INJECTION PRN
Status: DISCONTINUED | OUTPATIENT
Start: 2024-01-25 | End: 2024-01-29 | Stop reason: HOSPADM

## 2024-01-25 RX ORDER — ONDANSETRON 4 MG/1
4 TABLET, ORALLY DISINTEGRATING ORAL EVERY 8 HOURS PRN
Status: DISCONTINUED | OUTPATIENT
Start: 2024-01-25 | End: 2024-01-29 | Stop reason: HOSPADM

## 2024-01-25 RX ORDER — MAGNESIUM SULFATE IN WATER 40 MG/ML
2000 INJECTION, SOLUTION INTRAVENOUS PRN
Status: DISCONTINUED | OUTPATIENT
Start: 2024-01-25 | End: 2024-01-29 | Stop reason: HOSPADM

## 2024-01-25 RX ORDER — POTASSIUM CHLORIDE 20 MEQ/1
40 TABLET, EXTENDED RELEASE ORAL PRN
Status: DISCONTINUED | OUTPATIENT
Start: 2024-01-25 | End: 2024-01-29 | Stop reason: HOSPADM

## 2024-01-25 RX ORDER — SODIUM CHLORIDE 9 MG/ML
INJECTION, SOLUTION INTRAVENOUS CONTINUOUS
Status: DISCONTINUED | OUTPATIENT
Start: 2024-01-25 | End: 2024-01-27

## 2024-01-25 RX ORDER — POLYETHYLENE GLYCOL 3350 17 G/17G
17 POWDER, FOR SOLUTION ORAL DAILY PRN
Status: DISCONTINUED | OUTPATIENT
Start: 2024-01-25 | End: 2024-01-29 | Stop reason: HOSPADM

## 2024-01-25 RX ORDER — ACETAMINOPHEN 650 MG/1
650 SUPPOSITORY RECTAL EVERY 6 HOURS PRN
Status: DISCONTINUED | OUTPATIENT
Start: 2024-01-25 | End: 2024-01-29 | Stop reason: HOSPADM

## 2024-01-25 RX ORDER — SODIUM CHLORIDE 9 MG/ML
INJECTION, SOLUTION INTRAVENOUS CONTINUOUS
Status: DISCONTINUED | OUTPATIENT
Start: 2024-01-25 | End: 2024-01-25

## 2024-01-25 RX ORDER — ACETAMINOPHEN 325 MG/1
650 TABLET ORAL EVERY 6 HOURS PRN
Status: DISCONTINUED | OUTPATIENT
Start: 2024-01-25 | End: 2024-01-29 | Stop reason: HOSPADM

## 2024-01-25 RX ORDER — CARVEDILOL 6.25 MG/1
6.25 TABLET ORAL 2 TIMES DAILY WITH MEALS
Status: DISCONTINUED | OUTPATIENT
Start: 2024-01-25 | End: 2024-01-29 | Stop reason: HOSPADM

## 2024-01-25 RX ORDER — ASCORBIC ACID 500 MG
500 TABLET ORAL 2 TIMES DAILY
COMMUNITY

## 2024-01-25 RX ORDER — PANTOPRAZOLE SODIUM 40 MG/1
40 TABLET, DELAYED RELEASE ORAL
Status: DISCONTINUED | OUTPATIENT
Start: 2024-01-26 | End: 2024-01-29 | Stop reason: HOSPADM

## 2024-01-25 RX ORDER — ONDANSETRON 2 MG/ML
4 INJECTION INTRAMUSCULAR; INTRAVENOUS EVERY 6 HOURS PRN
Status: DISCONTINUED | OUTPATIENT
Start: 2024-01-25 | End: 2024-01-29 | Stop reason: HOSPADM

## 2024-01-25 RX ORDER — SERTRALINE HYDROCHLORIDE 25 MG/1
100 TABLET, FILM COATED ORAL DAILY
Status: DISCONTINUED | OUTPATIENT
Start: 2024-01-25 | End: 2024-01-29 | Stop reason: HOSPADM

## 2024-01-25 RX ORDER — ONDANSETRON 2 MG/ML
4 INJECTION INTRAMUSCULAR; INTRAVENOUS ONCE
Status: COMPLETED | OUTPATIENT
Start: 2024-01-25 | End: 2024-01-25

## 2024-01-25 RX ORDER — PREGABALIN 50 MG/1
50 CAPSULE ORAL 3 TIMES DAILY
COMMUNITY

## 2024-01-25 RX ORDER — LANOLIN ALCOHOL/MO/W.PET/CERES
3 CREAM (GRAM) TOPICAL NIGHTLY PRN
Status: DISCONTINUED | OUTPATIENT
Start: 2024-01-25 | End: 2024-01-27

## 2024-01-25 RX ORDER — HYDROXYZINE HYDROCHLORIDE 10 MG/1
10 TABLET, FILM COATED ORAL EVERY 6 HOURS PRN
Status: DISCONTINUED | OUTPATIENT
Start: 2024-01-25 | End: 2024-01-25

## 2024-01-25 RX ORDER — SODIUM CHLORIDE 9 MG/ML
INJECTION, SOLUTION INTRAVENOUS
Status: DISCONTINUED
Start: 2024-01-25 | End: 2024-01-25 | Stop reason: HOSPADM

## 2024-01-25 RX ORDER — HYDROXYZINE HYDROCHLORIDE 10 MG/1
10 TABLET, FILM COATED ORAL EVERY 8 HOURS PRN
Status: DISCONTINUED | OUTPATIENT
Start: 2024-01-25 | End: 2024-01-29 | Stop reason: HOSPADM

## 2024-01-25 RX ORDER — ENOXAPARIN SODIUM 100 MG/ML
40 INJECTION SUBCUTANEOUS DAILY
Status: DISCONTINUED | OUTPATIENT
Start: 2024-01-25 | End: 2024-01-29 | Stop reason: HOSPADM

## 2024-01-25 RX ORDER — METHENAMINE HIPPURATE 1000 MG/1
1 TABLET ORAL 2 TIMES DAILY WITH MEALS
Status: DISCONTINUED | OUTPATIENT
Start: 2024-01-25 | End: 2024-01-25

## 2024-01-25 RX ORDER — VALSARTAN 80 MG/1
20 TABLET ORAL DAILY
Status: DISCONTINUED | OUTPATIENT
Start: 2024-01-25 | End: 2024-01-25

## 2024-01-25 RX ORDER — SODIUM CHLORIDE, SODIUM LACTATE, POTASSIUM CHLORIDE, CALCIUM CHLORIDE 600; 310; 30; 20 MG/100ML; MG/100ML; MG/100ML; MG/100ML
INJECTION, SOLUTION INTRAVENOUS ONCE
Status: DISCONTINUED | OUTPATIENT
Start: 2024-01-25 | End: 2024-01-25 | Stop reason: HOSPADM

## 2024-01-25 RX ORDER — MEROPENEM 1 G/1
1000 INJECTION, POWDER, FOR SOLUTION INTRAVENOUS ONCE
Status: DISCONTINUED | OUTPATIENT
Start: 2024-01-25 | End: 2024-01-25

## 2024-01-25 RX ORDER — ASPIRIN 81 MG/1
81 TABLET ORAL DAILY
Status: DISCONTINUED | OUTPATIENT
Start: 2024-01-25 | End: 2024-01-29 | Stop reason: HOSPADM

## 2024-01-25 RX ADMIN — SODIUM CHLORIDE, PRESERVATIVE FREE 10 ML: 5 INJECTION INTRAVENOUS at 19:39

## 2024-01-25 RX ADMIN — ONDANSETRON 4 MG: 2 INJECTION INTRAMUSCULAR; INTRAVENOUS at 03:47

## 2024-01-25 RX ADMIN — ENOXAPARIN SODIUM 40 MG: 100 INJECTION SUBCUTANEOUS at 17:52

## 2024-01-25 RX ADMIN — SODIUM CHLORIDE: 9 INJECTION, SOLUTION INTRAVENOUS at 12:20

## 2024-01-25 RX ADMIN — CARVEDILOL 6.25 MG: 6.25 TABLET, FILM COATED ORAL at 17:52

## 2024-01-25 RX ADMIN — MEROPENEM 1000 MG: 1 INJECTION, POWDER, FOR SOLUTION INTRAVENOUS at 18:00

## 2024-01-25 RX ADMIN — ASPIRIN 81 MG: 81 TABLET, COATED ORAL at 17:52

## 2024-01-25 RX ADMIN — MEROPENEM 1000 MG: 1 INJECTION, POWDER, FOR SOLUTION INTRAVENOUS at 07:37

## 2024-01-25 RX ADMIN — SERTRALINE HYDROCHLORIDE 100 MG: 25 TABLET ORAL at 17:52

## 2024-01-25 RX ADMIN — HYDROXYZINE HYDROCHLORIDE 10 MG: 10 TABLET, FILM COATED ORAL at 20:37

## 2024-01-25 NOTE — PROGRESS NOTES
[facility] - Rehabilitation Department      Physical Therapy    [x] Initial Evaluation            [] Daily Treatment Note         [x] Discharge Summary      Patient: Zia Garrett   : 1935   MRN: [id number]   Date of Service:  2024  Admitting Diagnosis: UTI (urinary tract infection)  Current Admission Summary: 2024 H&P per Dr. Mike Whalen,\"Zia Garrett is a 88 y.o.  male with past medical history of CAD, chronic combined CHF, COPD, dementia, depression, gout, hypertension, skin cancer sent from nursing facility to [facility] ED for evaluation of persistent nausea/vomiting/diarrhea.  Patient reportedly also tested positive for influenza at the nursing facility.  Patient is a poor historian secondary to dementia.  Upon arrival in the emergency room, urinalysis was suggestive of UTI.  Given history of ESBL E. coli, given 1 dose of IV IV meropenem. WBC elevated at 16.6, hemoglobin/hematocrit stable at 11.7/36.4, platelets stable at 213 BUN/creatinine slightly elevated at 36/1.5.  Troponin slightly elevated at 24, 25.   Assessment   Abnormal urinalysis suggestive of UTI  History of ESBL E. coli UTI  Nausea/vomiting/diarrhea-likely secondary to viral disease.  C. difficile rule out.  Chronic combined CHF-compensated  PILAR-likely prerenal  COPD-not in exacerbation  Dementia  Hypertension\"  Past Medical History:  has a past medical history of Atherosclerotic heart disease, CAD (coronary artery disease), Cancer (HCC), CHF (congestive heart failure) (HCC), COPD (chronic obstructive pulmonary disease) (HCC), Dementia (HCC), Depression, Duodenal ulcer without hemorrhage or perforation, Falls frequently, GERD without esophagitis, Hernia, Hyperlipidemia, Hypertension, Hypokalemia, Lack of coordination, Melanoma (HCC), MI (myocardial infarction) (HCC), Muscle weakness (generalized), Protein calorie malnutrition (HCC), Reflux, Sepsis (HCC), Spinal stenosis, and UTI (urinary tract infection).  Past

## 2024-01-25 NOTE — PROGRESS NOTES
Medication Reconciliation    List of medications patient is currently taking is complete.     Source of information: 1. Conversation with [facility], nurse Shruthi                                      2. EPIC records      Allergies  Fluoxetine, Benazepril, Fluoxetine hcl, Hydrocodone, Morphine, Penicillins, Simvastatin, Morphine and related, and Sucralfate     Notes regarding home medications:   1. Doses updated to match current SNF orders.  2. Change recommendations approved by Dr. Whalen.     Kristopher StubbsD, BCPS

## 2024-01-25 NOTE — PROGRESS NOTES
4 Eyes Skin Assessment     NAME:  Zia Garrett  YOB: 1935  MEDICAL RECORD NUMBER:  [id number]    The patient is being assessed for  Admission    I agree that at least one RN has performed a thorough Head to Toe Skin Assessment on the patient. ALL assessment sites listed below have been assessed.        Areas assessed by both nurses:    Head, Face, Ears, Shoulders, Back, Chest, Arms, Elbows, Hands, Sacrum. Buttock, Coccyx, Ischium, Legs. Feet and Heels, and Under Medical Devices                           Does the Patient have a Wound? Yes wound(s) were present on assessment. LDA wound assessment was Initiated and completed by RN       Bridger Prevention initiated by RN: Yes  Wound Care Orders initiated by RN: Yes    Pressure Injury (Stage 3,4, Unstageable, DTI, NWPT, and Complex wounds) if present, place Wound referral order by RN under : Yes    New Ostomies, if present place, Ostomy referral order under : No     Nurse 1 eSignature: Electronically signed by Shahla Jones RN on 1/25/24 at 6:21 PM EST    **SHARE this note so that the co-signing nurse can place an eSignature**    Nurse 2 eSignature: Electronically signed by Martha Chow RN on 1/25/24 at 6:22 PM EST

## 2024-01-25 NOTE — PROGRESS NOTES
[facility]    Pharmacy Renal Dosing and Extended Infusion Beta-Lactam Adjustment Communication    Meropenem ordered for treatment of ESBL UTI. Per [facility] Renal Dose Adjustment Policy and Extended Infusion Beta-Lactam Policy, meropenem will be changed to 1000 mg every 12 hours EI.     Estimated Creatinine Clearance: Estimated Creatinine Clearance: 35 mL/min (A) (based on SCr of 1.5 mg/dL (H)).  Dialysis Status, PILAR, CKD: None  BMI: Body mass index is 23.56 kg/m².    Rationale for Adjustment: Agent is renally eliminated and demonstrates time-dependent effect on bacterial eradication. Extended-infusion dosing strategy aims to enhance microbiologic and clinical efficacy.    Pharmacy will continue to monitor renal function, cultures and sensitivities (where available) and adjust dose as necessary.      Please call with any questions.    Mihir Barrientos PharmD  [facility]   [id number]  1/25/2024   11:48 AM

## 2024-01-25 NOTE — PROGRESS NOTES
Patient admitted to room 3221. Alert to self only. No complaints of pain. Full admission and assessment complete. Patient placed on remote telemetry. Patient soiled in stool and urine on admission. Incontinent of both. External catheter placed for urinary incontinence. 4 eyes completed with Martha Chow RN. Patient with scattered abrasions. Stage 3 to left heel. Blisters to penis and scrotum. Excoriation to scrotum. Bed locked in lowest position. Call light in reach. Bed alarm on.

## 2024-01-25 NOTE — PLAN OF CARE
[facility] - Rehabilitation Department       Occupational Therapy    [x] Initial Evaluation            [x] Daily Treatment Note         [x] Discharge Summary      Patient: Zia Garrett   : 1935   MRN: [id number]   Date of Service:  2024    Admitting Diagnosis:  UTI (urinary tract infection)  Current Admission Summary: 88 y.o.  male with past medical history of CAD, chronic combined CHF, COPD, dementia, depression, gout, hypertension, skin cancer sent from nursing facility to [facility] ED for evaluation of persistent nausea/vomiting/diarrhea.  Patient reportedly also tested positive for influenza at the nursing facility.  Patient is a poor historian secondary to dementia.  Upon arrival in the emergency room, urinalysis was suggestive of UTI.   Past Medical History:  has a past medical history of Atherosclerotic heart disease, CAD (coronary artery disease), Cancer (HCC), CHF (congestive heart failure) (HCC), COPD (chronic obstructive pulmonary disease) (HCC), Dementia (HCC), Depression, Duodenal ulcer without hemorrhage or perforation, Falls frequently, GERD without esophagitis, Hernia, Hyperlipidemia, Hypertension, Hypokalemia, Lack of coordination, Melanoma (HCC), MI (myocardial infarction) (HCC), Muscle weakness (generalized), Protein calorie malnutrition (HCC), Reflux, Sepsis (HCC), Spinal stenosis, and UTI (urinary tract infection).  Past Surgical History:  has a past surgical history that includes Coronary angioplasty with stent; Cholecystectomy; Prostate biopsy; skin biopsy; Upper gastrointestinal endoscopy (12); Colonoscopy (8/10/12); Upper gastrointestinal endoscopy (N/A, 2019); Upper gastrointestinal endoscopy (N/A, 2019); Upper gastrointestinal endoscopy (N/A, 3/16/2023); IR MIDLINE CATH (2023); and IR MIDLINE CATH (10/18/2023).      Discharge Recommendations: Retn to LTC     Therapy discharge recommendations take into account each patient's current medical

## 2024-01-25 NOTE — ED NOTES
Bedside report given to [employer]. VSS. SL and monitor in place. No s/s of distress/discomfort. Patient being transferred to [place] at this time.

## 2024-01-25 NOTE — ED NOTES
Pt had soft BM, RN cleaned patient up and changed coccyx dressing. DTI on coccyx blanches, no open skin.

## 2024-01-25 NOTE — ED PROVIDER NOTES
Emergency Department Encounter    Patient: Zia Garrett  MRN: [id number]  : 1935  Date of Evaluation: 2024  ED Provider:  Moe Alonzo MD    Triage Chief Complaint:   Influenza (Pt from SNF where he was dx with influenza per SNF report. Pt presents vomiting, alert to person only, hx of dementia.)    [place]:  Zia Garrett is a 88 y.o. male presenting from skilled nursing facility with complaints of nausea vomiting diarrhea.  Per nursing facility patient is at his baseline mental status alert and oriented to person only.  Patient has a history of dementia.  [facility] patient has not had any falls but over the past hour patient has had several episodes of nausea vomiting diarrhea.  States they are nonbloody.  [facility] patient was also complaining of abdominal pain/discomfort.  Otherwise history is limited states patient is unable to provide history.  Patient denies headache, visual changes, chest pain or shortness of breath but unsure how accurate this is history is    ROS - see HPI, below listed is current ROS at time of my eval:  Unable to perform full review of systems secondary to patient dementia    Past Medical History:   Diagnosis Date    Atherosclerotic heart disease     CAD (coronary artery disease)     Cancer (HCC)     skin    CHF (congestive heart failure) (HCC)     COPD (chronic obstructive pulmonary disease) (HCC)     Dementia (HCC)     Depression     Duodenal ulcer without hemorrhage or perforation     Falls frequently     GERD without esophagitis     Hernia     Hyperlipidemia     Hypertension     Hypokalemia     Lack of coordination     falls    Melanoma (HCC)     MI (myocardial infarction) (HCC)     Muscle weakness (generalized)     Protein calorie malnutrition (HCC)     Reflux     Sepsis (HCC)     Spinal stenosis     UTI (urinary tract infection)      Past Surgical History:   Procedure Laterality Date    CHOLECYSTECTOMY      COLONOSCOPY  8/10/12    CORONARY ANGIOPLASTY WITH STENT

## 2024-01-25 NOTE — CARE COORDINATION
Case Management Assessment  Initial Evaluation    Date/Time of Evaluation: 1/25/2024 11:26 AM  Assessment Completed by: Dominga Adames    If patient is discharged prior to next notation, then this note serves as note for discharge by case management.    Patient Name: Zia Garrett                   YOB: 1935  Diagnosis: UTI (urinary tract infection) [N39.0]                   Date / Time: 1/25/2024 10:49 AM    Patient Admission Status: Inpatient   Readmission Risk (Low < 19, Mod (19-27), High > 27): Readmission Risk Score: 18.1    Current PCP: Omar Schofield MD  PCP verified by CM? Yes (facility)    Chart Reviewed: Yes      History Provided by: Other (see comment), Medical Record (Marina from [facility])  Patient Orientation: Alert and Oriented    Patient Cognition: Dementia / Early Alzheimer's    Hospitalization in the last 30 days (Readmission):  No    If yes, Readmission Assessment in  Navigator will be completed.    Advance Directives:      Code Status: Prior   Patient's Primary Decision Maker is: Legal Next of Kin    Primary Decision Maker: aGbi Still - Legal Guardian, Legal Guardian - [phone]    Discharge Planning:    Patient lives with: Alone, Other (Comment) (in facility) Type of Home: Long-Term Care  Primary Care Giver: Other (Comment) (LTC staff)  Patient Support Systems include: Children (LTC staff)   Current Financial resources: Medicaid, Medicare  Current community resources: ECF/Home Care, Transportation  Current services prior to admission: Durable Medical Equipment            Current DME: Wheelchair, Walker, Bedside Commode            Type of Home Care services:  None    ADLS  Prior functional level: Assistance with the following:, Bathing, Dressing, Toileting, Cooking, Housework, Shopping, Mobility  Current functional level: Assistance with the following:, Bathing, Dressing, Toileting, Feeding, Cooking, Housework, Shopping, Mobility    PT AM-PAC:   /24  OT AM-PAC:

## 2024-01-25 NOTE — H&P
History and Physical      Name:  Zia Garrett /Age/Sex: 1935  (88 y.o. male)   MRN & CSN:  [id number] & [id number] Admission Date/Time: 2024 10:49 AM   Location:  3221/3221-01 PCP: Omar Schofield MD       Hospital Day: 1    Assessment and Plan:   Zia Garrett is a 88 y.o.  male with past medical history of CAD, chronic combined CHF, COPD, dementia, depression, gout, hypertension, skin cancer sent from nursing facility to [facility] ED for evaluation of persistent nausea/vomiting/diarrhea.  Patient reportedly also tested positive for influenza at the nursing facility.  Patient is a poor historian secondary to dementia.  Upon arrival in the emergency room, urinalysis was suggestive of UTI.  Given history of ESBL E. coli, given 1 dose of IV IV meropenem. WBC elevated at 16.6, hemoglobin/hematocrit stable at 11.7/36.4, platelets stable at 213 BUN/creatinine slightly elevated at 36/1.5.  Troponin slightly elevated at 24, 25.    Assessment    Abnormal urinalysis suggestive of UTI  History of ESBL E. coli UTI  Nausea/vomiting/diarrhea-likely secondary to viral disease.  C. difficile rule out.  Chronic combined CHF-compensated  PILAR-likely prerenal  COPD-not in exacerbation  Dementia  Hypertension    Plan    Given history of recurrent UTIs/ESBL E. coli, will start on IV meropenem  Follow urine cultures/blood cultures times  Check for C. difficile  Continue gentle IV hydration  Baseline serum creatinine 0.9-1.1.  Creatinine upon admission 1.5.  Monitor renal function.  Hold valsartan.  Continue aspirin/carvedilol  Continue sertraline  Patient tested negative for influenza A/B, rapid COVID-19      Diet ADULT ORAL NUTRITION SUPPLEMENT; Breakfast, Lunch, Dinner; Standard High Calorie/High Protein Oral Supplement  ADULT DIET; Easy to Chew   DVT Prophylaxis [x] Lovenox, []  Heparin, [] SCDs, [] Ambulation   GI Prophylaxis [] PPI,  [] H2 Blocker,  [] Carafate,  [] Diet/Tube Feeds   Code Status Prior    Disposition Patient requires continued admission due to IV antibiotics   MDM [] Low, [] Moderate,[x]  High  Patient's risk as above      History of Present Illness:     Chief Complaint: UTI (urinary tract infection)  Zia Garrett is a 88 y.o.  male with past medical history of CAD, chronic combined CHF, COPD, dementia, depression, gout, hypertension, skin cancer sent from nursing facility to [facility] ED for evaluation of persistent nausea/vomiting/diarrhea.  Patient reportedly also tested positive for influenza at the nursing facility.  Patient is a poor historian secondary to dementia.  History obtained with help of chart review.  Patient does have history of ESBL E. coli.  No reported fever, chills, cough, shortness of breath, chest pain.  No reported syncopal episodes.    Objective:   No intake or output data in the 24 hours ending 01/25/24 1101   Vitals:   Vitals:    01/25/24 1100   BP: (!) 117/51   Pulse: 67   Resp: 18   Temp: 98.1 °F (36.7 °C)   SpO2: 98%     Physical Exam:   General-no acute distress  Respiratory-bilateral entry fair  CVS-S1-S2, RRR  Abdomen-soft, nontender, nondistended  CNS-AAO x 1-2, no gross focal deficit noted    Past Medical History:      Past Medical History:   Diagnosis Date    Atherosclerotic heart disease     CAD (coronary artery disease)     Cancer (HCC)     skin    CHF (congestive heart failure) (HCC)     COPD (chronic obstructive pulmonary disease) (HCC)     Dementia (HCC)     Depression     Duodenal ulcer without hemorrhage or perforation     Falls frequently     GERD without esophagitis     Hernia     Hyperlipidemia     Hypertension     Hypokalemia     Lack of coordination     falls    Melanoma (HCC)     MI (myocardial infarction) (HCC)     Muscle weakness (generalized)     Protein calorie malnutrition (HCC)     Reflux     Sepsis (HCC)     Spinal stenosis     UTI (urinary tract infection)      PSHX:  has a past surgical history that includes Coronary angioplasty with stent;

## 2024-01-26 PROBLEM — E43 SEVERE MALNUTRITION (HCC): Status: ACTIVE | Noted: 2024-01-26

## 2024-01-26 LAB
ANION GAP SERPL CALCULATED.3IONS-SCNC: 7 MMOL/L (ref 3–16)
BASOPHILS # BLD: 0.02 K/UL (ref 0–0.2)
BASOPHILS NFR BLD: 0 %
BUN SERPL-MCNC: 32 MG/DL (ref 7–20)
CALCIUM SERPL-MCNC: 8.7 MG/DL (ref 8.3–10.6)
CHLORIDE SERPL-SCNC: 110 MMOL/L (ref 99–110)
CO2 SERPL-SCNC: 26 MMOL/L (ref 21–32)
CREAT SERPL-MCNC: 1.5 MG/DL (ref 0.8–1.3)
EOSINOPHIL # BLD: 0.82 K/UL (ref 0–0.6)
EOSINOPHILS RELATIVE PERCENT: 14 %
ERYTHROCYTE [DISTWIDTH] IN BLOOD BY AUTOMATED COUNT: 14.5 % (ref 12.4–15.4)
GFR SERPL CREATININE-BSD FRML MDRD: 47 ML/MIN/1.73M2
GLUCOSE SERPL-MCNC: 89 MG/DL (ref 70–99)
HCT VFR BLD AUTO: 33.2 % (ref 40.5–52.5)
HGB BLD-MCNC: 10.5 G/DL (ref 13.5–17.5)
IMM GRANULOCYTES # BLD AUTO: 0.01 K/UL (ref 0–0.5)
IMM GRANULOCYTES NFR BLD: 0 %
LYMPHOCYTES NFR BLD: 0.74 K/UL (ref 1–5.1)
LYMPHOCYTES RELATIVE PERCENT: 13 %
MCH RBC QN AUTO: 28.5 PG (ref 26–34)
MCHC RBC AUTO-ENTMCNC: 31.6 G/DL (ref 31–36)
MCV RBC AUTO: 90.2 FL (ref 80–100)
MONOCYTES NFR BLD: 0.53 K/UL (ref 0–1.3)
MONOCYTES NFR BLD: 9 %
NEUTROPHILS NFR BLD: 64 %
NEUTS SEG NFR BLD: 3.76 K/UL (ref 1.7–7.7)
PLATELET # BLD AUTO: 149 K/UL (ref 135–450)
PMV BLD AUTO: 9.7 FL
POTASSIUM SERPL-SCNC: 4.5 MMOL/L (ref 3.5–5.1)
RBC # BLD AUTO: 3.68 M/UL (ref 4.2–5.9)
SODIUM SERPL-SCNC: 143 MMOL/L (ref 136–145)
WBC OTHER # BLD: 5.9 K/UL (ref 4–11)

## 2024-01-26 PROCEDURE — 2580000003 HC RX 258: Performed by: INTERNAL MEDICINE

## 2024-01-26 PROCEDURE — 6370000000 HC RX 637 (ALT 250 FOR IP): Performed by: INTERNAL MEDICINE

## 2024-01-26 PROCEDURE — 1200000000 HC SEMI PRIVATE

## 2024-01-26 PROCEDURE — 6360000002 HC RX W HCPCS: Performed by: INTERNAL MEDICINE

## 2024-01-26 PROCEDURE — 87040 BLOOD CULTURE FOR BACTERIA: CPT

## 2024-01-26 PROCEDURE — 36415 COLL VENOUS BLD VENIPUNCTURE: CPT

## 2024-01-26 PROCEDURE — 85025 COMPLETE CBC W/AUTO DIFF WBC: CPT

## 2024-01-26 PROCEDURE — 80048 BASIC METABOLIC PNL TOTAL CA: CPT

## 2024-01-26 RX ORDER — HALOPERIDOL 5 MG/ML
1 INJECTION INTRAMUSCULAR ONCE
Status: COMPLETED | OUTPATIENT
Start: 2024-01-26 | End: 2024-01-26

## 2024-01-26 RX ADMIN — ASPIRIN 81 MG: 81 TABLET, COATED ORAL at 08:06

## 2024-01-26 RX ADMIN — MEROPENEM 1000 MG: 1 INJECTION, POWDER, FOR SOLUTION INTRAVENOUS at 17:51

## 2024-01-26 RX ADMIN — MEROPENEM 1000 MG: 1 INJECTION, POWDER, FOR SOLUTION INTRAVENOUS at 06:08

## 2024-01-26 RX ADMIN — PANTOPRAZOLE SODIUM 40 MG: 40 TABLET, DELAYED RELEASE ORAL at 06:04

## 2024-01-26 RX ADMIN — SERTRALINE HYDROCHLORIDE 100 MG: 25 TABLET ORAL at 08:06

## 2024-01-26 RX ADMIN — CARVEDILOL 6.25 MG: 6.25 TABLET, FILM COATED ORAL at 17:41

## 2024-01-26 RX ADMIN — ENOXAPARIN SODIUM 40 MG: 100 INJECTION SUBCUTANEOUS at 08:06

## 2024-01-26 RX ADMIN — SODIUM CHLORIDE: 9 INJECTION, SOLUTION INTRAVENOUS at 17:41

## 2024-01-26 RX ADMIN — HALOPERIDOL LACTATE 1 MG: 5 INJECTION, SOLUTION INTRAMUSCULAR at 18:51

## 2024-01-26 ASSESSMENT — PAIN SCALES - GENERAL: PAINLEVEL_OUTOF10: 4

## 2024-01-26 ASSESSMENT — PAIN DESCRIPTION - DESCRIPTORS: DESCRIPTORS: ACHING

## 2024-01-26 ASSESSMENT — PAIN DESCRIPTION - ORIENTATION: ORIENTATION: MID;RIGHT

## 2024-01-26 ASSESSMENT — PAIN - FUNCTIONAL ASSESSMENT: PAIN_FUNCTIONAL_ASSESSMENT: ACTIVITIES ARE NOT PREVENTED

## 2024-01-26 ASSESSMENT — PAIN DESCRIPTION - LOCATION: LOCATION: BACK;LEG

## 2024-01-26 NOTE — DISCHARGE INSTR - COC
Continuity of Care Form    Patient Name: Zia Garrett   :  1935  MRN:  [id number]    Admit date:  2024  Discharge date:  2024      Code Status Order: Full Code   Advance Directives:     Admitting Physician:  Mike Whalen MD  PCP: Omar Schofield MD    Discharging Nurse: Martha Chow RN    Discharging Hospital Unit/Room#: 3203/3203-01  Discharging Unit Phone Number: [phone]    Emergency Contact:   Extended Emergency Contact Information  Primary Emergency Contact: Gabi Still  Home Phone: [phone]  Mobile Phone: [phone]  Relation: Legal Guardian  Preferred language: English   needed? No  Secondary Emergency Contact: Nancy Urbina  Home Phone: [phone]  Mobile Phone: [phone]  Relation: Child   needed? No    Past Surgical History:  Past Surgical History:   Procedure Laterality Date    CHOLECYSTECTOMY      COLONOSCOPY  8/10/12    CORONARY ANGIOPLASTY WITH STENT PLACEMENT      IR MIDLINE CATH  2023    IR MIDLINE CATH 2023 [facility] SPECIAL PROCEDURES    IR MIDLINE CATH  10/18/2023    IR MIDLINE CATH 10/18/2023 [facility] SPECIAL PROCEDURES    PROSTATE BIOPSY      SKIN BIOPSY      UPPER GASTROINTESTINAL ENDOSCOPY  12    UPPER GASTROINTESTINAL ENDOSCOPY N/A 2019    EGD BIOPSY performed by Michael Ascencio DO at [facility] ENDOSCOPY    UPPER GASTROINTESTINAL ENDOSCOPY N/A 2019    EGD performed by Yash Shaffer MD at [facility] ENDOSCOPY    UPPER GASTROINTESTINAL ENDOSCOPY N/A 3/16/2023    EGD BIOPSY performed by Yash Shaffer MD at [facility] ENDOSCOPY       Immunization History:   Immunization History   Administered Date(s) Administered    COVID-19, PFIZER PURPLE top, DILUTE for use, (age 12 y+), 30mcg/0.3mL 2021, 2021, 02/15/2022    Influenza A (L3V2-95) Vaccine PF IM 2009    Influenza Virus Vaccine 2009, 2010    Influenza, High Dose (Fluzone 65 yrs and older) 2011, 2013

## 2024-01-26 NOTE — PROGRESS NOTES
Hospitalist Progress Note      Name:  Zia Garrett /Age/Sex: 1935  (88 y.o. male)   MRN & CSN:  [id number] & [id number] Admission Date/Time: 2024 10:49 AM   Location:  3203/3203-01 PCP: Omar Schofield MD         Hospital Day: 2    Assessment and Plan:   Zia Garrett is a 88 y.o.  male with past medical history of CAD, chronic combined CHF, COPD, dementia, depression, gout, hypertension, skin cancer sent from nursing facility to [facility] ED for evaluation of persistent nausea/vomiting/diarrhea.  Patient reportedly also tested positive for influenza at the nursing facility.  Patient is a poor historian secondary to dementia.  Upon arrival in the emergency room, urinalysis was suggestive of UTI.  Given history of ESBL E. coli, given 1 dose of IV IV meropenem. WBC elevated at 16.6, hemoglobin/hematocrit stable at 11.7/36.4, platelets stable at 213 BUN/creatinine slightly elevated at 36/1.5.  Troponin slightly elevated at 24, 25.     Assessment     Abnormal urinalysis suggestive of UTI  History of ESBL E. coli UTI  Nausea/vomiting/diarrhea-likely secondary to viral disease.  C. difficile rule out.  Chronic combined CHF-compensated  PILAR-likely prerenal  COPD-not in exacerbation  Dementia  Hypertension     Plan     Given history of recurrent UTIs/ESBL E. coli, Continue on IV meropenem  Follow urine cultures/blood cultures x 2   Continue gentle IV hydration  Baseline serum creatinine 0.9-1.1.  Creatinine upon admission 1.5.  Monitor renal function.  Hold valsartan.  Continue aspirin/carvedilol(with holding parameters). BP Soft this AM  Continue sertraline  Patient tested negative for influenza A/B, rapid COVID-19        Diet ADULT ORAL NUTRITION SUPPLEMENT; Breakfast, Lunch, Dinner; Standard High Calorie/High Protein Oral Supplement  ADULT DIET; Easy to Chew   DVT Prophylaxis [x] Lovenox, []  Heparin, [] SCDs, [] Ambulation   GI Prophylaxis [] PPI,  [] H2 Blocker,  [] Carafate,  [] Diet/Tube  Feeds   Code Status Prior   Disposition Patient requires continued admission due to IV antibiotics   MDM [] Low, [] Moderate,[x]  High  Patient's risk as above          History of Present Illness:     Chief Complaint: UTI (urinary tract infection)    Patient seen and examined at bedside. No acute overnight events noted. Denies any acute concerns. Pleasantly confused.       Objective:     Intake/Output Summary (Last 24 hours) at 1/26/2024 0932  Last data filed at 1/25/2024 2133  Gross per 24 hour   Intake 480 ml   Output 775 ml   Net -295 ml      Vitals:   Vitals:    01/26/24 0730   BP: (!) 95/49   Pulse: 58   Resp: 16   Temp: 98.2 °F (36.8 °C)   SpO2: 95%     Physical Exam:   General-no acute distress  Respiratory-bilateral entry fair  CVS-S1-S2, RRR  Abdomen-soft, nontender, nondistended  CNS-AAO x 1-2, no gross focal deficit noted  Medications:   Medications:    aspirin EC  81 mg Oral Daily    carvedilol  6.25 mg Oral BID WC    pantoprazole  40 mg Oral QAM AC    sertraline  100 mg Oral Daily    sodium chloride flush  5-40 mL IntraVENous 2 times per day    enoxaparin  40 mg SubCUTAneous Daily    meropenem  1,000 mg IntraVENous Q12H      Infusions:    sodium chloride      sodium chloride 50 mL/hr at 01/25/24 1220     PRN Meds: bisacodyl, 10 mg, Daily PRN  magnesium hydroxide, 30 mL, Daily PRN  melatonin, 3 mg, Nightly PRN  sodium chloride flush, 5-40 mL, PRN  sodium chloride, , PRN  potassium chloride, 40 mEq, PRN   Or  potassium alternative oral replacement, 40 mEq, PRN   Or  potassium chloride, 10 mEq, PRN  magnesium sulfate, 2,000 mg, PRN  ondansetron, 4 mg, Q8H PRN   Or  ondansetron, 4 mg, Q6H PRN  polyethylene glycol, 17 g, Daily PRN  acetaminophen, 650 mg, Q6H PRN   Or  acetaminophen, 650 mg, Q6H PRN  hydrOXYzine HCl, 10 mg, Q8H PRN          Electronically signed by Mike Whalen MD on 1/26/2024 at 9:32 AM

## 2024-01-26 NOTE — PROGRESS NOTES
Comprehensive Nutrition Assessment    Type and Reason for Visit:  Positive Nutrition Screen, Wound    Nutrition Recommendations/Plan:   Advance Diet as tolerated, currently on Clear liquid diet.  Continue Ensure Clear TID.  Recommend liberalized diet once oral diet advanced.  Awaiting wound consult.     Malnutrition Assessment:  Malnutrition Status:  Severe malnutrition (01/26/24 0913)    Context:  Chronic Illness     Findings of the 6 clinical characteristics of malnutrition:  Energy Intake:  Unable to assess  Weight Loss:  10% over 6 months     Body Fat Loss:  Severe body fat loss Orbital, Triceps, Buccal region   Muscle Mass Loss:  Severe muscle mass loss Temples (temporalis), Clavicles (pectoralis & deltoids), Hand (interosseous)  Fluid Accumulation:  No significant fluid accumulation     Strength:  Not Performed    Nutrition Assessment:    +nutrition screen/wound. Pt with PMH of HF, HTN, malnutrition, CAD, COPD and dementia who presented with N/V and diarrhea from SNF. Pt sleeping at time of visit. Pt currently on clear liquid diet, switched ONS to appropriate Clear liquid ONS.Pt with increased nutrition needs r/t wound healing, awaiting wound consult for clarification of wounds. Noted pt with 10% wt loss over the past 6 months per EMR and severe fat and muscle wasting noted. Monitor for diet advancement. Recommend liberalized diet once oral diet advanced.    Nutrition Related Findings:    No edema noted; BM 1/25; Glu 133; Wound Type: Wound Consult Pending       Current Nutrition Intake & Therapies:    Average Meal Intake: Unable to assess (clear liquid diet)  Average Supplements Intake: Unable to assess  ADULT DIET; Clear Liquid  ADULT ORAL NUTRITION SUPPLEMENT; Breakfast, Lunch, Dinner; Clear Liquid Oral Supplement    Anthropometric Measures:  Height: 175.3 cm (5' 9\")  Ideal Body Weight (IBW): 160 lbs (73 kg)       Current Body Weight: 70.9 kg (156 lb 4.9 oz), 97.7 % IBW. Weight Source: Bed Scale  Current

## 2024-01-26 NOTE — PLAN OF CARE
Problem: Discharge Planning  Goal: Discharge to home or other facility with appropriate resources  Outcome: Progressing     Problem: Skin/Tissue Integrity  Goal: Absence of new skin breakdown  Description: 1.  Monitor for areas of redness and/or skin breakdown  2.  Assess vascular access sites hourly  3.  Every 4-6 hours minimum:  Change oxygen saturation probe site  4.  Every 4-6 hours:  If on nasal continuous positive airway pressure, respiratory therapy assess nares and determine need for appliance change or resting period.  Outcome: Progressing     Problem: Safety - Adult  Goal: Free from fall injury  Outcome: Progressing     Problem: ABCDS Injury Assessment  Goal: Absence of physical injury  Outcome: Progressing     Problem: Cardiovascular - Adult  Goal: Maintains optimal cardiac output and hemodynamic stability  Outcome: Progressing     Problem: Cardiovascular - Adult  Goal: Absence of cardiac dysrhythmias or at baseline  Outcome: Progressing     Problem: Genitourinary - Adult  Goal: Absence of urinary retention  Outcome: Progressing     Problem: Infection - Adult  Goal: Absence of infection at discharge  Outcome: Progressing  Goal: Absence of infection during hospitalization  Outcome: Progressing  Goal: Absence of fever/infection during anticipated neutropenic period  Outcome: Progressing

## 2024-01-26 NOTE — CARE COORDINATION
Discharge Planning:    Phone call to [facility] to verify if facility can handle IV antibiotics if it is needed. Left message for Marina with call back information.       CALE TolbertN, RN  RN Case Manager       Update: [facility] is able to accommodate patient for IV antibiotic.

## 2024-01-27 PROBLEM — D72.9 NEUTROPHILIA: Status: ACTIVE | Noted: 2024-01-27

## 2024-01-27 PROBLEM — A49.8 INFECTION DUE TO ESBL-PRODUCING ESCHERICHIA COLI: Status: ACTIVE | Noted: 2024-01-27

## 2024-01-27 PROBLEM — N39.0 COMPLICATED UTI (URINARY TRACT INFECTION): Status: ACTIVE | Noted: 2024-01-27

## 2024-01-27 PROBLEM — B99.9 INFECTION REQUIRING CONTACT ISOLATION PRECAUTIONS: Status: ACTIVE | Noted: 2024-01-27

## 2024-01-27 PROBLEM — N39.0 RECURRENT UTI: Status: ACTIVE | Noted: 2024-01-27

## 2024-01-27 PROBLEM — Z16.12 INFECTION DUE TO ESBL-PRODUCING ESCHERICHIA COLI: Status: ACTIVE | Noted: 2024-01-27

## 2024-01-27 PROBLEM — R11.2 NAUSEA AND VOMITING: Status: ACTIVE | Noted: 2024-01-27

## 2024-01-27 LAB
ANION GAP SERPL CALCULATED.3IONS-SCNC: 8 MMOL/L (ref 3–16)
BACTERIA UR CULT: ABNORMAL
BASOPHILS # BLD: 0.02 K/UL (ref 0–0.2)
BASOPHILS NFR BLD: 0 %
BUN SERPL-MCNC: 23 MG/DL (ref 7–20)
CALCIUM SERPL-MCNC: 8.9 MG/DL (ref 8.3–10.6)
CHLORIDE SERPL-SCNC: 107 MMOL/L (ref 99–110)
CO2 SERPL-SCNC: 25 MMOL/L (ref 21–32)
CREAT SERPL-MCNC: 1.2 MG/DL (ref 0.8–1.3)
EOSINOPHIL # BLD: 1.24 K/UL (ref 0–0.6)
EOSINOPHILS RELATIVE PERCENT: 18 %
ERYTHROCYTE [DISTWIDTH] IN BLOOD BY AUTOMATED COUNT: 14.2 % (ref 12.4–15.4)
GFR SERPL CREATININE-BSD FRML MDRD: >60 ML/MIN/1.73M2
GLUCOSE SERPL-MCNC: 96 MG/DL (ref 70–99)
HCT VFR BLD AUTO: 33.8 % (ref 40.5–52.5)
HGB BLD-MCNC: 10.8 G/DL (ref 13.5–17.5)
IMM GRANULOCYTES # BLD AUTO: 0.01 K/UL (ref 0–0.5)
IMM GRANULOCYTES NFR BLD: 0 %
LYMPHOCYTES NFR BLD: 1.4 K/UL (ref 1–5.1)
LYMPHOCYTES RELATIVE PERCENT: 21 %
MCH RBC QN AUTO: 28.3 PG (ref 26–34)
MCHC RBC AUTO-ENTMCNC: 32 G/DL (ref 31–36)
MCV RBC AUTO: 88.7 FL (ref 80–100)
MONOCYTES NFR BLD: 0.76 K/UL (ref 0–1.3)
MONOCYTES NFR BLD: 11 %
NEUTROPHILS NFR BLD: 49 %
NEUTS SEG NFR BLD: 3.34 K/UL (ref 1.7–7.7)
ORGANISM: ABNORMAL
PLATELET # BLD AUTO: 155 K/UL (ref 135–450)
PMV BLD AUTO: 9.5 FL
POTASSIUM SERPL-SCNC: 4.6 MMOL/L (ref 3.5–5.1)
RBC # BLD AUTO: 3.81 M/UL (ref 4.2–5.9)
SODIUM SERPL-SCNC: 141 MMOL/L (ref 136–145)
WBC OTHER # BLD: 6.8 K/UL (ref 4–11)

## 2024-01-27 PROCEDURE — 6370000000 HC RX 637 (ALT 250 FOR IP): Performed by: INTERNAL MEDICINE

## 2024-01-27 PROCEDURE — 99223 1ST HOSP IP/OBS HIGH 75: CPT | Performed by: INTERNAL MEDICINE

## 2024-01-27 PROCEDURE — 36410 VNPNXR 3YR/> PHY/QHP DX/THER: CPT

## 2024-01-27 PROCEDURE — 6360000002 HC RX W HCPCS: Performed by: INTERNAL MEDICINE

## 2024-01-27 PROCEDURE — 80048 BASIC METABOLIC PNL TOTAL CA: CPT

## 2024-01-27 PROCEDURE — 2580000003 HC RX 258: Performed by: INTERNAL MEDICINE

## 2024-01-27 PROCEDURE — 05HB33Z INSERTION OF INFUSION DEVICE INTO RIGHT BASILIC VEIN, PERCUTANEOUS APPROACH: ICD-10-PCS | Performed by: INTERNAL MEDICINE

## 2024-01-27 PROCEDURE — 36415 COLL VENOUS BLD VENIPUNCTURE: CPT

## 2024-01-27 PROCEDURE — 1200000000 HC SEMI PRIVATE

## 2024-01-27 PROCEDURE — 85025 COMPLETE CBC W/AUTO DIFF WBC: CPT

## 2024-01-27 RX ORDER — VALSARTAN 80 MG/1
20 TABLET ORAL NIGHTLY
Status: DISCONTINUED | OUTPATIENT
Start: 2024-01-27 | End: 2024-01-29 | Stop reason: HOSPADM

## 2024-01-27 RX ORDER — MECOBALAMIN 5000 MCG
5 TABLET,DISINTEGRATING ORAL NIGHTLY
Status: DISCONTINUED | OUTPATIENT
Start: 2024-01-27 | End: 2024-01-29 | Stop reason: HOSPADM

## 2024-01-27 RX ORDER — SODIUM CHLORIDE 0.9 % (FLUSH) 0.9 %
5-40 SYRINGE (ML) INJECTION PRN
Status: DISCONTINUED | OUTPATIENT
Start: 2024-01-27 | End: 2024-01-29 | Stop reason: HOSPADM

## 2024-01-27 RX ORDER — SODIUM CHLORIDE 0.9 % (FLUSH) 0.9 %
5-40 SYRINGE (ML) INJECTION EVERY 12 HOURS SCHEDULED
Status: DISCONTINUED | OUTPATIENT
Start: 2024-01-27 | End: 2024-01-29 | Stop reason: HOSPADM

## 2024-01-27 RX ORDER — LIDOCAINE HYDROCHLORIDE 10 MG/ML
5 INJECTION, SOLUTION EPIDURAL; INFILTRATION; INTRACAUDAL; PERINEURAL ONCE
Status: DISCONTINUED | OUTPATIENT
Start: 2024-01-27 | End: 2024-01-29 | Stop reason: HOSPADM

## 2024-01-27 RX ORDER — SODIUM CHLORIDE 9 MG/ML
25 INJECTION, SOLUTION INTRAVENOUS PRN
Status: DISCONTINUED | OUTPATIENT
Start: 2024-01-27 | End: 2024-01-29 | Stop reason: HOSPADM

## 2024-01-27 RX ADMIN — SODIUM CHLORIDE, PRESERVATIVE FREE 10 ML: 5 INJECTION INTRAVENOUS at 20:07

## 2024-01-27 RX ADMIN — ACETAMINOPHEN 650 MG: 325 TABLET ORAL at 19:45

## 2024-01-27 RX ADMIN — SERTRALINE HYDROCHLORIDE 100 MG: 25 TABLET ORAL at 08:20

## 2024-01-27 RX ADMIN — SODIUM CHLORIDE, PRESERVATIVE FREE 10 ML: 5 INJECTION INTRAVENOUS at 06:48

## 2024-01-27 RX ADMIN — ONDANSETRON 4 MG: 2 INJECTION INTRAMUSCULAR; INTRAVENOUS at 20:37

## 2024-01-27 RX ADMIN — MEROPENEM 1000 MG: 1 INJECTION, POWDER, FOR SOLUTION INTRAVENOUS at 20:04

## 2024-01-27 RX ADMIN — MEROPENEM 1000 MG: 1 INJECTION, POWDER, FOR SOLUTION INTRAVENOUS at 06:44

## 2024-01-27 RX ADMIN — SODIUM CHLORIDE, PRESERVATIVE FREE 10 ML: 5 INJECTION INTRAVENOUS at 19:50

## 2024-01-27 RX ADMIN — ENOXAPARIN SODIUM 40 MG: 100 INJECTION SUBCUTANEOUS at 08:19

## 2024-01-27 RX ADMIN — SODIUM CHLORIDE, PRESERVATIVE FREE 10 ML: 5 INJECTION INTRAVENOUS at 20:06

## 2024-01-27 RX ADMIN — ASPIRIN 81 MG: 81 TABLET, COATED ORAL at 08:20

## 2024-01-27 RX ADMIN — CARVEDILOL 6.25 MG: 6.25 TABLET, FILM COATED ORAL at 08:20

## 2024-01-27 RX ADMIN — Medication 5 MG: at 19:45

## 2024-01-27 RX ADMIN — PANTOPRAZOLE SODIUM 40 MG: 40 TABLET, DELAYED RELEASE ORAL at 06:55

## 2024-01-27 RX ADMIN — HYDROXYZINE HYDROCHLORIDE 10 MG: 10 TABLET, FILM COATED ORAL at 19:45

## 2024-01-27 RX ADMIN — CARVEDILOL 6.25 MG: 6.25 TABLET, FILM COATED ORAL at 17:39

## 2024-01-27 ASSESSMENT — PAIN SCALES - GENERAL: PAINLEVEL_OUTOF10: 0

## 2024-01-27 NOTE — PROGRESS NOTES
Called [phone] to request midline/PICC line placement. Information sent to rep from company and will call with ETA.

## 2024-01-27 NOTE — PROGRESS NOTES
Hospitalist Progress Note      Name:  Zia Garrett /Age/Sex: 1935  (88 y.o. male)   MRN & CSN:  [id number] & [id number] Admission Date/Time: 2024 10:49 AM   Location:  3203/3203-01 PCP: Omar Schofield MD         Hospital Day: 3    Assessment and Plan:   Zia Garrett is a 88 y.o.  male with past medical history of CAD, chronic combined CHF, COPD, dementia, depression, gout, hypertension, skin cancer sent from nursing facility to [facility] ED for evaluation of persistent nausea/vomiting/diarrhea.  Patient reportedly also tested positive for influenza at the nursing facility.  Patient is a poor historian secondary to dementia.  Upon arrival in the emergency room, urinalysis was suggestive of UTI.  Given history of ESBL E. coli, given 1 dose of IV IV meropenem. WBC elevated at 16.6, hemoglobin/hematocrit stable at 11.7/36.4, platelets stable at 213 BUN/creatinine slightly elevated at 36/1.5.  Troponin slightly elevated at 24, 25.     Assessment     ESBL E. coli UTI  Nausea/vomiting/diarrhea-improved  Chronic combined CHF-compensated  PILAR-likely prerenal  COPD-not in exacerbation  Dementia  Hypertension     Plan     Urine culture showing ESBL E. coli, Continue on IV meropenem  Infectious disease consult  Blood cultures x 2 shows no growth till date  Continue gentle IV hydration  Baseline serum creatinine 0.9-1.1.  Creatinine upon admission 1.5, improved to 1.2.  Monitor renal function. Resume valsartan.  Continue aspirin/carvedilol(with holding parameters).   Continue sertraline  Patient tested negative for influenza A/B, rapid COVID-19        Diet ADULT ORAL NUTRITION SUPPLEMENT; Breakfast, Lunch, Dinner; Standard High Calorie/High Protein Oral Supplement  ADULT DIET; Easy to Chew   DVT Prophylaxis [x] Lovenox, []  Heparin, [] SCDs, [] Ambulation   GI Prophylaxis [] PPI,  [] H2 Blocker,  [] Carafate,  [] Diet/Tube Feeds   Code Status Prior   Disposition Patient requires continued admission due

## 2024-01-27 NOTE — PROGRESS NOTES
Assessment completed.respirations even and easy skin warm/dry mepilex to sacrum and  bilateral heels for preventive measures  Patient awake, alert, and in bed.  Able to express needs has bouts of confusion , attempts to get up out of bed patient  is unable to ambulate Medications given per MAR. IV fluids infusing per order. No complaints of pain or discomfort at this time. Safety precautions in place. Bed alarm on. Patient has sitter @ bedside  Will continue to monitor.  Call light in reach.

## 2024-01-27 NOTE — CONSULTS
STENT PLACEMENT      IR MIDLINE CATH  7/19/2023    IR MIDLINE CATH 7/19/2023 [facility] PROCEDURES    IR MIDLINE CATH  10/18/2023    IR MIDLINE CATH 10/18/2023 [facility] PROCEDURES    PROSTATE BIOPSY      SKIN BIOPSY      UPPER GASTROINTESTINAL ENDOSCOPY  5/14/12    UPPER GASTROINTESTINAL ENDOSCOPY N/A 6/11/2019    EGD BIOPSY performed by Michael Ascencio DO at [facility] ENDOSCOPY    UPPER GASTROINTESTINAL ENDOSCOPY N/A 8/8/2019    EGD performed by Yash Shaffer MD at [facility] ENDOSCOPY    UPPER GASTROINTESTINAL ENDOSCOPY N/A 3/16/2023    EGD BIOPSY performed by Yash Shaffer MD at [facility] ENDOSCOPY       Current Medications:    Outpatient Medications Marked as Taking for the 1/25/24 encounter (Hospital Encounter)   Medication Sig Dispense Refill    pregabalin (LYRICA) 50 MG capsule Take 1 capsule by mouth 3 times daily. Max Daily Amount: 150 mg      vitamin C (ASCORBIC ACID) 500 MG tablet Take 1 tablet by mouth 2 times daily         Allergies:  Fluoxetine, Benazepril, Fluoxetine hcl, Hydrocodone, Morphine, Penicillins, Simvastatin, Morphine and related, and Sucralfate    Immunizations :   Immunization History   Administered Date(s) Administered    COVID-19, PFIZER PURPLE top, DILUTE for use, (age 12 y+), 30mcg/0.3mL 01/06/2021, 01/27/2021, 02/15/2022    Influenza A (Y8A7-82) Vaccine PF IM 11/30/2009    Influenza Virus Vaccine 11/01/2009, 09/17/2010    Influenza, High Dose (Fluzone 65 yrs and older) 08/01/2011, 09/19/2013    Pneumococcal, PCV-13, PREVNAR 13, (age 6w+), IM, 0.5mL 06/02/2016    Pneumococcal, PPSV23, PNEUMOVAX 23, (age 2y+), SC/IM, 0.5mL 09/01/2009    TDaP, ADACEL (age 10y-64y), BOOSTRIX (age 10y+), IM, 0.5mL 10/18/2011, 10/28/2018    Td vaccine (adult) 06/03/2004         Social History:    Social History     Tobacco Use    Smoking status: Former     Current packs/day: 0.25     Average packs/day: 0.3 packs/day for 10.0 years (2.5 ttl pk-yrs)     Types: Cigarettes     resident  Dementia  Change in mental status  CT abdomen pelvis indicated bilateral nephrolithiasis but no obstruction  Urine analysis very abnormal  Blood cultures so far negative  COVID-19 negative  Rapid flu negative    Unfortunately given the complicated urinary tract infection with ESBL there are no oral options will likely need IV antibiotic at discharge.  CT abdomen pelvis does not show any obstruction.  Per chart review he received IV antibiotics in July and October last year for ESBL E. coli urinary tract infection     Labs, Microbiology, Radiology and pertinent results from current hospitalization and care every where were reviewed by me as a part of the consultation.    PLAN :  1.  Continue IV meropenem 1 g every 12 adjusted to GFR  2.Watch creatinine  3.Trend WBC  4. Contact isolation  5.  Will likely need a PICC line or midline to complete antibiotic therapy  6.  Will be able to choose IV or ertapenem 1 g every 24 to finish the course  7. Will d/w primary team about access and line placement        Discussed with patient/Family and Nursing     Medical Decision Making:  The following items were considered in medical decision making:  Discussion of patient care with other providers  Reviewed clinical lab tests  Reviewed radiology tests  Reviewed other diagnostic tests/interventions  Independent review of radiologic images  Independent review of  Microbiology cultures and other micro tests reviewed     Risk of Complications/Morbidity: High      Illness(es)/ Infection present that pose threat to bodily function.   There is potential for severe exacerbation of infection/side effects of treatment.  Therapy requires intensive monitoring for antimicrobial agent toxicity.     Thanks for allowing me to participate in your patient's care please call me with any questions or concerns.    Dr. Nina Belle MD  Infectious Disease  [facility] Physician  Phone: [phone]   Fax : [phone]

## 2024-01-28 LAB
ANION GAP SERPL CALCULATED.3IONS-SCNC: 9 MMOL/L (ref 3–16)
BASOPHILS # BLD: 0.02 K/UL (ref 0–0.2)
BASOPHILS NFR BLD: 0 %
BUN SERPL-MCNC: 19 MG/DL (ref 7–20)
CALCIUM SERPL-MCNC: 9.2 MG/DL (ref 8.3–10.6)
CHLORIDE SERPL-SCNC: 106 MMOL/L (ref 99–110)
CO2 SERPL-SCNC: 27 MMOL/L (ref 21–32)
CREAT SERPL-MCNC: 1.1 MG/DL (ref 0.8–1.3)
EOSINOPHIL # BLD: 0.65 K/UL (ref 0–0.6)
EOSINOPHILS RELATIVE PERCENT: 11 %
ERYTHROCYTE [DISTWIDTH] IN BLOOD BY AUTOMATED COUNT: 14 % (ref 12.4–15.4)
GFR SERPL CREATININE-BSD FRML MDRD: >60 ML/MIN/1.73M2
GLUCOSE SERPL-MCNC: 109 MG/DL (ref 70–99)
HCT VFR BLD AUTO: 37.3 % (ref 40.5–52.5)
HGB BLD-MCNC: 11.9 G/DL (ref 13.5–17.5)
IMM GRANULOCYTES # BLD AUTO: 0.01 K/UL (ref 0–0.5)
IMM GRANULOCYTES NFR BLD: 0 %
LYMPHOCYTES NFR BLD: 0.97 K/UL (ref 1–5.1)
LYMPHOCYTES RELATIVE PERCENT: 17 %
MCH RBC QN AUTO: 28.5 PG (ref 26–34)
MCHC RBC AUTO-ENTMCNC: 31.9 G/DL (ref 31–36)
MCV RBC AUTO: 89.2 FL (ref 80–100)
MONOCYTES NFR BLD: 0.52 K/UL (ref 0–1.3)
MONOCYTES NFR BLD: 9 %
NEUTROPHILS NFR BLD: 63 %
NEUTS SEG NFR BLD: 3.66 K/UL (ref 1.7–7.7)
PLATELET # BLD AUTO: 165 K/UL (ref 135–450)
PMV BLD AUTO: 9.3 FL
POTASSIUM SERPL-SCNC: 4.1 MMOL/L (ref 3.5–5.1)
RBC # BLD AUTO: 4.18 M/UL (ref 4.2–5.9)
SODIUM SERPL-SCNC: 141 MMOL/L (ref 136–145)
WBC OTHER # BLD: 5.8 K/UL (ref 4–11)

## 2024-01-28 PROCEDURE — 1200000000 HC SEMI PRIVATE

## 2024-01-28 PROCEDURE — 2580000003 HC RX 258: Performed by: STUDENT IN AN ORGANIZED HEALTH CARE EDUCATION/TRAINING PROGRAM

## 2024-01-28 PROCEDURE — 2580000003 HC RX 258: Performed by: INTERNAL MEDICINE

## 2024-01-28 PROCEDURE — 6360000002 HC RX W HCPCS: Performed by: STUDENT IN AN ORGANIZED HEALTH CARE EDUCATION/TRAINING PROGRAM

## 2024-01-28 PROCEDURE — 6360000002 HC RX W HCPCS: Performed by: INTERNAL MEDICINE

## 2024-01-28 PROCEDURE — 85025 COMPLETE CBC W/AUTO DIFF WBC: CPT

## 2024-01-28 PROCEDURE — 6370000000 HC RX 637 (ALT 250 FOR IP): Performed by: INTERNAL MEDICINE

## 2024-01-28 PROCEDURE — 80048 BASIC METABOLIC PNL TOTAL CA: CPT

## 2024-01-28 PROCEDURE — 36415 COLL VENOUS BLD VENIPUNCTURE: CPT

## 2024-01-28 RX ORDER — SODIUM CHLORIDE 9 MG/ML
INJECTION, SOLUTION INTRAVENOUS CONTINUOUS
Status: DISCONTINUED | OUTPATIENT
Start: 2024-01-28 | End: 2024-01-29 | Stop reason: HOSPADM

## 2024-01-28 RX ORDER — SODIUM CHLORIDE 9 MG/ML
INJECTION, SOLUTION INTRAVENOUS
Status: DISPENSED
Start: 2024-01-28 | End: 2024-01-28

## 2024-01-28 RX ORDER — MEROPENEM 1 G/1
INJECTION, POWDER, FOR SOLUTION INTRAVENOUS
Status: DISPENSED
Start: 2024-01-28 | End: 2024-01-28

## 2024-01-28 RX ORDER — PROCHLORPERAZINE EDISYLATE 5 MG/ML
10 INJECTION INTRAMUSCULAR; INTRAVENOUS EVERY 6 HOURS PRN
Status: DISCONTINUED | OUTPATIENT
Start: 2024-01-28 | End: 2024-01-29 | Stop reason: HOSPADM

## 2024-01-28 RX ADMIN — PANTOPRAZOLE SODIUM 40 MG: 40 TABLET, DELAYED RELEASE ORAL at 08:05

## 2024-01-28 RX ADMIN — HYDROXYZINE HYDROCHLORIDE 10 MG: 10 TABLET, FILM COATED ORAL at 20:28

## 2024-01-28 RX ADMIN — ENOXAPARIN SODIUM 40 MG: 100 INJECTION SUBCUTANEOUS at 08:05

## 2024-01-28 RX ADMIN — PROCHLORPERAZINE EDISYLATE 10 MG: 5 INJECTION INTRAMUSCULAR; INTRAVENOUS at 03:22

## 2024-01-28 RX ADMIN — MEROPENEM 1000 MG: 1 INJECTION, POWDER, FOR SOLUTION INTRAVENOUS at 18:53

## 2024-01-28 RX ADMIN — CARVEDILOL 6.25 MG: 6.25 TABLET, FILM COATED ORAL at 16:45

## 2024-01-28 RX ADMIN — ASPIRIN 81 MG: 81 TABLET, COATED ORAL at 08:05

## 2024-01-28 RX ADMIN — Medication 5 MG: at 20:28

## 2024-01-28 RX ADMIN — ACETAMINOPHEN 650 MG: 325 TABLET ORAL at 20:28

## 2024-01-28 RX ADMIN — SERTRALINE HYDROCHLORIDE 100 MG: 25 TABLET ORAL at 08:05

## 2024-01-28 RX ADMIN — CARVEDILOL 6.25 MG: 6.25 TABLET, FILM COATED ORAL at 08:05

## 2024-01-28 RX ADMIN — SODIUM CHLORIDE: 9 INJECTION, SOLUTION INTRAVENOUS at 03:43

## 2024-01-28 RX ADMIN — MEROPENEM 1000 MG: 1 INJECTION, POWDER, FOR SOLUTION INTRAVENOUS at 08:20

## 2024-01-28 ASSESSMENT — PAIN SCALES - WONG BAKER: WONGBAKER_NUMERICALRESPONSE: 2

## 2024-01-28 NOTE — PROGRESS NOTES
807 patient vomited small amount of undigested food with moderate amount of phlegm noted zofran given intravenously will continue to monitor call light in reach

## 2024-01-28 NOTE — CARE COORDINATION
FREDDIE spoke to Marina in admissions at [facility]. Marina notes that the facility can accept the patient back with the daily antibiotic prescribed (Invanz) and a midline. Marina notes that they will not be able to get the antibiotic until the patient returns to the facility. She requests that the patient receives his dose today and tomorrow and return to the facility tomorrow, giving them the time they need to get the antibiotic for the patient, so that he does not miss a dose. FREDDIE relayed this information to the MD via PS. FREDDIE will continue to follow through discharge.

## 2024-01-28 NOTE — PROGRESS NOTES
2139 patient resting quietly @ this time no further episodes of vomiting noted HOB up  no complaints will continue to monitor call light in reach

## 2024-01-28 NOTE — PROGRESS NOTES
Arrived to place midline with bedside RN Mckenna. Pre-procedure and timeout done with RN, discussed limitations of placement and allergies. Vital signs stable. Labs, allergies, medications, and code status reviewed. No contraindications noted.    Unable to place picc - family unreachable for consent. ML placed instead    Procedure explained to pt, including the risk and benefits of the procedure. All questions answered. Pt verbalizes understanding of the procedure and states no more questions.     Pt's basilic, brachial, cephalic are all easily collapsible with no indication for a clot. Vein selected is large enough for catheter (please see image below). Pt tolerated sterile procedure well, with no difficulty accessing basilic vein, when accessed - blood was free flowing and non-pulsatile. Guidewire, introducer, and catheter went in smoothly. ML placement verification with blood return and flushes easily.      Nurses:  OK to use Midline.    Please replace all existing IV tubing with new IV tubing prior to using the ML for current IV infusions.  Please remove any PIVs from ML arm.  All of the above may be sources of infection or an increase chance of a clot.      Post procedure - reorganized pt table, placed pt in lowest position, with call light and educated on line care. Instructed pt/RN not to use arm for at least 30min to avoid bleeding. Reported off to bedside RN.      If you have any questions please call number below and dispatch will direct you to the PICC RN that is on call.    [phone]

## 2024-01-28 NOTE — PROGRESS NOTES
Physician Progress Note      PATIENT:               RUFINO SANCHEZ  [facility] #:                  [id number]  :                       1935  ADMIT DATE:       2024 10:49 AM  DISCH DATE:  RESPONDING  PROVIDER #:        Mike Whalen MD          QUERY TEXT:    Patient admitted with E. Coli UTI. Per nursing note on , \" Stage 3 to left   heel\".   If possible, please document in progress notes and discharge summary   the location, present on admission status and stage of the pressure ulcer:      The medical record reflects the following:  Risk Factors: 87 yo, NH patient, CHF, COPD, dementia  Clinical Indicators:  Nursing note:  Stage 3 to left heel. Blisters to   penis and scrotum. Excoriation to scrotum.    Treatment: Wound care consult, dietary consult    Stage 1:  Non-blanchable erythema of intact skin  Stage 2:  Abrasion, Blister, Partial-thickness skin loss, with exposed dermis  Stage 3:  Full-thickness skin loss with damage or necrosis of subcutaneous   tissue  Stage 4:  Full-thickness skin & soft tissue loss through to underlying muscle,   tendon or bone  Unstageable: Obscured full-thickness skin & tissue loss  Options provided:  -- Stage 3 Pressure Ulcer of left heel present on admission  -- Other - I will add my own diagnosis  -- Disagree - Not applicable / Not valid  -- Disagree - Clinically unable to determine / Unknown  -- Refer to Clinical Documentation Reviewer    PROVIDER RESPONSE TEXT:    This patient has a Stage 3 pressure ulcer of the left heel which was present   on admission.    Query created by: Clari Briseno on 2024 8:21 PM      Electronically signed by:  Mike Whalen MD 2024 8:24 PM

## 2024-01-28 NOTE — PROGRESS NOTES
0300 Patient vomited x2 while turning and providing care green bile and undigested food noted MD notified new orders for compazine and NS @ 75ml/hr will continue to monitor call light

## 2024-01-28 NOTE — PROGRESS NOTES
Physician Progress Note      PATIENT:               RUFINO SANCHEZ  CSN #:                  [id number]  :                       1935  ADMIT DATE:       2024 10:49 AM  DISCH DATE:  RESPONDING  PROVIDER #:        Mike Whalen MD          QUERY TEXT:    Patient admitted with E. Coli UTI. Noted to have severe malnutrition in   dietary consult note. If possible, please document in progress notes and   discharge summary if you are evaluating and /or treating any of the following:      The medical record reflects the following:  Risk Factors: 86 yo, dementia, COPD, CHF  Clinical Indicators: Per dietary: Malnutrition Status:  Severe malnutrition   (24 0913)  Weight Loss:  10% over 6 months  Body Fat Loss:  Severe body fat loss Orbital, Triceps, Buccal region  Muscle Mass Loss:  Severe muscle mass loss Temples (temporalis), Clavicles   (pectoralis & deltoids), Hand (interosseous)  -presented with N/V and diarrhea from SNF.  Pt currently on clear liquid diet, switched ONS to appropriate Clear liquid   ONS.Pt with increased nutrition needs r/t wound healing, awaiting wound   consult for clarification of wounds. Noted pt with 10% wt loss over the past 6   months per EMR and severe fat and muscle wasting noted.  -Ideal Body Weight (IBW): 160 lbs (73 kg)  Treatment:  Monitor for diet advancement. Recommend liberalized diet once oral   diet advanced.  Continue Ensure Clear TID  ASPEN Criteria:    https://aspenjournals.onlinelibrary.plummer.com/doi/full/10.1177/119809094977563  5  Options provided:  -- Protein calorie malnutrition severe  -- Other - I will add my own diagnosis  -- Disagree - Not applicable / Not valid  -- Disagree - Clinically unable to determine / Unknown  -- Refer to Clinical Documentation Reviewer    PROVIDER RESPONSE TEXT:    This patient has severe protein calorie malnutrition.    Query created by: Clari Briseno on 2024 8:26 PM      Electronically signed by:  Mike Whalen MD 2024 8:29

## 2024-01-29 VITALS
HEART RATE: 55 BPM | DIASTOLIC BLOOD PRESSURE: 77 MMHG | TEMPERATURE: 98.2 F | SYSTOLIC BLOOD PRESSURE: 126 MMHG | OXYGEN SATURATION: 100 % | RESPIRATION RATE: 18 BRPM | HEIGHT: 69 IN | WEIGHT: 156.4 LBS | BODY MASS INDEX: 23.16 KG/M2

## 2024-01-29 LAB
ANION GAP SERPL CALCULATED.3IONS-SCNC: 10 MMOL/L (ref 3–16)
BASOPHILS # BLD: 0.02 K/UL (ref 0–0.2)
BASOPHILS NFR BLD: 0 %
BUN SERPL-MCNC: 18 MG/DL (ref 7–20)
CALCIUM SERPL-MCNC: 8.9 MG/DL (ref 8.3–10.6)
CHLORIDE SERPL-SCNC: 106 MMOL/L (ref 99–110)
CO2 SERPL-SCNC: 25 MMOL/L (ref 21–32)
CREAT SERPL-MCNC: 0.9 MG/DL (ref 0.8–1.3)
EOSINOPHIL # BLD: 0.8 K/UL (ref 0–0.6)
EOSINOPHILS RELATIVE PERCENT: 12 %
ERYTHROCYTE [DISTWIDTH] IN BLOOD BY AUTOMATED COUNT: 13.9 % (ref 12.4–15.4)
GFR SERPL CREATININE-BSD FRML MDRD: >60 ML/MIN/1.73M2
GLUCOSE SERPL-MCNC: 127 MG/DL (ref 70–99)
HCT VFR BLD AUTO: 33.7 % (ref 40.5–52.5)
HGB BLD-MCNC: 11.1 G/DL (ref 13.5–17.5)
IMM GRANULOCYTES # BLD AUTO: 0.01 K/UL (ref 0–0.5)
IMM GRANULOCYTES NFR BLD: 0 %
LYMPHOCYTES NFR BLD: 1.07 K/UL (ref 1–5.1)
LYMPHOCYTES RELATIVE PERCENT: 16 %
MCH RBC QN AUTO: 29 PG (ref 26–34)
MCHC RBC AUTO-ENTMCNC: 32.9 G/DL (ref 31–36)
MCV RBC AUTO: 88 FL (ref 80–100)
MONOCYTES NFR BLD: 0.58 K/UL (ref 0–1.3)
MONOCYTES NFR BLD: 8 %
NEUTROPHILS NFR BLD: 64 %
NEUTS SEG NFR BLD: 4.42 K/UL (ref 1.7–7.7)
PLATELET # BLD AUTO: 145 K/UL (ref 135–450)
PMV BLD AUTO: 9.3 FL (ref 9.4–12.4)
POTASSIUM SERPL-SCNC: 3.8 MMOL/L (ref 3.5–5.1)
RBC # BLD AUTO: 3.83 M/UL (ref 4.2–5.9)
SODIUM SERPL-SCNC: 140 MMOL/L (ref 136–145)
WBC OTHER # BLD: 6.9 K/UL (ref 4–11)

## 2024-01-29 PROCEDURE — 2580000003 HC RX 258: Performed by: INTERNAL MEDICINE

## 2024-01-29 PROCEDURE — 80048 BASIC METABOLIC PNL TOTAL CA: CPT

## 2024-01-29 PROCEDURE — 6370000000 HC RX 637 (ALT 250 FOR IP): Performed by: INTERNAL MEDICINE

## 2024-01-29 PROCEDURE — A4216 STERILE WATER/SALINE, 10 ML: HCPCS | Performed by: INTERNAL MEDICINE

## 2024-01-29 PROCEDURE — 36415 COLL VENOUS BLD VENIPUNCTURE: CPT

## 2024-01-29 PROCEDURE — 6360000002 HC RX W HCPCS: Performed by: STUDENT IN AN ORGANIZED HEALTH CARE EDUCATION/TRAINING PROGRAM

## 2024-01-29 PROCEDURE — 85025 COMPLETE CBC W/AUTO DIFF WBC: CPT

## 2024-01-29 PROCEDURE — 6360000002 HC RX W HCPCS: Performed by: INTERNAL MEDICINE

## 2024-01-29 RX ADMIN — ASPIRIN 81 MG: 81 TABLET, COATED ORAL at 09:38

## 2024-01-29 RX ADMIN — CARVEDILOL 6.25 MG: 6.25 TABLET, FILM COATED ORAL at 09:38

## 2024-01-29 RX ADMIN — PANTOPRAZOLE SODIUM 40 MG: 40 TABLET, DELAYED RELEASE ORAL at 06:35

## 2024-01-29 RX ADMIN — ENOXAPARIN SODIUM 40 MG: 100 INJECTION SUBCUTANEOUS at 09:38

## 2024-01-29 RX ADMIN — SERTRALINE HYDROCHLORIDE 100 MG: 25 TABLET ORAL at 09:37

## 2024-01-29 RX ADMIN — PROCHLORPERAZINE EDISYLATE 10 MG: 5 INJECTION INTRAMUSCULAR; INTRAVENOUS at 00:04

## 2024-01-29 RX ADMIN — SODIUM CHLORIDE 1000 MG: 9 INJECTION INTRAMUSCULAR; INTRAVENOUS; SUBCUTANEOUS at 12:42

## 2024-01-29 RX ADMIN — MEROPENEM 1000 MG: 1 INJECTION, POWDER, FOR SOLUTION INTRAVENOUS at 06:32

## 2024-01-29 ASSESSMENT — PAIN SCALES - GENERAL: PAINLEVEL_OUTOF10: 0

## 2024-01-29 ASSESSMENT — PAIN SCALES - WONG BAKER: WONGBAKER_NUMERICALRESPONSE: 0

## 2024-01-29 NOTE — FLOWSHEET NOTE
01/29/24 1245   Vital Signs   Temp 98.2 °F (36.8 °C)   Temp Source Oral   Pulse 55   Heart Rate Source Monitor   Respirations 18   /77   MAP (Calculated) 93   BP Location Left upper arm   Patient Position Semi fowlers   Pain Assessment   Pain Assessment Adult Nonverbal Pain Scale (NPVS)   Oxygen Therapy   SpO2 100 %   O2 Device None (Room air)   O2 Flow Rate (L/min) 0 L/min     Pt D/C'd to [facility]. Report given to nurse Marcano. Addressed all nursing and patient concerns. No s/s of distress. Midline remains in place for IV ABX at [facility].    Martha Chow RN

## 2024-01-29 NOTE — DISCHARGE SUMMARY
Discharge Summary           Name:  Zia Garrett /Age/Sex: 1935  (88 y.o. male)   MRN & CSN:  [id number] & [id number] Admission Date/Time: 2024 10:49 AM   Attending:  Mike Whalen MD Discharging Physician: Mike Whalen MD     Hospital Course:   Zia Garrett is a 88 y.o.  male with past medical history of CAD, chronic combined CHF, COPD, dementia, depression, gout, hypertension, skin cancer sent from nursing facility to [facility] ED for evaluation of persistent nausea/vomiting/diarrhea.  Patient reportedly also tested positive for influenza at the nursing facility. Here, Patient tested negative for influenza A/B, rapid COVID-19. Patient is a poor historian secondary to dementia.  Upon arrival in the emergency room, urinalysis was suggestive of UTI.  Given history of ESBL E. coli, started on IV meropenem. Urine culture showing ESBL E. Coli. Seen by infectious diseases. Advise to continue IV Ertapenem till 2024. IV Antibiotics arranged at the [facility]. Midline placed. Patient is currently being discharged in stable condition on 2024.     Discharge Diagnosis     ESBL E. coli UTI  Nausea/vomiting/diarrhea-improved  Chronic combined CHF-compensated  PILAR-likely prerenal  COPD-not in exacerbation  Dementia  Hypertension      Consults this admission:  IP CONSULT TO INFECTIOUS DISEASES    Discharge Medications:        Medication List        START taking these medications      ertapenem  infusion  Commonly known as: INVanz  Infuse 1,000 mg intravenously every 24 hours for 8 days Compound per protocol.            CONTINUE taking these medications      acetaminophen 325 MG tablet  Commonly known as: TYLENOL     aspirin EC 81 MG EC tablet  Take 1 tablet by mouth daily     bisacodyl 10 MG suppository  Commonly known as: DULCOLAX     carvedilol 6.25 MG tablet  Commonly known as: COREG  Take 1 tablet by mouth 2 times daily (with meals)     hydrOXYzine HCl 10 MG tablet  Commonly known as: ATARAX     magnesium  hydroxide 400 MG/5ML suspension  Commonly known as: MILK OF MAGNESIA     melatonin 3 MG Tabs tablet     methenamine 1 g tablet  Commonly known as: HIPREX     omeprazole 20 MG delayed release capsule  Commonly known as: PRILOSEC     pregabalin 50 MG capsule  Commonly known as: LYRICA     sertraline 50 MG tablet  Commonly known as: ZOLOFT     vitamin C 500 MG tablet  Commonly known as: ASCORBIC ACID            STOP taking these medications      amLODIPine 10 MG tablet  Commonly known as: NORVASC               Where to Get Your Medications        Information about where to get these medications is not yet available    Ask your nurse or doctor about these medications  ertapenem  infusion          Objective Findings at Discharge:   BP (!) 144/88   Pulse 78   Temp 97.7 °F (36.5 °C) (Oral)   Resp 18   Ht 1.753 m (5' 9\")   Wt 70.9 kg (156 lb 6.4 oz)   SpO2 97%   BMI 23.10 kg/m²            PHYSICAL EXAM   General-no acute distress  Respiratory-bilateral entry fair  CVS-S1-S2, RRR  Abdomen-soft, nontender, nondistended  CNS-AAO x 1-2, no gross focal deficit noted.  Pleasantly confused    BMP/CBC  Recent Labs     01/27/24  0603 01/28/24  0655 01/29/24  0543    141 140   K 4.6 4.1 3.8    106 106   CO2 25 27 25   BUN 23* 19 18   CREATININE 1.2 1.1 0.9   WBC 6.8 5.8 6.9   HCT 33.8* 37.3* 33.7*    165 145       Discharge Time of 35 minutes    Electronically signed by Mike Whalen MD on 1/29/2024 at 8:06 AM

## 2024-01-29 NOTE — FLOWSHEET NOTE
01/29/24 0930   Vital Signs   Temp 97.7 °F (36.5 °C)   Temp Source Oral   Pulse 62   Heart Rate Source Monitor   Respirations 18   BP (!) 163/70   MAP (Calculated) 101   BP Location Left upper arm   Patient Position Semi fowlers   Pain Assessment   Pain Assessment Adult Nonverbal Pain Scale (NPVS)   Pain Level 0   Nieves-Baker Pain Rating 0   Oxygen Therapy   SpO2 100 %   O2 Device None (Room air)   O2 Flow Rate (L/min) 0 L/min     Pt assessment complete; see flow sheets.Vitals completed. O/A x self. Able to follow commands. No s/s of distress. Meds given per MAR. Repositioned for comfort. Pt denies any other care needs at this time. Pt stable.    Martha Chow RN

## 2024-01-29 NOTE — CARE COORDINATION
Discharge planning:      CM attempts to contact Gabi Still, patient's legal guardian to inform of discharge order and complete IMM over phone. ([phone]).  No answer.  Left VM with request for call back.      Requested LIANE to be completed by nursing staff.   Nurse states patient must receive antibiotic  prior to dc. Requests 1:30pm-2pm transport.    Transport Scheduled:   Time: 1400  Transport Co: [facility]  ([phone])            AMEENA Tolbert, RN  RN Case Manager

## 2024-01-29 NOTE — CARE COORDINATION
01/29/24 1154   IMM Letter   IMM Letter given to Patient/Family/Significant other/Guardian/POA/by: Dominga Adames-    IMM Letter date given: 01/29/24   IMM Letter time given: 1138

## 2024-01-29 NOTE — CARE COORDINATION
Case Management -  Discharge Note      Patient Name: Zia Garrett                   YOB: 1935            Readmission Risk (Low < 19, Mod (19-27), High > 27): Readmission Risk Score: 19.4    Current PCP: Omar Schofield MD    (IMM) Important Message from Medicare:    Date: 1/29/24    PT AM-PAC: 7 /24  OT AM-PAC: 9 /24    Patient noted to have a discharge order.  Pt has been medically cleared to return to LTC (Long Term Care)    Patient discharged to   [facility]  [address] [place] [id number]  Phone: [phone]  Fax: [phone]          Pre-cert Required/obtained: no    Transportation scheduled for 2:00pm    Transportation provided by [facility]  ([phone])     AVS faxed and agency notified: Admissions Notified. LIANE faxed to [phone].    The following prescriptions sent with pt: NA    Family Notified: Spoke with daughter to inform of transport and review IMM. Emailed IMM to [email or web address]    Nurse to call report to facility      Financial    Payor: MEDICARE / Plan: MEDICARE PART A AND B / Product Type: *No Product type* /     Pharmacy:  Potential assistance Purchasing Medications: No  Meds-to-Beds request:        [facility] [id number] - [place], [place] - [address] - P [phone] - F [phone]  [address] [address]  Phone: [phone] Fax: [phone]    [facility] [employer] [place] - [facility] - P [phone] - F [phone]  [address] [address]  Phone: [phone] Fax: [phone]      Notes:    Additional Case Management Notes:     Notifed Marina in admission at [facility] to inform of 2 pm transport. Reminded of IV medication and that order is on LIANE. Marina said they are able to see off of epic. Spoke with Gabi and completed IMM.     Dominga Adames, BSN, RN  RN Case Manager

## 2024-01-30 LAB
MICROORGANISM SPEC CULT: NORMAL
MICROORGANISM SPEC CULT: NORMAL
SERVICE CMNT-IMP: NORMAL
SERVICE CMNT-IMP: NORMAL
SPECIMEN DESCRIPTION: NORMAL
SPECIMEN DESCRIPTION: NORMAL

## 2024-01-31 DIAGNOSIS — N39.0 UTI DUE TO EXTENDED-SPECTRUM BETA LACTAMASE (ESBL) PRODUCING ESCHERICHIA COLI: Primary | ICD-10-CM

## 2024-01-31 DIAGNOSIS — B96.29 UTI DUE TO EXTENDED-SPECTRUM BETA LACTAMASE (ESBL) PRODUCING ESCHERICHIA COLI: Primary | ICD-10-CM

## 2024-01-31 DIAGNOSIS — Z16.12 UTI DUE TO EXTENDED-SPECTRUM BETA LACTAMASE (ESBL) PRODUCING ESCHERICHIA COLI: Primary | ICD-10-CM

## 2024-02-01 LAB
ALBUMIN SERPL-MCNC: 3.4 G/DL
ALP BLD-CCNC: 83 U/L
ALT SERPL-CCNC: 7 U/L
ANION GAP SERPL CALCULATED.3IONS-SCNC: ABNORMAL MMOL/L
AST SERPL-CCNC: 13 U/L
BASOPHILS ABSOLUTE: 0.1 /ΜL
BASOPHILS RELATIVE PERCENT: 0.4 %
BILIRUB SERPL-MCNC: 0.4 MG/DL (ref 0.1–1.4)
BUN BLDV-MCNC: 26 MG/DL
CALCIUM SERPL-MCNC: 8.5 MG/DL
CHLORIDE BLD-SCNC: 106 MMOL/L
CO2: 30 MMOL/L
CREAT SERPL-MCNC: 1.2 MG/DL
EGFR: 57
EOSINOPHILS ABSOLUTE: 1.8 /ΜL
EOSINOPHILS RELATIVE PERCENT: 17.7 %
GLUCOSE BLD-MCNC: 59 MG/DL
HCT VFR BLD CALC: 36.6 % (ref 41–53)
HEMOGLOBIN: 11.9 G/DL (ref 13.5–17.5)
LYMPHOCYTES ABSOLUTE: 1.3 /ΜL
LYMPHOCYTES RELATIVE PERCENT: 13 %
MCH RBC QN AUTO: 28.6 PG
MCHC RBC AUTO-ENTMCNC: 32.6 G/DL
MCV RBC AUTO: 87.8 FL
MONOCYTES ABSOLUTE: 0.5 /ΜL
MONOCYTES RELATIVE PERCENT: 5.3 %
NEUTROPHILS ABSOLUTE: 6.3 /ΜL
NEUTROPHILS RELATIVE PERCENT: 63.6 %
PDW BLD-RTO: 15.4 %
PLATELET # BLD: 214 K/ΜL
PMV BLD AUTO: 7.7 FL
POTASSIUM SERPL-SCNC: 3.7 MMOL/L
RBC # BLD: 4.16 10^6/ΜL
SODIUM BLD-SCNC: 146 MMOL/L
TOTAL PROTEIN: 6.5
WBC # BLD: 9.9 10^3/ML

## 2024-02-06 ENCOUNTER — TELEPHONE (OUTPATIENT)
Dept: INFECTIOUS DISEASES | Age: 89
End: 2024-02-06

## 2024-02-06 DIAGNOSIS — N39.0 UTI DUE TO EXTENDED-SPECTRUM BETA LACTAMASE (ESBL) PRODUCING ESCHERICHIA COLI: ICD-10-CM

## 2024-02-06 DIAGNOSIS — B96.29 UTI DUE TO EXTENDED-SPECTRUM BETA LACTAMASE (ESBL) PRODUCING ESCHERICHIA COLI: ICD-10-CM

## 2024-02-06 DIAGNOSIS — Z16.12 UTI DUE TO EXTENDED-SPECTRUM BETA LACTAMASE (ESBL) PRODUCING ESCHERICHIA COLI: ICD-10-CM

## 2024-02-06 NOTE — TELEPHONE ENCOUNTER
Floor RN at [facility] verbalized understanding to DC IV ABX and pull PICC after last dose on 2-7-24.  Verified with floor RN on 2-9-24 PICC pulled on 2-8-24.

## 2024-02-24 PROBLEM — N39.0 UTI (URINARY TRACT INFECTION): Status: RESOLVED | Noted: 2024-01-25 | Resolved: 2024-02-24

## 2024-04-24 ENCOUNTER — HOSPITAL ENCOUNTER (INPATIENT)
Age: 89
LOS: 4 days | Discharge: NURSING, REHAB OR LONG-TERM CARE | DRG: 872 | End: 2024-04-28
Attending: EMERGENCY MEDICINE | Admitting: INTERNAL MEDICINE
Payer: MEDICARE

## 2024-04-24 ENCOUNTER — APPOINTMENT (OUTPATIENT)
Dept: CT IMAGING | Age: 89
DRG: 872 | End: 2024-04-24
Payer: MEDICARE

## 2024-04-24 ENCOUNTER — APPOINTMENT (OUTPATIENT)
Dept: GENERAL RADIOLOGY | Age: 89
DRG: 872 | End: 2024-04-24
Payer: MEDICARE

## 2024-04-24 DIAGNOSIS — Z87.19 HISTORY OF FECAL IMPACTION: ICD-10-CM

## 2024-04-24 DIAGNOSIS — N39.0 URINARY TRACT INFECTION WITHOUT HEMATURIA, SITE UNSPECIFIED: Primary | ICD-10-CM

## 2024-04-24 DIAGNOSIS — D72.829 LEUKOCYTOSIS, UNSPECIFIED TYPE: ICD-10-CM

## 2024-04-24 DIAGNOSIS — G89.4 CHRONIC PAIN SYNDROME: ICD-10-CM

## 2024-04-24 LAB
ALBUMIN SERPL-MCNC: 3.8 G/DL (ref 3.4–5)
ALBUMIN/GLOB SERPL: 1 {RATIO} (ref 1.1–2.2)
ALP SERPL-CCNC: 140 U/L (ref 40–129)
ALT SERPL-CCNC: 14 U/L (ref 10–40)
ANION GAP SERPL CALCULATED.3IONS-SCNC: 13 MMOL/L (ref 3–16)
AST SERPL-CCNC: 26 U/L (ref 15–37)
BACTERIA URNS QL MICRO: ABNORMAL /HPF
BASOPHILS # BLD: 0.2 K/UL (ref 0–0.2)
BASOPHILS NFR BLD: 0.9 %
BILIRUB SERPL-MCNC: 0.4 MG/DL (ref 0–1)
BILIRUB UR QL STRIP.AUTO: NEGATIVE
BUN SERPL-MCNC: 28 MG/DL (ref 7–20)
CALCIUM SERPL-MCNC: 9.1 MG/DL (ref 8.3–10.6)
CHLORIDE SERPL-SCNC: 103 MMOL/L (ref 99–110)
CLARITY UR: ABNORMAL
CO2 SERPL-SCNC: 24 MMOL/L (ref 21–32)
COLOR UR: YELLOW
CREAT SERPL-MCNC: 1.4 MG/DL (ref 0.8–1.3)
DEPRECATED RDW RBC AUTO: 16 % (ref 12.4–15.4)
EOSINOPHIL # BLD: 0.8 K/UL (ref 0–0.6)
EOSINOPHIL NFR BLD: 4.3 %
EPI CELLS #/AREA URNS HPF: ABNORMAL /HPF (ref 0–5)
GFR SERPLBLD CREATININE-BSD FMLA CKD-EPI: 49 ML/MIN/{1.73_M2}
GLUCOSE SERPL-MCNC: 153 MG/DL (ref 70–99)
GLUCOSE UR STRIP.AUTO-MCNC: NEGATIVE MG/DL
HCT VFR BLD AUTO: 37.4 % (ref 40.5–52.5)
HGB BLD-MCNC: 12.2 G/DL (ref 13.5–17.5)
HGB UR QL STRIP.AUTO: ABNORMAL
KETONES UR STRIP.AUTO-MCNC: NEGATIVE MG/DL
LACTATE BLDV-SCNC: 1.4 MMOL/L (ref 0.4–2)
LEUKOCYTE ESTERASE UR QL STRIP.AUTO: ABNORMAL
LYMPHOCYTES # BLD: 0.8 K/UL (ref 1–5.1)
LYMPHOCYTES NFR BLD: 4.1 %
MCH RBC QN AUTO: 27.7 PG (ref 26–34)
MCHC RBC AUTO-ENTMCNC: 32.6 G/DL (ref 31–36)
MCV RBC AUTO: 84.9 FL (ref 80–100)
MONOCYTES # BLD: 1.2 K/UL (ref 0–1.3)
MONOCYTES NFR BLD: 6.5 %
NEUTROPHILS # BLD: 15.6 K/UL (ref 1.7–7.7)
NEUTROPHILS NFR BLD: 84.2 %
NITRITE UR QL STRIP.AUTO: POSITIVE
PH UR STRIP.AUTO: 7 [PH] (ref 5–8)
PLATELET # BLD AUTO: 170 K/UL (ref 135–450)
PMV BLD AUTO: 8.2 FL (ref 5–10.5)
POTASSIUM SERPL-SCNC: 5.2 MMOL/L (ref 3.5–5.1)
PROT SERPL-MCNC: 7.5 G/DL (ref 6.4–8.2)
PROT UR STRIP.AUTO-MCNC: ABNORMAL MG/DL
RBC # BLD AUTO: 4.41 M/UL (ref 4.2–5.9)
RBC #/AREA URNS HPF: ABNORMAL /HPF (ref 0–4)
SARS-COV-2 RDRP RESP QL NAA+PROBE: NOT DETECTED
SODIUM SERPL-SCNC: 140 MMOL/L (ref 136–145)
SP GR UR STRIP.AUTO: 1.01 (ref 1–1.03)
UA COMPLETE W REFLEX CULTURE PNL UR: YES
UA DIPSTICK W REFLEX MICRO PNL UR: YES
URN SPEC COLLECT METH UR: ABNORMAL
UROBILINOGEN UR STRIP-ACNC: 0.2 E.U./DL
WBC # BLD AUTO: 18.6 K/UL (ref 4–11)
WBC #/AREA URNS HPF: ABNORMAL /HPF (ref 0–5)

## 2024-04-24 PROCEDURE — 6370000000 HC RX 637 (ALT 250 FOR IP): Performed by: EMERGENCY MEDICINE

## 2024-04-24 PROCEDURE — 96361 HYDRATE IV INFUSION ADD-ON: CPT

## 2024-04-24 PROCEDURE — 1200000000 HC SEMI PRIVATE

## 2024-04-24 PROCEDURE — 74176 CT ABD & PELVIS W/O CONTRAST: CPT

## 2024-04-24 PROCEDURE — 87086 URINE CULTURE/COLONY COUNT: CPT

## 2024-04-24 PROCEDURE — 87077 CULTURE AEROBIC IDENTIFY: CPT

## 2024-04-24 PROCEDURE — 87040 BLOOD CULTURE FOR BACTERIA: CPT

## 2024-04-24 PROCEDURE — 96360 HYDRATION IV INFUSION INIT: CPT

## 2024-04-24 PROCEDURE — 6360000002 HC RX W HCPCS: Performed by: EMERGENCY MEDICINE

## 2024-04-24 PROCEDURE — 87186 SC STD MICRODIL/AGAR DIL: CPT

## 2024-04-24 PROCEDURE — 71045 X-RAY EXAM CHEST 1 VIEW: CPT

## 2024-04-24 PROCEDURE — 2580000003 HC RX 258: Performed by: INTERNAL MEDICINE

## 2024-04-24 PROCEDURE — 81001 URINALYSIS AUTO W/SCOPE: CPT

## 2024-04-24 PROCEDURE — 99285 EMERGENCY DEPT VISIT HI MDM: CPT

## 2024-04-24 PROCEDURE — 87150 DNA/RNA AMPLIFIED PROBE: CPT

## 2024-04-24 PROCEDURE — 36415 COLL VENOUS BLD VENIPUNCTURE: CPT

## 2024-04-24 PROCEDURE — 2580000003 HC RX 258: Performed by: EMERGENCY MEDICINE

## 2024-04-24 PROCEDURE — 83605 ASSAY OF LACTIC ACID: CPT

## 2024-04-24 PROCEDURE — 87635 SARS-COV-2 COVID-19 AMP PRB: CPT

## 2024-04-24 PROCEDURE — 80053 COMPREHEN METABOLIC PANEL: CPT

## 2024-04-24 PROCEDURE — 85025 COMPLETE CBC W/AUTO DIFF WBC: CPT

## 2024-04-24 RX ORDER — ONDANSETRON 2 MG/ML
4 INJECTION INTRAMUSCULAR; INTRAVENOUS EVERY 6 HOURS PRN
Status: DISCONTINUED | OUTPATIENT
Start: 2024-04-24 | End: 2024-04-28 | Stop reason: HOSPADM

## 2024-04-24 RX ORDER — PREGABALIN 25 MG/1
50 CAPSULE ORAL 3 TIMES DAILY
Status: DISCONTINUED | OUTPATIENT
Start: 2024-04-24 | End: 2024-04-25

## 2024-04-24 RX ORDER — ASPIRIN 81 MG/1
81 TABLET ORAL DAILY
Status: DISCONTINUED | OUTPATIENT
Start: 2024-04-25 | End: 2024-04-28 | Stop reason: HOSPADM

## 2024-04-24 RX ORDER — ACETAMINOPHEN 325 MG/1
650 TABLET ORAL EVERY 6 HOURS PRN
Status: DISCONTINUED | OUTPATIENT
Start: 2024-04-24 | End: 2024-04-28 | Stop reason: HOSPADM

## 2024-04-24 RX ORDER — POLYETHYLENE GLYCOL 3350 17 G/17G
17 POWDER, FOR SOLUTION ORAL DAILY PRN
Status: DISCONTINUED | OUTPATIENT
Start: 2024-04-24 | End: 2024-04-28 | Stop reason: HOSPADM

## 2024-04-24 RX ORDER — HYDROXYZINE HYDROCHLORIDE 10 MG/1
10 TABLET, FILM COATED ORAL EVERY 8 HOURS PRN
Status: DISCONTINUED | OUTPATIENT
Start: 2024-04-24 | End: 2024-04-28 | Stop reason: HOSPADM

## 2024-04-24 RX ORDER — ACETAMINOPHEN 650 MG/1
650 SUPPOSITORY RECTAL ONCE
Status: COMPLETED | OUTPATIENT
Start: 2024-04-24 | End: 2024-04-24

## 2024-04-24 RX ORDER — BISACODYL 10 MG
10 SUPPOSITORY, RECTAL RECTAL DAILY PRN
Status: DISCONTINUED | OUTPATIENT
Start: 2024-04-24 | End: 2024-04-28 | Stop reason: HOSPADM

## 2024-04-24 RX ORDER — 0.9 % SODIUM CHLORIDE 0.9 %
1000 INTRAVENOUS SOLUTION INTRAVENOUS ONCE
Status: COMPLETED | OUTPATIENT
Start: 2024-04-24 | End: 2024-04-24

## 2024-04-24 RX ORDER — M-VIT,TX,IRON,MINS/CALC/FOLIC 27MG-0.4MG
1 TABLET ORAL DAILY
COMMUNITY

## 2024-04-24 RX ORDER — MAGNESIUM SULFATE IN WATER 40 MG/ML
2000 INJECTION, SOLUTION INTRAVENOUS PRN
Status: DISCONTINUED | OUTPATIENT
Start: 2024-04-24 | End: 2024-04-28 | Stop reason: HOSPADM

## 2024-04-24 RX ORDER — CARVEDILOL 6.25 MG/1
6.25 TABLET ORAL 2 TIMES DAILY WITH MEALS
Status: DISCONTINUED | OUTPATIENT
Start: 2024-04-25 | End: 2024-04-28 | Stop reason: HOSPADM

## 2024-04-24 RX ORDER — ONDANSETRON 4 MG/1
4 TABLET, ORALLY DISINTEGRATING ORAL ONCE
Status: COMPLETED | OUTPATIENT
Start: 2024-04-24 | End: 2024-04-24

## 2024-04-24 RX ORDER — LANOLIN ALCOHOL/MO/W.PET/CERES
3 CREAM (GRAM) TOPICAL NIGHTLY
Status: DISCONTINUED | OUTPATIENT
Start: 2024-04-24 | End: 2024-04-28 | Stop reason: HOSPADM

## 2024-04-24 RX ORDER — ONDANSETRON 4 MG/1
4 TABLET, ORALLY DISINTEGRATING ORAL EVERY 8 HOURS PRN
Status: DISCONTINUED | OUTPATIENT
Start: 2024-04-24 | End: 2024-04-28 | Stop reason: HOSPADM

## 2024-04-24 RX ORDER — SODIUM CHLORIDE, SODIUM LACTATE, POTASSIUM CHLORIDE, CALCIUM CHLORIDE 600; 310; 30; 20 MG/100ML; MG/100ML; MG/100ML; MG/100ML
INJECTION, SOLUTION INTRAVENOUS CONTINUOUS
Status: DISCONTINUED | OUTPATIENT
Start: 2024-04-24 | End: 2024-04-25

## 2024-04-24 RX ORDER — POTASSIUM CHLORIDE 7.45 MG/ML
10 INJECTION INTRAVENOUS PRN
Status: DISCONTINUED | OUTPATIENT
Start: 2024-04-24 | End: 2024-04-24

## 2024-04-24 RX ORDER — PANTOPRAZOLE SODIUM 40 MG/1
40 TABLET, DELAYED RELEASE ORAL
Status: DISCONTINUED | OUTPATIENT
Start: 2024-04-25 | End: 2024-04-28 | Stop reason: HOSPADM

## 2024-04-24 RX ORDER — M-VIT,TX,IRON,MINS/CALC/FOLIC 27MG-0.4MG
1 TABLET ORAL DAILY
Status: DISCONTINUED | OUTPATIENT
Start: 2024-04-25 | End: 2024-04-28 | Stop reason: HOSPADM

## 2024-04-24 RX ORDER — SODIUM CHLORIDE 0.9 % (FLUSH) 0.9 %
5-40 SYRINGE (ML) INJECTION PRN
Status: DISCONTINUED | OUTPATIENT
Start: 2024-04-24 | End: 2024-04-28 | Stop reason: HOSPADM

## 2024-04-24 RX ORDER — ACETAMINOPHEN 325 MG/1
650 TABLET ORAL EVERY 6 HOURS PRN
Status: DISCONTINUED | OUTPATIENT
Start: 2024-04-24 | End: 2024-04-24 | Stop reason: ALTCHOICE

## 2024-04-24 RX ORDER — ASCORBIC ACID 500 MG
500 TABLET ORAL 2 TIMES DAILY
Status: DISCONTINUED | OUTPATIENT
Start: 2024-04-24 | End: 2024-04-28 | Stop reason: HOSPADM

## 2024-04-24 RX ORDER — POTASSIUM CHLORIDE 750 MG/1
40 TABLET, EXTENDED RELEASE ORAL PRN
Status: DISCONTINUED | OUTPATIENT
Start: 2024-04-24 | End: 2024-04-24

## 2024-04-24 RX ORDER — METHENAMINE HIPPURATE 1000 MG/1
1 TABLET ORAL DAILY
Status: DISCONTINUED | OUTPATIENT
Start: 2024-04-25 | End: 2024-04-25 | Stop reason: RX

## 2024-04-24 RX ORDER — ACETAMINOPHEN 650 MG/1
650 SUPPOSITORY RECTAL EVERY 6 HOURS PRN
Status: DISCONTINUED | OUTPATIENT
Start: 2024-04-24 | End: 2024-04-28 | Stop reason: HOSPADM

## 2024-04-24 RX ORDER — ENOXAPARIN SODIUM 100 MG/ML
40 INJECTION SUBCUTANEOUS DAILY
Status: DISCONTINUED | OUTPATIENT
Start: 2024-04-25 | End: 2024-04-28 | Stop reason: HOSPADM

## 2024-04-24 RX ORDER — SODIUM CHLORIDE 9 MG/ML
INJECTION, SOLUTION INTRAVENOUS PRN
Status: DISCONTINUED | OUTPATIENT
Start: 2024-04-24 | End: 2024-04-28 | Stop reason: HOSPADM

## 2024-04-24 RX ORDER — SERTRALINE HYDROCHLORIDE 100 MG/1
100 TABLET, FILM COATED ORAL DAILY
Status: DISCONTINUED | OUTPATIENT
Start: 2024-04-25 | End: 2024-04-25

## 2024-04-24 RX ORDER — SODIUM CHLORIDE 0.9 % (FLUSH) 0.9 %
5-40 SYRINGE (ML) INJECTION EVERY 12 HOURS SCHEDULED
Status: DISCONTINUED | OUTPATIENT
Start: 2024-04-24 | End: 2024-04-28 | Stop reason: HOSPADM

## 2024-04-24 RX ADMIN — SODIUM CHLORIDE, POTASSIUM CHLORIDE, SODIUM LACTATE AND CALCIUM CHLORIDE: 600; 310; 30; 20 INJECTION, SOLUTION INTRAVENOUS at 22:47

## 2024-04-24 RX ADMIN — ONDANSETRON 4 MG: 4 TABLET, ORALLY DISINTEGRATING ORAL at 16:27

## 2024-04-24 RX ADMIN — CEFEPIME 2000 MG: 2 INJECTION, POWDER, FOR SOLUTION INTRAVENOUS at 19:31

## 2024-04-24 RX ADMIN — SODIUM CHLORIDE 1000 ML: 9 INJECTION, SOLUTION INTRAVENOUS at 16:34

## 2024-04-24 RX ADMIN — SODIUM CHLORIDE 1000 ML: 9 INJECTION, SOLUTION INTRAVENOUS at 16:31

## 2024-04-24 RX ADMIN — ACETAMINOPHEN 650 MG: 650 SUPPOSITORY RECTAL at 19:37

## 2024-04-24 ASSESSMENT — PAIN SCALES - PAIN ASSESSMENT IN ADVANCED DEMENTIA (PAINAD)
NEGVOCALIZATION: OCCASIONAL MOAN/GROAN, LOW SPEECH, NEGATIVE/DISAPPROVING QUALITY
BODYLANGUAGE: RELAXED
BREATHING: NORMAL
TOTALSCORE: 1
CONSOLABILITY: NO NEED TO CONSOLE

## 2024-04-24 ASSESSMENT — LIFESTYLE VARIABLES
HOW MANY STANDARD DRINKS CONTAINING ALCOHOL DO YOU HAVE ON A TYPICAL DAY: PATIENT DOES NOT DRINK
HOW MANY STANDARD DRINKS CONTAINING ALCOHOL DO YOU HAVE ON A TYPICAL DAY: PATIENT DOES NOT DRINK
HOW OFTEN DO YOU HAVE A DRINK CONTAINING ALCOHOL: NEVER
HOW OFTEN DO YOU HAVE A DRINK CONTAINING ALCOHOL: NEVER

## 2024-04-24 ASSESSMENT — PAIN - FUNCTIONAL ASSESSMENT
PAIN_FUNCTIONAL_ASSESSMENT: NONE - DENIES PAIN
PAIN_FUNCTIONAL_ASSESSMENT: NONE - DENIES PAIN

## 2024-04-24 ASSESSMENT — PAIN SCALES - GENERAL: PAINLEVEL_OUTOF10: 0

## 2024-04-25 PROBLEM — Z87.19 HISTORY OF FECAL IMPACTION: Status: ACTIVE | Noted: 2024-04-25

## 2024-04-25 PROBLEM — K56.41 FECAL IMPACTION IN RECTUM (HCC): Status: ACTIVE | Noted: 2024-04-25

## 2024-04-25 PROBLEM — F03.918 DEMENTIA WITH BEHAVIORAL DISTURBANCE (HCC): Status: ACTIVE | Noted: 2018-03-03

## 2024-04-25 PROBLEM — D72.829 LEUKOCYTOSIS: Status: ACTIVE | Noted: 2024-01-27

## 2024-04-25 LAB
ALBUMIN SERPL-MCNC: 3.4 G/DL (ref 3.4–5)
ALBUMIN/GLOB SERPL: 1.3 {RATIO} (ref 1.1–2.2)
ALP SERPL-CCNC: 109 U/L (ref 40–129)
ALT SERPL-CCNC: 12 U/L (ref 10–40)
ANION GAP SERPL CALCULATED.3IONS-SCNC: 10 MMOL/L (ref 3–16)
AST SERPL-CCNC: 17 U/L (ref 15–37)
BILIRUB SERPL-MCNC: 0.5 MG/DL (ref 0–1)
BUN SERPL-MCNC: 23 MG/DL (ref 7–20)
CALCIUM SERPL-MCNC: 8.8 MG/DL (ref 8.3–10.6)
CHLORIDE SERPL-SCNC: 106 MMOL/L (ref 99–110)
CO2 SERPL-SCNC: 23 MMOL/L (ref 21–32)
CREAT SERPL-MCNC: 1.4 MG/DL (ref 0.8–1.3)
GFR SERPLBLD CREATININE-BSD FMLA CKD-EPI: 49 ML/MIN/{1.73_M2}
GLUCOSE SERPL-MCNC: 110 MG/DL (ref 70–99)
POTASSIUM SERPL-SCNC: 4.4 MMOL/L (ref 3.5–5.1)
POTASSIUM SERPL-SCNC: 4.4 MMOL/L (ref 3.5–5.1)
PROCALCITONIN SERPL IA-MCNC: 0.47 NG/ML (ref 0–0.15)
PROT SERPL-MCNC: 6.1 G/DL (ref 6.4–8.2)
REPORT: NORMAL
SODIUM SERPL-SCNC: 139 MMOL/L (ref 136–145)

## 2024-04-25 PROCEDURE — 2500000003 HC RX 250 WO HCPCS: Performed by: INTERNAL MEDICINE

## 2024-04-25 PROCEDURE — 92526 ORAL FUNCTION THERAPY: CPT

## 2024-04-25 PROCEDURE — 6360000002 HC RX W HCPCS

## 2024-04-25 PROCEDURE — 99232 SBSQ HOSP IP/OBS MODERATE 35: CPT | Performed by: INTERNAL MEDICINE

## 2024-04-25 PROCEDURE — 92610 EVALUATE SWALLOWING FUNCTION: CPT

## 2024-04-25 PROCEDURE — 6370000000 HC RX 637 (ALT 250 FOR IP): Performed by: INTERNAL MEDICINE

## 2024-04-25 PROCEDURE — 6370000000 HC RX 637 (ALT 250 FOR IP)

## 2024-04-25 PROCEDURE — 36415 COLL VENOUS BLD VENIPUNCTURE: CPT

## 2024-04-25 PROCEDURE — 80053 COMPREHEN METABOLIC PANEL: CPT

## 2024-04-25 PROCEDURE — 84145 PROCALCITONIN (PCT): CPT

## 2024-04-25 PROCEDURE — 2580000003 HC RX 258

## 2024-04-25 PROCEDURE — 2580000003 HC RX 258: Performed by: INTERNAL MEDICINE

## 2024-04-25 PROCEDURE — 97530 THERAPEUTIC ACTIVITIES: CPT

## 2024-04-25 PROCEDURE — 6360000002 HC RX W HCPCS: Performed by: INTERNAL MEDICINE

## 2024-04-25 PROCEDURE — 97162 PT EVAL MOD COMPLEX 30 MIN: CPT

## 2024-04-25 PROCEDURE — 1200000000 HC SEMI PRIVATE

## 2024-04-25 PROCEDURE — 97166 OT EVAL MOD COMPLEX 45 MIN: CPT

## 2024-04-25 PROCEDURE — 84132 ASSAY OF SERUM POTASSIUM: CPT

## 2024-04-25 RX ORDER — SERTRALINE HYDROCHLORIDE 100 MG/1
100 TABLET, FILM COATED ORAL DAILY
Status: DISCONTINUED | OUTPATIENT
Start: 2024-04-26 | End: 2024-04-28 | Stop reason: HOSPADM

## 2024-04-25 RX ORDER — SODIUM CHLORIDE, SODIUM LACTATE, POTASSIUM CHLORIDE, CALCIUM CHLORIDE 600; 310; 30; 20 MG/100ML; MG/100ML; MG/100ML; MG/100ML
INJECTION, SOLUTION INTRAVENOUS CONTINUOUS
Status: ACTIVE | OUTPATIENT
Start: 2024-04-25 | End: 2024-04-25

## 2024-04-25 RX ORDER — DOCUSATE SODIUM 100 MG/1
100 CAPSULE, LIQUID FILLED ORAL DAILY
Status: DISCONTINUED | OUTPATIENT
Start: 2024-04-25 | End: 2024-04-28 | Stop reason: HOSPADM

## 2024-04-25 RX ORDER — POLYETHYLENE GLYCOL 3350 17 G/17G
17 POWDER, FOR SOLUTION ORAL ONCE
Status: DISCONTINUED | OUTPATIENT
Start: 2024-04-25 | End: 2024-04-26

## 2024-04-25 RX ORDER — PREGABALIN 25 MG/1
50 CAPSULE ORAL 3 TIMES DAILY
Status: DISCONTINUED | OUTPATIENT
Start: 2024-04-26 | End: 2024-04-28 | Stop reason: HOSPADM

## 2024-04-25 RX ADMIN — OXYCODONE HYDROCHLORIDE AND ACETAMINOPHEN 500 MG: 500 TABLET ORAL at 10:08

## 2024-04-25 RX ADMIN — Medication 3 MG: at 00:57

## 2024-04-25 RX ADMIN — OXYCODONE HYDROCHLORIDE AND ACETAMINOPHEN 500 MG: 500 TABLET ORAL at 21:47

## 2024-04-25 RX ADMIN — PANTOPRAZOLE SODIUM 40 MG: 40 TABLET, DELAYED RELEASE ORAL at 10:08

## 2024-04-25 RX ADMIN — Medication 3 MG: at 21:47

## 2024-04-25 RX ADMIN — Medication 1500 MG: at 17:30

## 2024-04-25 RX ADMIN — ASPIRIN 81 MG: 81 TABLET, COATED ORAL at 10:08

## 2024-04-25 RX ADMIN — ACETAMINOPHEN 650 MG: 325 TABLET ORAL at 10:44

## 2024-04-25 RX ADMIN — DOCUSATE SODIUM 100 MG: 100 CAPSULE, LIQUID FILLED ORAL at 10:08

## 2024-04-25 RX ADMIN — MEROPENEM 1000 MG: 1 INJECTION, POWDER, FOR SOLUTION INTRAVENOUS at 10:51

## 2024-04-25 RX ADMIN — ENOXAPARIN SODIUM 40 MG: 100 INJECTION SUBCUTANEOUS at 10:08

## 2024-04-25 RX ADMIN — ACETAMINOPHEN 650 MG: 325 TABLET ORAL at 00:58

## 2024-04-25 RX ADMIN — SILVER SULFADIAZINE: 10 CREAM TOPICAL at 12:29

## 2024-04-25 RX ADMIN — MEROPENEM 1000 MG: 1 INJECTION, POWDER, FOR SOLUTION INTRAVENOUS at 21:49

## 2024-04-25 RX ADMIN — I-VITE, TAB 1000-60-2MG (60/BT) 1 TABLET: TAB at 10:08

## 2024-04-25 RX ADMIN — PREGABALIN 50 MG: 25 CAPSULE ORAL at 00:58

## 2024-04-25 RX ADMIN — ONDANSETRON 4 MG: 4 TABLET, ORALLY DISINTEGRATING ORAL at 01:01

## 2024-04-25 RX ADMIN — Medication 10 ML: at 21:48

## 2024-04-25 RX ADMIN — SODIUM CHLORIDE, POTASSIUM CHLORIDE, SODIUM LACTATE AND CALCIUM CHLORIDE: 600; 310; 30; 20 INJECTION, SOLUTION INTRAVENOUS at 12:32

## 2024-04-25 RX ADMIN — CARVEDILOL 6.25 MG: 6.25 TABLET, FILM COATED ORAL at 21:47

## 2024-04-25 RX ADMIN — CARVEDILOL 6.25 MG: 6.25 TABLET, FILM COATED ORAL at 10:08

## 2024-04-25 ASSESSMENT — PAIN SCALES - GENERAL
PAINLEVEL_OUTOF10: 10
PAINLEVEL_OUTOF10: 0

## 2024-04-25 ASSESSMENT — PAIN DESCRIPTION - ORIENTATION: ORIENTATION: LEFT;RIGHT

## 2024-04-25 ASSESSMENT — PAIN DESCRIPTION - LOCATION: LOCATION: ABDOMEN

## 2024-04-25 NOTE — DISCHARGE INSTR - COC
Continuity of Care Form    Patient Name: Zia Garrett   :  1935  MRN:  [id number]    Admit date:  2024  Discharge date:  2024    Code Status Order: Full Code   Advance Directives:     Admitting Physician:  Dominick Maradiaga MD  PCP: Omar Schofield MD    Discharging Nurse: Mona SERRA RN  Discharging Hospital Unit/Room#: 0226/0226-01  Discharging Unit Phone Number: [phone]    Emergency Contact:   Extended Emergency Contact Information  Primary Emergency Contact: Gabi Still  Home Phone: [phone]  Mobile Phone: [phone]  Relation: Legal Guardian  Preferred language: English   needed? No  Secondary Emergency Contact: Nancy Urbina  Home Phone: [phone]  Mobile Phone: [phone]  Relation: Child   needed? No    Past Surgical History:  Past Surgical History:   Procedure Laterality Date    CHOLECYSTECTOMY      COLONOSCOPY  8/10/12    CORONARY ANGIOPLASTY WITH STENT PLACEMENT      IR MIDLINE CATH  2023    IR MIDLINE CATH 2023 [facility] PROC[facility]    IR MIDLINE CATH  10/18/2023    IR MIDLINE CATH 10/18/2023 [facility] PROC[facility]    PROSTATE BIOPSY      SKIN BIOPSY      UPPER GASTROINTESTINAL ENDOSCOPY  12    UPPER GASTROINTESTINAL ENDOSCOPY N/A 2019    EGD BIOPSY performed by Michael Ascencio DO at [facility] END[facility]    UPPER GASTROINTESTINAL ENDOSCOPY N/A 2019    EGD performed by Yash Shaffer MD at [facility] ENDOSCOPY    UPPER GASTROINTESTINAL ENDOSCOPY N/A 3/16/2023    EGD BIOPSY performed by Yash Shaffer MD at [facility] ENDOSCOPY       Immunization History:   Immunization History   Administered Date(s) Administered    COVID-19, PFIZER PURPLE top, DILUTE for use, (age 12 y+), 30mcg/0.3mL 2021, 2021, 02/15/2022    Influenza A (E3T1-38) Vaccine PF IM 2009    Influenza Virus Vaccine 2009, 2010    Influenza, High Dose (Fluzone 65 yrs and older) 2011, 2013

## 2024-04-25 NOTE — DISCHARGE INSTRUCTIONS
Heart Failure Resources:  Heart Failure Interactive Workbook:  Go to https://[employer]/publication/?q=278866 for a Free Heart Failure Interactive Workbook provided by The American Heart Association. This interactive workbook will provide information on Healthier Living with Heart Failure. Please copy and paste link into search bar. Use your mouse to scroll through the pages.    HF [place] omayra:   Heart Failure Free smart phone omayra available for iPhone and Android download. Use your phone to track sodium intake, fluid intake, symptoms, and weight.     Low Sodium Diet / Recipes:  Go to www.[employer] website for “renal” diet which is Low Sodium! [employer] is a dialysis company, but this website offers free seasonal cookbooks. Each quarter, they will release 25-30 new recipes with a breakdown of calories, sodium, and glucose. You can also go to wwwTwentyFeet/recipes website for free recipes.     Discharge Instruction Video:  Scan the QR code below with your camera and click the canva.com link to open the video and watch educational information on Heart Failure and Medications from one of our nurses.   https://[email or web address]/design/DAFZnsH_JRk/4XywdvwEIEOxpMYxqQ2qed/edit    Home Exercise Program:   Identification of Green/Yellow/Red zones:  You should be able to identify when you feel good (green zone), if you have 1-2 symptoms of HF (yellow zone), or if you are in need of medical attention (red zone).  In your CHF education folder you were provided a “stop light tool” to outline this information.     We want to you to rate your exertion levels:    Our therapy team has discussed means of identification with you such as the \"Shayan scale.\"  The Shayan rating scale ranges from 6 to 20, where 6 means \"no exertion at all\" and 20 means \"maximal exertion.\" The goal is to use this to gauge how much effort it is taking for you to do your normal daily tasks.   You should be able to recognize when too much exertion is

## 2024-04-25 NOTE — CONSULTS
[facility]  Palliative Medicine Consultation Note      Date Of Admission:4/24/2024  Date of consult: 04/25/24  Seen by PC in the past:  No    Recommendations:        Writer spoke with pt's daughter,APRIL Ashley, for goc conversation and to discuss code status. Hospice/palliative options introduced to Gabi. Per Gabi she would like to discuss meeting with hospice with sibling and not ready to change pt to DNR at this time. PC phone number provided to Gabi. Multiple questions asked and answered.    1. Goals of Care/Advanced Care planning/Code status: Full Code  2. Pain: managed per care team  3. SOB: na  4. Disposition: return to [facility]    Reason for Consult:         [x]  Goals of Care  [x]  Code Status Discussion/Advanced Care Planning   []  Psychosocial/Family Support  []  Symptom Management  []  Other (Specify)    Requesting Physician: Dr. Young    CHIEF COMPLAINT:  UTI    History Obtained From:  family member - sister    History of Present Illness:         Zia Garrett is a 88 y.o. male with PMH of (see below list) who presented with UTI,history of fecal impaction, CHF,EF 35-40%,COPD,hypertension,hyperlipidemia. Per Gabi pt has been at [facility] for four years and recently declining with Dementia getting more advanced and severe. Pt's children to discuss options and considering meeting with hospice.     Subjective:         Past Medical History:        Diagnosis Date    AMS (altered mental status)     Atherosclerotic heart disease     CAD (coronary artery disease)     Cancer (HCC)     skin    Cardiomyopathy (HCC)     CHF (congestive heart failure) (HCC)     Chronic pancreatitis (HCC)     COPD (chronic obstructive pulmonary disease) (HCC)     COVID-19     Dementia (HCC)     Depression     Duodenal ulcer without hemorrhage or perforation     Falls frequently     GERD without esophagitis     Hernia     Hyperlipidemia     Hypertension     Hypokalemia     Lack of coordination     falls

## 2024-04-25 NOTE — CONSULTS
Pt from [facility] LTC. Writer left  for pt's legal guardian,Gabi Still, related to goc conversation. Awaiting call back. PC following peripherally.

## 2024-04-25 NOTE — ED NOTES
Report called to Kiersten HEARD @ [facility], attempted to call update to FÉLIX Fernando unavailable

## 2024-04-25 NOTE — CONSULTS
[facility]   Pharmacy Pharmacokinetic Monitoring Service - Vancomycin     Zia Garrett is a 88 y.o. male starting on vancomycin therapy for MRSA bacteremia. Pharmacy consulted by ERIC Gilbert for monitoring and adjustment.    Target Concentration: Dosing based on anticipated concentration <15 mg/L due to renal impairment/insufficiency    Additional Antimicrobials: Meropenem    Pertinent Laboratory Values:   Wt Readings from Last 1 Encounters:   04/24/24 79.7 kg (175 lb 11.2 oz)     Temp Readings from Last 1 Encounters:   04/25/24 98.7 °F (37.1 °C) (Oral)     Estimated Creatinine Clearance: 38 mL/min (A) (based on SCr of 1.4 mg/dL (H)).  Recent Labs     04/24/24  1557 04/25/24  1021   CREATININE 1.4* 1.4*   BUN 28* 23*   WBC 18.6*  --      Procalcitonin: 0.47    MRSA Nasal Swab: N/A. Non-respiratory infection.    Plan:  Concentration-guided dosing due to renal impairment/insufficiency  Start vancomycin 1500 mg IVPB x one dose today.  Renal labs as indicated   Vancomycin random concentration ordered for 4/26 @ 1400. Will assess dosing schedule based on random level result and renal function.  Pharmacy will continue to monitor patient and adjust therapy as indicated    Thank you for the consult.

## 2024-04-25 NOTE — ACP (ADVANCE CARE PLANNING)
Advance Care Planning   Healthcare Decision Maker:    Primary Decision Maker: Gabi Still - Legal Guardian, Legal Guardian - [phone]      Full CODE.    Click here to complete Healthcare Decision Makers including selection of the Healthcare Decision Maker Relationship (ie \"Primary\").

## 2024-04-26 LAB
ANION GAP SERPL CALCULATED.3IONS-SCNC: 7 MMOL/L (ref 3–16)
BACTERIA UR CULT: ABNORMAL
BASOPHILS # BLD: 0 K/UL (ref 0–0.2)
BASOPHILS NFR BLD: 0.1 %
BUN SERPL-MCNC: 21 MG/DL (ref 7–20)
CALCIUM SERPL-MCNC: 8.7 MG/DL (ref 8.3–10.6)
CHLORIDE SERPL-SCNC: 106 MMOL/L (ref 99–110)
CO2 SERPL-SCNC: 26 MMOL/L (ref 21–32)
CREAT SERPL-MCNC: 1.4 MG/DL (ref 0.8–1.3)
DEPRECATED RDW RBC AUTO: 16 % (ref 12.4–15.4)
EOSINOPHIL # BLD: 0.5 K/UL (ref 0–0.6)
EOSINOPHIL NFR BLD: 5.8 %
GFR SERPLBLD CREATININE-BSD FMLA CKD-EPI: 49 ML/MIN/{1.73_M2}
GLUCOSE SERPL-MCNC: 90 MG/DL (ref 70–99)
HCT VFR BLD AUTO: 33.4 % (ref 40.5–52.5)
HGB BLD-MCNC: 11.2 G/DL (ref 13.5–17.5)
LYMPHOCYTES # BLD: 0.9 K/UL (ref 1–5.1)
LYMPHOCYTES NFR BLD: 9.9 %
MCH RBC QN AUTO: 28.7 PG (ref 26–34)
MCHC RBC AUTO-ENTMCNC: 33.6 G/DL (ref 31–36)
MCV RBC AUTO: 85.4 FL (ref 80–100)
MONOCYTES # BLD: 1 K/UL (ref 0–1.3)
MONOCYTES NFR BLD: 11.7 %
NEUTROPHILS # BLD: 6.3 K/UL (ref 1.7–7.7)
NEUTROPHILS NFR BLD: 72.5 %
ORGANISM: ABNORMAL
PLATELET # BLD AUTO: 113 K/UL (ref 135–450)
PMV BLD AUTO: 7.8 FL (ref 5–10.5)
POTASSIUM SERPL-SCNC: 4.3 MMOL/L (ref 3.5–5.1)
RBC # BLD AUTO: 3.91 M/UL (ref 4.2–5.9)
SODIUM SERPL-SCNC: 139 MMOL/L (ref 136–145)
VANCOMYCIN SERPL-MCNC: 7.6 UG/ML
WBC # BLD AUTO: 8.7 K/UL (ref 4–11)

## 2024-04-26 PROCEDURE — 87040 BLOOD CULTURE FOR BACTERIA: CPT

## 2024-04-26 PROCEDURE — 6370000000 HC RX 637 (ALT 250 FOR IP): Performed by: INTERNAL MEDICINE

## 2024-04-26 PROCEDURE — 2580000003 HC RX 258

## 2024-04-26 PROCEDURE — 99233 SBSQ HOSP IP/OBS HIGH 50: CPT | Performed by: INTERNAL MEDICINE

## 2024-04-26 PROCEDURE — 36415 COLL VENOUS BLD VENIPUNCTURE: CPT

## 2024-04-26 PROCEDURE — 85025 COMPLETE CBC W/AUTO DIFF WBC: CPT

## 2024-04-26 PROCEDURE — 2580000003 HC RX 258: Performed by: INTERNAL MEDICINE

## 2024-04-26 PROCEDURE — 6360000002 HC RX W HCPCS: Performed by: INTERNAL MEDICINE

## 2024-04-26 PROCEDURE — 80048 BASIC METABOLIC PNL TOTAL CA: CPT

## 2024-04-26 PROCEDURE — 6370000000 HC RX 637 (ALT 250 FOR IP)

## 2024-04-26 PROCEDURE — 80202 ASSAY OF VANCOMYCIN: CPT

## 2024-04-26 PROCEDURE — 6360000002 HC RX W HCPCS

## 2024-04-26 PROCEDURE — 93306 TTE W/DOPPLER COMPLETE: CPT

## 2024-04-26 PROCEDURE — 1200000000 HC SEMI PRIVATE

## 2024-04-26 RX ADMIN — PREGABALIN 50 MG: 25 CAPSULE ORAL at 15:20

## 2024-04-26 RX ADMIN — Medication 10 ML: at 23:15

## 2024-04-26 RX ADMIN — PANTOPRAZOLE SODIUM 40 MG: 40 TABLET, DELAYED RELEASE ORAL at 06:40

## 2024-04-26 RX ADMIN — VANCOMYCIN HYDROCHLORIDE 1000 MG: 1 INJECTION, POWDER, LYOPHILIZED, FOR SOLUTION INTRAVENOUS at 18:48

## 2024-04-26 RX ADMIN — I-VITE, TAB 1000-60-2MG (60/BT) 1 TABLET: TAB at 12:26

## 2024-04-26 RX ADMIN — CARVEDILOL 6.25 MG: 6.25 TABLET, FILM COATED ORAL at 18:50

## 2024-04-26 RX ADMIN — OXYCODONE HYDROCHLORIDE AND ACETAMINOPHEN 500 MG: 500 TABLET ORAL at 23:14

## 2024-04-26 RX ADMIN — SERTRALINE 100 MG: 100 TABLET, FILM COATED ORAL at 12:26

## 2024-04-26 RX ADMIN — OXYCODONE HYDROCHLORIDE AND ACETAMINOPHEN 500 MG: 500 TABLET ORAL at 12:26

## 2024-04-26 RX ADMIN — DOCUSATE SODIUM 100 MG: 100 CAPSULE, LIQUID FILLED ORAL at 12:26

## 2024-04-26 RX ADMIN — ASPIRIN 81 MG: 81 TABLET, COATED ORAL at 12:26

## 2024-04-26 RX ADMIN — SILVER SULFADIAZINE: 10 CREAM TOPICAL at 10:00

## 2024-04-26 RX ADMIN — Medication 3 MG: at 23:14

## 2024-04-26 RX ADMIN — ENOXAPARIN SODIUM 40 MG: 100 INJECTION SUBCUTANEOUS at 12:26

## 2024-04-26 RX ADMIN — PREGABALIN 50 MG: 25 CAPSULE ORAL at 23:14

## 2024-04-26 ASSESSMENT — PAIN SCALES - GENERAL: PAINLEVEL_OUTOF10: 0

## 2024-04-26 NOTE — CARE COORDINATION
CM Update:    Plan: Return to [facility]   PC consult complete- see note 4/25. Family undecided about hospice.  Pt will need repeat bld cx, IRWIN and possible ID consult.   CM following

## 2024-04-27 LAB
ANION GAP SERPL CALCULATED.3IONS-SCNC: 10 MMOL/L (ref 3–16)
BACTERIA BLD CULT ORG #2: ABNORMAL
BACTERIA BLD CULT: ABNORMAL
BACTERIA BLD CULT: ABNORMAL
BUN SERPL-MCNC: 20 MG/DL (ref 7–20)
CALCIUM SERPL-MCNC: 8.8 MG/DL (ref 8.3–10.6)
CHLORIDE SERPL-SCNC: 106 MMOL/L (ref 99–110)
CO2 SERPL-SCNC: 24 MMOL/L (ref 21–32)
CREAT SERPL-MCNC: 1.2 MG/DL (ref 0.8–1.3)
GFR SERPLBLD CREATININE-BSD FMLA CKD-EPI: 59 ML/MIN/{1.73_M2}
GLUCOSE SERPL-MCNC: 80 MG/DL (ref 70–99)
ORGANISM: ABNORMAL
POTASSIUM SERPL-SCNC: 4.2 MMOL/L (ref 3.5–5.1)
SODIUM SERPL-SCNC: 140 MMOL/L (ref 136–145)

## 2024-04-27 PROCEDURE — 80048 BASIC METABOLIC PNL TOTAL CA: CPT

## 2024-04-27 PROCEDURE — 1200000000 HC SEMI PRIVATE

## 2024-04-27 PROCEDURE — 99232 SBSQ HOSP IP/OBS MODERATE 35: CPT | Performed by: INTERNAL MEDICINE

## 2024-04-27 PROCEDURE — 6360000002 HC RX W HCPCS: Performed by: INTERNAL MEDICINE

## 2024-04-27 PROCEDURE — 2580000003 HC RX 258: Performed by: INTERNAL MEDICINE

## 2024-04-27 PROCEDURE — 6370000000 HC RX 637 (ALT 250 FOR IP): Performed by: INTERNAL MEDICINE

## 2024-04-27 PROCEDURE — 36415 COLL VENOUS BLD VENIPUNCTURE: CPT

## 2024-04-27 PROCEDURE — 6370000000 HC RX 637 (ALT 250 FOR IP)

## 2024-04-27 PROCEDURE — 2580000003 HC RX 258

## 2024-04-27 PROCEDURE — 6360000002 HC RX W HCPCS

## 2024-04-27 RX ADMIN — PANTOPRAZOLE SODIUM 40 MG: 40 TABLET, DELAYED RELEASE ORAL at 05:51

## 2024-04-27 RX ADMIN — Medication 10 ML: at 20:32

## 2024-04-27 RX ADMIN — ASPIRIN 81 MG: 81 TABLET, COATED ORAL at 11:10

## 2024-04-27 RX ADMIN — OXYCODONE HYDROCHLORIDE AND ACETAMINOPHEN 500 MG: 500 TABLET ORAL at 20:32

## 2024-04-27 RX ADMIN — PREGABALIN 50 MG: 25 CAPSULE ORAL at 15:32

## 2024-04-27 RX ADMIN — VANCOMYCIN HYDROCHLORIDE 1000 MG: 1 INJECTION, POWDER, LYOPHILIZED, FOR SOLUTION INTRAVENOUS at 18:08

## 2024-04-27 RX ADMIN — SILVER SULFADIAZINE: 10 CREAM TOPICAL at 11:10

## 2024-04-27 RX ADMIN — PREGABALIN 50 MG: 25 CAPSULE ORAL at 11:09

## 2024-04-27 RX ADMIN — CARVEDILOL 6.25 MG: 6.25 TABLET, FILM COATED ORAL at 18:08

## 2024-04-27 RX ADMIN — PREGABALIN 50 MG: 25 CAPSULE ORAL at 20:32

## 2024-04-27 RX ADMIN — Medication 3 MG: at 20:32

## 2024-04-27 RX ADMIN — Medication 10 ML: at 11:11

## 2024-04-27 RX ADMIN — ENOXAPARIN SODIUM 40 MG: 100 INJECTION SUBCUTANEOUS at 11:10

## 2024-04-27 RX ADMIN — I-VITE, TAB 1000-60-2MG (60/BT) 1 TABLET: TAB at 11:10

## 2024-04-27 RX ADMIN — OXYCODONE HYDROCHLORIDE AND ACETAMINOPHEN 500 MG: 500 TABLET ORAL at 11:10

## 2024-04-27 RX ADMIN — DOCUSATE SODIUM 100 MG: 100 CAPSULE, LIQUID FILLED ORAL at 11:09

## 2024-04-27 RX ADMIN — CARVEDILOL 6.25 MG: 6.25 TABLET, FILM COATED ORAL at 11:10

## 2024-04-27 RX ADMIN — SERTRALINE 100 MG: 100 TABLET, FILM COATED ORAL at 11:10

## 2024-04-28 VITALS
TEMPERATURE: 98.5 F | SYSTOLIC BLOOD PRESSURE: 146 MMHG | WEIGHT: 175.7 LBS | DIASTOLIC BLOOD PRESSURE: 65 MMHG | HEART RATE: 60 BPM | BODY MASS INDEX: 26.02 KG/M2 | HEIGHT: 69 IN | OXYGEN SATURATION: 93 % | RESPIRATION RATE: 16 BRPM

## 2024-04-28 PROCEDURE — 05HC33Z INSERTION OF INFUSION DEVICE INTO LEFT BASILIC VEIN, PERCUTANEOUS APPROACH: ICD-10-PCS | Performed by: INTERNAL MEDICINE

## 2024-04-28 PROCEDURE — 36569 INSJ PICC 5 YR+ W/O IMAGING: CPT

## 2024-04-28 PROCEDURE — 2580000003 HC RX 258: Performed by: INTERNAL MEDICINE

## 2024-04-28 PROCEDURE — 6370000000 HC RX 637 (ALT 250 FOR IP): Performed by: INTERNAL MEDICINE

## 2024-04-28 PROCEDURE — 6360000002 HC RX W HCPCS: Performed by: INTERNAL MEDICINE

## 2024-04-28 PROCEDURE — 99239 HOSP IP/OBS DSCHRG MGMT >30: CPT | Performed by: INTERNAL MEDICINE

## 2024-04-28 RX ORDER — PREGABALIN 50 MG/1
50 CAPSULE ORAL 3 TIMES DAILY
Qty: 6 CAPSULE | Refills: 0 | Status: ON HOLD | OUTPATIENT
Start: 2024-04-28 | End: 2024-05-05

## 2024-04-28 RX ADMIN — I-VITE, TAB 1000-60-2MG (60/BT) 1 TABLET: TAB at 09:59

## 2024-04-28 RX ADMIN — PANTOPRAZOLE SODIUM 40 MG: 40 TABLET, DELAYED RELEASE ORAL at 07:00

## 2024-04-28 RX ADMIN — OXYCODONE HYDROCHLORIDE AND ACETAMINOPHEN 500 MG: 500 TABLET ORAL at 09:59

## 2024-04-28 RX ADMIN — SILVER SULFADIAZINE: 10 CREAM TOPICAL at 10:03

## 2024-04-28 RX ADMIN — POLYETHYLENE GLYCOL 3350 17 G: 17 POWDER, FOR SOLUTION ORAL at 09:57

## 2024-04-28 RX ADMIN — ENOXAPARIN SODIUM 40 MG: 100 INJECTION SUBCUTANEOUS at 10:02

## 2024-04-28 RX ADMIN — Medication 10 ML: at 10:03

## 2024-04-28 RX ADMIN — PREGABALIN 50 MG: 25 CAPSULE ORAL at 09:59

## 2024-04-28 RX ADMIN — ASPIRIN 81 MG: 81 TABLET, COATED ORAL at 09:59

## 2024-04-28 RX ADMIN — SERTRALINE 100 MG: 100 TABLET, FILM COATED ORAL at 09:59

## 2024-04-28 RX ADMIN — CARVEDILOL 6.25 MG: 6.25 TABLET, FILM COATED ORAL at 10:11

## 2024-04-28 NOTE — PLAN OF CARE
Problem: Discharge Planning  Goal: Discharge to home or other facility with appropriate resources  Outcome: Adequate for Discharge     Problem: Pain  Goal: Verbalizes/displays adequate comfort level or baseline comfort level  Outcome: Adequate for Discharge     Problem: Safety - Adult  Goal: Free from fall injury  Outcome: Adequate for Discharge     Problem: Skin/Tissue Integrity  Goal: Absence of new skin breakdown  Description: 1.  Monitor for areas of redness and/or skin breakdown  2.  Assess vascular access sites hourly  3.  Every 4-6 hours minimum:  Change oxygen saturation probe site  4.  Every 4-6 hours:  If on nasal continuous positive airway pressure, respiratory therapy assess nares and determine need for appliance change or resting period.  Outcome: Adequate for Discharge     Problem: Respiratory - Adult  Goal: Achieves optimal ventilation and oxygenation  Outcome: Adequate for Discharge     Problem: Skin/Tissue Integrity - Adult  Goal: Skin integrity remains intact  Outcome: Adequate for Discharge  Goal: Incisions, wounds, or drain sites healing without S/S of infection  Outcome: Adequate for Discharge  Goal: Oral mucous membranes remain intact  Outcome: Adequate for Discharge     Problem: Gastrointestinal - Adult  Goal: Minimal or absence of nausea and vomiting  Outcome: Adequate for Discharge  Goal: Maintains or returns to baseline bowel function  Outcome: Adequate for Discharge  Goal: Maintains adequate nutritional intake  Outcome: Adequate for Discharge  Goal: Establish and maintain optimal ostomy function  Outcome: Adequate for Discharge     Problem: Genitourinary - Adult  Goal: Absence of urinary retention  Outcome: Adequate for Discharge  Goal: Urinary catheter remains patent  Outcome: Adequate for Discharge     Problem: Infection - Adult  Goal: Absence of infection at discharge  Outcome: Adequate for Discharge  Goal: Absence of infection during hospitalization  Outcome: Adequate for 
  Problem: Discharge Planning  Goal: Discharge to home or other facility with appropriate resources  Outcome: Progressing     Problem: Pain  Goal: Verbalizes/displays adequate comfort level or baseline comfort level  Outcome: Progressing     Problem: Safety - Adult  Goal: Free from fall injury  Outcome: Progressing     Problem: Skin/Tissue Integrity  Goal: Absence of new skin breakdown  Description: 1.  Monitor for areas of redness and/or skin breakdown  2.  Assess vascular access sites hourly  3.  Every 4-6 hours minimum:  Change oxygen saturation probe site  4.  Every 4-6 hours:  If on nasal continuous positive airway pressure, respiratory therapy assess nares and determine need for appliance change or resting period.  Outcome: Progressing     Problem: Respiratory - Adult  Goal: Achieves optimal ventilation and oxygenation  Outcome: Progressing     Problem: Skin/Tissue Integrity - Adult  Goal: Skin integrity remains intact  Outcome: Progressing  Goal: Incisions, wounds, or drain sites healing without S/S of infection  Outcome: Progressing  Goal: Oral mucous membranes remain intact  Outcome: Progressing     Problem: Gastrointestinal - Adult  Goal: Minimal or absence of nausea and vomiting  Outcome: Progressing  Goal: Maintains or returns to baseline bowel function  Outcome: Progressing  Goal: Maintains adequate nutritional intake  Outcome: Progressing  Goal: Establish and maintain optimal ostomy function  Outcome: Progressing     Problem: Genitourinary - Adult  Goal: Absence of urinary retention  Outcome: Progressing  Goal: Urinary catheter remains patent  Outcome: Progressing     Problem: Infection - Adult  Goal: Absence of infection at discharge  Outcome: Progressing  Goal: Absence of infection during hospitalization  Outcome: Progressing  Goal: Absence of fever/infection during anticipated neutropenic period  Outcome: Progressing     Problem: Chronic Conditions and Co-morbidities  Goal: Patient's chronic 
  Problem: Discharge Planning  Goal: Discharge to home or other facility with appropriate resources  Outcome: Progressing     Problem: Pain  Goal: Verbalizes/displays adequate comfort level or baseline comfort level  Outcome: Progressing     Problem: Safety - Adult  Goal: Free from fall injury  Outcome: Progressing     Problem: Skin/Tissue Integrity  Goal: Absence of new skin breakdown  Description: 1.  Monitor for areas of redness and/or skin breakdown  2.  Assess vascular access sites hourly  3.  Every 4-6 hours minimum:  Change oxygen saturation probe site  4.  Every 4-6 hours:  If on nasal continuous positive airway pressure, respiratory therapy assess nares and determine need for appliance change or resting period.  Outcome: Progressing     Problem: Skin/Tissue Integrity - Adult  Goal: Skin integrity remains intact  Outcome: Progressing     Problem: Gastrointestinal - Adult  Goal: Minimal or absence of nausea and vomiting  Outcome: Progressing     Problem: Genitourinary - Adult  Goal: Absence of urinary retention  Outcome: Progressing     Problem: Chronic Conditions and Co-morbidities  Goal: Patient's chronic conditions and co-morbidity symptoms are monitored and maintained or improved  Outcome: Progressing     
  Problem: Discharge Planning  Goal: Discharge to home or other facility with appropriate resources  Outcome: Progressing  Flowsheets (Taken 4/26/2024 2313)  Discharge to home or other facility with appropriate resources: Identify barriers to discharge with patient and caregiver     Problem: Pain  Goal: Verbalizes/displays adequate comfort level or baseline comfort level  Outcome: Progressing     Problem: Safety - Adult  Goal: Free from fall injury  Outcome: Progressing     Problem: Skin/Tissue Integrity  Goal: Absence of new skin breakdown  Description: 1.  Monitor for areas of redness and/or skin breakdown  2.  Assess vascular access sites hourly  3.  Every 4-6 hours minimum:  Change oxygen saturation probe site  4.  Every 4-6 hours:  If on nasal continuous positive airway pressure, respiratory therapy assess nares and determine need for appliance change or resting period.  Outcome: Progressing     Problem: Respiratory - Adult  Goal: Achieves optimal ventilation and oxygenation  Outcome: Progressing     Problem: Skin/Tissue Integrity - Adult  Goal: Skin integrity remains intact  Outcome: Progressing  Flowsheets (Taken 4/26/2024 2313)  Skin Integrity Remains Intact: Monitor for areas of redness and/or skin breakdown  Goal: Incisions, wounds, or drain sites healing without S/S of infection  Outcome: Progressing  Goal: Oral mucous membranes remain intact  Outcome: Progressing     Problem: Gastrointestinal - Adult  Goal: Minimal or absence of nausea and vomiting  Outcome: Progressing  Goal: Maintains or returns to baseline bowel function  Outcome: Progressing  Goal: Maintains adequate nutritional intake  Outcome: Progressing  Goal: Establish and maintain optimal ostomy function  Outcome: Progressing     Problem: Genitourinary - Adult  Goal: Absence of urinary retention  Outcome: Progressing  Goal: Urinary catheter remains patent  Outcome: Progressing     Problem: Infection - Adult  Goal: Absence of infection at 
  Problem: Genitourinary - Adult  Goal: Urinary catheter remains patent  Outcome: Progressing     
  Problem: SLP Adult - Impaired Swallowing  Goal: By Discharge: Advance to least restrictive diet without signs or symptoms of aspiration for planned discharge setting.  See evaluation for individualized goals.  Note: SLP completed evaluation. Please refer to notes in EMR.     Gay Johnson  SLP  Clinician     Supervisor: Monika Fulton MA, CCC-SLP    
understand heart failure and disease management, be more comfortable, and reduce lower extremity edema prior to discharge.     Electronically signed by Belinda Simms RN on 4/27/2024 at 7:27 AM

## 2024-04-28 NOTE — CARE COORDINATION
DISCHARGE ORDER  Date/Time 2024 1:24 PM  Completed by: Teresa Boeck, RN, Case Management    Patient Name: Zia Garrett    : 1935      Admit order Date and Status:24  Noted discharge order. (verify MD's last order for status of admission/Traditional Medicare 3 [place] Inpatient qualifying stay required for SNF)    Confirmed discharge plan with:              Patient:  No              When pt confirms DC plan does any support person need to be contacted by CM Yes if yes who - Gaib Still - legal guardian                      Discharge to Facility: [facility] phone number for staff giving report: [phone]   Pre-certification completed: NA   Hospital Exemption Notification (HENS) completed: NA   Discharge orders and Continuity of Care faxed to facility:  [facility] from [facility]      Transportation:               Medical Transport explained with choice list offered to pt/family.                Choice:No(no preference)  Agency used: [facility]   time:   2:30      Pt/family/Nursing/Facility aware of  time:  Yes Names: aGbi Still (legal guardian), Mona RN, HUC/paperwork, Marina/VGT.   Ambulance form completed:  yes:      Date Last IMM Given: 24    Comments: Pt is being d/c'd to [facility] today. Pt's O2 sats are 93% on RA.    Discharge timeout done with Mona RN. All discharge needs and concerns addressed.    Discharging nurse to complete LIANE, reconcile AVS, and place final copy with patient's discharge packet. Discharging RN to ensure that written prescriptions for  Level II medications are sent with patient to the facility as per protocol.

## 2024-04-28 NOTE — PROGRESS NOTES
04/26/24 1006   Encounter Summary   Encounter Overview/Reason Initial Encounter   Service Provided For Patient   Referral/Consult From Rounding   Last Encounter  04/26/24  ( visited; provided pastoral support)   Complexity of Encounter Low   Begin Time 0946   End Time  0951   Total Time Calculated 5 min   Assessment/Intervention/Outcome   Assessment Unable to assess   Intervention Sustaining Presence/Ministry of presence       
  Pharmacy Vancomycin Consult     Vancomycin Day: 2  Current Dosinmg times one,   Current indication: MRSA Bacteremia    Temp max:      Recent Labs     24  1557 24  1021 24  0556   BUN 28* 23* 21*   CREATININE 1.4* 1.4* 1.4*   WBC 18.6*  --  8.7       Intake/Output Summary (Last 24 hours) at 2024 1453  Last data filed at 2024 0859  Gross per 24 hour   Intake 190 ml   Output 1550 ml   Net -1360 ml     Culture Date      Source                       Results                      Blood                           MRSA    Ht Readings from Last 1 Encounters:   24 1.753 m (5' 9\")        Wt Readings from Last 1 Encounters:   24 79.5 kg (175 lb 3.2 oz)       Body mass index is 25.87 kg/m².    Estimated Creatinine Clearance: 38 mL/min (A) (based on SCr of 1.4 mg/dL (H)).    Level : 7.6 ()    Assessment/Plan:  Will place order for Vanc 1gm q24h, (AUC= 480), recheck level on   Gabi Fritz Pharm D :55 PM  .        
  Speech Language Pathology  Swallowing Disorders and Dysphagia  Clinical Bedside Swallow Assessment  Facility/Department: [address] MEDICAL-SURGICAL    Instrumentation: Not clinically indicated at this time. Will continue to monitor  Diet recommendation: IDDSI 4 Puree Solids; IDDSI 0 Thin Liquids; Meds PO as tolerated  Risk management: upright for all intake, stay upright for at least 30 mins after intake, small bites/sips, assist feed, close supervision, oral care 2-3x/day to reduce adverse affects in the event of aspiration, increase physical mobility as able, alternate bites/sips, slow rate of intake, general GERD precautions, STRICT aspiration precautions, and hold PO and contact SLP if s/s of aspiration or worsening respiratory status develop.    NAME:Zia Garrett  : 1935 (88 y.o.)   MRN: [id number]  ROOM: 0226/0226-01  ADMISSION DATE: 2024  PATIENT DIAGNOSIS(ES): UTI (urinary tract infection) [N39.0]  History of fecal impaction [Z87.19]  Urinary tract infection without hematuria, site unspecified [N39.0]  Leukocytosis, unspecified type [D72.829]  Chief Complaint   Patient presents with    Emesis     Pt with reported sudden onset of projectile vomiting approx 45 min PTA     Patient Active Problem List    Diagnosis Date Noted    HCAP (healthcare-associated pneumonia) 03/15/2023    Bronchiectasis without complication (HCC) 2023    Dysphagia 2023    Community acquired pneumonia 2023    Acute respiratory failure with hypoxia (HCC) 2023    Acute respiratory failure with hypoxemia (HCC) 2023    NSTEMI (non-ST elevated myocardial infarction) (HCC) 2022    Cardiomyopathy (HCC) 2022    Lactic acidosis 2022    Demand ischemia (HCC) 2022    Hypoxia 2022    Thrombocytopenia (HCC) 2022    Acute hypoxemic respiratory failure (HCC) 2022    Acute metabolic encephalopathy 2022    Urinary tract infection with hematuria 2022    
/77   Pulse 64   Temp 98.4 °F (36.9 °C) (Axillary)   Resp 18   Ht 1.753 m (5' 9\")   Wt 79.5 kg (175 lb 3.2 oz)   SpO2 93%   BMI 25.87 kg/m²     Patient alert and oriented to self only. Assessment completed. With cordero draining with clear yellow urine. Respiration easy, even and unlabored. Patient tolerated night meds well crushed with pudding. Call light and bedside table within reach. Bed at lowest position, locked, side rails x2.  Pt denies needs at this time.        
4 Eyes Skin Assessment     NAME:  Zia Garrett  YOB: 1935  MEDICAL RECORD NUMBER:  [id number]    The patient is being assessed for  Admission    I agree that at least one RN has performed a thorough Head to Toe Skin Assessment on the patient. ALL assessment sites listed below have been assessed.      Areas assessed by both nurses:    Head, Face, Ears, Shoulders, Back, Chest, Arms, Elbows, Hands, Sacrum. Buttock, Coccyx, Ischium, and Legs. Feet and Heels:  callous left and right heel.  Blanchable redness buttock        Does the Patient have a Wound? No noted wound(s)       Bridger Prevention initiated by RN: Yes  Wound Care Orders initiated by RN: No    Pressure Injury (Stage 3,4, Unstageable, DTI, NWPT, and Complex wounds) if present, place Wound referral order by RN under : No    New Ostomies, if present place, Ostomy referral order under : No     Nurse 1 eSignature: Electronically signed by Cheryl Gibson RN on 4/25/24 at 5:15 AM EDT    **SHARE this note so that the co-signing nurse can place an eSignature**    Nurse 2 eSignature: Electronically signed by Olivia Huerta RN on 4/25/24 at 6:28 AM EDT   
Admitted to [facility] at this time.  Poor historian; temp 101.5; reports feeling chilled; dry heaves noted.  Hob up; bathed and positioned comfortably in bed.  See admission information; call light in reach.   
Arrived to place midline with bedside RN Mona. Pre-procedure and timeout done with RN, discussed limitations of placement and allergies. Vital signs stable. Labs, allergies, medications, and code status reviewed. No contraindications noted.    Procedure explained to pt, including the risk and benefits of the procedure. All questions answered. Pt verbalizes understanding of the procedure and states no more questions.     Pt's basilic, brachial, cephalic are all easily collapsible with no indication for a clot. Vein selected is large enough for catheter (please see image below). Pt tolerated sterile procedure well, with no difficulty accessing basilic vein, when accessed - blood was free flowing and non-pulsatile. Guidewire, introducer, and catheter went in smoothly. ML placement verification with blood return and flushes easily.      Nurses:  OK to use Midline.    Please replace all existing IV tubing with new IV tubing prior to using the ML for current IV infusions.  Please remove any PIVs from ML arm.  All of the above may be sources of infection or an increase chance of a clot.      Post procedure - reorganized pt table, placed pt in lowest position, with call light and educated on line care. Instructed pt/RN not to use arm for at least 30min to avoid bleeding. Reported off to bedside RN.      If you have any questions please call number below and dispatch will direct you to the PICC RN that is on call.    [phone]       
Bedside report given to EMS transport. Patient leaving in stable condition.  
Called report to Shruthi @ [facility].  [phone]. Patient has scheduled  time of 230pm.  
Consult has been called to mirza hilgeman rn on 4/5/24. Spoke with mirza . 9:37 AM    Fatmata Kramer  4/25/2024  
Extended Infusion B-Lactam Antibiotics: Cefepime   Pharmacist Verification:  Review indication, allergies, suspected pathogens, drug interactions, and renal function   Automatically interchange intermittent infusion orders with extended infusion (unless exclusion criteria met)   Automatically adjust dose based on indication and renal function   Automatically adjust timing of antibiotics to avoid compatibility issues, if applicable   Exclusions:   Pediatric patients  Patients in zainab-operative settings   Patients in ambulatory clinics or infusion centers  If a patient has restricted IV access or drug compatibility concerns, patients may receive standard intermittent infusions of antibiotics to minimize antibiotic line time and avoid drug incompatibilities, following discussion between pharmacist and prescriber.  Compatibility Information: For updated compatibility information please refer to [employer] IV Compatibility database in [employer].    Day:  Recent Labs     04/24/24  1557   CREATININE 1.4*     Estimated Creatinine Clearance: 38 mL/min (A) (based on SCr of 1.4 mg/dL (H)).  Recent Labs     04/24/24  1557   WBC 18.6*       Cefepime-Extended Infusion (4-hour infusion) - Preferred Dosing Strategy   Renal Function (CrCl mL/min) ? 60 30 - 59 11 - 29 ? 10, HD PD CRRT   All indications - Loading dose of 2000 milligrams x 1 over 30 minutes or via IV push. Maintenance dose   should begin at the next regularly scheduled dosing interval based on indication/renal function.   Intra-abdominal infections, Skin and soft tissue infections, Urinary tract infections 2000mg q12h 2000mg q24h 1000mg q24h 500mg q24h 1000mg q24h 2000mg q12h   Bacteremia, CNS infections, Cystic fibrosis, Diabetic foot infections, Endocarditis, Febrile neutropenia, Healthcare-associated infections, Osteomyelitis/joint infections, Pneumonia, Sepsis, BMI > 40* 2000mg q8h 2000mg q12h 1000mg q12h 1000mg q24h 1000mg q24h 2000mg q8h   *Consider 2000mg q12h for 
Inpatient Occupational Therapy Evaluation and Treatment    Unit: [facility]  Date:  4/25/2024  Patient Name:    Zia Garrett  Admitting diagnosis:  UTI (urinary tract infection) [N39.0]  History of fecal impaction [Z87.19]  Urinary tract infection without hematuria, site unspecified [N39.0]  Leukocytosis, unspecified type [D72.829]  Admit Date:  4/24/2024  Precautions/Restrictions/WB Status/ Lines/ Wounds/ Oxygen: Fall risk, Bed/chair alarm, Lines (IV), Confusion, and [place] (hard of hearing)    Pt seen for cotreatment this date due to complexity of condition, staff recommendation, and limited functional status information    Treatment Time:  1752-2059  Treatment Number:  1  Timed Code Treatment Minutes: 28 minutes  Total Treatment Minutes:  38  minutes    Patient Goals for Therapy: \"eat\"          Discharge Recommendations: LTC/ECF  DME needs for discharge: Defer to facility       Therapy recommendations for staff:   Assist of 2 for repositioning in bed    History of Present Illness: per Dr Maradiaga H&P 4/23/24:  \"Zia Garrett is a 88 y.o. male with a PMH of dementia, CHF, COPD, hypertension, hyperlipidemia, recurrent UTI who currently lives in a skilled nursing facility who presented to ED with complaint of nausea and vomiting  Patient is a resident of a skilled nursing facility with known history of severe dementia who presents to the ED due to episode of projectile vomiting.  In the ED vitals show blood pressure of 151/69, pulse of 97, temperature 101.5, respiration 18, saturating 92%. l labs show sodium of 140 potassium 5.2 BUN of 28 creatinine 1.4 lactic of 1.4 glucose of 153.  WBC of 18.6 hemoglobin of 12.2.  COVID not detected.  Urinalysis concerning for UTI urine culture sent.  Chest x-ray showed no acute cardiopulmonary process, CT abdomen and pelvis shows constipation and stool impaction in the rectum no bowel obstruction diverticulosis no diverticulitis mildly enlarged prostate mild bronchiectasis in the lung 
Inpatient Physical Therapy Evaluation, Treatment, & Discharge Summary    Unit: [facility]  Date:  4/25/2024  Patient Name:    Zia Garrett  Admitting diagnosis:  UTI (urinary tract infection) [N39.0]  History of fecal impaction [Z87.19]  Urinary tract infection without hematuria, site unspecified [N39.0]  Leukocytosis, unspecified type [D72.829]  Admit Date:  4/24/2024  Precautions/Restrictions/WB Status/ Lines/ Wounds/ Oxygen: Fall risk, Bed/chair alarm, Lines (IV and internal catheter), and Confusion      Pt seen for cotreatment this date due to patient safety, patient endurance, acute illness/injury, and need for the assistance of 2 skilled therapists    Treatment Time:  1122- 1200  Treatment Number:  1   Timed Code Treatment Minutes: 28 minutes  Total Treatment Minutes:  38  minutes    Patient Stated Goals for Therapy: \" none stated \"          Discharge Recommendations: Return to ECF without skilled therapy  DME needs for discharge: Defer to facility       Therapy recommendation for EMS Transport: requires transport by cot due to pt needs lift equipment for safe transfers    Therapy recommendations for staff:   Assist of 2 for repositioning in bed    History of Present Illness:   Per Dr. Maradiaga H&P:   Pt  is a 88 y.o. male with a PMH of dementia, CHF, COPD, hypertension, hyperlipidemia, recurrent UTI who currently lives in a skilled nursing facility who presented to ED with complaint of nausea and vomiting  Patient is a resident of a skilled nursing facility with known history of severe dementia who presents to the ED due to episode of projectile vomiting.  In the ED vitals show blood pressure of 151/69, pulse of 97, temperature 101.5, respiration 18, saturating 92%. l labs show sodium of 140 potassium 5.2 BUN of 28 creatinine 1.4 lactic of 1.4 glucose of 153.  WBC of 18.6 hemoglobin of 12.2.  COVID not detected.  Urinalysis concerning for UTI urine culture sent.  Chest x-ray showed no acute cardiopulmonary 
Liseth ROGEL, called, order for midline for IV ATB. Added for nurse. Kyara Joyner,RN Clinical     
PHARMACY NOTE  Patient was ordered Methenamine.  Per the Formulary Committee, this medication is non-formulary and not stocked by pharmacy.  Patient is from a SNF and unable to provide this medication.  UTI is being treated with Cefepime.  Methenamine can be reordered at discharge.   JIMMY العراقيPh.4/25/20248:44 AM    
Patient resting in bed with eyes closed, respiration easy, even, unlabored. No s/s of distress noted. No complains of pain or discomfort at this time. Call light within reach.    
Progress Note    Admit Date:  4/24/2024    SNF resident- projectile vomiting   Sepsis criteria   UTI -hx of ESBL e coli   Mild elev CR   Dementia     Blood and urine with MRSA    Subjective:  Mr. Garrett today is seen resting in bed. Mentation much improved   Denies any dysuria or abd pain or flank pain   Tolerating diet well     Objective:     Vitals:    04/26/24 1850 04/26/24 2305 04/27/24 0140 04/27/24 0145   BP: (!) 140/70 137/77  128/61   Pulse: 65 64  61   Resp:  18  18   Temp:  98.4 °F (36.9 °C)  98.2 °F (36.8 °C)   TempSrc:  Axillary  Axillary   SpO2:  93%  93%   Weight:   79.7 kg (175 lb 11.2 oz)    Height:             No data found.         Intake/Output Summary (Last 24 hours) at 4/27/2024 1025  Last data filed at 4/27/2024 0930  Gross per 24 hour   Intake 280 ml   Output 800 ml   Net -520 ml         Physical Exam:          General: alert to self and place . Appears to be not in any distress  Mucous Membranes:  Pink , anicteric  Neck: No JVD, no carotid bruit, no thyromegaly  Chest:  Clear to auscultation bilaterally, no added sounds  Cardiovascular:  RRR S1S2 heard, no murmurs or gallops  Abdomen:  Soft, undistended, non tender, no organomegaly, BS present  Corrales in place with yellow urine   Extremities: atrophic ext, No edema or cyanosis. Distal pulses well felt  Neurological : grossly normal with baseline dementia  Pleasant and cooperative                Medications:  vancomycin, 1,000 mg, Q24H  pregabalin, 50 mg, TID  sertraline, 100 mg, Daily  docusate sodium, 100 mg, Daily  aspirin EC, 81 mg, Daily  carvedilol, 6.25 mg, BID WC  melatonin, 3 mg, Nightly  pantoprazole, 40 mg, QAM AC  silver sulfADIAZINE, , Daily  vitamin C, 500 mg, BID  therapeutic multivitamin-minerals, 1 tablet, Daily  sodium chloride flush, 5-40 mL, 2 times per day  enoxaparin, 40 mg, Daily      PRN Medications:  perflutren lipid microspheres, 1.5 mL, ONCE PRN  bisacodyl, 10 mg, Daily PRN  hydrOXYzine HCl, 10 mg, Q8H PRN  magnesium 
Progress Note    Admit Date:  4/24/2024    SNF resident- projectile vomiting   Sepsis criteria   UTI -hx of ESBL e coli   Mild elev CR   Dementia     Blood and urine with MRSA    Subjective:  Mr. Garrett today is seen resting in bed. Mentation much improved and much more oriented today   Denies any dysuria or abd pain or flank pain   Tolerating diet well     Objective:     Vitals:    04/25/24 2059 04/25/24 2147 04/26/24 0440 04/26/24 0450   BP: 116/87 116/87 (!) 102/51    Pulse: 99 99 88    Resp: 16  16    Temp: 99.1 °F (37.3 °C)  99.5 °F (37.5 °C)    TempSrc: Oral  Oral    SpO2: 95%  97%    Weight:    79.5 kg (175 lb 3.2 oz)   Height:             Patient Vitals for the past 4 hrs:   BP Temp Temp src Pulse Resp SpO2 Weight   04/26/24 0450 -- -- -- -- -- -- 79.5 kg (175 lb 3.2 oz)   04/26/24 0440 (!) 102/51 99.5 °F (37.5 °C) Oral 88 16 97 % --          Intake/Output Summary (Last 24 hours) at 4/26/2024 0749  Last data filed at 4/26/2024 0450  Gross per 24 hour   Intake 610 ml   Output 1550 ml   Net -940 ml         Physical Exam:          General: alert to self and place . Appears to be not in any distress  Mucous Membranes:  Pink , anicteric  Neck: No JVD, no carotid bruit, no thyromegaly  Chest:  Clear to auscultation bilaterally, no added sounds  Cardiovascular:  RRR S1S2 heard, no murmurs or gallops  Abdomen:  Soft, undistended, non tender, no organomegaly, BS present  Corrales in place with yellow urine   Extremities: atrophic ext, No edema or cyanosis. Distal pulses well felt  Neurological : grossly normal with baseline dementia  Pleasant and cooperative                Medications:  pregabalin, 50 mg, TID  sertraline, 100 mg, Daily  polyethylene glycol, 17 g, Once  docusate sodium, 100 mg, Daily  meropenem, 1,000 mg, Q12H  vancomycin (VANCOCIN) intermittent dosing (placeholder), , RX Placeholder  aspirin EC, 81 mg, Daily  carvedilol, 6.25 mg, BID WC  melatonin, 3 mg, Nightly  pantoprazole, 40 mg, QAM AC  silver 
Pt medicated for pain 10/10 in abdomen.  Medicated with PRN tylenol.  See MAR and pain flow sheet.  No further assistance needed at this time. Will continue to monitor.  
Pt sleeping but awakens easily. Alert and oriented to self. Tolerates a dysphasia diet. Takes pills crushed in pudding. Corrales in place. Remains on RA. Right and left piv intact and LR infusing at 75ml/hr in right piv. Pt arms contracted. Boot on right foot. Pillows elevating feet. Denies any pain, nausea or needs. Instructed to call out for needs. Call light in reach, bed alarm on. will continue to monitor.  
Shift assessment complete. See flow sheet. Due medications administered per MD orders. See MAR. Vital signs stable. Patient is alert and oriented x 1, self only. Pt denies any present needs/concerns. Call light explained and in reach.  
Shift report given to FÉLIX Gayle  
Zofran 4 mg po given at this time.  Other scheduled meds also given at this time.  IV infusing w/o diff, call light in reach.    
without AE   -continue prn inhalers     #Constipation and stool impaction  -colace daily   -had manual disimpaction yesterday   -suppository prn     #CAD  -ASA, BB     #HTN  -on coreg    #Chronic combined systolic and diastolic HF   -last echo with EF 35-40% 2022  -no acute decompensation   -on coreg     #Dementia   #Depression   -on zoloft   -supportive care     Palliative care consulted for goals of care     DVT Prophylaxis: Lovenox   Diet: ADULT DIET; Regular  Code Status: Full Code    ERIC Gilbert  04/25/24  10:26 AM    Agree with above  Changes made to note    César Young MD,4/25/2024 11:17 AM        
    Organism Staph aureus MRSA     Culture, Blood 2 --     POSITIVE for  No further workup  CONTACT PRECAUTIONS INDICATED  Isolated two of two sets  Refer to culture [id number] for sensitivity results      Narrative:      ORDER#: [id number]                          ORDERED BY: KAYLA BRAN  SOURCE: Blood                              COLLECTED:  04/24/24 16:30  ANTIBIOTICS AT MARY.:                      RECEIVED :  04/24/24 23:49  CALL  [facility]  [facility] tel. [phone],  Previous panic on this admission - call not needed per SOP, 04/25/2024 21:14,  by MULBR  If child <=2 yrs old please draw pediatric bottle.~Blood Culture #2    Culture, Blood, PCR ID Panel [[id number]] Collected: 04/24/24 1630    Order Status: Completed Updated: 04/25/24 1503     Report SEE IMAGE               RADIOLOGY  CT ABDOMEN PELVIS WO CONTRAST Additional Contrast? None   Final Result   1. Constipation and stool impaction in the rectum.   2. No bowel obstruction or perforation or thickening.   3. Diverticulosis but no acute diverticulitis.   4. Normal appendix.   5. No evidence of obstructive uropathy.   6. Corrales catheter in adequate position.   7. Mildly enlarged prostate.   8. No significant ventral hernia.   9. Mild bronchiectasis in the lung bases.   10. Status post cholecystectomy.         XR CHEST PORTABLE   Final Result   No acute cardiopulmonary findings             technically difficult examination. Low parasternal window.   LV systolic function is mildly reduced with ejection fraction estimated   45-50%.   No regional wall motion abnormalities.   There is mild concentric left ventricular hypertrophy.   Grade I diastolic dysfunction with normal left ventricular filling pressure.   No obvious valvular vegetations identified.   Inadequate tricuspid regurgitation jet to estimate systolic artery pressure   (SPAP).  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

## 2024-04-28 NOTE — DISCHARGE SUMMARY
Name:  Zia Garrett  Room:  0226/0226-01  MRN:    [id number]    Discharge Summary      This discharge summary is in conjunction with a complete physical exam done on the day of discharge.      Discharging Physician: MARIA DEL ROSARIO NAYAK MD      Admit: 4/24/2024  Discharge:  4/28/2024     Diagnoses this Admission    Principal Problem:    UTI (urinary tract infection)  Active Problems:    Dementia with behavioral disturbance (HCC)    MRSA bacteremia    Leukocytosis    History of fecal impaction    Fecal impaction in rectum (HCC)  Resolved Problems:    * No resolved hospital problems. *          Procedures (Please Review Full Report for Details)      Consults    IP CONSULT TO HOSPITALIST  IP CONSULT TO PALLIATIVE CARE  PHARMACY TO DOSE VANCOMYCIN      HPI:  Zia Garrett is a 88 y.o. male with a PMH of dementia, CHF, COPD, hypertension, hyperlipidemia, recurrent UTI who currently lives in a skilled nursing facility who presented to ED with complaint of nausea and vomiting  Patient is a resident of a skilled nursing facility with known history of severe dementia who presents to the ED due to episode of projectile vomiting.  In the ED vitals show blood pressure of 151/69, pulse of 97, temperature 101.5, respiration 18, saturating 92%. l labs show sodium of 140 potassium 5.2 BUN of 28 creatinine 1.4 lactic of 1.4 glucose of 153.  WBC of 18.6 hemoglobin of 12.2.  COVID not detected.  Urinalysis concerning for UTI urine culture sent.  Chest x-ray showed no acute cardiopulmonary process, CT abdomen and pelvis shows constipation and stool impaction in the rectum no bowel obstruction diverticulosis no diverticulitis mildly enlarged prostate mild bronchiectasis in the lung bases.  In the ED patient had his bowel disimpacted for stool with associated large bowel movement.  Received Tylenol, cefepime, Zofran.  Patient is being admitted for UTI.          Hospital Course      #Sepsis   MRSA bacteremia and UTI  -hx of

## 2024-04-28 NOTE — CARE COORDINATION
CM delivered 2nd IMM delivered within 4 hours for DC, verbal explanation of patient rights at bedside. Pt voiced understanding of discharge MCR rights and is agreeable to discharge.       Spoke with Gabi Still - legal guardian - for verbal permission to sign IMM.     Mail copy to:    Gabi Still  [address] [place] [address]

## 2024-04-28 NOTE — CONSULTS
Clinical Pharmacy Progress Note   Heart Failure Medication Review    NAME:  Zia Garrett  AGE: 88 y.o.   GENDER: male  : 1935  TODAY'S DATE:  2024    PAST MEDICAL HISTORY:  @[facility]@  HOME MEDICATIONS:  Prior to Admission medications    Medication Sig Start Date End Date Taking? Authorizing Provider   pregabalin (LYRICA) 50 MG capsule Take 1 capsule by mouth 3 times daily for 2 days. Max Daily Amount: 150 mg 24 Yes Andry Meneses MD   vancomycin (VANCOCIN) infusion Infuse 1,000 mg intravenously every 24 hours for 9 days Compound per protocol. 24 Yes Andry Meneses MD   silver sulfADIAZINE (SILVADENE) 1 % cream Apply topically daily Apply topically daily.   Yes Xochilt Hanna MD   Multiple Vitamins-Minerals (THERAPEUTIC MULTIVITAMIN-MINERALS) tablet Take 1 tablet by mouth daily   Yes Xochilt Hanna MD   vitamin C (ASCORBIC ACID) 500 MG tablet Take 1 tablet by mouth 2 times daily    Xochilt Hanna MD   pregabalin (LYRICA) 50 MG capsule Take 1 capsule by mouth 3 times daily.  24  Xochilt Hanna MD   hydrOXYzine HCl (ATARAX) 10 MG tablet Take 1 tablet by mouth every 8 hours as needed for Itching    Xochilt Hanna MD   methenamine (HIPREX) 1 g tablet Take 1 tablet by mouth daily  24  Xochilt Hanna MD   omeprazole (PRILOSEC) 20 MG delayed release capsule Take 1 capsule by mouth daily    Xochilt Hanna MD   aspirin EC 81 MG EC tablet Take 1 tablet by mouth daily 22   Prashant Rooney MD   carvedilol (COREG) 6.25 MG tablet Take 1 tablet by mouth 2 times daily (with meals) 22   Prashant Rooney MD   melatonin 3 MG TABS tablet Take 1 tablet by mouth nightly    Xochilt Hanna MD   sertraline (ZOLOFT) 50 MG tablet Take 2 tablets by mouth daily    Xochilt Hanna MD   amLODIPine (NORVASC) 10 MG tablet Take 10 mg by mouth daily  22  Xochilt Hanna MD   magnesium

## 2024-04-28 NOTE — FLOWSHEET NOTE
04/28/24 0842   Vital Signs   Temp 98.5 °F (36.9 °C)   Temp Source Oral   Pulse 60   Heart Rate Source Monitor   Respirations 16   BP (!) 146/65   MAP (Calculated) 92   BP Location Right upper arm   BP Method Automatic   Patient Position Semi fowlers   Pain Assessment   Pain Assessment None - Denies Pain   Oxygen Therapy   SpO2 93 %   O2 Device None (Room air)     AM assessment completed, see flow sheet. Pt is alert and oriented to self only. Vital signs are as noted. Respirations are even & easy. No complaints voiced. Pt denies needs at this time. SR up x 2, and bed in low position. Call light is within reach.

## 2024-04-29 ENCOUNTER — HOSPITAL ENCOUNTER (OUTPATIENT)
Dept: INTERVENTIONAL RADIOLOGY/VASCULAR | Age: 89
Discharge: HOME | End: 2024-04-29
Payer: MEDICARE

## 2024-04-29 PROCEDURE — 76937 US GUIDE VASCULAR ACCESS: CPT

## 2024-04-29 PROCEDURE — 36410 VNPNXR 3YR/> PHY/QHP DX/THER: CPT

## 2024-04-29 PROCEDURE — C1751 CATH, INF, PER/CENT/MIDLINE: HCPCS

## 2024-04-30 LAB
BACTERIA BLD CULT ORG #2: NORMAL
BACTERIA BLD CULT: NORMAL

## 2024-05-02 ENCOUNTER — HOSPITAL ENCOUNTER (OUTPATIENT)
Age: 89
Setting detail: SPECIMEN
Discharge: HOME | End: 2024-05-02
Payer: MEDICARE

## 2024-05-02 LAB — VANCOMYCIN TROUGH SERPL-MCNC: 14.4 UG/ML (ref 10–20)

## 2024-05-02 PROCEDURE — 36415 COLL VENOUS BLD VENIPUNCTURE: CPT

## 2024-05-02 PROCEDURE — 80202 ASSAY OF VANCOMYCIN: CPT

## 2024-05-04 ENCOUNTER — APPOINTMENT (OUTPATIENT)
Dept: GENERAL RADIOLOGY | Age: 89
End: 2024-05-04
Payer: MEDICARE

## 2024-05-04 ENCOUNTER — HOSPITAL ENCOUNTER (OUTPATIENT)
Age: 89
Setting detail: OBSERVATION
Discharge: HOME | End: 2024-05-05
Attending: EMERGENCY MEDICINE | Admitting: STUDENT IN AN ORGANIZED HEALTH CARE EDUCATION/TRAINING PROGRAM
Payer: MEDICARE

## 2024-05-04 DIAGNOSIS — R13.10 DYSPHAGIA, UNSPECIFIED TYPE: ICD-10-CM

## 2024-05-04 DIAGNOSIS — R11.10 VOMITING, UNSPECIFIED VOMITING TYPE, UNSPECIFIED WHETHER NAUSEA PRESENT: Primary | ICD-10-CM

## 2024-05-04 DIAGNOSIS — G89.4 CHRONIC PAIN SYNDROME: ICD-10-CM

## 2024-05-04 DIAGNOSIS — J69.0 ASPIRATION PNEUMONIA OF LEFT LUNG, UNSPECIFIED ASPIRATION PNEUMONIA TYPE, UNSPECIFIED PART OF LUNG (HCC): ICD-10-CM

## 2024-05-04 DIAGNOSIS — R09.02 HYPOXEMIA: ICD-10-CM

## 2024-05-04 PROBLEM — J96.01 ACUTE HYPOXIC RESPIRATORY FAILURE (HCC): Status: ACTIVE | Noted: 2024-05-04

## 2024-05-04 LAB
ALBUMIN SERPL-MCNC: 3.6 G/DL (ref 3.4–5)
ALBUMIN/GLOB SERPL: 1 {RATIO} (ref 1.1–2.2)
ALP SERPL-CCNC: 110 U/L (ref 40–129)
ALT SERPL-CCNC: 9 U/L (ref 10–40)
ANION GAP SERPL CALCULATED.3IONS-SCNC: 8 MMOL/L (ref 3–16)
AST SERPL-CCNC: 15 U/L (ref 15–37)
BASOPHILS # BLD: 0 K/UL (ref 0–0.2)
BASOPHILS NFR BLD: 0.3 %
BILIRUB SERPL-MCNC: 0.4 MG/DL (ref 0–1)
BUN SERPL-MCNC: 16 MG/DL (ref 7–20)
CALCIUM SERPL-MCNC: 8.8 MG/DL (ref 8.3–10.6)
CHLORIDE SERPL-SCNC: 102 MMOL/L (ref 99–110)
CO2 SERPL-SCNC: 27 MMOL/L (ref 21–32)
CREAT SERPL-MCNC: 1.1 MG/DL (ref 0.8–1.3)
DEPRECATED RDW RBC AUTO: 15.4 % (ref 12.4–15.4)
EOSINOPHIL # BLD: 0.7 K/UL (ref 0–0.6)
EOSINOPHIL NFR BLD: 7.2 %
FLUAV RNA RESP QL NAA+PROBE: NOT DETECTED
FLUBV RNA RESP QL NAA+PROBE: NOT DETECTED
GFR SERPLBLD CREATININE-BSD FMLA CKD-EPI: 65 ML/MIN/{1.73_M2}
GLUCOSE BLD-MCNC: 120 MG/DL (ref 70–99)
GLUCOSE SERPL-MCNC: 112 MG/DL (ref 70–99)
HCT VFR BLD AUTO: 36.1 % (ref 40.5–52.5)
HGB BLD-MCNC: 11.8 G/DL (ref 13.5–17.5)
LACTATE BLDV-SCNC: 1 MMOL/L (ref 0.4–2)
LYMPHOCYTES # BLD: 0.4 K/UL (ref 1–5.1)
LYMPHOCYTES NFR BLD: 3.9 %
MCH RBC QN AUTO: 27.6 PG (ref 26–34)
MCHC RBC AUTO-ENTMCNC: 32.5 G/DL (ref 31–36)
MCV RBC AUTO: 84.8 FL (ref 80–100)
MONOCYTES # BLD: 0.6 K/UL (ref 0–1.3)
MONOCYTES NFR BLD: 6.6 %
NEUTROPHILS # BLD: 7.5 K/UL (ref 1.7–7.7)
NEUTROPHILS NFR BLD: 82 %
PERFORMED ON: ABNORMAL
PLATELET # BLD AUTO: 199 K/UL (ref 135–450)
PMV BLD AUTO: 7.5 FL (ref 5–10.5)
POTASSIUM SERPL-SCNC: 4.4 MMOL/L (ref 3.5–5.1)
PROCALCITONIN SERPL IA-MCNC: 0.09 NG/ML (ref 0–0.15)
PROT SERPL-MCNC: 7.1 G/DL (ref 6.4–8.2)
RBC # BLD AUTO: 4.26 M/UL (ref 4.2–5.9)
SARS-COV-2 RNA RESP QL NAA+PROBE: NOT DETECTED
SODIUM SERPL-SCNC: 137 MMOL/L (ref 136–145)
WBC # BLD AUTO: 9.1 K/UL (ref 4–11)

## 2024-05-04 PROCEDURE — 84145 PROCALCITONIN (PCT): CPT

## 2024-05-04 PROCEDURE — 83605 ASSAY OF LACTIC ACID: CPT

## 2024-05-04 PROCEDURE — 96375 TX/PRO/DX INJ NEW DRUG ADDON: CPT

## 2024-05-04 PROCEDURE — 85025 COMPLETE CBC W/AUTO DIFF WBC: CPT

## 2024-05-04 PROCEDURE — 2700000000 HC OXYGEN THERAPY PER DAY

## 2024-05-04 PROCEDURE — G0378 HOSPITAL OBSERVATION PER HR: HCPCS

## 2024-05-04 PROCEDURE — 87040 BLOOD CULTURE FOR BACTERIA: CPT

## 2024-05-04 PROCEDURE — 99285 EMERGENCY DEPT VISIT HI MDM: CPT

## 2024-05-04 PROCEDURE — 80053 COMPREHEN METABOLIC PANEL: CPT

## 2024-05-04 PROCEDURE — 87636 SARSCOV2 & INF A&B AMP PRB: CPT

## 2024-05-04 PROCEDURE — 96365 THER/PROPH/DIAG IV INF INIT: CPT

## 2024-05-04 PROCEDURE — 6370000000 HC RX 637 (ALT 250 FOR IP): Performed by: STUDENT IN AN ORGANIZED HEALTH CARE EDUCATION/TRAINING PROGRAM

## 2024-05-04 PROCEDURE — 2580000003 HC RX 258: Performed by: PHYSICIAN ASSISTANT

## 2024-05-04 PROCEDURE — 36415 COLL VENOUS BLD VENIPUNCTURE: CPT

## 2024-05-04 PROCEDURE — 94761 N-INVAS EAR/PLS OXIMETRY MLT: CPT

## 2024-05-04 PROCEDURE — 6360000002 HC RX W HCPCS: Performed by: PHYSICIAN ASSISTANT

## 2024-05-04 PROCEDURE — 2580000003 HC RX 258: Performed by: STUDENT IN AN ORGANIZED HEALTH CARE EDUCATION/TRAINING PROGRAM

## 2024-05-04 PROCEDURE — 71045 X-RAY EXAM CHEST 1 VIEW: CPT

## 2024-05-04 RX ORDER — ASPIRIN 81 MG/1
81 TABLET ORAL DAILY
Status: DISCONTINUED | OUTPATIENT
Start: 2024-05-05 | End: 2024-05-05 | Stop reason: HOSPADM

## 2024-05-04 RX ORDER — ONDANSETRON 4 MG/1
4 TABLET, ORALLY DISINTEGRATING ORAL EVERY 8 HOURS PRN
Status: DISCONTINUED | OUTPATIENT
Start: 2024-05-04 | End: 2024-05-05 | Stop reason: HOSPADM

## 2024-05-04 RX ORDER — M-VIT,TX,IRON,MINS/CALC/FOLIC 27MG-0.4MG
1 TABLET ORAL DAILY
Status: DISCONTINUED | OUTPATIENT
Start: 2024-05-05 | End: 2024-05-05 | Stop reason: HOSPADM

## 2024-05-04 RX ORDER — SODIUM CHLORIDE 9 MG/ML
INJECTION, SOLUTION INTRAVENOUS PRN
Status: DISCONTINUED | OUTPATIENT
Start: 2024-05-04 | End: 2024-05-05 | Stop reason: HOSPADM

## 2024-05-04 RX ORDER — SODIUM CHLORIDE 0.9 % (FLUSH) 0.9 %
5-40 SYRINGE (ML) INJECTION PRN
Status: DISCONTINUED | OUTPATIENT
Start: 2024-05-04 | End: 2024-05-05 | Stop reason: HOSPADM

## 2024-05-04 RX ORDER — ONDANSETRON 2 MG/ML
4 INJECTION INTRAMUSCULAR; INTRAVENOUS ONCE
Status: COMPLETED | OUTPATIENT
Start: 2024-05-04 | End: 2024-05-04

## 2024-05-04 RX ORDER — SERTRALINE HYDROCHLORIDE 100 MG/1
100 TABLET, FILM COATED ORAL DAILY
Status: DISCONTINUED | OUTPATIENT
Start: 2024-05-05 | End: 2024-05-05 | Stop reason: HOSPADM

## 2024-05-04 RX ORDER — ONDANSETRON 2 MG/ML
4 INJECTION INTRAMUSCULAR; INTRAVENOUS EVERY 6 HOURS PRN
Status: DISCONTINUED | OUTPATIENT
Start: 2024-05-04 | End: 2024-05-05 | Stop reason: HOSPADM

## 2024-05-04 RX ORDER — HYDROXYZINE HYDROCHLORIDE 10 MG/1
10 TABLET, FILM COATED ORAL EVERY 8 HOURS PRN
Status: DISCONTINUED | OUTPATIENT
Start: 2024-05-04 | End: 2024-05-05 | Stop reason: HOSPADM

## 2024-05-04 RX ORDER — POLYETHYLENE GLYCOL 3350 17 G/17G
17 POWDER, FOR SOLUTION ORAL DAILY PRN
Status: DISCONTINUED | OUTPATIENT
Start: 2024-05-04 | End: 2024-05-05 | Stop reason: HOSPADM

## 2024-05-04 RX ORDER — CARVEDILOL 6.25 MG/1
6.25 TABLET ORAL 2 TIMES DAILY WITH MEALS
Status: DISCONTINUED | OUTPATIENT
Start: 2024-05-05 | End: 2024-05-05 | Stop reason: HOSPADM

## 2024-05-04 RX ORDER — PANTOPRAZOLE SODIUM 40 MG/1
40 TABLET, DELAYED RELEASE ORAL
Status: DISCONTINUED | OUTPATIENT
Start: 2024-05-05 | End: 2024-05-05 | Stop reason: HOSPADM

## 2024-05-04 RX ORDER — ACETAMINOPHEN 325 MG/1
650 TABLET ORAL EVERY 6 HOURS PRN
Status: DISCONTINUED | OUTPATIENT
Start: 2024-05-04 | End: 2024-05-05 | Stop reason: HOSPADM

## 2024-05-04 RX ORDER — ASCORBIC ACID 500 MG
500 TABLET ORAL 2 TIMES DAILY
Status: DISCONTINUED | OUTPATIENT
Start: 2024-05-04 | End: 2024-05-05 | Stop reason: HOSPADM

## 2024-05-04 RX ORDER — LANOLIN ALCOHOL/MO/W.PET/CERES
3 CREAM (GRAM) TOPICAL NIGHTLY
Status: DISCONTINUED | OUTPATIENT
Start: 2024-05-04 | End: 2024-05-05 | Stop reason: HOSPADM

## 2024-05-04 RX ORDER — ENOXAPARIN SODIUM 100 MG/ML
40 INJECTION SUBCUTANEOUS DAILY
Status: DISCONTINUED | OUTPATIENT
Start: 2024-05-05 | End: 2024-05-05 | Stop reason: HOSPADM

## 2024-05-04 RX ORDER — SODIUM CHLORIDE 0.9 % (FLUSH) 0.9 %
5-40 SYRINGE (ML) INJECTION EVERY 12 HOURS SCHEDULED
Status: DISCONTINUED | OUTPATIENT
Start: 2024-05-04 | End: 2024-05-05 | Stop reason: HOSPADM

## 2024-05-04 RX ORDER — GUAIFENESIN 600 MG/1
600 TABLET, EXTENDED RELEASE ORAL 2 TIMES DAILY
Status: DISCONTINUED | OUTPATIENT
Start: 2024-05-04 | End: 2024-05-05 | Stop reason: HOSPADM

## 2024-05-04 RX ORDER — ACETAMINOPHEN 650 MG/1
650 SUPPOSITORY RECTAL EVERY 6 HOURS PRN
Status: DISCONTINUED | OUTPATIENT
Start: 2024-05-04 | End: 2024-05-05 | Stop reason: HOSPADM

## 2024-05-04 RX ORDER — IPRATROPIUM BROMIDE AND ALBUTEROL SULFATE 2.5; .5 MG/3ML; MG/3ML
1 SOLUTION RESPIRATORY (INHALATION)
Status: DISCONTINUED | OUTPATIENT
Start: 2024-05-05 | End: 2024-05-05

## 2024-05-04 RX ORDER — BISACODYL 10 MG
10 SUPPOSITORY, RECTAL RECTAL DAILY PRN
Status: DISCONTINUED | OUTPATIENT
Start: 2024-05-04 | End: 2024-05-05 | Stop reason: HOSPADM

## 2024-05-04 RX ADMIN — CEFEPIME 2000 MG: 2 INJECTION, POWDER, FOR SOLUTION INTRAVENOUS at 18:24

## 2024-05-04 RX ADMIN — OXYCODONE HYDROCHLORIDE AND ACETAMINOPHEN 500 MG: 500 TABLET ORAL at 22:24

## 2024-05-04 RX ADMIN — ONDANSETRON 4 MG: 2 INJECTION INTRAMUSCULAR; INTRAVENOUS at 17:35

## 2024-05-04 RX ADMIN — Medication 3 MG: at 22:24

## 2024-05-04 RX ADMIN — Medication 10 ML: at 22:27

## 2024-05-04 RX ADMIN — GUAIFENESIN 600 MG: 600 TABLET, EXTENDED RELEASE ORAL at 22:24

## 2024-05-04 ASSESSMENT — PAIN - FUNCTIONAL ASSESSMENT: PAIN_FUNCTIONAL_ASSESSMENT: NONE - DENIES PAIN

## 2024-05-04 NOTE — ED PROVIDER NOTES
[facility] ED  EMERGENCY DEPARTMENT ENCOUNTER        Pt Name: Zia Garrett  MRN: [id number]  Birthdate 1935  Date of evaluation: 5/4/2024  Provider: King Marshall PA-C  PCP: Omar Schofield MD  Note Started: 6:28 PM EDT 5/4/24       I have seen and evaluated this patient with my supervising physician Maria Isabel Plummer MD.      CHIEF COMPLAINT       Chief Complaint   Patient presents with    Emesis     Pt arrives via EMS from the [facility] for c/o emesis. SNF reports emesis x10 today. Pt recently d/c from [facility] for UTI. Pt has PICC and has been receiving Vanc, last dose at 1000 today. Pt alert and oriented to self at baseline.        HISTORY OF PRESENT ILLNESS: 1 or more Elements     History From: nurse at [facility].     Limitations to history : confused     Social Determinants Significantly Affecting Health : None    Chief Complaint:emesis     Zia Garrett is a 88 y.o. male who presents for emesis and slight confusion.  Patient presented to the ED via EMS.  Upon arrival he was alert oriented to self which is his baseline.  Patient upon presenting to the ER had a pulse ox of 90%.  He was placed on 2 L of oxygen with a pulse ox of 92 to 94%.      Spoke with nurse at [facility] who stated the patient seemed to be maybe a little bit more confused than his typical self.  She states that she gave the patient his vancomycin around 1:30 PM today and finished his dose around 2 PM and then shortly after he started cough and vomiting. Projectile vomiting as described per the nurse at [facility]. Vitals were stable prior to the incident. Emesis occurred 8 different occasions. No issues the day prior per the nursing staff.     Full code.     Nursing Notes were all reviewed and agreed with or any disagreements were addressed in the HPI.    REVIEW OF SYSTEMS :      Review of Systems    Positives and Pertinent negatives as per HPI.     SURGICAL HISTORY     Past Surgical

## 2024-05-04 NOTE — ED PROVIDER NOTES
ED Attending Attestation Note    I personally saw the patient and made/approved the management plan and take responsibility for patient management.     Briefly, 88 y.o. male presents with nausea and vomiting.  He currently has a PICC line in place for IV vancomycin for MDRO UTI.  Patient also noted to have intermittent hypoxia here..     Focused exam:   Gen: 88 y.o. male, NAD  Patient was resting comfortably and awakens to verbal stimuli.  Heart is noted to be somewhat irregular rhythm but regular rate.  No respiratory distress while currently on 2 L of oxygen via nasal cannula.    Imaging:   XR CHEST PORTABLE   Final Result   Left perihilar infiltrate. Follow-up to resolution is recommended.            The Ekg interpreted by me shows  atrial fibrillation with a rate of 78  Axis is   Normal  QTc is  normal  Intervals and Durations are unremarkable.      ST Segments: nonspecific changes  No significant change from prior EKG dated 1/25/24    MDM:   Patient presenting for evaluation of multiple episodes of nausea and vomiting.  Benign abdominal exam.  No leukocytosis.  Also concerning is that he is having some intermittent hypoxia.  He does have a left perihilar infiltrate.  He has already received vancomycin via IV today therefore we will broaden the antibiotics with addition of cefepime as well as Zofran was given for symptom control intravenously.  Blood cultures x 2 have been sent.  No significant lactic acidosis at this point.  Unclear if possibly patient aspirated with some pneumonitis.  Will plan for admission for further management at this time      For further details of the patient's emergency department visit, please see the advanced practice provider's documentation.    Maria Isabel Plummer MD     This report has been produced using speech recognition software and may contain errors related to that system including errors in grammar, punctuation, and spelling, as well as words and phrases that may be

## 2024-05-05 ENCOUNTER — APPOINTMENT (OUTPATIENT)
Dept: GENERAL RADIOLOGY | Age: 89
End: 2024-05-05
Payer: MEDICARE

## 2024-05-05 VITALS
RESPIRATION RATE: 16 BRPM | SYSTOLIC BLOOD PRESSURE: 104 MMHG | WEIGHT: 169.3 LBS | HEIGHT: 69 IN | DIASTOLIC BLOOD PRESSURE: 63 MMHG | TEMPERATURE: 98 F | HEART RATE: 61 BPM | BODY MASS INDEX: 25.07 KG/M2 | OXYGEN SATURATION: 95 %

## 2024-05-05 PROBLEM — R11.10 VOMITING: Status: ACTIVE | Noted: 2024-01-27

## 2024-05-05 PROBLEM — G89.4 CHRONIC PAIN SYNDROME: Status: ACTIVE | Noted: 2024-05-05

## 2024-05-05 LAB
ANION GAP SERPL CALCULATED.3IONS-SCNC: 10 MMOL/L (ref 3–16)
BASOPHILS # BLD: 0 K/UL (ref 0–0.2)
BASOPHILS NFR BLD: 0.4 %
BUN SERPL-MCNC: 15 MG/DL (ref 7–20)
CALCIUM SERPL-MCNC: 8.8 MG/DL (ref 8.3–10.6)
CHLORIDE SERPL-SCNC: 103 MMOL/L (ref 99–110)
CO2 SERPL-SCNC: 24 MMOL/L (ref 21–32)
CREAT SERPL-MCNC: 1.2 MG/DL (ref 0.8–1.3)
DEPRECATED RDW RBC AUTO: 15.5 % (ref 12.4–15.4)
EOSINOPHIL # BLD: 0.5 K/UL (ref 0–0.6)
EOSINOPHIL NFR BLD: 8.2 %
GFR SERPLBLD CREATININE-BSD FMLA CKD-EPI: 59 ML/MIN/{1.73_M2}
GLUCOSE BLD-MCNC: 103 MG/DL (ref 70–99)
GLUCOSE BLD-MCNC: 105 MG/DL (ref 70–99)
GLUCOSE SERPL-MCNC: 96 MG/DL (ref 70–99)
HCT VFR BLD AUTO: 34.4 % (ref 40.5–52.5)
HGB BLD-MCNC: 11.3 G/DL (ref 13.5–17.5)
LYMPHOCYTES # BLD: 0.6 K/UL (ref 1–5.1)
LYMPHOCYTES NFR BLD: 10.4 %
MCH RBC QN AUTO: 28.3 PG (ref 26–34)
MCHC RBC AUTO-ENTMCNC: 32.8 G/DL (ref 31–36)
MCV RBC AUTO: 86.3 FL (ref 80–100)
MONOCYTES # BLD: 0.8 K/UL (ref 0–1.3)
MONOCYTES NFR BLD: 12.6 %
NEUTROPHILS # BLD: 4.2 K/UL (ref 1.7–7.7)
NEUTROPHILS NFR BLD: 68.4 %
PERFORMED ON: ABNORMAL
PERFORMED ON: ABNORMAL
PLATELET # BLD AUTO: 189 K/UL (ref 135–450)
PMV BLD AUTO: 7.4 FL (ref 5–10.5)
POTASSIUM SERPL-SCNC: 4.2 MMOL/L (ref 3.5–5.1)
RBC # BLD AUTO: 3.99 M/UL (ref 4.2–5.9)
SODIUM SERPL-SCNC: 137 MMOL/L (ref 136–145)
VANCOMYCIN TROUGH SERPL-MCNC: 17.3 UG/ML (ref 10–20)
WBC # BLD AUTO: 6.1 K/UL (ref 4–11)

## 2024-05-05 PROCEDURE — 96366 THER/PROPH/DIAG IV INF ADDON: CPT

## 2024-05-05 PROCEDURE — 94761 N-INVAS EAR/PLS OXIMETRY MLT: CPT

## 2024-05-05 PROCEDURE — 80048 BASIC METABOLIC PNL TOTAL CA: CPT

## 2024-05-05 PROCEDURE — 6370000000 HC RX 637 (ALT 250 FOR IP): Performed by: STUDENT IN AN ORGANIZED HEALTH CARE EDUCATION/TRAINING PROGRAM

## 2024-05-05 PROCEDURE — 36415 COLL VENOUS BLD VENIPUNCTURE: CPT

## 2024-05-05 PROCEDURE — 96367 TX/PROPH/DG ADDL SEQ IV INF: CPT

## 2024-05-05 PROCEDURE — 6370000000 HC RX 637 (ALT 250 FOR IP): Performed by: INTERNAL MEDICINE

## 2024-05-05 PROCEDURE — 96372 THER/PROPH/DIAG INJ SC/IM: CPT

## 2024-05-05 PROCEDURE — 80202 ASSAY OF VANCOMYCIN: CPT

## 2024-05-05 PROCEDURE — 99238 HOSP IP/OBS DSCHRG MGMT 30/<: CPT | Performed by: INTERNAL MEDICINE

## 2024-05-05 PROCEDURE — 71045 X-RAY EXAM CHEST 1 VIEW: CPT

## 2024-05-05 PROCEDURE — G0378 HOSPITAL OBSERVATION PER HR: HCPCS

## 2024-05-05 PROCEDURE — 2700000000 HC OXYGEN THERAPY PER DAY

## 2024-05-05 PROCEDURE — 6360000002 HC RX W HCPCS: Performed by: STUDENT IN AN ORGANIZED HEALTH CARE EDUCATION/TRAINING PROGRAM

## 2024-05-05 PROCEDURE — 85025 COMPLETE CBC W/AUTO DIFF WBC: CPT

## 2024-05-05 PROCEDURE — 2580000003 HC RX 258: Performed by: STUDENT IN AN ORGANIZED HEALTH CARE EDUCATION/TRAINING PROGRAM

## 2024-05-05 RX ORDER — LEVOFLOXACIN 500 MG/1
750 TABLET, FILM COATED ORAL
Status: DISCONTINUED | OUTPATIENT
Start: 2024-05-05 | End: 2024-05-05 | Stop reason: HOSPADM

## 2024-05-05 RX ORDER — ONDANSETRON 4 MG/1
4 TABLET, ORALLY DISINTEGRATING ORAL EVERY 8 HOURS PRN
Qty: 30 TABLET | Refills: 0
Start: 2024-05-05

## 2024-05-05 RX ORDER — PREGABALIN 50 MG/1
50 CAPSULE ORAL 3 TIMES DAILY
Qty: 6 CAPSULE | Refills: 0 | Status: SHIPPED | OUTPATIENT
Start: 2024-05-05 | End: 2024-05-07

## 2024-05-05 RX ORDER — LEVOFLOXACIN 500 MG/1
500 TABLET, FILM COATED ORAL DAILY
Qty: 4 TABLET | Refills: 0
Start: 2024-05-06 | End: 2024-05-10

## 2024-05-05 RX ORDER — IPRATROPIUM BROMIDE AND ALBUTEROL SULFATE 2.5; .5 MG/3ML; MG/3ML
1 SOLUTION RESPIRATORY (INHALATION) EVERY 4 HOURS PRN
Status: DISCONTINUED | OUTPATIENT
Start: 2024-05-05 | End: 2024-05-05 | Stop reason: HOSPADM

## 2024-05-05 RX ADMIN — Medication 10 ML: at 10:04

## 2024-05-05 RX ADMIN — LEVOFLOXACIN 750 MG: 500 TABLET, FILM COATED ORAL at 10:03

## 2024-05-05 RX ADMIN — VANCOMYCIN HYDROCHLORIDE 750 MG: 750 INJECTION, POWDER, LYOPHILIZED, FOR SOLUTION INTRAVENOUS at 12:43

## 2024-05-05 RX ADMIN — SERTRALINE 100 MG: 100 TABLET, FILM COATED ORAL at 10:04

## 2024-05-05 RX ADMIN — OXYCODONE HYDROCHLORIDE AND ACETAMINOPHEN 500 MG: 500 TABLET ORAL at 10:04

## 2024-05-05 RX ADMIN — CARVEDILOL 6.25 MG: 6.25 TABLET, FILM COATED ORAL at 10:03

## 2024-05-05 RX ADMIN — ASPIRIN 81 MG: 81 TABLET, COATED ORAL at 10:03

## 2024-05-05 RX ADMIN — ENOXAPARIN SODIUM 40 MG: 100 INJECTION SUBCUTANEOUS at 10:04

## 2024-05-05 RX ADMIN — PANTOPRAZOLE SODIUM 40 MG: 40 TABLET, DELAYED RELEASE ORAL at 05:31

## 2024-05-05 RX ADMIN — GUAIFENESIN 600 MG: 600 TABLET, EXTENDED RELEASE ORAL at 10:04

## 2024-05-05 RX ADMIN — I-VITE, TAB 1000-60-2MG (60/BT) 1 TABLET: TAB at 10:03

## 2024-05-05 RX ADMIN — VANCOMYCIN HYDROCHLORIDE 750 MG: 750 INJECTION, POWDER, LYOPHILIZED, FOR SOLUTION INTRAVENOUS at 00:07

## 2024-05-05 NOTE — H&P
Hospital Medicine History & Physical      Date of Admission: 5/4/2024    Date of Service:  Pt seen/examined on 5/4/2024    []Admitted to Inpatient with expected LOS greater than two midnights due to medical therapy.  [x]Placed in Observation status.    Chief Admission Complaint: Hypoxia    Presenting Admission History:      88 y.o. male who presented to [facility] with vomiting.  PMHx significant for CHF, COPD, hypertension, dementia, MRSA bacteremia.  Patient was brought from the nursing home due to several episodes of vomiting per her report.  Unable to obtain history from patient although he states that he probably only vomited once.  Denies remembering any choking episode or coughing.  In the ER patient received cefepime given concern for possible aspiration pneumonia.  He is also requiring oxygen by nasal cannula.    Assessment/Plan:      Current Principal Problem:  Acute hypoxic respiratory failure (HCC)    Acute hypoxic respiratory failure, possibly aspiration pneumonitis versus aspiration pneumonia  MRSA bacteremia on IV vancomycin  CHF  COPD  Advanced dementia  Hypertension  Hyperlipidemia    Plan:  Received 1 dose of cefepime in the ER, I will hold off on continue antibiotics given negative procalcitonin and white count.  Depending on clinical course may not require antibiotics  Continue IV vancomycin, pharmacy to dose      Discussed management and the need for Hospitalization of the patient w/ the Emergency Department Provider.    CXR: I have reviewed the CXR with the following interpretation: Left perihilar infiltrate    Physical Exam Performed:      /65   Pulse 78   Temp 98.6 °F (37 °C)   Resp 20   Ht 1.753 m (5' 9\")   Wt 79.4 kg (175 lb)   SpO2 92%   BMI 25.84 kg/m²     General appearance:  No apparent distress, only alert to self  HEENT:  Pupils equal, round, and reactive to light. Conjunctivae/corneas clear.  Respiratory:  Normal respiratory effort. Clear to

## 2024-05-05 NOTE — PLAN OF CARE
Problem: Discharge Planning  Goal: Discharge to home or other facility with appropriate resources  Outcome: Progressing  Flowsheets (Taken 5/4/2024 2137)  Discharge to home or other facility with appropriate resources:   Identify barriers to discharge with patient and caregiver   Arrange for needed discharge resources and transportation as appropriate     Problem: Skin/Tissue Integrity  Goal: Absence of new skin breakdown  Description: 1.  Monitor for areas of redness and/or skin breakdown  2.  Assess vascular access sites hourly  3.  Every 4-6 hours minimum:  Change oxygen saturation probe site  4.  Every 4-6 hours:  If on nasal continuous positive airway pressure, respiratory therapy assess nares and determine need for appliance change or resting period.  Outcome: Progressing     Problem: Safety - Adult  Goal: Free from fall injury  Outcome: Progressing     Problem: ABCDS Injury Assessment  Goal: Absence of physical injury  Outcome: Progressing

## 2024-05-05 NOTE — ED NOTES
ED SBAR report provider to FÉLIX Lao. Patient to be transported to Room 222 via stretcher by RN.Patient transported with bedside cardiac monitor and with IV medications infusing. IV site clean, dry, and intact. MEWS score and pain assessed as KEMAR and documented. Updated patient on plan of care.

## 2024-05-05 NOTE — DISCHARGE INSTR - COC
Continuity of Care Form    Patient Name: Zia Garrett   :  1935  MRN:  [id number]    Admit date:  2024  Discharge date:  ***    Code Status Order: Full Code   Advance Directives:     Admitting Physician:  Mel Oliveira MD  PCP: Omar Schofield MD    Discharging Nurse: ***  Discharging Hospital Unit/Room#: 0222/0222-01  Discharging Unit Phone Number: ***    Emergency Contact:   Extended Emergency Contact Information  Primary Emergency Contact: Gabi Still  Home Phone: [phone]  Mobile Phone: [phone]  Relation: Legal Guardian  Preferred language: English   needed? No  Secondary Emergency Contact: Nancy Urbina  Home Phone: [phone]  Mobile Phone: [phone]  Relation: Child   needed? No    Past Surgical History:  Past Surgical History:   Procedure Laterality Date    CHOLECYSTECTOMY      COLONOSCOPY  8/10/12    CORONARY ANGIOPLASTY WITH STENT PLACEMENT      IR MIDLINE CATH  2023    IR MIDLINE CATH 2023 [facility] SPECIAL PROCEDURES    IR MIDLINE CATH  10/18/2023    IR MIDLINE CATH 10/18/2023 [facility] PROCED[facility]    IR MIDLINE CATH  2024    IR MIDLINE CATH 2024 [facility] SPECIAL PROCEDURES    PROSTATE BIOPSY      SKIN BIOPSY      UPPER GASTROINTESTINAL ENDOSCOPY  12    UPPER GASTROINTESTINAL ENDOSCOPY N/A 2019    EGD BIOPSY performed by Micheal Ascencio DO at [facility] ENDOSCOPY    UPPER GASTROINTESTINAL ENDOSCOPY N/A 2019    EGD performed by Yash Shaffer MD at [facility] ENDOSCOPY    UPPER GASTROINTESTINAL ENDOSCOPY N/A 3/16/2023    EGD BIOPSY performed by Yash Shaffer MD at [facility] ENDOSCOPY       Immunization History:   Immunization History   Administered Date(s) Administered    COVID-19, PFIZER PURPLE top, DILUTE for use, (age 12 y+), 30mcg/0.3mL 2021, 2021, 02/15/2022    Influenza A ([id number]) Vaccine PF IM 2009    Influenza Virus Vaccine 2009, 2010    Influenza, High Dose (Fluzone

## 2024-05-05 NOTE — DISCHARGE SUMMARY
Name:  Zia Garrett  Room:  0222/0222-01  MRN:    [id number]    Discharge Summary      This discharge summary is in conjunction with a complete physical exam done on the day of discharge.      Discharging Physician: AMANDA العراقي MD      Admit: 5/4/2024  Discharge:  5/5/2024     Diagnoses this Admission    Principal Problem:    Acute hypoxic respiratory failure (HCC)  Active Problems:    Hypoxemia    Vomiting    Chronic pain syndrome  Resolved Problems:    * No resolved hospital problems. *      Procedures (Please Review Full Report for Details)  none    Consults    PHARMACY TO DOSE VANCOMYCIN      HPI:    88 y.o. male who presented to [facility] with vomiting.  PMHx significant for CHF, COPD, hypertension, dementia, MRSA bacteremia.  Patient was brought from the nursing home due to several episodes of vomiting per her report.  Unable to obtain history from patient although he states that he probably only vomited once.  Denies remembering any choking episode or coughing.  In the ER patient received cefepime given concern for possible aspiration pneumonia.  He is also requiring oxygen by nasal cannula.     Physical Exam at Discharge:  /63   Pulse 61   Temp 98 °F (36.7 °C)   Resp 16   Ht 1.753 m (5' 9\")   Wt 76.8 kg (169 lb 4.8 oz)   SpO2 95%   BMI 25.00 kg/m²     Physical Exam  Constitutional:       Appearance: He is well-developed.   HENT:      Head: Normocephalic.   Eyes:      Conjunctiva/sclera: Conjunctivae normal.      Pupils: Pupils are equal, round, and reactive to light.   Neck:      Thyroid: No thyroid mass or thyromegaly.      Vascular: No carotid bruit or JVD.      Trachea: Trachea normal.   Cardiovascular:      Rate and Rhythm: Normal rate and regular rhythm.      Heart sounds: Normal heart sounds. No murmur heard.     No gallop.   Pulmonary:      Effort: Pulmonary effort is normal. No respiratory distress.      Breath sounds: Normal breath sounds. No wheezing or rales.

## 2024-05-05 NOTE — DISCHARGE INSTRUCTIONS
Heart Failure Resources:  Heart Failure Interactive Workbook:  Go to https://[employer]Ninite.[employer]/publication/?b=858074 for a Free Heart Failure Interactive Workbook provided by The American Heart Association. This interactive workbook will provide information on Healthier Living with Heart Failure. Please copy and paste link into search bar. Use your mouse to scroll through the pages.    HF [place] omayra:   Heart Failure Free smart phone omayra available for iPhone and Android download. Use your phone to track sodium intake, fluid intake, symptoms, and weight.     Low Sodium Diet / Recipes:  Go to www.[employer] website for “renal” diet which is Low Sodium! [employer] is a dialysis company, but this website offers free seasonal cookbooks. Each quarter, they will release 25-30 new recipes with a breakdown of calories, sodium, and glucose. You can also go to wwwVisuu/recipes website for free recipes.     Discharge Instruction Video:  Scan the QR code below with your camera and click the canva.com link to open the video and watch educational information on Heart Failure and Medications from one of our nurses.   https://www.[employer]/design/DAFZnsH_JRk/6GyxnroDUIJpdLEzsO2caf/edit    Home Exercise Program:   Identification of Green/Yellow/Red zones:  You should be able to identify when you feel good (green zone), if you have 1-2 symptoms of HF (yellow zone), or if you are in need of medical attention (red zone).  In your CHF education folder you were provided a “stop light tool” to outline this information.     We want to you to rate your exertion levels:    Our therapy team has discussed means of identification with you such as the \"Shayan scale.\"  The Shayan rating scale ranges from 6 to 20, where 6 means \"no exertion at all\" and 20 means \"maximal exertion.\" The goal is to use this to gauge how much effort it is taking for you to do your normal daily tasks.   You should be able to recognize when too much

## 2024-05-05 NOTE — CONSULTS
Pharmacy Note  Vancomycin Consult    Zia Garrett is a 88 y.o. male started on Vancomycin for CAP; consult received from Dr. Oliveira to manage therapy. Also receiving the following antibiotics: None.    Allergies:  Fluoxetine, Benazepril, Fluoxetine hcl, Hydrocodone, Morphine, Penicillins, Simvastatin, Morphine and related, and Sucralfate     Tmax:     Micro:     Recent Labs     05/04/24  1655   CREATININE 1.1       Recent Labs     05/04/24  1655   WBC 9.1       Estimated Creatinine Clearance: 48 mL/min (based on SCr of 1.1 mg/dL).    No intake or output data in the 24 hours ending 05/04/24 2213    Wt Readings from Last 1 Encounters:   05/04/24 76.8 kg (169 lb 4.8 oz)         Body mass index is 25 kg/m².    Loading dose (critically ill or in ICU, require dialysis or renal replacement therapy): Vancomycin 25 mg/kg IVPB x 1 (maximum 2500 mg).  Maintenance dose: 10-20 mg/kg (maximum: 2000 mg/dose and 4500 mg/day) starting at the next dosing interval determined by renal function  Goal Vancomycin trough: 15-20 mcg/mL   Goal Vancomycin AUC: 400-600     Assessment/Plan:  Will initiate Vancomycin at 750 mg IV every 12 hours. Calculated Vancomycin AUC = 493 mg/L*h with an estimated steady-state vancomycin trough = 19.2 mcg/mL. Vancomycin level ordered for 5/5 at 1000. Timing of trough level will be determined based on culture results, renal function, and clinical response.     Thank you for the consult.  Derjee Scott, PharmD  5/4/2024 10:14 PM

## 2024-05-05 NOTE — PROGRESS NOTES
Pharmacy Vancomycin Consult     Vancomycin Day: 2  Current Dosin mg IV every 12 hours  Current indication: CAP    Temp max:  98.5    Recent Labs     24  1655 24  0632   BUN 16 15   CREATININE 1.1 1.2   WBC 9.1 6.1       Intake/Output Summary (Last 24 hours) at 2024 1119  Last data filed at 2024 1111  Gross per 24 hour   Intake --   Output 1350 ml   Net -1350 ml       Ht Readings from Last 1 Encounters:   24 1.753 m (5' 9\")        Wt Readings from Last 1 Encounters:   24 76.8 kg (169 lb 4.8 oz)       Body mass index is 25 kg/m².    Estimated Creatinine Clearance: 44 mL/min (based on SCr of 1.2 mg/dL).    Trough: 17.3 ( 1000)    Assessment/Plan:  Will decrease the dose to 750 mg IV every 24 hours. Predicted AUC = 509.   Draw vancomycin trough  at 1100.  Pharmacy will continue to monitor and adjust as necessary.    
Pt dc'd to [facility]. Per Dr Santos, ok to leave midline in place for dc.   
score.  If a patient is on this medication at home then do not decrease Frequency below that used at home.    0-3 - enter or revise RT bronchodilator order(s) to equivalent RT Bronchodilator order with Frequency of every 4 hours PRN for wheezing or increased work of breathing using Per Protocol order mode.        4-6 - enter or revise RT Bronchodilator order(s) to two equivalent RT bronchodilator orders with one order with BID Frequency and one order with Frequency of every 4 hours PRN wheezing or increased work of breathing using Per Protocol order mode.        7-10 - enter or revise RT Bronchodilator order(s) to two equivalent RT bronchodilator orders with one order with TID Frequency and one order with Frequency of every 4 hours PRN wheezing or increased work of breathing using Per Protocol order mode.       11-13 - enter or revise RT Bronchodilator order(s) to one equivalent RT bronchodilator order with QID Frequency and an Albuterol order with Frequency of every 4 hours PRN wheezing or increased work of breathing using Per Protocol order mode.      Greater than 13 - enter or revise RT Bronchodilator order(s) to one equivalent RT bronchodilator order with every 4 hours Frequency and an Albuterol order with Frequency of every 2 hours PRN wheezing or increased work of breathing using Per Protocol order mode.     Electronically signed by Dereje Frye RCP on 5/5/2024 at 1:48 AM

## 2024-05-09 LAB
BACTERIA BLD CULT ORG #2: NORMAL
BACTERIA BLD CULT: NORMAL

## 2024-05-24 PROBLEM — N39.0 UTI (URINARY TRACT INFECTION): Status: RESOLVED | Noted: 2024-04-24 | Resolved: 2024-05-24

## (undated) DEVICE — ENDOSCOPIC KIT 2 12 FT OP4 DE2 GWN SYR

## (undated) DEVICE — CANNULA,OXY,ADULT,SUPERSOFT,W/7'TUB,SC: Brand: MEDLINE INDUSTRIES, INC.

## (undated) DEVICE — CONMED SCOPE SAVER BITE BLOCK, 20X27 MM: Brand: SCOPE SAVER

## (undated) DEVICE — FORCEPS BX L240CM DIA2.4MM L NDL RAD JAW 4 133340

## (undated) DEVICE — ENDO CARRY-ON PROCEDURE KIT INCLUDES SUCTION TUBING, LUBRICANT, GAUZE, BIOHAZARD STICKER, TRANSPORT PAD AND INTERCEPT BEDSIDE KIT.: Brand: ENDO CARRY-ON PROCEDURE KIT

## (undated) DEVICE — ELECTRODE ECG MONITR FOAM TEAR DROP ADLT RED

## (undated) DEVICE — FORCEP BX STD CAP 240CM RAD JAW 4

## (undated) DEVICE — YANKAUER,BULB TIP,W/O VENT,RIGID,STERILE: Brand: MEDLINE